# Patient Record
Sex: FEMALE | Race: WHITE | Employment: OTHER | URBAN - METROPOLITAN AREA
[De-identification: names, ages, dates, MRNs, and addresses within clinical notes are randomized per-mention and may not be internally consistent; named-entity substitution may affect disease eponyms.]

---

## 2017-02-04 DIAGNOSIS — J30.1 NON-SEASONAL ALLERGIC RHINITIS DUE TO POLLEN: Primary | ICD-10-CM

## 2017-02-04 RX ORDER — MONTELUKAST SODIUM 10 MG/1
10 TABLET ORAL NIGHTLY
Qty: 90 TABLET | Refills: 1 | Status: SHIPPED | OUTPATIENT
Start: 2017-02-04 | End: 2017-03-07 | Stop reason: ALTCHOICE

## 2017-03-07 ENCOUNTER — OFFICE VISIT (OUTPATIENT)
Dept: FAMILY MEDICINE CLINIC | Age: 77
End: 2017-03-07

## 2017-03-07 ENCOUNTER — HOSPITAL ENCOUNTER (OUTPATIENT)
Dept: OTHER | Age: 77
Discharge: HOME | End: 2017-03-07
Attending: NURSE PRACTITIONER | Admitting: NURSE PRACTITIONER

## 2017-03-07 VITALS
TEMPERATURE: 97.9 F | WEIGHT: 109.6 LBS | SYSTOLIC BLOOD PRESSURE: 100 MMHG | BODY MASS INDEX: 20.17 KG/M2 | HEART RATE: 77 BPM | HEIGHT: 62 IN | OXYGEN SATURATION: 97 % | DIASTOLIC BLOOD PRESSURE: 60 MMHG

## 2017-03-07 DIAGNOSIS — M79.89 SWELLING OF LEFT HAND: ICD-10-CM

## 2017-03-07 DIAGNOSIS — M79.89 SWELLING OF LEFT HAND: Primary | ICD-10-CM

## 2017-03-07 LAB
BASOPHILS ABSOLUTE: 0 K/UL (ref 0–0.2)
BASOPHILS RELATIVE PERCENT: 0.2 %
EOSINOPHILS ABSOLUTE: 0 K/UL (ref 0–0.6)
EOSINOPHILS RELATIVE PERCENT: 0.5 %
HCT VFR BLD CALC: 39.4 % (ref 36–48)
HEMOGLOBIN: 12.6 G/DL (ref 12–16)
LYMPHOCYTES ABSOLUTE: 4.4 K/UL (ref 1–5.1)
LYMPHOCYTES RELATIVE PERCENT: 56.2 %
MCH RBC QN AUTO: 29.6 PG (ref 26–34)
MCHC RBC AUTO-ENTMCNC: 32.1 G/DL (ref 31–36)
MCV RBC AUTO: 92.3 FL (ref 80–100)
MONOCYTES ABSOLUTE: 0.7 K/UL (ref 0–1.3)
MONOCYTES RELATIVE PERCENT: 8.9 %
NEUTROPHILS ABSOLUTE: 2.7 K/UL (ref 1.7–7.7)
NEUTROPHILS RELATIVE PERCENT: 34.2 %
PDW BLD-RTO: 17 % (ref 12.4–15.4)
PLATELET # BLD: 121 K/UL (ref 135–450)
PMV BLD AUTO: 9.5 FL (ref 5–10.5)
RBC # BLD: 4.27 M/UL (ref 4–5.2)
WBC # BLD: 7.8 K/UL (ref 4–11)

## 2017-03-07 PROCEDURE — G8484 FLU IMMUNIZE NO ADMIN: HCPCS | Performed by: NURSE PRACTITIONER

## 2017-03-07 PROCEDURE — 1036F TOBACCO NON-USER: CPT | Performed by: NURSE PRACTITIONER

## 2017-03-07 PROCEDURE — 4040F PNEUMOC VAC/ADMIN/RCVD: CPT | Performed by: NURSE PRACTITIONER

## 2017-03-07 PROCEDURE — 3017F COLORECTAL CA SCREEN DOC REV: CPT | Performed by: NURSE PRACTITIONER

## 2017-03-07 PROCEDURE — 99213 OFFICE O/P EST LOW 20 MIN: CPT | Performed by: NURSE PRACTITIONER

## 2017-03-07 PROCEDURE — G8399 PT W/DXA RESULTS DOCUMENT: HCPCS | Performed by: NURSE PRACTITIONER

## 2017-03-07 PROCEDURE — 1123F ACP DISCUSS/DSCN MKR DOCD: CPT | Performed by: NURSE PRACTITIONER

## 2017-03-07 PROCEDURE — G8419 CALC BMI OUT NRM PARAM NOF/U: HCPCS | Performed by: NURSE PRACTITIONER

## 2017-03-07 PROCEDURE — 1090F PRES/ABSN URINE INCON ASSESS: CPT | Performed by: NURSE PRACTITIONER

## 2017-03-07 PROCEDURE — G8427 DOCREV CUR MEDS BY ELIG CLIN: HCPCS | Performed by: NURSE PRACTITIONER

## 2017-03-07 RX ORDER — TRIAMCINOLONE ACETONIDE 55 UG/1
2 SPRAY, METERED NASAL
COMMUNITY
Start: 2017-02-16 | End: 2017-03-15

## 2017-03-07 RX ORDER — OXYMETAZOLINE HYDROCHLORIDE 0.05 G/100ML
2 SPRAY NASAL
COMMUNITY
End: 2017-05-08

## 2017-03-07 ASSESSMENT — ENCOUNTER SYMPTOMS
ALLERGIC/IMMUNOLOGIC NEGATIVE: 1
GASTROINTESTINAL NEGATIVE: 1
RESPIRATORY NEGATIVE: 1
EYES NEGATIVE: 1

## 2017-03-08 LAB — SEDIMENTATION RATE, ERYTHROCYTE: 4 MM/HR (ref 0–30)

## 2017-03-17 DIAGNOSIS — E78.2 MIXED HYPERLIPIDEMIA: Primary | ICD-10-CM

## 2017-03-17 RX ORDER — ATORVASTATIN CALCIUM 20 MG/1
20 TABLET, FILM COATED ORAL DAILY
Qty: 90 TABLET | Refills: 1 | Status: SHIPPED | OUTPATIENT
Start: 2017-03-17 | End: 2018-01-24 | Stop reason: SDUPTHER

## 2017-03-24 ENCOUNTER — TELEPHONE (OUTPATIENT)
Dept: FAMILY MEDICINE CLINIC | Age: 77
End: 2017-03-24

## 2017-04-11 ENCOUNTER — PATIENT MESSAGE (OUTPATIENT)
Dept: FAMILY MEDICINE CLINIC | Age: 77
End: 2017-04-11

## 2017-04-11 RX ORDER — SULFAMETHOXAZOLE AND TRIMETHOPRIM 800; 160 MG/1; MG/1
1 TABLET ORAL 2 TIMES DAILY
Qty: 20 TABLET | Refills: 0 | Status: SHIPPED | OUTPATIENT
Start: 2017-04-11 | End: 2017-04-21

## 2017-05-08 ENCOUNTER — OFFICE VISIT (OUTPATIENT)
Dept: FAMILY MEDICINE CLINIC | Age: 77
End: 2017-05-08

## 2017-05-08 ENCOUNTER — HOSPITAL ENCOUNTER (OUTPATIENT)
Dept: OTHER | Age: 77
Discharge: HOME | End: 2017-05-08
Attending: FAMILY MEDICINE | Admitting: FAMILY MEDICINE

## 2017-05-08 VITALS
WEIGHT: 106.6 LBS | SYSTOLIC BLOOD PRESSURE: 106 MMHG | BODY MASS INDEX: 19.62 KG/M2 | DIASTOLIC BLOOD PRESSURE: 68 MMHG | TEMPERATURE: 97.8 F

## 2017-05-08 DIAGNOSIS — J01.01 ACUTE RECURRENT MAXILLARY SINUSITIS: Primary | ICD-10-CM

## 2017-05-08 DIAGNOSIS — J30.1 SEASONAL ALLERGIC RHINITIS DUE TO POLLEN: ICD-10-CM

## 2017-05-08 DIAGNOSIS — H10.33 ACUTE BACTERIAL CONJUNCTIVITIS OF BOTH EYES: ICD-10-CM

## 2017-05-08 DIAGNOSIS — E78.2 MIXED HYPERLIPIDEMIA: ICD-10-CM

## 2017-05-08 DIAGNOSIS — J01.01 ACUTE RECURRENT MAXILLARY SINUSITIS: ICD-10-CM

## 2017-05-08 PROCEDURE — 3017F COLORECTAL CA SCREEN DOC REV: CPT | Performed by: FAMILY MEDICINE

## 2017-05-08 PROCEDURE — 1090F PRES/ABSN URINE INCON ASSESS: CPT | Performed by: FAMILY MEDICINE

## 2017-05-08 PROCEDURE — 99214 OFFICE O/P EST MOD 30 MIN: CPT | Performed by: FAMILY MEDICINE

## 2017-05-08 PROCEDURE — 1036F TOBACCO NON-USER: CPT | Performed by: FAMILY MEDICINE

## 2017-05-08 PROCEDURE — 1123F ACP DISCUSS/DSCN MKR DOCD: CPT | Performed by: FAMILY MEDICINE

## 2017-05-08 PROCEDURE — G8419 CALC BMI OUT NRM PARAM NOF/U: HCPCS | Performed by: FAMILY MEDICINE

## 2017-05-08 PROCEDURE — G8399 PT W/DXA RESULTS DOCUMENT: HCPCS | Performed by: FAMILY MEDICINE

## 2017-05-08 PROCEDURE — 4040F PNEUMOC VAC/ADMIN/RCVD: CPT | Performed by: FAMILY MEDICINE

## 2017-05-08 PROCEDURE — G8428 CUR MEDS NOT DOCUMENT: HCPCS | Performed by: FAMILY MEDICINE

## 2017-05-08 RX ORDER — LEVOFLOXACIN 500 MG/1
500 TABLET, FILM COATED ORAL DAILY
Qty: 10 TABLET | Refills: 0 | Status: SHIPPED | OUTPATIENT
Start: 2017-05-08 | End: 2017-05-18

## 2017-05-08 RX ORDER — CETIRIZINE HYDROCHLORIDE 10 MG/1
10 TABLET ORAL DAILY
COMMUNITY
End: 2020-01-13

## 2017-05-08 RX ORDER — MONTELUKAST SODIUM 10 MG/1
TABLET ORAL
COMMUNITY
Start: 2017-05-04 | End: 2017-08-02 | Stop reason: SDUPTHER

## 2017-05-08 ASSESSMENT — ENCOUNTER SYMPTOMS
EYES NEGATIVE: 1
GASTROINTESTINAL NEGATIVE: 1
RESPIRATORY NEGATIVE: 1

## 2017-05-16 DIAGNOSIS — E78.2 MIXED HYPERLIPIDEMIA: ICD-10-CM

## 2017-05-16 LAB
A/G RATIO: 2.9 (ref 1.1–2.2)
ALBUMIN SERPL-MCNC: 4.7 G/DL (ref 3.4–5)
ALP BLD-CCNC: 66 U/L (ref 40–129)
ALT SERPL-CCNC: 36 U/L (ref 10–40)
ANION GAP SERPL CALCULATED.3IONS-SCNC: 10 MMOL/L (ref 3–16)
AST SERPL-CCNC: 22 U/L (ref 15–37)
BILIRUB SERPL-MCNC: 0.3 MG/DL (ref 0–1)
BUN BLDV-MCNC: 11 MG/DL (ref 7–20)
CALCIUM SERPL-MCNC: 9.1 MG/DL (ref 8.3–10.6)
CHLORIDE BLD-SCNC: 103 MMOL/L (ref 99–110)
CHOLESTEROL, TOTAL: 180 MG/DL (ref 0–199)
CO2: 30 MMOL/L (ref 21–32)
CREAT SERPL-MCNC: <0.5 MG/DL (ref 0.6–1.2)
GFR AFRICAN AMERICAN: >60
GFR NON-AFRICAN AMERICAN: >60
GLOBULIN: 1.6 G/DL
GLUCOSE BLD-MCNC: 96 MG/DL (ref 70–99)
HDLC SERPL-MCNC: 67 MG/DL (ref 40–60)
LDL CHOLESTEROL CALCULATED: 80 MG/DL
POTASSIUM SERPL-SCNC: 4.3 MMOL/L (ref 3.5–5.1)
SODIUM BLD-SCNC: 143 MMOL/L (ref 136–145)
TOTAL PROTEIN: 6.3 G/DL (ref 6.4–8.2)
TRIGL SERPL-MCNC: 167 MG/DL (ref 0–150)
VLDLC SERPL CALC-MCNC: 33 MG/DL

## 2017-07-11 ENCOUNTER — OFFICE VISIT (OUTPATIENT)
Dept: FAMILY MEDICINE CLINIC | Age: 77
End: 2017-07-11

## 2017-07-11 ENCOUNTER — TELEPHONE (OUTPATIENT)
Dept: FAMILY MEDICINE CLINIC | Age: 77
End: 2017-07-11

## 2017-07-11 VITALS
DIASTOLIC BLOOD PRESSURE: 60 MMHG | TEMPERATURE: 96.7 F | HEART RATE: 76 BPM | HEIGHT: 62 IN | BODY MASS INDEX: 19.54 KG/M2 | OXYGEN SATURATION: 97 % | SYSTOLIC BLOOD PRESSURE: 100 MMHG | WEIGHT: 106.2 LBS

## 2017-07-11 DIAGNOSIS — M75.101 ROTATOR CUFF SYNDROME, RIGHT: Primary | ICD-10-CM

## 2017-07-11 PROCEDURE — G8427 DOCREV CUR MEDS BY ELIG CLIN: HCPCS | Performed by: NURSE PRACTITIONER

## 2017-07-11 PROCEDURE — 1123F ACP DISCUSS/DSCN MKR DOCD: CPT | Performed by: NURSE PRACTITIONER

## 2017-07-11 PROCEDURE — 99213 OFFICE O/P EST LOW 20 MIN: CPT | Performed by: NURSE PRACTITIONER

## 2017-07-11 PROCEDURE — 1090F PRES/ABSN URINE INCON ASSESS: CPT | Performed by: NURSE PRACTITIONER

## 2017-07-11 PROCEDURE — 4040F PNEUMOC VAC/ADMIN/RCVD: CPT | Performed by: NURSE PRACTITIONER

## 2017-07-11 PROCEDURE — 1036F TOBACCO NON-USER: CPT | Performed by: NURSE PRACTITIONER

## 2017-07-11 PROCEDURE — G8399 PT W/DXA RESULTS DOCUMENT: HCPCS | Performed by: NURSE PRACTITIONER

## 2017-07-11 PROCEDURE — G8420 CALC BMI NORM PARAMETERS: HCPCS | Performed by: NURSE PRACTITIONER

## 2017-07-11 RX ORDER — ALPRAZOLAM 1 MG/1
TABLET ORAL
Qty: 1 TABLET | Refills: 0 | Status: SHIPPED | OUTPATIENT
Start: 2017-07-11 | End: 2017-09-20 | Stop reason: ALTCHOICE

## 2017-07-11 ASSESSMENT — ENCOUNTER SYMPTOMS
GASTROINTESTINAL NEGATIVE: 1
ALLERGIC/IMMUNOLOGIC NEGATIVE: 1
EYES NEGATIVE: 1
RESPIRATORY NEGATIVE: 1

## 2017-07-12 ENCOUNTER — HOSPITAL ENCOUNTER (OUTPATIENT)
Dept: MRI IMAGING | Age: 77
Discharge: HOME | End: 2017-07-12
Attending: NURSE PRACTITIONER | Admitting: NURSE PRACTITIONER

## 2017-07-12 DIAGNOSIS — M75.101 ROTATOR CUFF SYNDROME, RIGHT: ICD-10-CM

## 2017-07-12 DIAGNOSIS — M75.101 RIGHT ROTATOR CUFF TEAR: ICD-10-CM

## 2017-08-02 DIAGNOSIS — J30.1 NON-SEASONAL ALLERGIC RHINITIS DUE TO POLLEN: ICD-10-CM

## 2017-08-02 RX ORDER — MONTELUKAST SODIUM 10 MG/1
10 TABLET ORAL NIGHTLY
Qty: 90 TABLET | Refills: 1 | Status: SHIPPED | OUTPATIENT
Start: 2017-08-02 | End: 2018-02-04 | Stop reason: SDUPTHER

## 2017-08-08 ENCOUNTER — PATIENT MESSAGE (OUTPATIENT)
Dept: FAMILY MEDICINE CLINIC | Age: 77
End: 2017-08-08

## 2017-08-08 DIAGNOSIS — M75.121 COMPLETE TEAR OF RIGHT ROTATOR CUFF: Primary | ICD-10-CM

## 2017-08-08 DIAGNOSIS — M19.011 PRIMARY OSTEOARTHRITIS OF RIGHT SHOULDER: ICD-10-CM

## 2017-08-10 PROBLEM — M75.121 COMPLETE TEAR OF RIGHT ROTATOR CUFF: Status: ACTIVE | Noted: 2017-08-10

## 2017-08-10 PROBLEM — M19.011 PRIMARY OSTEOARTHRITIS OF RIGHT SHOULDER: Status: ACTIVE | Noted: 2017-08-10

## 2017-08-15 ENCOUNTER — HOSPITAL ENCOUNTER (OUTPATIENT)
Dept: ONCOLOGY | Age: 77
Discharge: HOME | End: 2017-08-31
Attending: INTERNAL MEDICINE | Admitting: INTERNAL MEDICINE

## 2017-08-15 VITALS
TEMPERATURE: 97.3 F | SYSTOLIC BLOOD PRESSURE: 94 MMHG | HEART RATE: 69 BPM | DIASTOLIC BLOOD PRESSURE: 52 MMHG | RESPIRATION RATE: 16 BRPM

## 2017-08-15 DIAGNOSIS — D59.10 ACQUIRED AUTOIMMUNE HEMOLYTIC ANEMIA (HCC): ICD-10-CM

## 2017-08-15 DIAGNOSIS — C91.12 CLL (CHRONIC LYMPHOID LEUKEMIA) IN RELAPSE (HCC): ICD-10-CM

## 2017-08-15 LAB
ABO/RH: NORMAL
ANTIBODY SCREEN: NORMAL
BLOOD BANK DISPENSE STATUS: NORMAL
BLOOD BANK PRODUCT CODE: NORMAL
BPU ID: NORMAL
DESCRIPTION BLOOD BANK: NORMAL

## 2017-08-15 RX ORDER — SODIUM CHLORIDE 0.9 % (FLUSH) 0.9 %
20 SYRINGE (ML) INJECTION PRN
Status: CANCELLED | OUTPATIENT
Start: 2017-08-15

## 2017-08-15 RX ORDER — ACETAMINOPHEN 325 MG/1
650 TABLET ORAL ONCE
Status: CANCELLED | OUTPATIENT
Start: 2017-08-15 | End: 2017-08-15

## 2017-08-15 RX ORDER — SODIUM CHLORIDE 9 MG/ML
INJECTION, SOLUTION INTRAVENOUS CONTINUOUS
Status: CANCELLED | OUTPATIENT
Start: 2017-08-15

## 2017-08-15 RX ORDER — DIPHENHYDRAMINE HCL 25 MG
25 TABLET ORAL ONCE
Status: CANCELLED | OUTPATIENT
Start: 2017-08-15 | End: 2017-08-15

## 2017-08-15 RX ORDER — DIPHENHYDRAMINE HYDROCHLORIDE 50 MG/ML
50 INJECTION INTRAMUSCULAR; INTRAVENOUS ONCE
Status: CANCELLED | OUTPATIENT
Start: 2017-08-15 | End: 2017-08-15

## 2017-08-15 RX ORDER — DIPHENHYDRAMINE HYDROCHLORIDE 50 MG/ML
25 INJECTION INTRAMUSCULAR; INTRAVENOUS ONCE
Status: CANCELLED | OUTPATIENT
Start: 2017-08-15 | End: 2017-08-15

## 2017-08-15 RX ORDER — METHYLPREDNISOLONE SODIUM SUCCINATE 125 MG/2ML
125 INJECTION, POWDER, LYOPHILIZED, FOR SOLUTION INTRAMUSCULAR; INTRAVENOUS ONCE
Status: CANCELLED | OUTPATIENT
Start: 2017-08-15 | End: 2017-08-15

## 2017-08-15 RX ORDER — 0.9 % SODIUM CHLORIDE 0.9 %
10 VIAL (ML) INJECTION ONCE
Status: CANCELLED | OUTPATIENT
Start: 2017-08-15 | End: 2017-08-15

## 2017-08-17 ENCOUNTER — HOSPITAL ENCOUNTER (OUTPATIENT)
Dept: PHYSICAL THERAPY | Age: 77
Discharge: HOME | End: 2017-08-31
Admitting: FAMILY MEDICINE

## 2017-08-31 ENCOUNTER — HOSPITAL ENCOUNTER (OUTPATIENT)
Dept: ONCOLOGY | Age: 77
Discharge: HOME | End: 2017-08-31
Attending: INTERNAL MEDICINE | Admitting: INTERNAL MEDICINE

## 2017-08-31 DIAGNOSIS — C91.12 CLL (CHRONIC LYMPHOID LEUKEMIA) IN RELAPSE (HCC): ICD-10-CM

## 2017-08-31 DIAGNOSIS — D59.10 ACQUIRED AUTOIMMUNE HEMOLYTIC ANEMIA (HCC): ICD-10-CM

## 2017-08-31 LAB
ABO/RH: NORMAL
ANTIBODY IDENTIFICATION: NORMAL
ANTIBODY SCREEN: NORMAL

## 2017-08-31 RX ORDER — DIPHENHYDRAMINE HYDROCHLORIDE 50 MG/ML
50 INJECTION INTRAMUSCULAR; INTRAVENOUS ONCE
Status: CANCELLED | OUTPATIENT
Start: 2017-08-31 | End: 2017-08-31

## 2017-08-31 RX ORDER — SODIUM CHLORIDE 9 MG/ML
INJECTION, SOLUTION INTRAVENOUS CONTINUOUS
Status: CANCELLED | OUTPATIENT
Start: 2017-08-31

## 2017-08-31 RX ORDER — METHYLPREDNISOLONE SODIUM SUCCINATE 125 MG/2ML
125 INJECTION, POWDER, LYOPHILIZED, FOR SOLUTION INTRAMUSCULAR; INTRAVENOUS ONCE
Status: CANCELLED | OUTPATIENT
Start: 2017-08-31 | End: 2017-08-31

## 2017-08-31 RX ORDER — ACETAMINOPHEN 325 MG/1
650 TABLET ORAL ONCE
Status: CANCELLED | OUTPATIENT
Start: 2017-08-31 | End: 2017-08-31

## 2017-08-31 RX ORDER — DIPHENHYDRAMINE HCL 25 MG
25 TABLET ORAL ONCE
Status: CANCELLED | OUTPATIENT
Start: 2017-08-31 | End: 2017-08-31

## 2017-08-31 RX ORDER — DIPHENHYDRAMINE HYDROCHLORIDE 50 MG/ML
25 INJECTION INTRAMUSCULAR; INTRAVENOUS ONCE
Status: CANCELLED | OUTPATIENT
Start: 2017-08-31 | End: 2017-08-31

## 2017-08-31 RX ORDER — SODIUM CHLORIDE 0.9 % (FLUSH) 0.9 %
20 SYRINGE (ML) INJECTION PRN
Status: CANCELLED | OUTPATIENT
Start: 2017-08-31

## 2017-08-31 RX ORDER — 0.9 % SODIUM CHLORIDE 0.9 %
10 VIAL (ML) INJECTION ONCE
Status: CANCELLED | OUTPATIENT
Start: 2017-08-31 | End: 2017-08-31

## 2017-09-01 ENCOUNTER — HOSPITAL ENCOUNTER (OUTPATIENT)
Dept: ONCOLOGY | Age: 77
Discharge: HOME | End: 2017-09-01
Attending: INTERNAL MEDICINE | Admitting: INTERNAL MEDICINE

## 2017-09-01 VITALS
TEMPERATURE: 98.1 F | HEART RATE: 61 BPM | SYSTOLIC BLOOD PRESSURE: 101 MMHG | DIASTOLIC BLOOD PRESSURE: 62 MMHG | RESPIRATION RATE: 16 BRPM

## 2017-09-01 DIAGNOSIS — D59.10 ACQUIRED AUTOIMMUNE HEMOLYTIC ANEMIA (HCC): ICD-10-CM

## 2017-09-01 DIAGNOSIS — C91.12 CLL (CHRONIC LYMPHOID LEUKEMIA) IN RELAPSE (HCC): ICD-10-CM

## 2017-09-01 LAB
BLOOD BANK DISPENSE STATUS: NORMAL
BLOOD BANK PRODUCT CODE: NORMAL
BPU ID: NORMAL
DESCRIPTION BLOOD BANK: NORMAL

## 2017-09-01 RX ORDER — METHYLPREDNISOLONE SODIUM SUCCINATE 125 MG/2ML
125 INJECTION, POWDER, LYOPHILIZED, FOR SOLUTION INTRAMUSCULAR; INTRAVENOUS ONCE
Status: CANCELLED | OUTPATIENT
Start: 2017-09-01 | End: 2017-09-01

## 2017-09-01 RX ORDER — 0.9 % SODIUM CHLORIDE 0.9 %
10 VIAL (ML) INJECTION ONCE
Status: CANCELLED | OUTPATIENT
Start: 2017-09-01 | End: 2017-09-01

## 2017-09-01 RX ORDER — DIPHENHYDRAMINE HYDROCHLORIDE 50 MG/ML
25 INJECTION INTRAMUSCULAR; INTRAVENOUS ONCE
Status: CANCELLED | OUTPATIENT
Start: 2017-09-01 | End: 2017-09-01

## 2017-09-01 RX ORDER — DIPHENHYDRAMINE HCL 25 MG
25 TABLET ORAL ONCE
Status: CANCELLED | OUTPATIENT
Start: 2017-09-01 | End: 2017-09-01

## 2017-09-01 RX ORDER — DIPHENHYDRAMINE HYDROCHLORIDE 50 MG/ML
50 INJECTION INTRAMUSCULAR; INTRAVENOUS ONCE
Status: CANCELLED | OUTPATIENT
Start: 2017-09-01 | End: 2017-09-01

## 2017-09-01 RX ORDER — SODIUM CHLORIDE 0.9 % (FLUSH) 0.9 %
20 SYRINGE (ML) INJECTION PRN
Status: CANCELLED | OUTPATIENT
Start: 2017-09-01

## 2017-09-01 RX ORDER — ACETAMINOPHEN 325 MG/1
650 TABLET ORAL ONCE
Status: CANCELLED | OUTPATIENT
Start: 2017-09-01 | End: 2017-09-01

## 2017-09-01 RX ORDER — SODIUM CHLORIDE 9 MG/ML
INJECTION, SOLUTION INTRAVENOUS CONTINUOUS
Status: CANCELLED | OUTPATIENT
Start: 2017-09-01

## 2017-09-19 ENCOUNTER — TELEPHONE (OUTPATIENT)
Dept: FAMILY MEDICINE CLINIC | Age: 77
End: 2017-09-19

## 2017-09-19 RX ORDER — DOXYCYCLINE HYCLATE 100 MG
100 TABLET ORAL 2 TIMES DAILY
Qty: 20 TABLET | Refills: 0 | Status: SHIPPED | OUTPATIENT
Start: 2017-09-19 | End: 2017-09-29

## 2017-09-19 RX ORDER — ALBUTEROL SULFATE 90 UG/1
2 AEROSOL, METERED RESPIRATORY (INHALATION) EVERY 6 HOURS PRN
Qty: 1 INHALER | Refills: 1 | Status: SHIPPED | OUTPATIENT
Start: 2017-09-19 | End: 2019-03-05

## 2017-09-20 ENCOUNTER — OFFICE VISIT (OUTPATIENT)
Dept: FAMILY MEDICINE CLINIC | Age: 77
End: 2017-09-20

## 2017-09-20 VITALS
DIASTOLIC BLOOD PRESSURE: 60 MMHG | SYSTOLIC BLOOD PRESSURE: 110 MMHG | WEIGHT: 106 LBS | OXYGEN SATURATION: 97 % | TEMPERATURE: 98.2 F | HEART RATE: 78 BPM | BODY MASS INDEX: 19.51 KG/M2 | HEIGHT: 62 IN

## 2017-09-20 DIAGNOSIS — H57.89 EYE REDNESS: Primary | ICD-10-CM

## 2017-09-20 DIAGNOSIS — R05.9 COUGH: ICD-10-CM

## 2017-09-20 PROCEDURE — 1123F ACP DISCUSS/DSCN MKR DOCD: CPT | Performed by: NURSE PRACTITIONER

## 2017-09-20 PROCEDURE — 99213 OFFICE O/P EST LOW 20 MIN: CPT | Performed by: NURSE PRACTITIONER

## 2017-09-20 PROCEDURE — 1090F PRES/ABSN URINE INCON ASSESS: CPT | Performed by: NURSE PRACTITIONER

## 2017-09-20 PROCEDURE — G8420 CALC BMI NORM PARAMETERS: HCPCS | Performed by: NURSE PRACTITIONER

## 2017-09-20 PROCEDURE — G8399 PT W/DXA RESULTS DOCUMENT: HCPCS | Performed by: NURSE PRACTITIONER

## 2017-09-20 PROCEDURE — G8427 DOCREV CUR MEDS BY ELIG CLIN: HCPCS | Performed by: NURSE PRACTITIONER

## 2017-09-20 PROCEDURE — 4040F PNEUMOC VAC/ADMIN/RCVD: CPT | Performed by: NURSE PRACTITIONER

## 2017-09-20 PROCEDURE — 1036F TOBACCO NON-USER: CPT | Performed by: NURSE PRACTITIONER

## 2017-09-20 RX ORDER — BROMPHENIRAMINE MALEATE, PSEUDOEPHEDRINE HYDROCHLORIDE, AND DEXTROMETHORPHAN HYDROBROMIDE 2; 30; 10 MG/5ML; MG/5ML; MG/5ML
5 SYRUP ORAL 4 TIMES DAILY PRN
Qty: 200 ML | Refills: 0 | Status: SHIPPED | OUTPATIENT
Start: 2017-09-20 | End: 2019-03-05 | Stop reason: ALTCHOICE

## 2017-09-20 RX ORDER — ERYTHROMYCIN 5 MG/G
OINTMENT OPHTHALMIC
Qty: 1 TUBE | Refills: 0 | Status: SHIPPED | OUTPATIENT
Start: 2017-09-20 | End: 2019-03-05

## 2017-09-20 ASSESSMENT — ENCOUNTER SYMPTOMS
EYE DISCHARGE: 1
BLURRED VISION: 0
COUGH: 1
GASTROINTESTINAL NEGATIVE: 1
VOMITING: 0
FOREIGN BODY SENSATION: 1
WHEEZING: 0
ALLERGIC/IMMUNOLOGIC NEGATIVE: 1
NAUSEA: 0
SORE THROAT: 0
HEARTBURN: 0
EYE REDNESS: 1
HEMOPTYSIS: 0
PHOTOPHOBIA: 0
RHINORRHEA: 0
SHORTNESS OF BREATH: 0
DOUBLE VISION: 0

## 2018-01-15 ENCOUNTER — TELEPHONE (OUTPATIENT)
Dept: FAMILY MEDICINE CLINIC | Age: 78
End: 2018-01-15

## 2018-01-24 RX ORDER — ATORVASTATIN CALCIUM 20 MG/1
TABLET, FILM COATED ORAL
Qty: 60 TABLET | Refills: 0 | Status: SHIPPED | OUTPATIENT
Start: 2018-01-24 | End: 2018-06-03 | Stop reason: SDUPTHER

## 2018-02-04 DIAGNOSIS — J30.1 NON-SEASONAL ALLERGIC RHINITIS DUE TO POLLEN: ICD-10-CM

## 2018-02-04 RX ORDER — MONTELUKAST SODIUM 10 MG/1
10 TABLET ORAL NIGHTLY
Qty: 90 TABLET | Refills: 1 | Status: SHIPPED | OUTPATIENT
Start: 2018-02-04 | End: 2018-08-12 | Stop reason: SDUPTHER

## 2018-02-14 ENCOUNTER — OFFICE VISIT (OUTPATIENT)
Dept: FAMILY MEDICINE CLINIC | Age: 78
End: 2018-02-14

## 2018-02-14 VITALS
TEMPERATURE: 98.3 F | WEIGHT: 107.8 LBS | SYSTOLIC BLOOD PRESSURE: 110 MMHG | OXYGEN SATURATION: 96 % | HEART RATE: 66 BPM | HEIGHT: 62 IN | BODY MASS INDEX: 19.84 KG/M2 | DIASTOLIC BLOOD PRESSURE: 60 MMHG

## 2018-02-14 DIAGNOSIS — M25.531 RIGHT WRIST PAIN: ICD-10-CM

## 2018-02-14 DIAGNOSIS — M47.812 OSTEOARTHRITIS OF CERVICAL SPINE, UNSPECIFIED SPINAL OSTEOARTHRITIS COMPLICATION STATUS: ICD-10-CM

## 2018-02-14 DIAGNOSIS — D61.01 ACUTE PURE RED CELL APLASIA (HCC): ICD-10-CM

## 2018-02-14 DIAGNOSIS — M75.121 COMPLETE TEAR OF RIGHT ROTATOR CUFF: Primary | ICD-10-CM

## 2018-02-14 RX ORDER — CELECOXIB 100 MG/1
100 CAPSULE ORAL DAILY
Qty: 90 CAPSULE | Refills: 1 | Status: SHIPPED | OUTPATIENT
Start: 2018-02-14 | End: 2018-11-09 | Stop reason: SDUPTHER

## 2018-02-14 ASSESSMENT — ENCOUNTER SYMPTOMS
GASTROINTESTINAL NEGATIVE: 1
VISUAL CHANGE: 0
PHOTOPHOBIA: 0
TROUBLE SWALLOWING: 0
EYES NEGATIVE: 1
ALLERGIC/IMMUNOLOGIC NEGATIVE: 1
RESPIRATORY NEGATIVE: 1

## 2018-02-14 NOTE — PROGRESS NOTES
Date    Allergic rhinitis due to pollen 1/20/2016    Arthritis, degenerative 5/3/2011    Cancer (HCC)     chronic lymphocytic leukemia    CLL (chronic lymphoblastic leukemia)     Closed compression fracture of thoracic vertebra (HCC) 6/27/2016    Depression     Hyperlipidemia     Melanoma of upper arm ([address] [address]     2002    Mild reactive airways disease 1/20/2016     Past Surgical History:   Procedure Laterality Date    APPENDECTOMY      BELPHAROPTOSIS REPAIR      CATARACT REMOVAL WITH IMPLANT      bilat    NASAL SINUS SURGERY      TONSILLECTOMY       Family History   Problem Relation Age of Onset    Cancer Mother      lymphoma    Heart Disease Father     Heart Disease Sister     Arthritis Sister      Social History     Social History    Marital status:      Spouse name: N/A    Number of children: N/A    Years of education: N/A     Occupational History    Not on file. Social History Main Topics    Smoking status: Never Smoker    Smokeless tobacco: Never Used    Alcohol use 0.0 oz/week      Comment: occasionally     Drug use: Unknown    Sexual activity: Not on file     Other Topics Concern    Not on file     Social History Narrative    No narrative on file         Any recent diagnostic tests, hospital reports, office notes or consultation letters were reviewed prior to and during the visit. Shoulder Pain    The pain is present in the right shoulder. This is a recurrent problem. The current episode started more than 1 month ago. The problem has been unchanged. The quality of the pain is described as aching. The pain is moderate. Associated symptoms include a limited range of motion and stiffness. Pertinent negatives include no fever, inability to bear weight, itching, joint locking, joint swelling, numbness or tingling. She has tried NSAIDS for the symptoms. The treatment provided mild relief. Neck Pain    This is a chronic problem.  The current episode started more than 1 decreased range of motion, tenderness, bony tenderness, pain and decreased strength. She exhibits no swelling, no effusion, no crepitus, no deformity, no laceration, no spasm and normal pulse. Right wrist: She exhibits tenderness, bony tenderness and swelling. She exhibits normal range of motion, no effusion, no crepitus, no deformity and no laceration. Cervical back: She exhibits tenderness, bony tenderness and pain. She exhibits normal range of motion, no swelling, no edema, no deformity, no laceration, no spasm and normal pulse. Neurological: She is alert and oriented to person, place, and time. Skin: Skin is warm and dry. Psychiatric: She has a normal mood and affect. Her behavior is normal. Thought content normal.         1. Complete tear of right rotator cuff  Would like to try PT, order written celecoxib (CELEBREX) 100 MG capsule    External Referral To Physical Therapy   2. Osteoarthritis of cervical spine, unspecified spinal osteoarthritis complication status  Condition is unchanged, will change treatment, call if no better or worsens   celecoxib (CELEBREX) 100 MG capsule    External Referral To Physical Therapy   3.  Right wrist pain Condition is unchanged, will change treatment, call if no better or worsens   celecoxib (CELEBREX) 100 MG capsule    External Referral To Physical Therapy             Chrissie Cullen NP

## 2018-03-01 RX ORDER — MECLIZINE HYDROCHLORIDE 25 MG/1
25 TABLET ORAL 3 TIMES DAILY PRN
Qty: 60 TABLET | Refills: 1 | Status: SHIPPED | OUTPATIENT
Start: 2018-03-01 | End: 2019-03-05

## 2018-03-22 ENCOUNTER — HOSPITAL ENCOUNTER (OUTPATIENT)
Dept: GENERAL RADIOLOGY | Age: 78
Discharge: HOME | End: 2018-03-22
Attending: INTERNAL MEDICINE | Admitting: INTERNAL MEDICINE

## 2018-03-22 DIAGNOSIS — Z13.820 ENCOUNTER FOR IMAGING TO ASSESS OSTEOPOROSIS: ICD-10-CM

## 2018-03-22 DIAGNOSIS — M81.0 SENILE OSTEOPOROSIS: ICD-10-CM

## 2018-03-22 DIAGNOSIS — Q78.2 OSTEOPETROSIS: ICD-10-CM

## 2018-06-03 RX ORDER — ATORVASTATIN CALCIUM 20 MG/1
TABLET, FILM COATED ORAL
Qty: 30 TABLET | Refills: 0 | Status: SHIPPED | OUTPATIENT
Start: 2018-06-03 | End: 2018-08-12 | Stop reason: SDUPTHER

## 2018-06-18 ENCOUNTER — TELEPHONE (OUTPATIENT)
Dept: FAMILY MEDICINE CLINIC | Age: 78
End: 2018-06-18

## 2018-06-18 DIAGNOSIS — E78.2 MIXED HYPERLIPIDEMIA: Primary | ICD-10-CM

## 2018-07-12 DIAGNOSIS — E78.2 MIXED HYPERLIPIDEMIA: ICD-10-CM

## 2018-07-12 LAB
A/G RATIO: 3.3 (ref 1.1–2.2)
ALBUMIN SERPL-MCNC: 4.6 G/DL (ref 3.4–5)
ALP BLD-CCNC: 57 U/L (ref 40–129)
ALT SERPL-CCNC: 27 U/L (ref 10–40)
ANION GAP SERPL CALCULATED.3IONS-SCNC: 9 MMOL/L (ref 3–16)
AST SERPL-CCNC: 21 U/L (ref 15–37)
BILIRUB SERPL-MCNC: 0.4 MG/DL (ref 0–1)
BUN BLDV-MCNC: 14 MG/DL (ref 7–20)
CALCIUM SERPL-MCNC: 9.3 MG/DL (ref 8.3–10.6)
CHLORIDE BLD-SCNC: 103 MMOL/L (ref 99–110)
CHOLESTEROL, TOTAL: 185 MG/DL (ref 0–199)
CO2: 29 MMOL/L (ref 21–32)
CREAT SERPL-MCNC: <0.5 MG/DL (ref 0.6–1.2)
GFR AFRICAN AMERICAN: >60
GFR NON-AFRICAN AMERICAN: >60
GLOBULIN: 1.4 G/DL
GLUCOSE BLD-MCNC: 97 MG/DL (ref 70–99)
HDLC SERPL-MCNC: 74 MG/DL (ref 40–60)
LDL CHOLESTEROL CALCULATED: 90 MG/DL
POTASSIUM SERPL-SCNC: 4.2 MMOL/L (ref 3.5–5.1)
SODIUM BLD-SCNC: 141 MMOL/L (ref 136–145)
TOTAL PROTEIN: 6 G/DL (ref 6.4–8.2)
TRIGL SERPL-MCNC: 106 MG/DL (ref 0–150)
VLDLC SERPL CALC-MCNC: 21 MG/DL

## 2018-08-12 DIAGNOSIS — J30.1 NON-SEASONAL ALLERGIC RHINITIS DUE TO POLLEN: ICD-10-CM

## 2018-08-14 ENCOUNTER — TELEPHONE (OUTPATIENT)
Dept: INTERNAL MEDICINE | Age: 78
End: 2018-08-14

## 2018-08-14 RX ORDER — MONTELUKAST SODIUM 10 MG/1
TABLET ORAL
Qty: 90 TABLET | Refills: 0 | Status: SHIPPED | OUTPATIENT
Start: 2018-08-14 | End: 2018-11-21 | Stop reason: SDUPTHER

## 2018-08-14 RX ORDER — ATORVASTATIN CALCIUM 20 MG/1
TABLET, FILM COATED ORAL
Qty: 90 TABLET | Refills: 0 | Status: SHIPPED | OUTPATIENT
Start: 2018-08-14 | End: 2018-11-05 | Stop reason: SDUPTHER

## 2018-08-14 NOTE — TELEPHONE ENCOUNTER
Patient called in stating she needs medication refill for atorvastatin (LIPITOR) 20 MG tablet   Verified pharmacy with patient on file  Please call patient

## 2018-10-10 ENCOUNTER — HOSPITAL ENCOUNTER (OUTPATIENT)
Dept: CT IMAGING | Age: 78
Discharge: HOME | End: 2018-10-10
Payer: MEDICARE

## 2018-10-10 DIAGNOSIS — D59.10 AUTOIMMUNE HEMOLYTIC ANEMIA (HCC): ICD-10-CM

## 2018-10-10 PROCEDURE — 74177 CT ABD & PELVIS W/CONTRAST: CPT

## 2018-10-10 PROCEDURE — 6360000004 HC RX CONTRAST MEDICATION: Performed by: INTERNAL MEDICINE

## 2018-10-10 RX ADMIN — IOHEXOL 50 ML: 240 INJECTION, SOLUTION INTRATHECAL; INTRAVASCULAR; INTRAVENOUS; ORAL at 11:50

## 2018-10-10 RX ADMIN — IOPAMIDOL 80 ML: 755 INJECTION, SOLUTION INTRAVENOUS at 11:50

## 2018-11-05 DIAGNOSIS — E78.2 MIXED HYPERLIPIDEMIA: Primary | ICD-10-CM

## 2018-11-05 RX ORDER — ATORVASTATIN CALCIUM 20 MG/1
TABLET, FILM COATED ORAL
Qty: 90 TABLET | Refills: 0 | Status: SHIPPED | OUTPATIENT
Start: 2018-11-05 | End: 2019-10-28 | Stop reason: SDUPTHER

## 2018-11-08 ENCOUNTER — OFFICE VISIT (OUTPATIENT)
Dept: INTERNAL MEDICINE CLINIC | Age: 78
End: 2018-11-08
Payer: MEDICARE

## 2018-11-08 VITALS
OXYGEN SATURATION: 97 % | SYSTOLIC BLOOD PRESSURE: 110 MMHG | DIASTOLIC BLOOD PRESSURE: 70 MMHG | BODY MASS INDEX: 19.88 KG/M2 | WEIGHT: 108 LBS | HEIGHT: 62 IN

## 2018-11-08 DIAGNOSIS — C91.10 CHRONIC LARGE GRANULAR LYMPHOCYTIC LEUKEMIA (HCC): ICD-10-CM

## 2018-11-08 DIAGNOSIS — M75.111 INCOMPLETE TEAR OF RIGHT ROTATOR CUFF: ICD-10-CM

## 2018-11-08 DIAGNOSIS — D80.1 HYPOGAMMAGLOBULINEMIA (HCC): ICD-10-CM

## 2018-11-08 DIAGNOSIS — E78.2 MIXED HYPERLIPIDEMIA: ICD-10-CM

## 2018-11-08 DIAGNOSIS — D59.10 ACQUIRED AUTOIMMUNE HEMOLYTIC ANEMIA (HCC): Primary | ICD-10-CM

## 2018-11-08 PROCEDURE — 1090F PRES/ABSN URINE INCON ASSESS: CPT | Performed by: INTERNAL MEDICINE

## 2018-11-08 PROCEDURE — 1101F PT FALLS ASSESS-DOCD LE1/YR: CPT | Performed by: INTERNAL MEDICINE

## 2018-11-08 PROCEDURE — 99213 OFFICE O/P EST LOW 20 MIN: CPT | Performed by: INTERNAL MEDICINE

## 2018-11-08 PROCEDURE — 4040F PNEUMOC VAC/ADMIN/RCVD: CPT | Performed by: INTERNAL MEDICINE

## 2018-11-08 PROCEDURE — G8420 CALC BMI NORM PARAMETERS: HCPCS | Performed by: INTERNAL MEDICINE

## 2018-11-08 PROCEDURE — G8484 FLU IMMUNIZE NO ADMIN: HCPCS | Performed by: INTERNAL MEDICINE

## 2018-11-08 PROCEDURE — 1123F ACP DISCUSS/DSCN MKR DOCD: CPT | Performed by: INTERNAL MEDICINE

## 2018-11-08 PROCEDURE — 1036F TOBACCO NON-USER: CPT | Performed by: INTERNAL MEDICINE

## 2018-11-08 PROCEDURE — G8427 DOCREV CUR MEDS BY ELIG CLIN: HCPCS | Performed by: INTERNAL MEDICINE

## 2018-11-08 PROCEDURE — G8399 PT W/DXA RESULTS DOCUMENT: HCPCS | Performed by: INTERNAL MEDICINE

## 2018-11-08 ASSESSMENT — PATIENT HEALTH QUESTIONNAIRE - PHQ9
SUM OF ALL RESPONSES TO PHQ9 QUESTIONS 1 & 2: 0
2. FEELING DOWN, DEPRESSED OR HOPELESS: 0
1. LITTLE INTEREST OR PLEASURE IN DOING THINGS: 0
SUM OF ALL RESPONSES TO PHQ QUESTIONS 1-9: 0
SUM OF ALL RESPONSES TO PHQ QUESTIONS 1-9: 0

## 2018-11-08 ASSESSMENT — ENCOUNTER SYMPTOMS
SHORTNESS OF BREATH: 0
CHEST TIGHTNESS: 0
NAUSEA: 0
VOMITING: 0
ABDOMINAL PAIN: 0

## 2018-11-08 NOTE — PROGRESS NOTES
 Melanoma of upper arm ([facility] [address]     2002    Mild reactive airways disease 1/20/2016       Social History:   TOBACCO:   reports that she has never smoked. She has never used smokeless tobacco.  ETOH:   reports that she drinks alcohol. Current Medications:    Prior to Admission medications    Medication Sig Start Date End Date Taking? Authorizing Provider   montelukast (SINGULAIR) 10 MG tablet TAKE ONE TABLET BY MOUTH ONCE NIGHTLY 8/14/18  Yes Ghazal Jose MD   erythromycin [facility]) 5 MG/GM ophthalmic ointment Apply to affected eye(s) 3 times a day x 10 days 9/20/17  Yes BRAULIO Love CNP   brompheniramine-pseudoephedrine-DM 30-2-10 MG/5ML syrup Take 5 mLs by mouth 4 times daily as needed for Cough 9/20/17  Yes BRAULIO Love CNP   butalbital-aspirin-caffeine (FIORINAL) -40 MG capsule TAKE ONE CAPSULE BY MOUTH EVERY 4 HOURS AS NEEDED  **DO NOT EXCEED 6 CAPSULES IN 24 HOURS** 7/21/17  Yes Regina Tam MD   predniSONE (DELTASONE) 10 MG tablet Take half tablet every day 6/20/17  Yes Regina Tam MD   cetirizine (ZYRTEC) 10 MG tablet Take 10 mg by mouth daily   Yes Historical Provider, MD   folic acid (FOLVITE) 1 MG tablet Take 1 tablet by mouth daily 10/25/16  Yes Regina Tam MD   Multiple Vitamin (MVI, CELEBRATE, CHEWABLE TABLET) Take 1 tablet by mouth 3/11/09  Yes Historical Provider, MD   calcium citrate-vitamin D (CITRICAL + D) 315-250 MG-UNIT TABS Take  by mouth. Yes Historical Provider, MD   celecoxib (CELEBREX) 100 MG capsule Take 1 capsule by mouth daily 11/9/18   Marilyn Gomez MD   atorvastatin (LIPITOR) 20 MG tablet TAKE ONE TABLET BY MOUTH DAILY 11/5/18   [facility]  Gina Medina.,    meclizine (ANTIVERT) 25 MG tablet Take 1 tablet by mouth 3 times daily as needed for Dizziness or Nausea 3/1/18   Ghazal Jose MD   albuterol sulfate HFA (PROVENTIL HFA) 108 (90 Base) MCG/ACT inhaler Inhale 2 puffs into the lungs every 6 hours as needed for Wheezing MAY SUBSTITUTE PER Musculoskeletal: She exhibits no deformity. R shoulder: limited abduction 120 due to pain  Mild pain with forced ext rotation and IR   likley rotator cuff tendinitis/ tear      Lymphadenopathy:        Head (right side): No submental and no submandibular adenopathy present. Head (left side): No submental and no submandibular adenopathy present. She has no cervical adenopathy. Right cervical: No superficial cervical and no deep cervical adenopathy present. Left cervical: No superficial cervical and no deep cervical adenopathy present. Neurological: She is alert and oriented to person, place, and time. She has normal reflexes. She exhibits normal muscle tone. GCS eye subscore is 4. GCS verbal subscore is 5. GCS motor subscore is 6. Skin: No rash noted. She is not diaphoretic. Psychiatric: She has a normal mood and affect. Her behavior is normal. Thought content normal.          Assessment/Plan:   Celeste Carnes was seen today for established new doctor. Diagnoses and all orders for this visit:    Acquired autoimmune hemolytic anemia (HCC)    Chronic large granular lymphocytic leukemia ([facility] [address]  She is followed by Dr Millie Fernando, no side effects on Prednison    Mixed hyperlipidemia  On Lipitor 20 mg - no side effects- no changes in meds    Incomplete tear of right rotator cuff  -     External Referral To Physical Therapy      Quality & Risk Score Accuracy    Last edited 11/11/18 19:14 EST by Chela Jain MD       - Patient was encouraged to callthe office (and ask to see me) or be seen by other provider for any worsening or lack    of improvement in his symptoms.       - Pt was asked toschedule an appointment in 6 months, and to let me know of any scheduling difficulties.      Chela Jain M.D.   11/11/2018, 7:17 PM

## 2018-11-09 DIAGNOSIS — M25.531 RIGHT WRIST PAIN: ICD-10-CM

## 2018-11-09 DIAGNOSIS — M47.812 OSTEOARTHRITIS OF CERVICAL SPINE, UNSPECIFIED SPINAL OSTEOARTHRITIS COMPLICATION STATUS: ICD-10-CM

## 2018-11-09 DIAGNOSIS — M75.121 COMPLETE TEAR OF RIGHT ROTATOR CUFF: ICD-10-CM

## 2018-11-09 RX ORDER — CELECOXIB 100 MG/1
100 CAPSULE ORAL DAILY
Qty: 90 CAPSULE | Refills: 1 | Status: SHIPPED | OUTPATIENT
Start: 2018-11-09 | End: 2019-05-14 | Stop reason: ALTCHOICE

## 2018-11-21 DIAGNOSIS — J30.1 NON-SEASONAL ALLERGIC RHINITIS DUE TO POLLEN: ICD-10-CM

## 2018-11-21 RX ORDER — MONTELUKAST SODIUM 10 MG/1
10 TABLET ORAL NIGHTLY
Qty: 90 TABLET | Refills: 2 | Status: SHIPPED | OUTPATIENT
Start: 2018-11-21 | End: 2019-08-25 | Stop reason: SDUPTHER

## 2019-03-05 ENCOUNTER — HOSPITAL ENCOUNTER (OUTPATIENT)
Age: 79
Discharge: HOME | End: 2019-03-05
Payer: MEDICARE

## 2019-03-05 ENCOUNTER — TELEPHONE (OUTPATIENT)
Dept: INTERNAL MEDICINE CLINIC | Age: 79
End: 2019-03-05

## 2019-03-05 ENCOUNTER — OFFICE VISIT (OUTPATIENT)
Dept: INTERNAL MEDICINE CLINIC | Age: 79
End: 2019-03-05
Payer: MEDICARE

## 2019-03-05 ENCOUNTER — HOSPITAL ENCOUNTER (OUTPATIENT)
Dept: GENERAL RADIOLOGY | Age: 79
Discharge: HOME | End: 2019-03-05
Payer: MEDICARE

## 2019-03-05 VITALS
WEIGHT: 108 LBS | BODY MASS INDEX: 20.01 KG/M2 | SYSTOLIC BLOOD PRESSURE: 130 MMHG | OXYGEN SATURATION: 96 % | DIASTOLIC BLOOD PRESSURE: 80 MMHG | HEART RATE: 75 BPM

## 2019-03-05 DIAGNOSIS — F41.9 ANXIETY: Primary | ICD-10-CM

## 2019-03-05 DIAGNOSIS — M75.121 COMPLETE TEAR OF RIGHT ROTATOR CUFF: Primary | ICD-10-CM

## 2019-03-05 DIAGNOSIS — M75.121 COMPLETE TEAR OF RIGHT ROTATOR CUFF: ICD-10-CM

## 2019-03-05 DIAGNOSIS — H65.92 LEFT SEROUS OTITIS MEDIA, UNSPECIFIED CHRONICITY: ICD-10-CM

## 2019-03-05 DIAGNOSIS — C91.10 CHRONIC LARGE GRANULAR LYMPHOCYTIC LEUKEMIA (HCC): ICD-10-CM

## 2019-03-05 DIAGNOSIS — D80.1 HYPOGAMMAGLOBULINEMIA (HCC): ICD-10-CM

## 2019-03-05 PROCEDURE — 1123F ACP DISCUSS/DSCN MKR DOCD: CPT | Performed by: INTERNAL MEDICINE

## 2019-03-05 PROCEDURE — G8427 DOCREV CUR MEDS BY ELIG CLIN: HCPCS | Performed by: INTERNAL MEDICINE

## 2019-03-05 PROCEDURE — 1036F TOBACCO NON-USER: CPT | Performed by: INTERNAL MEDICINE

## 2019-03-05 PROCEDURE — 1090F PRES/ABSN URINE INCON ASSESS: CPT | Performed by: INTERNAL MEDICINE

## 2019-03-05 PROCEDURE — 99213 OFFICE O/P EST LOW 20 MIN: CPT | Performed by: INTERNAL MEDICINE

## 2019-03-05 PROCEDURE — G8484 FLU IMMUNIZE NO ADMIN: HCPCS | Performed by: INTERNAL MEDICINE

## 2019-03-05 PROCEDURE — G8420 CALC BMI NORM PARAMETERS: HCPCS | Performed by: INTERNAL MEDICINE

## 2019-03-05 PROCEDURE — G8399 PT W/DXA RESULTS DOCUMENT: HCPCS | Performed by: INTERNAL MEDICINE

## 2019-03-05 PROCEDURE — 4040F PNEUMOC VAC/ADMIN/RCVD: CPT | Performed by: INTERNAL MEDICINE

## 2019-03-05 PROCEDURE — 73030 X-RAY EXAM OF SHOULDER: CPT

## 2019-03-05 RX ORDER — TRAMADOL HYDROCHLORIDE 50 MG/1
50 TABLET ORAL EVERY 12 HOURS PRN
Qty: 14 TABLET | Refills: 1 | Status: SHIPPED | OUTPATIENT
Start: 2019-03-05 | End: 2019-03-12

## 2019-03-05 RX ORDER — AZITHROMYCIN 250 MG/1
250 TABLET, FILM COATED ORAL SEE ADMIN INSTRUCTIONS
Qty: 6 TABLET | Refills: 0 | Status: SHIPPED | OUTPATIENT
Start: 2019-03-05 | End: 2019-03-10

## 2019-03-05 RX ORDER — LORAZEPAM 0.5 MG/1
0.5 TABLET ORAL EVERY 8 HOURS PRN
Qty: 1 TABLET | Refills: 0 | OUTPATIENT
Start: 2019-03-05 | End: 2019-03-06

## 2019-03-05 RX ORDER — IBUPROFEN 200 MG
400 TABLET ORAL 3 TIMES DAILY
COMMUNITY
End: 2021-02-09

## 2019-03-05 ASSESSMENT — PATIENT HEALTH QUESTIONNAIRE - PHQ9
SUM OF ALL RESPONSES TO PHQ QUESTIONS 1-9: 0
2. FEELING DOWN, DEPRESSED OR HOPELESS: 0
SUM OF ALL RESPONSES TO PHQ9 QUESTIONS 1 & 2: 0
1. LITTLE INTEREST OR PLEASURE IN DOING THINGS: 0
SUM OF ALL RESPONSES TO PHQ QUESTIONS 1-9: 0

## 2019-03-05 NOTE — LETTER
MEDICATION AGREEMENT     Vinayak Plants  [birth year]      For certain conditions, multiple classes of medications may be used to help better manage your symptoms, and to improve your ability to function at home, work and in social settings. However, these medications do have risks, which will be discussed with you, including addiction and dependency. The following prescribed medications need frequent monitoring and will require you to partner and assist in your healthcare. Medication  Dose, instructions and quantity as indicated on current prescription bottle Diagnosis/Reason(s) for Taking Category       Tramadol 50mg BID     Shoulder pain                               Benefits and goals of Controlled Substance Medications: There are two potential goals for your treatment: (1) decreased pain and suffering (2) improved daily life functions. There are many possible treatments for your chronic condition(s), and, in addition to controlled substance medications, we will try alternatives such as physical therapy, yoga, massage, home daily exercise, meditation, relaxation techniques, injections, chiropractic manipulations, surgery, cognitive therapy, hypnosis and many medications that are not habit-forming. Use of controlled substance medications may be helpful, but they are unlikely to resolve all of your symptoms or restore all function. Risks of Controlled Substance Medications:    Opioid pain medications: These medications can lead to problems such as addiction/dependence, sedation, lightheadedness/dizziness, memory issues, falls, constipation, nausea, or vomiting. They may also impair the ability to drive or operate machinery. Additionally, these medications may lower testosterone levels, leading to loss of bone strength, stamina and sex drive.   They may cause problems with breathing, sleep apnea and reduced coughing, which are especially dangerous for patients with lung disease. Overdose or dangerous interactions with alcohol and other medications may occur, leading to death. Hyperalgesia may develop, in which patients receiving opioids for the treatment of pain may actually become more sensitive to certain painful stimuli, and in some cases, experience pain from ordinarily non-painful stimuli. Women between the ages of 14-53 who could become pregnant should carefully weigh the risks and benefits of opioids with their physicians, as these medications increase the risk of pregnancy complications, including miscarriage,  delivery and stillbirth. It is also possible for babies to be born addicted to opioids. Opioid dependence withdrawal symptoms may include; feelings of uneasiness, increased pain, irritability, belly pain, diarrhea, sweats and goose-flesh. Benzodiazepines and non-benzodiazepine sleep medications: These medications can lead to problems such as addiction/dependence, sedation, fatigue, lightheadedness, dizziness, incoordination, falls, depression, hallucinations, and impaired judgment, memory and concentration. The ability to drive and operate machinery may also be affected. Abnormal sleep-related behaviors have been reported, including sleep walking, driving, making telephone calls, eating, or having sex while not fully awake. These medications can suppress breathing and worsen sleep apnea, particularly when combined with alcohol or other sedating medications, potentially leading to death. Dependence withdrawal symptoms may include tremors, anxiety, hallucinations and seizures. Stimulants:  Common adverse effects include addiction/dependence, increased blood pressure and heart rate, decreased appetite, nausea, involuntary weight loss, insomnia, irritability, and headaches.   These risks may increase when these medications are combined with other stimulants, such as caffeine pills or energy drinks, certain weight loss supplements and oral decongestants. Dependence withdrawal symptoms may include depressed mood, loss of interest, suicidal thoughts, anxiety, fatigue, appetite changes and agitation. Testosterone replacement therapy:  Potential side effects include increased risk of stroke and heart attack, blood clots, increased blood pressure, increased cholesterol, enlarged prostate, sleep apnea, irritability/aggression and other mood disorders, and decreased fertility. Other:     1. I understand that I have the following responsibilities:  · I will take medications at the dose and frequency prescribed. · I will not increase or change how I take my medications without the approval of the health care provider who signs this Medication Agreement. · I will arrange for refills at the prescribed interval ONLY during regular office hours. I will not ask for refills earlier than agreed, after-hours, on holidays or on weekends. · I will obtain all refills for these medications at  ·  ____________________________________  pharmacy (phone number  ·  ________________________), with full consent for my provider and pharmacist to exchange information in writing or verbally. · I will not request any pain medications or controlled substances from other providers and will inform this provider of all other medications I am taking. · I will inform my other health care providers that I am taking these medications and of the existence of this [address]. In the event of an emergency, I will provide the same information to the emergency department providers. · I will protect my prescriptions and medications. I understand that lost or misplaced prescriptions will not be replaced. · I will keep medications only for my own use and will not share them with others. I will keep all medications away from children. · I agree to participate in any medical, psychological or psychiatric assessments recommended by my provider. · I will actively participate in any program designed to improve function, including social, physical, psychological and daily or work activities. 2. I will not use illegal or street drugs or another person's prescription. If I have an addiction problem with drugs or alcohol and my provider asks me to enter a program to address this issue, I agree to follow through. Such programs may include:  · 12-Step program and securing a sponsor  · Individual counseling   · Inpatient or outpatient treatment  · Other:_____________________________________________________________________________________________________________________________________________    If in treatment, I will request that a copy of the programs initial evaluation and treatment recommendations be sent to this provider and will not expect refills until that is received. I will also request written monthly updates be sent to this provider to verify my continuing treatment. 3. I will consent to drug screening upon my providers request to assure I am only taking the prescribed drugs, described in this MEDICATION AGREEMENT. I understand that a drug screen is a laboratory test in which a sample of my urine, blood or saliva is checked to see what drugs I have been taking. 4. I agree that I will treat the providers and staff at this office with respect at all times. I will keep all of my scheduled appointments, but if I need to cancel my appointment, I will do so a minimum of 24 hours before it is scheduled. 5. I understand that this provider may stop prescribing the medications listed if:  · I do not show any improvement in pain, or my activity has not improved. · I develop rapid tolerance or loss of improvement, as described in my treatment plan. · I develop significant side effects from the medication.   · My behavior is inconsistent with the responsibilities outlined above, which may also result in my being prevented from receiving further care from this office. · Other:____________________________________________________________________    AGREEMENT:    I have read the above and have had all of my questions answered. For chronic disease management, I know that my symptoms can be managed with many types of treatments. A chronic medication trial may be part of my treatment, but I must be an active participant in my care. Medication therapy is only one part of my symptom management plan. In some cases, there may be limited scientific evidence to support the chronic use of certain medications to improve symptoms and daily function. Furthermore, in certain circumstances, there may be scientific information that suggests that use of chronic controlled substances may actually worsen my symptoms and increase my risk of unintentional death directly related to this medication therapy. I know that if my provider feels my risk from controlled medications is greater than my benefit, I will have my controlled substance medication(s) compassionately lowered or removed altogether. I agree to a controlled substance medication trial.      I further agree to allow this office to contact my HIPAA contact on file if there are concerns about my safety and use of controlled medications. I have agreed to use the following medications above as instructed by my physician and as stated in this [address].      Patient Signature:  ______________________  Date:3/5/2019 or _____________    Provider Signature:______________________  Date:3/5/2019 or _____________

## 2019-03-05 NOTE — PROGRESS NOTES
MD Mirza   traMADol (ULTRAM) 50 MG tablet Take 1 tablet by mouth every 12 hours as needed for Pain for up to 7 days. Intended supply: 3 days. Take lowest dose possible to manage pain 3/5/19 3/12/19 Yes Isabelle Duff MD   montelukast (SINGULAIR) 10 MG tablet Take 1 tablet by mouth nightly 11/21/18  Yes Isabelle Duff MD   celecoxib (CELEBREX) 100 MG capsule Take 1 capsule by mouth daily 11/9/18  Yes Isabelle Duff MD   atorvastatin (LIPITOR) 20 MG tablet TAKE ONE TABLET BY MOUTH DAILY 11/5/18  Yes Angel Vargas.,    butalbital-aspirin-caffeine [facility]) -40 MG capsule TAKE ONE CAPSULE BY MOUTH EVERY 4 HOURS AS NEEDED  **DO NOT EXCEED 6 CAPSULES IN 24 HOURS** 7/21/17  Yes Luz Merrill MD   predniSONE (DELTASONE) 10 MG tablet Take half tablet every day 6/20/17  Yes Luz Merrill MD   cetirizine (ZYRTEC) 10 MG tablet Take 10 mg by mouth daily   Yes Historical Provider, MD   fluticasone (FLONASE) 50 MCG/ACT nasal spray 1 spray by Nasal route daily   Yes Historical Provider, MD   Multiple Vitamin (MVI, CELEBRATE, CHEWABLE TABLET) Take 1 tablet by mouth 3/11/09  Yes Historical Provider, MD   calcium citrate-vitamin D (CITRICAL + D) 315-250 MG-UNIT TABS Take  by mouth. Yes Historical Provider, MD       REVIEW OF SYSTEMS:  Review of Systems   Constitutional: Negative for activity change, appetite change, chills, fever and unexpected weight change. HENT: Positive for ear pain (pressure in both ears ). Negative for hearing loss. Eyes: Negative for visual disturbance. Respiratory: Negative for cough, chest tightness and shortness of breath. Cardiovascular: Negative for chest pain. Gastrointestinal: Negative for abdominal pain, nausea and vomiting. Genitourinary: Negative for hematuria. Musculoskeletal: Positive for arthralgias (R shoulder pain ). Skin: Negative for rash. Allergic/Immunologic: Negative for immunocompromised state.    Neurological: Negative for dizziness and headaches. Psychiatric/Behavioral: Negative for dysphoric mood and suicidal ideas. Physical Exam:      Vitals: /80 (Site: Left Upper Arm, Position: Sitting, Cuff Size: Small Adult)   Pulse 75   Wt 108 lb (49 kg)   LMP  (LMP Unknown)   SpO2 96%   BMI 20.01 kg/m²     Body mass index is 20.01 kg/m². Wt Readings from Last 3 Encounters:   03/05/19 108 lb (49 kg)   11/08/18 108 lb (49 kg)   02/14/18 107 lb 12.8 oz (48.9 kg)     Physical Exam   Constitutional: She is oriented to person, place, and time. She appears well-developed and well-nourished. No distress. HENT:   Head: Normocephalic and atraumatic. Mouth/Throat: Oropharynx is clear and moist. No oropharyngeal exudate. Small amount of fluid behind tympanic membrane. Eyes: Conjunctivae and EOM are normal. Right eye exhibits no discharge. Left eye exhibits no discharge. No scleral icterus. Neck: Normal range of motion. Neck supple. Cardiovascular: Normal rate and normal heart sounds. No murmur heard. Pulmonary/Chest: Effort normal and breath sounds normal. No respiratory distress. She has no wheezes. She has no rales. Abdominal: Soft. She exhibits no distension and no mass. There is no tenderness. There is no guarding. Musculoskeletal: She exhibits no deformity. R shoulder- significant  limitation in ROM especially ABD  No redness/ swelling. Lymphadenopathy:        Head (right side): No submental and no submandibular adenopathy present. Head (left side): No submental and no submandibular adenopathy present. She has no cervical adenopathy. Right cervical: No superficial cervical and no deep cervical adenopathy present. Left cervical: No superficial cervical and no deep cervical adenopathy present. Neurological: She is alert and oriented to person, place, and time. She has normal reflexes. She exhibits normal muscle tone. GCS eye subscore is 4. GCS verbal subscore is 5.  GCS motor subscore

## 2019-03-06 ENCOUNTER — HOSPITAL ENCOUNTER (OUTPATIENT)
Dept: MRI IMAGING | Age: 79
Discharge: HOME | End: 2019-03-06
Payer: MEDICARE

## 2019-03-06 DIAGNOSIS — M75.121 COMPLETE TEAR OF RIGHT ROTATOR CUFF: ICD-10-CM

## 2019-03-06 PROCEDURE — 73221 MRI JOINT UPR EXTREM W/O DYE: CPT

## 2019-04-25 ASSESSMENT — ENCOUNTER SYMPTOMS
CHEST TIGHTNESS: 0
NAUSEA: 0
ABDOMINAL PAIN: 0
VOMITING: 0
COUGH: 0
SHORTNESS OF BREATH: 0

## 2019-05-14 ENCOUNTER — OFFICE VISIT (OUTPATIENT)
Dept: INTERNAL MEDICINE CLINIC | Age: 79
End: 2019-05-14
Payer: MEDICARE

## 2019-05-14 VITALS
HEART RATE: 69 BPM | DIASTOLIC BLOOD PRESSURE: 62 MMHG | BODY MASS INDEX: 19.64 KG/M2 | WEIGHT: 106 LBS | OXYGEN SATURATION: 97 % | SYSTOLIC BLOOD PRESSURE: 110 MMHG

## 2019-05-14 DIAGNOSIS — D59.10 ACQUIRED AUTOIMMUNE HEMOLYTIC ANEMIA (HCC): ICD-10-CM

## 2019-05-14 DIAGNOSIS — Z12.12 SCREENING FOR COLORECTAL CANCER: ICD-10-CM

## 2019-05-14 DIAGNOSIS — F51.01 PRIMARY INSOMNIA: Primary | ICD-10-CM

## 2019-05-14 DIAGNOSIS — Z12.11 SCREENING FOR COLORECTAL CANCER: ICD-10-CM

## 2019-05-14 DIAGNOSIS — E78.2 MIXED HYPERLIPIDEMIA: ICD-10-CM

## 2019-05-14 DIAGNOSIS — C91.12 CLL (CHRONIC LYMPHOID LEUKEMIA) IN RELAPSE (HCC): ICD-10-CM

## 2019-05-14 PROCEDURE — G8427 DOCREV CUR MEDS BY ELIG CLIN: HCPCS | Performed by: INTERNAL MEDICINE

## 2019-05-14 PROCEDURE — 1090F PRES/ABSN URINE INCON ASSESS: CPT | Performed by: INTERNAL MEDICINE

## 2019-05-14 PROCEDURE — 1036F TOBACCO NON-USER: CPT | Performed by: INTERNAL MEDICINE

## 2019-05-14 PROCEDURE — 1123F ACP DISCUSS/DSCN MKR DOCD: CPT | Performed by: INTERNAL MEDICINE

## 2019-05-14 PROCEDURE — 99213 OFFICE O/P EST LOW 20 MIN: CPT | Performed by: INTERNAL MEDICINE

## 2019-05-14 PROCEDURE — 4040F PNEUMOC VAC/ADMIN/RCVD: CPT | Performed by: INTERNAL MEDICINE

## 2019-05-14 PROCEDURE — G8399 PT W/DXA RESULTS DOCUMENT: HCPCS | Performed by: INTERNAL MEDICINE

## 2019-05-14 PROCEDURE — G8420 CALC BMI NORM PARAMETERS: HCPCS | Performed by: INTERNAL MEDICINE

## 2019-05-14 RX ORDER — TRAZODONE HYDROCHLORIDE 50 MG/1
50 TABLET ORAL NIGHTLY
Qty: 30 TABLET | Refills: 1 | Status: SHIPPED | OUTPATIENT
Start: 2019-05-14 | End: 2019-06-01

## 2019-05-14 ASSESSMENT — ENCOUNTER SYMPTOMS
CONSTIPATION: 1
ABDOMINAL PAIN: 0
VOMITING: 0
CHEST TIGHTNESS: 0
NAUSEA: 0
SHORTNESS OF BREATH: 0

## 2019-05-14 NOTE — PROGRESS NOTES
Outpatient Note for established Patient - regular Visit    History Obtained From:  patient, electronic medical record  Is the patient new to me ? - No    HISTORY OF PRESENT ILLNESS:   The patient is a 69 y.o. female who is here today for :      Her shoulder pain is much better   She had surgery   BP looks good today. Pt denies CP/SOB/palpitations/abdominal pain/N/V. She see Dr Yonatan Jackman tomorrow for the CLL. No LOW. CARIDAD/ fever/ chills. She is on OTC anti-histamine  insomnia meds  recently difficulties with falling asleep. She still has some shoulder discomfort. Melatonin does not work. She reports good sleep hygiene     Preventive:  1)  colon cancer screening completed? No- she ius ok with FIT   2) Breast cancer screeningcompleted (or not needed) ? - she does not want to do mammogram       Past Medical History:        Diagnosis Date    Allergic rhinitis due to pollen 1/20/2016    Arthritis, degenerative 5/3/2011    Cancer (HCC)     chronic lymphocytic leukemia    CLL (chronic lymphoblastic leukemia)     Closed compression fracture of thoracic vertebra ([facility] [address] 6/27/2016    Depression     Hyperlipidemia     Melanoma of upper arm ([facility] [address]     2002    Mild reactive airways disease 1/20/2016       Social History:   TOBACCO:   reports that she has never smoked. She has never used smokeless tobacco.    ETOH:   reports that she drinks alcohol. Current Medications:    Prior to Admission medications    Medication Sig Start Date End Date Taking?  Authorizing Provider   traZODone (DESYREL) 50 MG tablet Take 1 tablet by mouth nightly 5/14/19  Yes Isabelle Duff MD   B Complex Vitamins (B-COMPLEX/B-12 PO) Take 1 capsule by mouth daily   Yes Historical Provider, MD   ibuprofen (ADVIL;MOTRIN) 200 MG tablet Take 400 mg by mouth 3 times daily   Yes Historical Provider, MD   montelukast (SINGULAIR) 10 MG tablet Take 1 tablet by mouth nightly 11/21/18  Yes Isabelle Duff MD   atorvastatin (LIPITOR) 20 MG tablet TAKE ONE TABLET BY MOUTH DAILY 11/5/18  Yes Deontetaylor Eller. Alonso Andrews, DO   butalbital-aspirin-caffeine [facility]) -40 MG capsule TAKE ONE CAPSULE BY MOUTH EVERY 4 HOURS AS NEEDED  **DO NOT EXCEED 6 CAPSULES IN 24 HOURS** 7/21/17  Yes Ledy Valentin MD   predniSONE (DELTASONE) 10 MG tablet Take half tablet every day 6/20/17  Yes Ledy Valentin MD   cetirizine (ZYRTEC) 10 MG tablet Take 10 mg by mouth daily   Yes Historical Provider, MD   fluticasone (FLONASE) 50 MCG/ACT nasal spray 1 spray by Nasal route daily   Yes Historical Provider, MD   Multiple Vitamin (MVI, CELEBRATE, CHEWABLE TABLET) Take 1 tablet by mouth 3/11/09  Yes Historical Provider, MD   calcium citrate-vitamin D (CITRICAL + D) 315-250 MG-UNIT TABS Take  by mouth. Yes Historical Provider, MD       REVIEW OF SYSTEMS:  Review of Systems   Constitutional: Negative for activity change, appetite change, chills, fever and unexpected weight change. HENT: Negative for hearing loss. Eyes: Negative for visual disturbance. Respiratory: Negative for chest tightness and shortness of breath. Cardiovascular: Negative for chest pain. Gastrointestinal: Positive for constipation (good with stool softener ). Negative for abdominal pain, nausea and vomiting. Genitourinary: Negative for difficulty urinating and hematuria. Skin: Negative for rash. Allergic/Immunologic: Negative for immunocompromised state. Neurological: Negative for dizziness and headaches. Psychiatric/Behavioral: Negative for dysphoric mood and suicidal ideas. The patient is not nervous/anxious. Physical Exam:      Vitals: /62 (Site: Left Upper Arm, Position: Sitting, Cuff Size: Small Adult)   Pulse 69   Wt 106 lb (48.1 kg)   LMP  (LMP Unknown)   SpO2 97%   BMI 19.64 kg/m²     Body mass index is 19.64 kg/m².   Wt Readings from Last 3 Encounters:   05/14/19 106 lb (48.1 kg)   03/05/19 108 lb (49 kg)   11/08/18 108 lb (49 kg)     Physical Exam will, see Dr Karolyn Tomlinson and repeat labs including CBC    Acquired autoimmune hemolytic anemia ([facility]    Screening for colorectal cancer  -     POCT Fecal Immunochemical Test (FIT)      Quality & Risk Score Accuracy    Visit Dx:  D59.1 - Acquired autoimmune hemolytic anemia (HCC)  Assessment and plan:  Stable based upon symptoms and exam. Continue current treatment plan and follow up at least yearly. She will see Dr Karolyn Tomlinson tomorrow   Last edited 05/14/19 09:56 EDT by Jesus Shell MD       - Patient was encouraged to callthe office (and ask to see me) or be seen by other provider for any worsening or lack    of improvement in his symptoms.       - Pt was asked toschedule an appointment in 6 months, and to let me know of any scheduling difficulties. Additional patients instructions (if given): There are no Patient Instructions on file for this visit.     Jesus Shell M.D.   5/14/2019, 10:09 AM

## 2019-05-15 LAB
CONTROL: NORMAL
HEMOCCULT STL QL: NORMAL

## 2019-05-16 DIAGNOSIS — E78.2 MIXED HYPERLIPIDEMIA: ICD-10-CM

## 2019-05-16 LAB
A/G RATIO: 2 (ref 1.1–2.2)
ALBUMIN SERPL-MCNC: 4.3 G/DL (ref 3.4–5)
ALP BLD-CCNC: 66 U/L (ref 40–129)
ALT SERPL-CCNC: 21 U/L (ref 10–40)
ANION GAP SERPL CALCULATED.3IONS-SCNC: 10 MMOL/L (ref 3–16)
AST SERPL-CCNC: 18 U/L (ref 15–37)
BILIRUB SERPL-MCNC: <0.2 MG/DL (ref 0–1)
BUN BLDV-MCNC: 11 MG/DL (ref 7–20)
CALCIUM SERPL-MCNC: 8.9 MG/DL (ref 8.3–10.6)
CHLORIDE BLD-SCNC: 103 MMOL/L (ref 99–110)
CHOLESTEROL, TOTAL: 189 MG/DL (ref 0–199)
CO2: 28 MMOL/L (ref 21–32)
CREAT SERPL-MCNC: 0.5 MG/DL (ref 0.6–1.2)
GFR AFRICAN AMERICAN: >60
GFR NON-AFRICAN AMERICAN: >60
GLOBULIN: 2.1 G/DL
GLUCOSE BLD-MCNC: 90 MG/DL (ref 70–99)
HDLC SERPL-MCNC: 61 MG/DL (ref 40–60)
LDL CHOLESTEROL CALCULATED: 96 MG/DL
POTASSIUM SERPL-SCNC: 3.9 MMOL/L (ref 3.5–5.1)
SODIUM BLD-SCNC: 141 MMOL/L (ref 136–145)
TOTAL PROTEIN: 6.4 G/DL (ref 6.4–8.2)
TRIGL SERPL-MCNC: 161 MG/DL (ref 0–150)
VLDLC SERPL CALC-MCNC: 32 MG/DL

## 2019-05-22 ENCOUNTER — PATIENT MESSAGE (OUTPATIENT)
Dept: INTERNAL MEDICINE CLINIC | Age: 79
End: 2019-05-22

## 2019-05-23 ENCOUNTER — TELEPHONE (OUTPATIENT)
Dept: INTERNAL MEDICINE CLINIC | Age: 79
End: 2019-05-23

## 2019-05-23 NOTE — TELEPHONE ENCOUNTER
Test Results Question     From  Charyl Apgar To  [facility] [address] Practice Support Sent  2019  2:00 PM   ----- Message from [facility] [address], Processor sent at 2019  2:00 PM EDT -----     Dr. Darylene Reid,   Because my lipid test seems good, should I continue to take the Lennie Sis take it 4 times a week, 20mgs.  Thank you!

## 2019-08-25 DIAGNOSIS — J30.1 NON-SEASONAL ALLERGIC RHINITIS DUE TO POLLEN: ICD-10-CM

## 2019-08-28 RX ORDER — MONTELUKAST SODIUM 10 MG/1
TABLET ORAL
Qty: 90 TABLET | Refills: 1 | Status: SHIPPED | OUTPATIENT
Start: 2019-08-28 | End: 2020-03-05

## 2019-10-28 ENCOUNTER — OFFICE VISIT (OUTPATIENT)
Dept: INTERNAL MEDICINE CLINIC | Age: 79
End: 2019-10-28
Payer: MEDICARE

## 2019-10-28 VITALS
WEIGHT: 105.2 LBS | HEART RATE: 74 BPM | BODY MASS INDEX: 19.49 KG/M2 | DIASTOLIC BLOOD PRESSURE: 70 MMHG | OXYGEN SATURATION: 94 % | SYSTOLIC BLOOD PRESSURE: 122 MMHG

## 2019-10-28 DIAGNOSIS — D59.10 ACQUIRED AUTOIMMUNE HEMOLYTIC ANEMIA (HCC): ICD-10-CM

## 2019-10-28 DIAGNOSIS — J01.01 ACUTE RECURRENT MAXILLARY SINUSITIS: ICD-10-CM

## 2019-10-28 DIAGNOSIS — C91.10 CHRONIC LARGE GRANULAR LYMPHOCYTIC LEUKEMIA (HCC): Primary | ICD-10-CM

## 2019-10-28 DIAGNOSIS — E78.2 MIXED HYPERLIPIDEMIA: ICD-10-CM

## 2019-10-28 PROCEDURE — 4040F PNEUMOC VAC/ADMIN/RCVD: CPT | Performed by: INTERNAL MEDICINE

## 2019-10-28 PROCEDURE — 1090F PRES/ABSN URINE INCON ASSESS: CPT | Performed by: INTERNAL MEDICINE

## 2019-10-28 PROCEDURE — 1123F ACP DISCUSS/DSCN MKR DOCD: CPT | Performed by: INTERNAL MEDICINE

## 2019-10-28 PROCEDURE — G8399 PT W/DXA RESULTS DOCUMENT: HCPCS | Performed by: INTERNAL MEDICINE

## 2019-10-28 PROCEDURE — G8427 DOCREV CUR MEDS BY ELIG CLIN: HCPCS | Performed by: INTERNAL MEDICINE

## 2019-10-28 PROCEDURE — 1036F TOBACCO NON-USER: CPT | Performed by: INTERNAL MEDICINE

## 2019-10-28 PROCEDURE — G8484 FLU IMMUNIZE NO ADMIN: HCPCS | Performed by: INTERNAL MEDICINE

## 2019-10-28 PROCEDURE — 99213 OFFICE O/P EST LOW 20 MIN: CPT | Performed by: INTERNAL MEDICINE

## 2019-10-28 PROCEDURE — G8420 CALC BMI NORM PARAMETERS: HCPCS | Performed by: INTERNAL MEDICINE

## 2019-10-28 RX ORDER — ATORVASTATIN CALCIUM 20 MG/1
TABLET, FILM COATED ORAL
Qty: 90 TABLET | Refills: 3 | Status: SHIPPED | OUTPATIENT
Start: 2019-10-28 | End: 2020-12-14 | Stop reason: SDUPTHER

## 2019-10-28 RX ORDER — DOXYCYCLINE HYCLATE 100 MG
100 TABLET ORAL 2 TIMES DAILY
Qty: 14 TABLET | Refills: 0 | Status: SHIPPED | OUTPATIENT
Start: 2019-10-28 | End: 2019-11-04 | Stop reason: SDUPTHER

## 2019-10-28 ASSESSMENT — ENCOUNTER SYMPTOMS
DIARRHEA: 0
BLOOD IN STOOL: 0
NAUSEA: 0
ABDOMINAL PAIN: 0
VOMITING: 0
RHINORRHEA: 1
CHEST TIGHTNESS: 0
SINUS PRESSURE: 1
BACK PAIN: 0
CONSTIPATION: 0
SHORTNESS OF BREATH: 0
SORE THROAT: 0

## 2019-11-04 DIAGNOSIS — J01.01 ACUTE RECURRENT MAXILLARY SINUSITIS: ICD-10-CM

## 2019-11-04 RX ORDER — DOXYCYCLINE HYCLATE 100 MG
TABLET ORAL
Qty: 14 TABLET | Refills: 0 | Status: SHIPPED | OUTPATIENT
Start: 2019-11-04 | End: 2020-01-13

## 2019-11-26 ENCOUNTER — HOSPITAL ENCOUNTER (OUTPATIENT)
Dept: GENERAL RADIOLOGY | Age: 79
Discharge: HOME | End: 2019-11-26
Payer: MEDICARE

## 2019-11-26 DIAGNOSIS — Z78.0 POSTMENOPAUSAL STATUS: ICD-10-CM

## 2020-01-10 ENCOUNTER — TELEPHONE (OUTPATIENT)
Dept: INTERNAL MEDICINE CLINIC | Age: 80
End: 2020-01-10

## 2020-01-13 ENCOUNTER — OFFICE VISIT (OUTPATIENT)
Dept: INTERNAL MEDICINE CLINIC | Age: 80
End: 2020-01-13
Payer: MEDICARE

## 2020-01-13 VITALS
HEIGHT: 61 IN | OXYGEN SATURATION: 97 % | DIASTOLIC BLOOD PRESSURE: 70 MMHG | WEIGHT: 106 LBS | SYSTOLIC BLOOD PRESSURE: 102 MMHG | HEART RATE: 76 BPM | BODY MASS INDEX: 20.01 KG/M2

## 2020-01-13 DIAGNOSIS — R53.83 OTHER FATIGUE: ICD-10-CM

## 2020-01-13 DIAGNOSIS — C91.10 CHRONIC LARGE GRANULAR LYMPHOCYTIC LEUKEMIA (HCC): ICD-10-CM

## 2020-01-13 LAB
A/G RATIO: 2.5 (ref 1.1–2.2)
ALBUMIN SERPL-MCNC: 4.2 G/DL (ref 3.4–5)
ALP BLD-CCNC: 80 U/L (ref 40–129)
ALT SERPL-CCNC: 20 U/L (ref 10–40)
ANION GAP SERPL CALCULATED.3IONS-SCNC: 12 MMOL/L (ref 3–16)
AST SERPL-CCNC: 20 U/L (ref 15–37)
BASOPHILS ABSOLUTE: 0.1 K/UL (ref 0–0.2)
BASOPHILS RELATIVE PERCENT: 1 %
BILIRUB SERPL-MCNC: 0.3 MG/DL (ref 0–1)
BUN BLDV-MCNC: 9 MG/DL (ref 7–20)
CALCIUM SERPL-MCNC: 8.9 MG/DL (ref 8.3–10.6)
CHLORIDE BLD-SCNC: 103 MMOL/L (ref 99–110)
CO2: 28 MMOL/L (ref 21–32)
CREAT SERPL-MCNC: <0.5 MG/DL (ref 0.6–1.2)
EOSINOPHILS ABSOLUTE: 0.1 K/UL (ref 0–0.6)
EOSINOPHILS RELATIVE PERCENT: 1 %
GFR AFRICAN AMERICAN: >60
GFR NON-AFRICAN AMERICAN: >60
GLOBULIN: 1.7 G/DL
GLUCOSE BLD-MCNC: 81 MG/DL (ref 70–99)
HCT VFR BLD CALC: 26.7 % (ref 36–48)
HEMATOLOGY PATH CONSULT: YES
HEMOGLOBIN: 8.9 G/DL (ref 12–16)
LYMPHOCYTES ABSOLUTE: 4.1 K/UL (ref 1–5.1)
LYMPHOCYTES RELATIVE PERCENT: 80 %
MCH RBC QN AUTO: 30.9 PG (ref 26–34)
MCHC RBC AUTO-ENTMCNC: 33.5 G/DL (ref 31–36)
MCV RBC AUTO: 92.3 FL (ref 80–100)
MONOCYTES ABSOLUTE: 0.3 K/UL (ref 0–1.3)
MONOCYTES RELATIVE PERCENT: 5 %
MONONUCLEAR UNIDENTIFIED CELLS: 2 %
NEUTROPHILS ABSOLUTE: 0.6 K/UL (ref 1.7–7.7)
NEUTROPHILS RELATIVE PERCENT: 11 %
PDW BLD-RTO: 16.2 % (ref 12.4–15.4)
PLATELET # BLD: 141 K/UL (ref 135–450)
PMV BLD AUTO: 11 FL (ref 5–10.5)
POTASSIUM SERPL-SCNC: 4.3 MMOL/L (ref 3.5–5.1)
RBC # BLD: 2.89 M/UL (ref 4–5.2)
SODIUM BLD-SCNC: 143 MMOL/L (ref 136–145)
TOTAL PROTEIN: 5.9 G/DL (ref 6.4–8.2)
TSH REFLEX: 3.97 UIU/ML (ref 0.27–4.2)
WBC # BLD: 5.1 K/UL (ref 4–11)

## 2020-01-13 PROCEDURE — 1090F PRES/ABSN URINE INCON ASSESS: CPT | Performed by: INTERNAL MEDICINE

## 2020-01-13 PROCEDURE — G8484 FLU IMMUNIZE NO ADMIN: HCPCS | Performed by: INTERNAL MEDICINE

## 2020-01-13 PROCEDURE — G8399 PT W/DXA RESULTS DOCUMENT: HCPCS | Performed by: INTERNAL MEDICINE

## 2020-01-13 PROCEDURE — 99213 OFFICE O/P EST LOW 20 MIN: CPT | Performed by: INTERNAL MEDICINE

## 2020-01-13 PROCEDURE — G8420 CALC BMI NORM PARAMETERS: HCPCS | Performed by: INTERNAL MEDICINE

## 2020-01-13 PROCEDURE — 1036F TOBACCO NON-USER: CPT | Performed by: INTERNAL MEDICINE

## 2020-01-13 PROCEDURE — 93000 ELECTROCARDIOGRAM COMPLETE: CPT | Performed by: INTERNAL MEDICINE

## 2020-01-13 PROCEDURE — 4040F PNEUMOC VAC/ADMIN/RCVD: CPT | Performed by: INTERNAL MEDICINE

## 2020-01-13 PROCEDURE — G8427 DOCREV CUR MEDS BY ELIG CLIN: HCPCS | Performed by: INTERNAL MEDICINE

## 2020-01-13 PROCEDURE — 1123F ACP DISCUSS/DSCN MKR DOCD: CPT | Performed by: INTERNAL MEDICINE

## 2020-01-13 SDOH — ECONOMIC STABILITY: INCOME INSECURITY: HOW HARD IS IT FOR YOU TO PAY FOR THE VERY BASICS LIKE FOOD, HOUSING, MEDICAL CARE, AND HEATING?: NOT HARD AT ALL

## 2020-01-13 SDOH — ECONOMIC STABILITY: TRANSPORTATION INSECURITY
IN THE PAST 12 MONTHS, HAS THE LACK OF TRANSPORTATION KEPT YOU FROM MEDICAL APPOINTMENTS OR FROM GETTING MEDICATIONS?: NO

## 2020-01-13 SDOH — ECONOMIC STABILITY: FOOD INSECURITY: WITHIN THE PAST 12 MONTHS, YOU WORRIED THAT YOUR FOOD WOULD RUN OUT BEFORE YOU GOT MONEY TO BUY MORE.: NEVER TRUE

## 2020-01-13 SDOH — ECONOMIC STABILITY: FOOD INSECURITY: WITHIN THE PAST 12 MONTHS, THE FOOD YOU BOUGHT JUST DIDN'T LAST AND YOU DIDN'T HAVE MONEY TO GET MORE.: NEVER TRUE

## 2020-01-13 SDOH — ECONOMIC STABILITY: TRANSPORTATION INSECURITY
IN THE PAST 12 MONTHS, HAS LACK OF TRANSPORTATION KEPT YOU FROM MEETINGS, WORK, OR FROM GETTING THINGS NEEDED FOR DAILY LIVING?: NO

## 2020-01-13 ASSESSMENT — PATIENT HEALTH QUESTIONNAIRE - PHQ9
1. LITTLE INTEREST OR PLEASURE IN DOING THINGS: 0
2. FEELING DOWN, DEPRESSED OR HOPELESS: 0
SUM OF ALL RESPONSES TO PHQ9 QUESTIONS 1 & 2: 0
SUM OF ALL RESPONSES TO PHQ QUESTIONS 1-9: 0
SUM OF ALL RESPONSES TO PHQ QUESTIONS 1-9: 0

## 2020-01-13 ASSESSMENT — ENCOUNTER SYMPTOMS
NAUSEA: 0
SORE THROAT: 0
BACK PAIN: 0
SHORTNESS OF BREATH: 0
ABDOMINAL PAIN: 0
EYE REDNESS: 0
COUGH: 0
CHEST TIGHTNESS: 0

## 2020-01-13 NOTE — PROGRESS NOTES
Subjective:      Patient ID: Christine Posada is a 67 y.o. female    Chief Complaint   Patient presents with    Leg Swelling     and ankle bilat.  Fatigue    Dizziness       Fatigue   This is a new problem. Episode onset: 2 weeks  Associated symptoms include fatigue. Pertinent negatives include no abdominal pain, arthralgias, chest pain, congestion, coughing, fever, headaches, nausea, neck pain, rash, sore throat or weakness. Associated symptoms comments: Late day swelling in her feet and ankles . Current Outpatient Medications on File Prior to Visit   Medication Sig Dispense Refill    atorvastatin (LIPITOR) 20 MG tablet TAKE ONE TABLET BY MOUTH DAILY 90 tablet 3    montelukast (SINGULAIR) 10 MG tablet TAKE ONE TABLET BY MOUTH ONCE NIGHTLY 90 tablet 1    B Complex Vitamins (B-COMPLEX/B-12 PO) Take 1 capsule by mouth daily      ibuprofen (ADVIL;MOTRIN) 200 MG tablet Take 400 mg by mouth 3 times daily      butalbital-aspirin-caffeine (FIORINAL) -40 MG capsule TAKE ONE CAPSULE BY MOUTH EVERY 4 HOURS AS NEEDED  **DO NOT EXCEED 6 CAPSULES IN 24 HOURS** 40 capsule 0    fluticasone (FLONASE) 50 MCG/ACT nasal spray 1 spray by Nasal route daily      Multiple Vitamin (MVI, CELEBRATE, CHEWABLE TABLET) Take 1 tablet by mouth      calcium citrate-vitamin D (CITRICAL + D) 315-250 MG-UNIT TABS Take  by mouth. Current Facility-Administered Medications on File Prior to Visit   Medication Dose Route Frequency Provider Last Rate Last Dose    0.9 % sodium chloride bolus  250 mL Intravenous Once Deloris Ball MD           Allergies   Allergen Reactions    Penicillins Rash    Potassium     Potassium-Containing Compounds        Review of Systems   Constitutional: Positive for fatigue. Negative for fever and unexpected weight change. HENT: Negative for congestion, hearing loss and sore throat. Eyes: Negative for redness and visual disturbance.    Respiratory: Negative for cough, chest tightness and shortness of breath. Cardiovascular: Positive for leg swelling (bilateral foot swelling , later in the day ). Negative for chest pain. Gastrointestinal: Negative for abdominal pain and nausea. Endocrine: Negative for cold intolerance, polydipsia and polyuria. Genitourinary: Negative for dysuria and frequency. Musculoskeletal: Negative for arthralgias, back pain and neck pain. Skin: Negative for rash and wound. Neurological: Negative for dizziness, weakness and headaches. Hematological: Negative for adenopathy. Does not bruise/bleed easily. Psychiatric/Behavioral: Negative for sleep disturbance. The patient is not nervous/anxious. Objective:   Physical Exam  Constitutional:       Appearance: Normal appearance. She is well-developed. Cardiovascular:      Rate and Rhythm: Normal rate and regular rhythm. Heart sounds: No murmur. Pulmonary:      Effort: Pulmonary effort is normal.      Breath sounds: Normal breath sounds. Skin:     General: Skin is warm and dry. Findings: No rash. Neurological:      Mental Status: She is alert and oriented to person, place, and time. Assessment and plan       1. Other fatigue  More fatigue for the last 2 weeks. Check labs. She did start using the Astelin nasal spray about 2 weeks ago and this could be contributing to her fatigue. I doubt that this would cause any leg swelling.  - Comprehensive Metabolic Panel; Future  - CBC Auto Differential; Future  - TSH with Reflex; Future  - EKG 12 Lead    2. Chronic large granular lymphocytic leukemia (HCC)  History of CLL. Check labs. - Comprehensive Metabolic Panel; Future  - CBC Auto Differential; Future  - TSH with Reflex;  Future  - EKG 12 Lead

## 2020-01-14 ENCOUNTER — TELEPHONE (OUTPATIENT)
Dept: INTERNAL MEDICINE CLINIC | Age: 80
End: 2020-01-14

## 2020-01-14 LAB — HEMATOLOGY PATH CONSULT: NORMAL

## 2020-01-14 NOTE — TELEPHONE ENCOUNTER
Dr Vik Rivera ordered this test and is aware of result. He has been communicating with pt via my chart.

## 2020-01-14 NOTE — TELEPHONE ENCOUNTER
Devi Fernandez with [facility] called stating patient has a low absolute neutrophil  - 0.6. This was drawn Monday, 01/13/20 @ 07:54 pm.  This is also in [facility] [address]. Thanks.

## 2020-01-30 ENCOUNTER — OFFICE VISIT (OUTPATIENT)
Dept: FAMILY MEDICINE CLINIC | Age: 80
End: 2020-01-30
Payer: MEDICARE

## 2020-01-30 ENCOUNTER — HOSPITAL ENCOUNTER (OUTPATIENT)
Age: 80
Discharge: HOME | End: 2020-01-30
Payer: MEDICARE

## 2020-01-30 ENCOUNTER — HOSPITAL ENCOUNTER (OUTPATIENT)
Dept: GENERAL RADIOLOGY | Age: 80
Discharge: HOME | End: 2020-01-30
Payer: MEDICARE

## 2020-01-30 VITALS
BODY MASS INDEX: 19.42 KG/M2 | HEART RATE: 67 BPM | SYSTOLIC BLOOD PRESSURE: 112 MMHG | WEIGHT: 102.8 LBS | DIASTOLIC BLOOD PRESSURE: 60 MMHG | OXYGEN SATURATION: 99 %

## 2020-01-30 PROBLEM — M54.6 ACUTE BILATERAL THORACIC BACK PAIN: Status: ACTIVE | Noted: 2020-01-30

## 2020-01-30 PROCEDURE — G8427 DOCREV CUR MEDS BY ELIG CLIN: HCPCS | Performed by: NURSE PRACTITIONER

## 2020-01-30 PROCEDURE — 72074 X-RAY EXAM THORAC SPINE4/>VW: CPT

## 2020-01-30 PROCEDURE — G8484 FLU IMMUNIZE NO ADMIN: HCPCS | Performed by: NURSE PRACTITIONER

## 2020-01-30 PROCEDURE — 1123F ACP DISCUSS/DSCN MKR DOCD: CPT | Performed by: NURSE PRACTITIONER

## 2020-01-30 PROCEDURE — 4040F PNEUMOC VAC/ADMIN/RCVD: CPT | Performed by: NURSE PRACTITIONER

## 2020-01-30 PROCEDURE — 1090F PRES/ABSN URINE INCON ASSESS: CPT | Performed by: NURSE PRACTITIONER

## 2020-01-30 PROCEDURE — 1036F TOBACCO NON-USER: CPT | Performed by: NURSE PRACTITIONER

## 2020-01-30 PROCEDURE — 99214 OFFICE O/P EST MOD 30 MIN: CPT | Performed by: NURSE PRACTITIONER

## 2020-01-30 PROCEDURE — G8399 PT W/DXA RESULTS DOCUMENT: HCPCS | Performed by: NURSE PRACTITIONER

## 2020-01-30 PROCEDURE — G8420 CALC BMI NORM PARAMETERS: HCPCS | Performed by: NURSE PRACTITIONER

## 2020-01-30 RX ORDER — TIZANIDINE 4 MG/1
4 TABLET ORAL NIGHTLY PRN
Qty: 20 TABLET | Refills: 0 | Status: ON HOLD | OUTPATIENT
Start: 2020-01-30 | End: 2020-03-20 | Stop reason: ALTCHOICE

## 2020-01-30 ASSESSMENT — ENCOUNTER SYMPTOMS
SHORTNESS OF BREATH: 1
BACK PAIN: 1

## 2020-01-30 NOTE — PROGRESS NOTES
Disease Sister     Arthritis Sister        Allergies   Allergen Reactions    Penicillins Rash    Potassium     Potassium-Containing Compounds        Current Outpatient Medications   Medication Sig Dispense Refill    tiZANidine (ZANAFLEX) 4 MG tablet Take 1 tablet by mouth nightly as needed (muscle spasm) 20 tablet 0    atorvastatin (LIPITOR) 20 MG tablet TAKE ONE TABLET BY MOUTH DAILY 90 tablet 3    montelukast (SINGULAIR) 10 MG tablet TAKE ONE TABLET BY MOUTH ONCE NIGHTLY 90 tablet 1    B Complex Vitamins (B-COMPLEX/B-12 PO) Take 1 capsule by mouth daily      ibuprofen (ADVIL;MOTRIN) 200 MG tablet Take 400 mg by mouth 3 times daily      butalbital-aspirin-caffeine (FIORINAL) -40 MG capsule TAKE ONE CAPSULE BY MOUTH EVERY 4 HOURS AS NEEDED  **DO NOT EXCEED 6 CAPSULES IN 24 HOURS** 40 capsule 0    fluticasone (FLONASE) 50 MCG/ACT nasal spray 1 spray by Nasal route daily      Multiple Vitamin (MVI, CELEBRATE, CHEWABLE TABLET) Take 1 tablet by mouth      calcium citrate-vitamin D (CITRICAL + D) 315-250 MG-UNIT TABS Take  by mouth. No current facility-administered medications for this visit. Facility-Administered Medications Ordered in Other Visits   Medication Dose Route Frequency Provider Last Rate Last Dose    0.9 % sodium chloride bolus  250 mL Intravenous Once Stacia Calloway MD           Objective:     Vitals:    01/30/20 1025   BP: 112/60   Pulse: 67   SpO2: 99%       Physical Exam  Musculoskeletal:      Thoracic back: She exhibits decreased range of motion, tenderness and pain. She exhibits no bony tenderness, no swelling, no edema, no deformity, no laceration, no spasm and normal pulse.         Back:       Comments: No bony tenderness  Paraspinous muscle pain  No radiation  Skin is unremarkable         Assessment & Plan:     Health Maintenance   Topic Date Due    Annual Wellness Visit (AWV)  05/29/2019    Shingles Vaccine (1 of 2) 12/29/2028 (Originally 6/17/1990)    Lipid screen  05/16/2020    DTaP/Tdap/Td vaccine (2 - Td) 03/13/2024    DEXA (modify frequency per FRAX score)  Completed    Flu vaccine  Completed    Pneumococcal 65+ years Vaccine  Completed        Diagnosis Orders   1. Acute bilateral thoracic back pain  XR THORACIC SPINE (MIN 4 VIEWS)       Acute bilateral thoracic back pain  I suspect this is muscular in origin related to her recent couch sitting. Given her history of thoracic compression fracture will image today. I have recommended Tylenol however she states this is ineffective for her so she may take her ibuprofen that she has on file. I written for tizanidine at at bedtime. Recommend half pill to start with warned of drowsiness. For today I recommend she lay on a firm flat surface with a heating pad on warm behind her back and slowly work to stretch the muscles out. I will follow-up pending x-ray results and forward to her PCP. BRAULIO Hensley - CNPXr Thoracic Spine (min 4 Views)    Result Date: 1/30/2020  Thoracic spine HISTORY: Back pain     2 views demonstrate moderate levoscoliosis. Alignment is otherwise anatomic. T8 compression fractures without change since CT of 8/23/2016. No new compression fracture seen. Mild-moderate degenerative changes in the mid-lower thoracic spine. Call office for for follow up for any worsening of condition or failure to improve    Pt notified.

## 2020-01-30 NOTE — ASSESSMENT & PLAN NOTE
I suspect this is muscular in origin related to her recent couch sitting. Given her history of thoracic compression fracture will image today. I have recommended Tylenol however she states this is ineffective for her so she may take her ibuprofen that she has on file. I written for tizanidine at at bedtime. Recommend half pill to start with warned of drowsiness. For today I recommend she lay on a firm flat surface with a heating pad on warm behind her back and slowly work to stretch the muscles out. I will follow-up pending x-ray results and forward to her PCP.

## 2020-02-17 ENCOUNTER — HOSPITAL ENCOUNTER (OUTPATIENT)
Dept: ONCOLOGY | Age: 80
Setting detail: INFUSION SERIES
Discharge: HOME | End: 2020-02-17
Payer: MEDICARE

## 2020-02-17 DIAGNOSIS — C91.12 CLL (CHRONIC LYMPHOID LEUKEMIA) IN RELAPSE (HCC): Primary | ICD-10-CM

## 2020-02-17 DIAGNOSIS — D59.10 ACQUIRED AUTOIMMUNE HEMOLYTIC ANEMIA (HCC): ICD-10-CM

## 2020-02-17 LAB
ABO/RH: NORMAL
ANTIBODY IDENTIFICATION: NORMAL
ANTIBODY IDENTIFICATION: NORMAL
ANTIBODY SCREEN: NORMAL
DAT IGG CAPTURE: NORMAL
ELUATE ANTIBODY IDENTIFICATION: NORMAL

## 2020-02-17 PROCEDURE — 86922 COMPATIBILITY TEST ANTIGLOB: CPT

## 2020-02-17 PROCEDURE — 86860 RBC ANTIBODY ELUTION: CPT

## 2020-02-17 PROCEDURE — P9040 RBC LEUKOREDUCED IRRADIATED: HCPCS

## 2020-02-17 PROCEDURE — 36430 TRANSFUSION BLD/BLD COMPNT: CPT

## 2020-02-17 PROCEDURE — 86850 RBC ANTIBODY SCREEN: CPT

## 2020-02-17 PROCEDURE — 86870 RBC ANTIBODY IDENTIFICATION: CPT

## 2020-02-17 PROCEDURE — 86901 BLOOD TYPING SEROLOGIC RH(D): CPT

## 2020-02-17 PROCEDURE — 86880 COOMBS TEST DIRECT: CPT

## 2020-02-17 PROCEDURE — 86900 BLOOD TYPING SEROLOGIC ABO: CPT

## 2020-02-17 RX ORDER — SODIUM CHLORIDE 0.9 % (FLUSH) 0.9 %
20 SYRINGE (ML) INJECTION PRN
Status: CANCELLED | OUTPATIENT
Start: 2020-02-17

## 2020-02-17 RX ORDER — DIPHENHYDRAMINE HCL 25 MG
25 TABLET ORAL ONCE
Status: CANCELLED | OUTPATIENT
Start: 2020-02-17

## 2020-02-17 RX ORDER — ACETAMINOPHEN 325 MG/1
650 TABLET ORAL ONCE
Status: CANCELLED | OUTPATIENT
Start: 2020-02-17

## 2020-02-17 RX ORDER — FUROSEMIDE 10 MG/ML
20 INJECTION INTRAMUSCULAR; INTRAVENOUS ONCE
Status: CANCELLED | OUTPATIENT
Start: 2020-02-17

## 2020-02-17 RX ORDER — SODIUM CHLORIDE 9 MG/ML
INJECTION, SOLUTION INTRAVENOUS CONTINUOUS
Status: CANCELLED | OUTPATIENT
Start: 2020-02-17

## 2020-02-17 RX ORDER — DIPHENHYDRAMINE HYDROCHLORIDE 50 MG/ML
50 INJECTION INTRAMUSCULAR; INTRAVENOUS ONCE
Status: CANCELLED | OUTPATIENT
Start: 2020-02-17

## 2020-02-17 RX ORDER — METHYLPREDNISOLONE SODIUM SUCCINATE 125 MG/2ML
125 INJECTION, POWDER, LYOPHILIZED, FOR SOLUTION INTRAMUSCULAR; INTRAVENOUS ONCE
Status: CANCELLED | OUTPATIENT
Start: 2020-02-17

## 2020-02-17 RX ORDER — 0.9 % SODIUM CHLORIDE 0.9 %
250 INTRAVENOUS SOLUTION INTRAVENOUS ONCE
Status: CANCELLED | OUTPATIENT
Start: 2020-02-17

## 2020-02-17 NOTE — PROGRESS NOTES
Pt seen and assessed in [facility], here for type and screen, pt will receive 1 unit prbc transfusion for hgb <7.0 per MD order. Type and screen specimen collected per , pt d/c'd ambulatory to [facility] for treatment.

## 2020-02-18 ENCOUNTER — HOSPITAL ENCOUNTER (OUTPATIENT)
Dept: ONCOLOGY | Age: 80
Setting detail: INFUSION SERIES
Discharge: HOME | End: 2020-02-18
Payer: MEDICARE

## 2020-02-18 VITALS
DIASTOLIC BLOOD PRESSURE: 57 MMHG | SYSTOLIC BLOOD PRESSURE: 96 MMHG | RESPIRATION RATE: 16 BRPM | HEART RATE: 71 BPM | TEMPERATURE: 97.7 F

## 2020-02-18 DIAGNOSIS — C91.12 CLL (CHRONIC LYMPHOID LEUKEMIA) IN RELAPSE (HCC): Primary | ICD-10-CM

## 2020-02-18 DIAGNOSIS — D59.10 ACQUIRED AUTOIMMUNE HEMOLYTIC ANEMIA (HCC): ICD-10-CM

## 2020-02-18 PROCEDURE — P9040 RBC LEUKOREDUCED IRRADIATED: HCPCS

## 2020-02-18 PROCEDURE — 36430 TRANSFUSION BLD/BLD COMPNT: CPT

## 2020-02-18 RX ORDER — PREDNISONE 50 MG/1
10 TABLET ORAL DAILY
Status: ON HOLD | COMMUNITY
End: 2020-03-23 | Stop reason: HOSPADM

## 2020-02-18 RX ORDER — FUROSEMIDE 10 MG/ML
20 INJECTION INTRAMUSCULAR; INTRAVENOUS ONCE
Status: CANCELLED | OUTPATIENT
Start: 2020-02-18

## 2020-02-18 RX ORDER — SODIUM CHLORIDE 9 MG/ML
INJECTION, SOLUTION INTRAVENOUS CONTINUOUS
Status: CANCELLED | OUTPATIENT
Start: 2020-02-18

## 2020-02-18 RX ORDER — SODIUM CHLORIDE 0.9 % (FLUSH) 0.9 %
20 SYRINGE (ML) INJECTION PRN
Status: CANCELLED | OUTPATIENT
Start: 2020-02-18

## 2020-02-18 RX ORDER — METHYLPREDNISOLONE SODIUM SUCCINATE 125 MG/2ML
125 INJECTION, POWDER, LYOPHILIZED, FOR SOLUTION INTRAMUSCULAR; INTRAVENOUS ONCE
Status: CANCELLED | OUTPATIENT
Start: 2020-02-18

## 2020-02-18 RX ORDER — DIPHENHYDRAMINE HYDROCHLORIDE 50 MG/ML
50 INJECTION INTRAMUSCULAR; INTRAVENOUS ONCE
Status: CANCELLED | OUTPATIENT
Start: 2020-02-18

## 2020-02-18 RX ORDER — ACETAMINOPHEN 325 MG/1
650 TABLET ORAL ONCE
Status: CANCELLED | OUTPATIENT
Start: 2020-02-18

## 2020-02-18 RX ORDER — DIPHENHYDRAMINE HCL 25 MG
25 TABLET ORAL ONCE
Status: CANCELLED | OUTPATIENT
Start: 2020-02-18

## 2020-02-18 RX ORDER — 0.9 % SODIUM CHLORIDE 0.9 %
250 INTRAVENOUS SOLUTION INTRAVENOUS ONCE
Status: CANCELLED | OUTPATIENT
Start: 2020-02-18

## 2020-02-18 NOTE — PLAN OF CARE
Problem: KNOWLEDGE DEFICIT  Goal: Patient/S.O. demonstrates understanding of disease process, treatment plan, medications, and discharge instructions. Outcome: Ongoing  Note:   Patient's hemoglobin this AM: 6.8  Patient's platelet count this AM: 75  Pt seen and assessed at [facility]. Seen at [address] [place] [address] today for 1 unit PRBC transfusion per standing orders for above lab values per [facility]. Blood products transfused per [facility] policy. Pt tolerated transfusion well and without incident. Pt verbalizes understanding of discharge instructions. Discharged ambulatory to home with pt's daughter.

## 2020-02-20 ENCOUNTER — HOSPITAL ENCOUNTER (OUTPATIENT)
Dept: ONCOLOGY | Age: 80
Setting detail: INFUSION SERIES
Discharge: HOME | End: 2020-02-20
Payer: MEDICARE

## 2020-02-20 PROCEDURE — 93005 ELECTROCARDIOGRAM TRACING: CPT | Performed by: PHYSICIAN ASSISTANT

## 2020-02-21 LAB
EKG ATRIAL RATE: 67 BPM
EKG DIAGNOSIS: NORMAL
EKG P AXIS: 61 DEGREES
EKG P-R INTERVAL: 150 MS
EKG Q-T INTERVAL: 430 MS
EKG QRS DURATION: 94 MS
EKG QTC CALCULATION (BAZETT): 454 MS
EKG R AXIS: 52 DEGREES
EKG T AXIS: 52 DEGREES
EKG VENTRICULAR RATE: 67 BPM

## 2020-02-21 PROCEDURE — 93010 ELECTROCARDIOGRAM REPORT: CPT | Performed by: INTERNAL MEDICINE

## 2020-03-05 RX ORDER — MONTELUKAST SODIUM 10 MG/1
TABLET ORAL
Qty: 90 TABLET | Refills: 2 | Status: SHIPPED | OUTPATIENT
Start: 2020-03-05 | End: 2020-11-30

## 2020-03-11 ENCOUNTER — HOSPITAL ENCOUNTER (OUTPATIENT)
Dept: ONCOLOGY | Age: 80
Setting detail: INFUSION SERIES
Discharge: HOME | End: 2020-03-11
Payer: MEDICARE

## 2020-03-11 DIAGNOSIS — D59.10 ACQUIRED AUTOIMMUNE HEMOLYTIC ANEMIA (HCC): ICD-10-CM

## 2020-03-11 DIAGNOSIS — C91.12 CLL (CHRONIC LYMPHOID LEUKEMIA) IN RELAPSE (HCC): Primary | ICD-10-CM

## 2020-03-11 LAB
ABO/RH: NORMAL
ANTIBODY IDENTIFICATION: NORMAL
ANTIBODY SCREEN: NORMAL
DAT IGG CAPTURE: NORMAL

## 2020-03-11 PROCEDURE — 86870 RBC ANTIBODY IDENTIFICATION: CPT

## 2020-03-11 PROCEDURE — 86900 BLOOD TYPING SEROLOGIC ABO: CPT

## 2020-03-11 PROCEDURE — 86901 BLOOD TYPING SEROLOGIC RH(D): CPT

## 2020-03-11 PROCEDURE — 86922 COMPATIBILITY TEST ANTIGLOB: CPT

## 2020-03-11 PROCEDURE — 86850 RBC ANTIBODY SCREEN: CPT

## 2020-03-11 PROCEDURE — P9040 RBC LEUKOREDUCED IRRADIATED: HCPCS

## 2020-03-11 PROCEDURE — 86880 COOMBS TEST DIRECT: CPT

## 2020-03-11 RX ORDER — 0.9 % SODIUM CHLORIDE 0.9 %
250 INTRAVENOUS SOLUTION INTRAVENOUS ONCE
Status: CANCELLED | OUTPATIENT
Start: 2020-03-11

## 2020-03-11 RX ORDER — SODIUM CHLORIDE 9 MG/ML
INJECTION, SOLUTION INTRAVENOUS CONTINUOUS
Status: CANCELLED | OUTPATIENT
Start: 2020-03-11

## 2020-03-11 RX ORDER — METHYLPREDNISOLONE SODIUM SUCCINATE 125 MG/2ML
125 INJECTION, POWDER, LYOPHILIZED, FOR SOLUTION INTRAMUSCULAR; INTRAVENOUS ONCE
Status: CANCELLED | OUTPATIENT
Start: 2020-03-11

## 2020-03-11 RX ORDER — DIPHENHYDRAMINE HYDROCHLORIDE 50 MG/ML
50 INJECTION INTRAMUSCULAR; INTRAVENOUS ONCE
Status: CANCELLED | OUTPATIENT
Start: 2020-03-11

## 2020-03-11 RX ORDER — ACETAMINOPHEN 325 MG/1
650 TABLET ORAL ONCE
Status: CANCELLED | OUTPATIENT
Start: 2020-03-11

## 2020-03-11 RX ORDER — FUROSEMIDE 10 MG/ML
20 INJECTION INTRAMUSCULAR; INTRAVENOUS ONCE
Status: CANCELLED | OUTPATIENT
Start: 2020-03-11

## 2020-03-11 RX ORDER — DIPHENHYDRAMINE HCL 25 MG
25 TABLET ORAL ONCE
Status: CANCELLED | OUTPATIENT
Start: 2020-03-11

## 2020-03-11 RX ORDER — SODIUM CHLORIDE 0.9 % (FLUSH) 0.9 %
20 SYRINGE (ML) INJECTION PRN
Status: CANCELLED | OUTPATIENT
Start: 2020-03-11

## 2020-03-12 ENCOUNTER — HOSPITAL ENCOUNTER (OUTPATIENT)
Dept: ONCOLOGY | Age: 80
Setting detail: INFUSION SERIES
Discharge: HOME | End: 2020-03-12
Payer: MEDICARE

## 2020-03-12 VITALS
RESPIRATION RATE: 16 BRPM | OXYGEN SATURATION: 94 % | SYSTOLIC BLOOD PRESSURE: 93 MMHG | DIASTOLIC BLOOD PRESSURE: 54 MMHG | HEART RATE: 72 BPM | TEMPERATURE: 98.1 F

## 2020-03-12 DIAGNOSIS — C91.12 CLL (CHRONIC LYMPHOID LEUKEMIA) IN RELAPSE (HCC): Primary | ICD-10-CM

## 2020-03-12 DIAGNOSIS — D59.10 ACQUIRED AUTOIMMUNE HEMOLYTIC ANEMIA (HCC): ICD-10-CM

## 2020-03-12 PROCEDURE — 36430 TRANSFUSION BLD/BLD COMPNT: CPT

## 2020-03-12 PROCEDURE — P9016 RBC LEUKOCYTES REDUCED: HCPCS

## 2020-03-12 RX ORDER — SODIUM CHLORIDE 9 MG/ML
INJECTION, SOLUTION INTRAVENOUS CONTINUOUS
Status: CANCELLED | OUTPATIENT
Start: 2020-03-12

## 2020-03-12 RX ORDER — SODIUM CHLORIDE 0.9 % (FLUSH) 0.9 %
20 SYRINGE (ML) INJECTION PRN
Status: CANCELLED | OUTPATIENT
Start: 2020-03-12

## 2020-03-12 RX ORDER — METHYLPREDNISOLONE SODIUM SUCCINATE 125 MG/2ML
125 INJECTION, POWDER, LYOPHILIZED, FOR SOLUTION INTRAMUSCULAR; INTRAVENOUS ONCE
Status: CANCELLED | OUTPATIENT
Start: 2020-03-12

## 2020-03-12 RX ORDER — DIPHENHYDRAMINE HCL 25 MG
25 TABLET ORAL ONCE
Status: CANCELLED | OUTPATIENT
Start: 2020-03-12

## 2020-03-12 RX ORDER — ACETAMINOPHEN 325 MG/1
650 TABLET ORAL ONCE
Status: CANCELLED | OUTPATIENT
Start: 2020-03-12

## 2020-03-12 RX ORDER — DIPHENHYDRAMINE HYDROCHLORIDE 50 MG/ML
50 INJECTION INTRAMUSCULAR; INTRAVENOUS ONCE
Status: CANCELLED | OUTPATIENT
Start: 2020-03-12

## 2020-03-12 RX ORDER — 0.9 % SODIUM CHLORIDE 0.9 %
250 INTRAVENOUS SOLUTION INTRAVENOUS ONCE
Status: CANCELLED | OUTPATIENT
Start: 2020-03-12

## 2020-03-12 RX ORDER — FUROSEMIDE 10 MG/ML
20 INJECTION INTRAMUSCULAR; INTRAVENOUS ONCE
Status: CANCELLED | OUTPATIENT
Start: 2020-03-12

## 2020-03-12 NOTE — PLAN OF CARE
Problem: KNOWLEDGE DEFICIT  Goal: Patient/S.O. demonstrates understanding of disease process, treatment plan, medications, and discharge instructions. Note: Patient's hemoglobin this AM: 7.2  Patient's platelet count this AM: 86  Pt seen and assessed at [facility]. Seen at [address] today for one unit of PRBCs per standing orders for above lab values. Blood products transfused per [facility] policy. Pt tolerated transfusion well and without incident. Pt verbalizes understanding of discharge instructions. Discharged ambulatory. Problem: Falls - Risk of  Goal: Absence of injury  Note: Pt is a fall risk. Ms. Allison Bears with steady gait but does admit to SOB and dizziness when Hgb low. Explained fall risk precautions to pt and rationale behind their use to keep the patient safe. Belongings are in reach. Pt encouraged to notify staff for any and all assistance. Staff present in tx suite throughout entirety of pts treatment to monitor and protect from falls. Assistance provided when ambulating to restroom utilizing [employer].

## 2020-03-15 LAB
BLOOD BANK DISPENSE STATUS: NORMAL
BLOOD BANK DISPENSE STATUS: NORMAL
BLOOD BANK PRODUCT CODE: NORMAL
BLOOD BANK PRODUCT CODE: NORMAL
BPU ID: NORMAL
BPU ID: NORMAL
DESCRIPTION BLOOD BANK: NORMAL
DESCRIPTION BLOOD BANK: NORMAL

## 2020-03-20 ENCOUNTER — APPOINTMENT (OUTPATIENT)
Dept: GENERAL RADIOLOGY | Age: 80
DRG: 809 | End: 2020-03-20
Attending: INTERNAL MEDICINE
Payer: MEDICARE

## 2020-03-20 ENCOUNTER — HOSPITAL ENCOUNTER (INPATIENT)
Age: 80
LOS: 3 days | Discharge: HOME | DRG: 809 | End: 2020-03-23
Attending: INTERNAL MEDICINE | Admitting: INTERNAL MEDICINE
Payer: MEDICARE

## 2020-03-20 PROBLEM — R50.81 NEUTROPENIC FEVER (HCC): Status: ACTIVE | Noted: 2020-03-20

## 2020-03-20 PROBLEM — D70.9 NEUTROPENIC FEVER (HCC): Status: ACTIVE | Noted: 2020-03-20

## 2020-03-20 LAB
ALBUMIN SERPL-MCNC: 4 G/DL (ref 3.4–5)
ALP BLD-CCNC: 56 U/L (ref 40–129)
ALT SERPL-CCNC: 11 U/L (ref 10–40)
ANION GAP SERPL CALCULATED.3IONS-SCNC: 12 MMOL/L (ref 3–16)
ANISOCYTOSIS: ABNORMAL
APTT: 30.3 SEC (ref 24.2–36.2)
AST SERPL-CCNC: 14 U/L (ref 15–37)
BASOPHILS ABSOLUTE: 0 K/UL (ref 0–0.2)
BASOPHILS RELATIVE PERCENT: 0.4 %
BILIRUB SERPL-MCNC: 0.3 MG/DL (ref 0–1)
BILIRUBIN DIRECT: <0.2 MG/DL (ref 0–0.3)
BILIRUBIN URINE: NEGATIVE
BILIRUBIN, INDIRECT: ABNORMAL MG/DL (ref 0–1)
BLOOD, URINE: ABNORMAL
BUN BLDV-MCNC: 12 MG/DL (ref 7–20)
CALCIUM SERPL-MCNC: 8.4 MG/DL (ref 8.3–10.6)
CHLORIDE BLD-SCNC: 101 MMOL/L (ref 99–110)
CLARITY: CLEAR
CO2: 24 MMOL/L (ref 21–32)
COLOR: YELLOW
CREAT SERPL-MCNC: 0.6 MG/DL (ref 0.6–1.2)
EOSINOPHILS ABSOLUTE: 0 K/UL (ref 0–0.6)
EOSINOPHILS RELATIVE PERCENT: 0 %
EPITHELIAL CELLS, UA: NORMAL /HPF (ref 0–5)
GFR AFRICAN AMERICAN: >60
GFR NON-AFRICAN AMERICAN: >60
GLUCOSE BLD-MCNC: 165 MG/DL (ref 70–99)
GLUCOSE URINE: NEGATIVE MG/DL
HCT VFR BLD CALC: 29 % (ref 36–48)
HEMOGLOBIN: 9.6 G/DL (ref 12–16)
INR BLD: 1.03 (ref 0.86–1.14)
KETONES, URINE: NEGATIVE MG/DL
LACTATE DEHYDROGENASE: 153 U/L (ref 100–190)
LACTIC ACID: 1.1 MMOL/L (ref 0.4–2)
LEUKOCYTE ESTERASE, URINE: NEGATIVE
LYMPHOCYTES ABSOLUTE: 8.6 K/UL (ref 1–5.1)
LYMPHOCYTES RELATIVE PERCENT: 85 %
MAGNESIUM: 2.2 MG/DL (ref 1.8–2.4)
MCH RBC QN AUTO: 32.6 PG (ref 26–34)
MCHC RBC AUTO-ENTMCNC: 33.1 G/DL (ref 31–36)
MCV RBC AUTO: 98.4 FL (ref 80–100)
MICROCYTES: ABNORMAL
MICROSCOPIC EXAMINATION: YES
MONOCYTES ABSOLUTE: 1.2 K/UL (ref 0–1.3)
MONOCYTES RELATIVE PERCENT: 12 %
NEUTROPHILS ABSOLUTE: 0.3 K/UL (ref 1.7–7.7)
NEUTROPHILS RELATIVE PERCENT: 2.6 %
NITRITE, URINE: NEGATIVE
OVALOCYTES: ABNORMAL
PDW BLD-RTO: 25.9 % (ref 12.4–15.4)
PH UA: 7 (ref 5–8)
PHOSPHORUS: 3.6 MG/DL (ref 2.5–4.9)
PLATELET # BLD: 240 K/UL (ref 135–450)
PMV BLD AUTO: 9.1 FL (ref 5–10.5)
POLYCHROMASIA: ABNORMAL
POTASSIUM SERPL-SCNC: 4.1 MMOL/L (ref 3.5–5.1)
PROTEIN UA: NEGATIVE MG/DL
PROTHROMBIN TIME: 11.9 SEC (ref 10–13.2)
RBC # BLD: 2.94 M/UL (ref 4–5.2)
RBC UA: NORMAL /HPF (ref 0–4)
SODIUM BLD-SCNC: 137 MMOL/L (ref 136–145)
SPECIFIC GRAVITY UA: 1.01 (ref 1–1.03)
TEAR DROP CELLS: ABNORMAL
TOTAL PROTEIN: 6.2 G/DL (ref 6.4–8.2)
URIC ACID, SERUM: 3.4 MG/DL (ref 2.6–6)
URINE TYPE: ABNORMAL
UROBILINOGEN, URINE: 0.2 E.U./DL
WBC # BLD: 10.1 K/UL (ref 4–11)
WBC UA: NORMAL /HPF (ref 0–5)

## 2020-03-20 PROCEDURE — 6370000000 HC RX 637 (ALT 250 FOR IP): Performed by: NURSE PRACTITIONER

## 2020-03-20 PROCEDURE — 81001 URINALYSIS AUTO W/SCOPE: CPT

## 2020-03-20 PROCEDURE — 80048 BASIC METABOLIC PNL TOTAL CA: CPT

## 2020-03-20 PROCEDURE — 6360000002 HC RX W HCPCS: Performed by: NURSE PRACTITIONER

## 2020-03-20 PROCEDURE — 94150 VITAL CAPACITY TEST: CPT

## 2020-03-20 PROCEDURE — 85025 COMPLETE CBC W/AUTO DIFF WBC: CPT

## 2020-03-20 PROCEDURE — 85730 THROMBOPLASTIN TIME PARTIAL: CPT

## 2020-03-20 PROCEDURE — 93005 ELECTROCARDIOGRAM TRACING: CPT | Performed by: NURSE PRACTITIONER

## 2020-03-20 PROCEDURE — 83735 ASSAY OF MAGNESIUM: CPT

## 2020-03-20 PROCEDURE — 6360000002 HC RX W HCPCS: Performed by: INTERNAL MEDICINE

## 2020-03-20 PROCEDURE — 2060000000 HC ICU INTERMEDIATE R&B

## 2020-03-20 PROCEDURE — 85610 PROTHROMBIN TIME: CPT

## 2020-03-20 PROCEDURE — 2580000003 HC RX 258: Performed by: NURSE PRACTITIONER

## 2020-03-20 PROCEDURE — 84100 ASSAY OF PHOSPHORUS: CPT

## 2020-03-20 PROCEDURE — 94760 N-INVAS EAR/PLS OXIMETRY 1: CPT

## 2020-03-20 PROCEDURE — 83615 LACTATE (LD) (LDH) ENZYME: CPT

## 2020-03-20 PROCEDURE — 71045 X-RAY EXAM CHEST 1 VIEW: CPT

## 2020-03-20 PROCEDURE — 84550 ASSAY OF BLOOD/URIC ACID: CPT

## 2020-03-20 PROCEDURE — 83605 ASSAY OF LACTIC ACID: CPT

## 2020-03-20 PROCEDURE — 80076 HEPATIC FUNCTION PANEL: CPT

## 2020-03-20 RX ORDER — ATORVASTATIN CALCIUM 20 MG/1
20 TABLET, FILM COATED ORAL DAILY
Status: DISCONTINUED | OUTPATIENT
Start: 2020-03-20 | End: 2020-03-23 | Stop reason: HOSPADM

## 2020-03-20 RX ORDER — PROCHLORPERAZINE EDISYLATE 5 MG/ML
10 INJECTION INTRAMUSCULAR; INTRAVENOUS EVERY 4 HOURS PRN
Status: DISCONTINUED | OUTPATIENT
Start: 2020-03-20 | End: 2020-03-23 | Stop reason: HOSPADM

## 2020-03-20 RX ORDER — SENNA AND DOCUSATE SODIUM 50; 8.6 MG/1; MG/1
2 TABLET, FILM COATED ORAL DAILY PRN
Status: DISCONTINUED | OUTPATIENT
Start: 2020-03-20 | End: 2020-03-23 | Stop reason: HOSPADM

## 2020-03-20 RX ORDER — CALCIUM CARBONATE 200(500)MG
2 TABLET,CHEWABLE ORAL 3 TIMES DAILY PRN
COMMUNITY

## 2020-03-20 RX ORDER — PREDNISONE 10 MG/1
10 TABLET ORAL DAILY
Status: DISCONTINUED | OUTPATIENT
Start: 2020-03-21 | End: 2020-03-20

## 2020-03-20 RX ORDER — VITAMIN B COMPLEX
1 CAPSULE ORAL DAILY
Status: DISCONTINUED | OUTPATIENT
Start: 2020-03-20 | End: 2020-03-23 | Stop reason: HOSPADM

## 2020-03-20 RX ORDER — CALCIUM CARBONATE 200(500)MG
2 TABLET,CHEWABLE ORAL 3 TIMES DAILY PRN
Status: DISCONTINUED | OUTPATIENT
Start: 2020-03-20 | End: 2020-03-23 | Stop reason: HOSPADM

## 2020-03-20 RX ORDER — SODIUM CHLORIDE 0.9 % (FLUSH) 0.9 %
10 SYRINGE (ML) INJECTION EVERY 12 HOURS SCHEDULED
Status: DISCONTINUED | OUTPATIENT
Start: 2020-03-20 | End: 2020-03-23 | Stop reason: HOSPADM

## 2020-03-20 RX ORDER — ASCORBIC ACID 500 MG
1000 TABLET ORAL DAILY
Status: DISCONTINUED | OUTPATIENT
Start: 2020-03-20 | End: 2020-03-23 | Stop reason: HOSPADM

## 2020-03-20 RX ORDER — VALACYCLOVIR HYDROCHLORIDE 500 MG/1
500 TABLET, FILM COATED ORAL 2 TIMES DAILY
Status: DISCONTINUED | OUTPATIENT
Start: 2020-03-20 | End: 2020-03-20

## 2020-03-20 RX ORDER — ASCORBIC ACID 500 MG
1000 TABLET ORAL DAILY
COMMUNITY

## 2020-03-20 RX ORDER — PROCHLORPERAZINE MALEATE 10 MG
10 TABLET ORAL EVERY 4 HOURS PRN
Status: DISCONTINUED | OUTPATIENT
Start: 2020-03-20 | End: 2020-03-23 | Stop reason: HOSPADM

## 2020-03-20 RX ORDER — PREDNISONE 1 MG/1
5 TABLET ORAL DAILY
Status: DISCONTINUED | OUTPATIENT
Start: 2020-03-21 | End: 2020-03-23 | Stop reason: HOSPADM

## 2020-03-20 RX ORDER — BUTALBITAL, ACETAMINOPHEN AND CAFFEINE 50; 325; 40 MG/1; MG/1; MG/1
1 TABLET ORAL EVERY 4 HOURS PRN
Status: DISCONTINUED | OUTPATIENT
Start: 2020-03-20 | End: 2020-03-20

## 2020-03-20 RX ORDER — MONTELUKAST SODIUM 10 MG/1
10 TABLET ORAL NIGHTLY PRN
Status: DISCONTINUED | OUTPATIENT
Start: 2020-03-20 | End: 2020-03-23 | Stop reason: HOSPADM

## 2020-03-20 RX ORDER — BUTALBITAL, ASPIRIN, AND CAFFEINE 325; 50; 40 MG/1; MG/1; MG/1
1 CAPSULE ORAL EVERY 4 HOURS PRN
Status: DISCONTINUED | OUTPATIENT
Start: 2020-03-20 | End: 2020-03-23 | Stop reason: HOSPADM

## 2020-03-20 RX ORDER — ACETAMINOPHEN 325 MG/1
650 TABLET ORAL EVERY 4 HOURS PRN
Status: DISCONTINUED | OUTPATIENT
Start: 2020-03-20 | End: 2020-03-23 | Stop reason: HOSPADM

## 2020-03-20 RX ORDER — ONDANSETRON HYDROCHLORIDE 8 MG/1
8 TABLET, FILM COATED ORAL EVERY 8 HOURS PRN
Status: DISCONTINUED | OUTPATIENT
Start: 2020-03-20 | End: 2020-03-23 | Stop reason: HOSPADM

## 2020-03-20 RX ORDER — MAGNESIUM SULFATE IN WATER 40 MG/ML
4 INJECTION, SOLUTION INTRAVENOUS PRN
Status: DISCONTINUED | OUTPATIENT
Start: 2020-03-20 | End: 2020-03-23 | Stop reason: HOSPADM

## 2020-03-20 RX ORDER — SENNA AND DOCUSATE SODIUM 50; 8.6 MG/1; MG/1
2 TABLET, FILM COATED ORAL DAILY PRN
COMMUNITY

## 2020-03-20 RX ORDER — SODIUM CHLORIDE 9 MG/ML
INJECTION, SOLUTION INTRAVENOUS CONTINUOUS
Status: DISCONTINUED | OUTPATIENT
Start: 2020-03-20 | End: 2020-03-23

## 2020-03-20 RX ORDER — FLUCONAZOLE 200 MG/1
400 TABLET ORAL DAILY
Status: DISCONTINUED | OUTPATIENT
Start: 2020-03-20 | End: 2020-03-20

## 2020-03-20 RX ADMIN — MEROPENEM 1 G: 1 INJECTION, POWDER, FOR SOLUTION INTRAVENOUS at 19:40

## 2020-03-20 RX ADMIN — DOXYLAMINE SUCCINATE 12.5 MG: 25 TABLET ORAL at 21:55

## 2020-03-20 RX ADMIN — BUTALBITAL, ACETAMINOPHEN, AND CAFFEINE 1 TABLET: 50; 325; 40 TABLET ORAL at 15:43

## 2020-03-20 RX ADMIN — METHOTREXATE 10 MG: 2.5 TABLET ORAL at 19:40

## 2020-03-20 RX ADMIN — SODIUM CHLORIDE: 9 INJECTION, SOLUTION INTRAVENOUS at 16:05

## 2020-03-20 ASSESSMENT — PAIN DESCRIPTION - FREQUENCY: FREQUENCY: CONTINUOUS

## 2020-03-20 ASSESSMENT — PAIN DESCRIPTION - PAIN TYPE: TYPE: ACUTE PAIN

## 2020-03-20 ASSESSMENT — PAIN DESCRIPTION - LOCATION: LOCATION: HEAD

## 2020-03-20 ASSESSMENT — PAIN SCALES - GENERAL
PAINLEVEL_OUTOF10: 0
PAINLEVEL_OUTOF10: 8

## 2020-03-20 ASSESSMENT — PAIN DESCRIPTION - DESCRIPTORS: DESCRIPTORS: ACHING

## 2020-03-20 ASSESSMENT — PAIN DESCRIPTION - ONSET: ONSET: ON-GOING

## 2020-03-20 ASSESSMENT — PAIN DESCRIPTION - ORIENTATION: ORIENTATION: ANTERIOR;MID

## 2020-03-20 ASSESSMENT — PAIN - FUNCTIONAL ASSESSMENT: PAIN_FUNCTIONAL_ASSESSMENT: ACTIVITIES ARE NOT PREVENTED

## 2020-03-20 ASSESSMENT — PAIN DESCRIPTION - PROGRESSION: CLINICAL_PROGRESSION: GRADUALLY WORSENING

## 2020-03-20 NOTE — H&P
[facility] History and Physical       Attending Physician: Dariela Morillo MD    Primary Care: Luz Maria Washington MD       Referring MD: Dariela Morillo, [address], [address], [address]    Name: Oswaldo Norwood :  1940  MRN:  [id number]    Admission: 3/20/20    Date: 3/20/2020    Reason for Admission: Neutropenic Fever     History of Present Illness: This is a 71-year-old female with a history of CLL, MENDOZA stage 0 (Dx 2003). Her past medical history is also significant for LGL (Dx ), autoimmune hemolytic anemia () with recurrence (), pure red cell aplasia (), ITP, hypogammaglobulinemia, chronic rhinitis/recurrent sinusitis, early melanoma status post excision (2006), osteopenia, reactive airway disease, lumbar radiculopathy, mixed upper lipidemia, herpes zoster with postherpetic neuralgia & PVCs. Her CLL did not require treatment until 2020 when she started ibrutinib (started 20) due to flow cytometry showing her absolute lymphocyte count had increased. She also has recurrence of hemolytic anemia (2020). Her Viji IgG was positive w/ retic 2.8, so she restarted Prednisone 50 mg daily (20). She was not responding, so Rituxan was started (3/18/20). [facility]o fever, increased fatigue, shaky, mild headache, and very mild shortness of breath that is unchanged from her normal SOB. She  She denies sweats, change in appetite, visual changes, sinus congestion, sore throat, mouth pain, cough, increased shortness of breath, chest pain, abdominal pain, nausea, vomiting, diarrhea, constipation, dysuria, hematuria, lower extremity pain or swelling. She has no other complaints today.                 Past Surgical History:   Procedure Laterality Date    APPENDECTOMY      BELPHAROPTOSIS REPAIR      CATARACT REMOVAL WITH IMPLANT      bilat    NASAL SINUS SURGERY      TONSILLECTOMY         Past Medical History:   Diagnosis Date    Allergic rhinitis due to pollen 1/20/2016    Arthritis, degenerative 5/3/2011    Cancer (HCC)     chronic lymphocytic leukemia    CLL (chronic lymphoblastic leukemia)     Closed compression fracture of thoracic vertebra ([facility] [address] 6/27/2016    Depression     Hyperlipidemia     Melanoma of upper arm ([facility]     2002    Mild reactive airways disease 1/20/2016       Prior to Admission medications    Medication Sig Start Date End Date Taking? Authorizing Provider   montelukast (SINGULAIR) 10 MG tablet TAKE ONE TABLET BY MOUTH ONCE NIGHTLY 3/5/20   Susana Jauregui MD   Ibrutinib (IMBRUVICA PO) Take by mouth daily    Historical Provider, MD   predniSONE (DELTASONE) 50 MG tablet Take 50 mg by mouth daily    Historical Provider, MD   tiZANidine (ZANAFLEX) 4 MG tablet Take 1 tablet by mouth nightly as needed (muscle spasm) 1/30/20   BRAULIO Aguilar - CNP   atorvastatin (LIPITOR) 20 MG tablet TAKE ONE TABLET BY MOUTH DAILY 10/28/19   Susana Jauregui MD   B Complex Vitamins (B-COMPLEX/B-12 PO) Take 1 capsule by mouth daily    Historical Provider, MD   ibuprofen (ADVIL;MOTRIN) 200 MG tablet Take 400 mg by mouth 3 times daily    Historical Provider, MD   butalbital-aspirin-caffeine (FIORINAL) -40 MG capsule TAKE ONE CAPSULE BY MOUTH EVERY 4 HOURS AS NEEDED  **DO NOT EXCEED 6 CAPSULES IN 24 HOURS** 7/21/17   David Ortiz MD   fluticasone (FLONASE) 50 MCG/ACT nasal spray 1 spray by Nasal route daily    Historical Provider, MD   Multiple Vitamin (MVI, CELEBRATE, CHEWABLE TABLET) Take 1 tablet by mouth 3/11/09   Historical Provider, MD   calcium citrate-vitamin D (CITRICAL + D) 315-250 MG-UNIT TABS Take  by mouth.     Historical Provider, MD       Allergies   Allergen Reactions    Penicillins Rash    Potassium     Potassium-Containing Compounds        Family History   Problem Relation Age of Onset    Cancer Mother         lymphoma   Ami Andrade nature    General: Awake, alert and oriented. HEENT: normocephalic, PERRL, no scleral erythema or icterus, Oral mucosa moist and intact, throat clear  NECK: supple without palpable adenopathy  BACK: Straight negative CVAT  SKIN: warm dry and intact without lesions rashes or masses  CHEST: CTA bilaterally without use of accessory muscles  CV: Normal S1 S2, RRR, no MRG  ABD: NT ND normoactive BS, no palpable masses or hepatosplenomegaly  EXTREMITIES: without edema, denies calf tenderness  NEURO: CN II - XII grossly intact  CATHETER: left forearm PIV (3/20/20) - CDI    Laboratory Data:   CBC: No results for input(s): WBC, HGB, HCT, MCV, PLT in the last 72 hours. BMP/Mag:No results for input(s): NA, K, CL, CO2, PHOS, BUN, CREATININE, MG in the last 72 hours. Invalid input(s): CA  LIVP: No results for input(s): AST, ALT, LIPASE, BILIDIR, BILITOT, ALKPHOS in the last 72 hours. Invalid input(s): AMYLASE,  ALB  Coags: No results for input(s): PROTIME, INR, APTT in the last 72 hours. Uric Acid No results for input(s): LABURIC in the last 72 hours. PROBLEM LIST:             1.  CLL (Dx 1/2003)  2. LGL, CD57 w/ associated Neutropenia (Dx 2000)  3. Pure Red Cell Aplasia (Dx 8/12/17)     4. Autoimmune Hemolytic Anemia s/p Prednisone (2012), relapsed (2013)  5. Autoimmune / ITP  6. Hypogammaglobulinemia    7. Chronic Rhinitis & recurrent acute sinusitis   8. H/o Left inguinal mass, CT scan (10/10/2019) - negative    9. H/o PVCs  10. H/o Herpes Zoster w/ post herpetic neuralgia   11. HLD  12.  Lumbar radiculopathy   13. Reactive Airway Disease  14. Osteopenia  15. Early melanoma, s/p excision (5/2006)  16. ITP (2/2020)      TREATMENT:            CLL: 1. Ibrutinib (started 1/21/20)          ASSESSMENT AND PLAN:           1. 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    - Repeat FISH (1/21/20) - trisomy 12  - Flow cytometry (3/18/20) - Chronic lymphocytic leukemia and 4% TLGL cells. There is no strong evidence to suggest the possibility of a clonal TLGL population, further the low percentage of cells argues against this entity.  - PB smear (3/18/20) - Occasional spherocytes and slight increase in polychromasia indicating likely ongoing hemolysis. Leukocytes remarkable for monomorphic small lymphoid cells indicating the presence of chronic lymphocytic leukemia. There are also occasional forms showing granules. Platelets unremarkable    PLAN:  Cont Ibrutinib (started 1/21/20) - Hold while admitted for fever     2. Heme:  Anemia, hemolytic, autoimmune/ITP w/ neutropenia from chemotherapy   - H/o hemolytic anemia and ITP  - Transfuse for hgb <7 and plt <10  - No transfusion today     ITP / hemolytic anemia:    - Recurrent hemolysis (1/21/20) - Viji IgG + with retic 2.8    Current Tx:  - Cont Prednisone 50 mg daily (started 2/17/20), 40mg daily (2/24/20), 30 mg daily (3/4/20), 20mg daily (3/12/20), 10mg daily (3/19/20)   - Rituxan weekly x 4 - #1 (3/18/20) - next dose #2 - 3/25/20    Previous Tx:  - S/p 4 cycles Rituxan (completed 9/14/16) & IVIG 20 gm daily for 5 days (completed 10/10/16)  - S/p Rituxan weekly x 4 (last dose 9/11/17) w/ normalization of her hemoglobin. - S/p Prednisone (stopped 10/2019)      3.  ID:  Admitted w/ neutropenic fever, etiology unclear   - H/o acute sinusitis, currenlty on ppx Doxycycline (Dr. Gee Sunshine)   - Rapid flu (3/20/20) - Pending  - CXR (3/20/20) - Pending   - Start Valtrex & Diflucan  - Merrem Day + 1    Hypogammaglobulinemia:   IgG (3/11/20) - 562  -  Cont monthly immunoglobulin (started 1/4/19), last dose 3/11/20, next due 0/7/56.     4.  Metabolic:  Stable renal fxn and e-lytes  - Start IVF:  NS @ 75 mL/hr    5.  GI / Nutrition:  - H/o indigestion & constipation   Nutrition:  - Start low microbial diet  - Dietary to follow   Indigestion:  - Cont TUMS as needed  Constipation:  - Cont Senokot -s as needed    6. M/S:  - H/o Osteopenia & generalized weakness  Osteopenia:    - Cont Reclast annually 3/15/2017, last dose 4/4/18 and 4/2018. - Dexa scan (3/22/18)   - Repeat bone density study (?)  Generalized Weakness:  Ongoing d/t CLL and treatment w/ chemotherapy  - TSH (8/9/19) - WNL  - Consult PT/OT    7. ENT:    - H/o rhinitis & followed by ENT  Rhinitis:     - Cont Flonase & Zyrtec D    8. Cardiac:    - H/o PVCs   - If heart fluttering sensation returns, will order holter monitor     9. Insomnia:    - Cont doxylamine 12.5mg nightly     10. Neuro:  Mild HA  - H/o headaches  - Cont Fiorinol as needed      - DVT Prophylaxis: Platelets >57,075 cells/dL, - daily lovenox prophylaxis ordered  Contraindications to pharmacologic prophylaxis: None  Contraindications to mechanical prophylaxis: None      - Disposition: Once afebrile on outpatient Abx    The patient was seen and examined by Dr. Edis Linares. This admission history and physical has been discussed and agreed upon by Dr. Edis Linares.     BRAULIO Glass - CNP

## 2020-03-20 NOTE — H&P
TONSILLECTOMY         Past Medical History:   Diagnosis Date    Allergic rhinitis due to pollen 1/20/2016    Arthritis, degenerative 5/3/2011    Cancer (HCC)     chronic lymphocytic leukemia    CLL (chronic lymphoblastic leukemia)     Closed compression fracture of thoracic vertebra ([facility] [address] 6/27/2016    Depression     Hyperlipidemia     Melanoma of upper arm ([facility]     2002    Mild reactive airways disease 1/20/2016       Prior to Admission medications    Medication Sig Start Date End Date Taking?  Authorizing Provider   calcium carbonate (TUMS) 500 MG chewable tablet Take 2 tablets by mouth 3 times daily as needed for Heartburn   Yes Historical Provider, MD   vitamin C (ASCORBIC ACID) 500 MG tablet Take 1,000 mg by mouth daily   Yes Historical Provider, MD   sennosides-docusate sodium (SENOKOT-S) 8.6-50 MG tablet Take 2 tablets by mouth daily as needed for Constipation   Yes Historical Provider, MD   Ibrutinib (IMBRUVICA PO) Take by mouth daily   Yes Historical Provider, MD   predniSONE (DELTASONE) 50 MG tablet Take 10 mg by mouth daily    Yes Historical Provider, MD   atorvastatin (LIPITOR) 20 MG tablet TAKE ONE TABLET BY MOUTH DAILY 10/28/19  Yes Sushma Lyman MD   B Complex Vitamins (B-COMPLEX/B-12 PO) Take 1 capsule by mouth daily   Yes Historical Provider, MD   ibuprofen (ADVIL;MOTRIN) 200 MG tablet Take 400 mg by mouth 3 times daily   Yes Historical Provider, MD   butalbital-aspirin-caffeine (FIORINAL) -40 MG capsule TAKE ONE CAPSULE BY MOUTH EVERY 4 HOURS AS NEEDED  **DO NOT EXCEED 6 CAPSULES IN 24 HOURS** 7/21/17  Yes Franchesca Laguna MD   montelukast (SINGULAIR) 10 MG tablet TAKE ONE TABLET BY MOUTH ONCE NIGHTLY  Patient taking differently: Take 10 mg by mouth nightly as needed (rhinitis)  3/5/20   Sushma Lyman MD   Multiple Vitamin (MVI, CELEBRATE, CHEWABLE TABLET) Take 1 tablet by mouth 3/11/09   Historical Provider, MD       Allergies   Allergen Reactions    Penicillins Rash kg)       KPS: 90% Able to carry on normal activity; minor signs or symptoms of disease    ECOG PS:  (1) Restricted in physically strenuous activity, ambulatory and able to do work of light nature    General: Awake, alert and oriented. HEENT: normocephalic, PERRL, no scleral erythema or icterus, Oral mucosa moist and intact, throat clear  NECK: supple without palpable adenopathy  BACK: Straight negative CVAT  SKIN: warm dry and intact without lesions rashes or masses  CHEST: CTA bilaterally without use of accessory muscles  CV: Normal S1 S2, RRR, no MRG  ABD: NT ND normoactive BS, no palpable masses or hepatosplenomegaly  EXTREMITIES: without edema, denies calf tenderness  NEURO: CN II - XII grossly intact  CATHETER: left forearm PIV (3/20/20) - CDI    Laboratory Data:   CBC: No results for input(s): WBC, HGB, HCT, MCV, PLT in the last 72 hours. BMP/Mag:No results for input(s): NA, K, CL, CO2, PHOS, BUN, CREATININE, MG in the last 72 hours. Invalid input(s): CA  LIVP: No results for input(s): AST, ALT, LIPASE, BILIDIR, BILITOT, ALKPHOS in the last 72 hours. Invalid input(s): AMYLASE,  ALB  Coags: No results for input(s): PROTIME, INR, APTT in the last 72 hours. Uric Acid No results for input(s): LABURIC in the last 72 hours. PROBLEM LIST:             1.  CLL (Dx 1/2003)  2. LGL, CD57 w/ associated Neutropenia (Dx 2000)  3. Pure Red Cell Aplasia (Dx 8/12/17)     4. Autoimmune Hemolytic Anemia s/p Prednisone (2012), relapsed (2013)  5. Autoimmune / ITP  6. Hypogammaglobulinemia    7. Chronic Rhinitis & recurrent acute sinusitis   8. H/o Left inguinal mass, CT scan (10/10/2019) - negative    9. H/o PVCs  10. H/o Herpes Zoster w/ post herpetic neuralgia   11. HLD  12.  Lumbar radiculopathy   13. Reactive Airway Disease  14. Osteopenia  15. Early melanoma, s/p excision (5/2006)  16. ITP (2/2020)      TREATMENT:            CLL: 1.   Ibrutinib (started 1/21/20)          ASSESSMENT AND PLAN: / Nutrition:  - H/o indigestion & constipation   Nutrition:  - Start low microbial diet  - Dietary to follow   Indigestion:  - Cont TUMS as needed  Constipation:  - Cont Senokot -s as needed    6. M/S:  - H/o Osteopenia & generalized weakness  Osteopenia:    - Cont Reclast annually 3/15/2017, last dose 4/4/18 and 4/2018. - Dexa scan (3/22/18)   - Repeat bone density study (?)  Generalized Weakness:  Ongoing d/t CLL and treatment w/ chemotherapy  - TSH (8/9/19) - WNL  - Consult PT/OT    7. ENT:    - H/o rhinitis & followed by ENT  Rhinitis:     - Cont Flonase & Zyrtec D    8. Cardiac:    - H/o PVCs   - If heart fluttering sensation returns, will order holter monitor     9. Insomnia:    - Cont doxylamine 12.5mg nightly     10. Neuro:  Mild HA  - H/o headaches  - Cont Fiorinol as needed    11. LGL - review of the peripheral blood smear show LGL and confirmed with flow cytometry. Will begin MTX with profound neutropenia.       - DVT Prophylaxis: Platelets >83,362 cells/dL, - daily lovenox prophylaxis ordered  Contraindications to pharmacologic prophylaxis: None  Contraindications to mechanical prophylaxis: None      - Disposition: Once afebrile on outpatient Abx        Jeannette Esquivel MD

## 2020-03-20 NOTE — PROGRESS NOTES
Patient admitted to [facility] via direct admit from [facility] for diagnosis of neutropenic fever. Patient oriented to patient room including call light and bed controls. Admission assessment completed - see admission flowsheet documentation. Patient is a medium fall risk. Safety measures instituted per policy. Patient oriented to unit policies and procedures including: pain management practices, unit safety precautions, family rapid response, q4h vital signs and assessments, daily 4am lab draws, weekly chest x-rays, weekly VRE rectal swabs for surveillance, daily chlorhexidine bathing, standing transfusion orders, and routine central line care. Also discussed use of call light and how to get in touch with nursing staff. Stressed the importance of calling out immediately for any changes in condition including but not limited to: pain, chills, fever, nausea, vomiting, diarrhea, chest pain, sob/quijano, assistance with toileting, bleeding, or any other symptoms that are out of the ordinary for the patient. Patient verbalizes understanding of all instructions and will call for assistance as needed.

## 2020-03-21 LAB
ANION GAP SERPL CALCULATED.3IONS-SCNC: 10 MMOL/L (ref 3–16)
BASOPHILS ABSOLUTE: 0 K/UL (ref 0–0.2)
BASOPHILS RELATIVE PERCENT: 0 %
BUN BLDV-MCNC: 10 MG/DL (ref 7–20)
CALCIUM SERPL-MCNC: 8.3 MG/DL (ref 8.3–10.6)
CHLORIDE BLD-SCNC: 109 MMOL/L (ref 99–110)
CO2: 26 MMOL/L (ref 21–32)
CREAT SERPL-MCNC: <0.5 MG/DL (ref 0.6–1.2)
EOSINOPHILS ABSOLUTE: 0 K/UL (ref 0–0.6)
EOSINOPHILS RELATIVE PERCENT: 0 %
GFR AFRICAN AMERICAN: >60
GFR NON-AFRICAN AMERICAN: >60
GLUCOSE BLD-MCNC: 86 MG/DL (ref 70–99)
HCT VFR BLD CALC: 27.4 % (ref 36–48)
HEMOGLOBIN: 8.9 G/DL (ref 12–16)
LYMPHOCYTES ABSOLUTE: 10.4 K/UL (ref 1–5.1)
LYMPHOCYTES RELATIVE PERCENT: 86 %
MCH RBC QN AUTO: 31.8 PG (ref 26–34)
MCHC RBC AUTO-ENTMCNC: 32.6 G/DL (ref 31–36)
MCV RBC AUTO: 97.5 FL (ref 80–100)
MONOCYTES ABSOLUTE: 1.3 K/UL (ref 0–1.3)
MONOCYTES RELATIVE PERCENT: 11 %
MYELOCYTE PERCENT: 1 %
NEUTROPHILS ABSOLUTE: 0.4 K/UL (ref 1.7–7.7)
NEUTROPHILS RELATIVE PERCENT: 2 %
PDW BLD-RTO: 26.7 % (ref 12.4–15.4)
PLATELET # BLD: 217 K/UL (ref 135–450)
PMV BLD AUTO: 8.9 FL (ref 5–10.5)
POTASSIUM SERPL-SCNC: 3.9 MMOL/L (ref 3.5–5.1)
RBC # BLD: 2.81 M/UL (ref 4–5.2)
SODIUM BLD-SCNC: 145 MMOL/L (ref 136–145)
URIC ACID, SERUM: 3.1 MG/DL (ref 2.6–6)
WBC # BLD: 12.1 K/UL (ref 4–11)

## 2020-03-21 PROCEDURE — 6370000000 HC RX 637 (ALT 250 FOR IP): Performed by: INTERNAL MEDICINE

## 2020-03-21 PROCEDURE — 85025 COMPLETE CBC W/AUTO DIFF WBC: CPT

## 2020-03-21 PROCEDURE — 2060000000 HC ICU INTERMEDIATE R&B

## 2020-03-21 PROCEDURE — 6360000002 HC RX W HCPCS: Performed by: INTERNAL MEDICINE

## 2020-03-21 PROCEDURE — 84550 ASSAY OF BLOOD/URIC ACID: CPT

## 2020-03-21 PROCEDURE — 6360000002 HC RX W HCPCS: Performed by: NURSE PRACTITIONER

## 2020-03-21 PROCEDURE — 2580000003 HC RX 258: Performed by: INTERNAL MEDICINE

## 2020-03-21 PROCEDURE — 87081 CULTURE SCREEN ONLY: CPT

## 2020-03-21 PROCEDURE — 6370000000 HC RX 637 (ALT 250 FOR IP): Performed by: NURSE PRACTITIONER

## 2020-03-21 PROCEDURE — 80048 BASIC METABOLIC PNL TOTAL CA: CPT

## 2020-03-21 PROCEDURE — 2580000003 HC RX 258: Performed by: NURSE PRACTITIONER

## 2020-03-21 RX ORDER — 0.9 % SODIUM CHLORIDE 0.9 %
500 INTRAVENOUS SOLUTION INTRAVENOUS ONCE
Status: COMPLETED | OUTPATIENT
Start: 2020-03-21 | End: 2020-03-21

## 2020-03-21 RX ADMIN — DOXYLAMINE SUCCINATE 12.5 MG: 25 TABLET ORAL at 20:57

## 2020-03-21 RX ADMIN — ATORVASTATIN CALCIUM 20 MG: 20 TABLET, FILM COATED ORAL at 08:39

## 2020-03-21 RX ADMIN — PREDNISONE 5 MG: 5 TABLET ORAL at 08:39

## 2020-03-21 RX ADMIN — Medication 1 CAPSULE: at 08:39

## 2020-03-21 RX ADMIN — MEROPENEM 1 G: 1 INJECTION, POWDER, FOR SOLUTION INTRAVENOUS at 18:48

## 2020-03-21 RX ADMIN — SODIUM CHLORIDE 15 ML: 900 IRRIGANT IRRIGATION at 18:07

## 2020-03-21 RX ADMIN — SODIUM CHLORIDE: 9 INJECTION, SOLUTION INTRAVENOUS at 14:24

## 2020-03-21 RX ADMIN — SODIUM CHLORIDE 15 ML: 900 IRRIGANT IRRIGATION at 12:06

## 2020-03-21 RX ADMIN — Medication 10 ML: at 20:18

## 2020-03-21 RX ADMIN — TBO-FILGRASTIM 300 MCG: 300 INJECTION, SOLUTION SUBCUTANEOUS at 18:13

## 2020-03-21 RX ADMIN — SODIUM CHLORIDE 15 ML: 900 IRRIGANT IRRIGATION at 20:18

## 2020-03-21 RX ADMIN — MEROPENEM 1 G: 1 INJECTION, POWDER, FOR SOLUTION INTRAVENOUS at 04:22

## 2020-03-21 RX ADMIN — OXYCODONE HYDROCHLORIDE AND ACETAMINOPHEN 1000 MG: 500 TABLET ORAL at 08:39

## 2020-03-21 RX ADMIN — BUTALBITAL, ASPIRIN, AND CAFFEINE 1 CAPSULE: 325; 50; 40 CAPSULE ORAL at 12:05

## 2020-03-21 RX ADMIN — SODIUM CHLORIDE 500 ML: 9 INJECTION, SOLUTION INTRAVENOUS at 18:08

## 2020-03-21 RX ADMIN — MEROPENEM 1 G: 1 INJECTION, POWDER, FOR SOLUTION INTRAVENOUS at 12:05

## 2020-03-21 RX ADMIN — ENOXAPARIN SODIUM 40 MG: 40 INJECTION SUBCUTANEOUS at 18:07

## 2020-03-21 RX ADMIN — Medication 10 ML: at 08:40

## 2020-03-21 ASSESSMENT — PAIN SCALES - GENERAL
PAINLEVEL_OUTOF10: 2
PAINLEVEL_OUTOF10: 0

## 2020-03-21 ASSESSMENT — PAIN DESCRIPTION - DESCRIPTORS: DESCRIPTORS: HEADACHE

## 2020-03-21 ASSESSMENT — PAIN DESCRIPTION - PAIN TYPE: TYPE: ACUTE PAIN

## 2020-03-21 ASSESSMENT — PAIN DESCRIPTION - PROGRESSION: CLINICAL_PROGRESSION: GRADUALLY WORSENING

## 2020-03-21 ASSESSMENT — PAIN DESCRIPTION - FREQUENCY: FREQUENCY: CONTINUOUS

## 2020-03-21 ASSESSMENT — PAIN - FUNCTIONAL ASSESSMENT: PAIN_FUNCTIONAL_ASSESSMENT: ACTIVITIES ARE NOT PREVENTED

## 2020-03-21 ASSESSMENT — PAIN DESCRIPTION - LOCATION: LOCATION: HEAD

## 2020-03-21 ASSESSMENT — PAIN DESCRIPTION - ORIENTATION: ORIENTATION: ANTERIOR

## 2020-03-21 ASSESSMENT — PAIN DESCRIPTION - ONSET: ONSET: ON-GOING

## 2020-03-21 NOTE — PROGRESS NOTES
Patient gave RN home supply of Fiorinal. RN verified order with NP and pharmacist. Medication brought to pharmacy for controlled usage. Will notify pm RN.

## 2020-03-21 NOTE — PROGRESS NOTES
[facility] Progress Note    3/21/2020     Micki Waggoner    MRN: [id number]    : 1940    Referring MD: Kirstie Pump, [address], [address], [address]      SUBJECTIVE:  Patient is feeling better. ECOG PS:  (1) Restricted in physically strenuous activity, ambulatory and able to do work of light nature    KPS: 80% Normal activity with effort; some signs or symptoms of disease    Isolation: None    Medications    Scheduled Meds:   tbo-filgrastim  300 mcg Subcutaneous QPM    enoxaparin  40 mg Subcutaneous Daily    Saline Mouthwash  15 mL Swish & Spit 4x Daily AC & HS    sodium chloride flush  10 mL Intravenous 2 times per day    atorvastatin  20 mg Oral Daily    b complex vitamins  1 capsule Oral Daily    vitamin C  1,000 mg Oral Daily    doxyLAMINE succinate  12.5 mg Oral Nightly    meropenem  1 g Intravenous Q8H    predniSONE  5 mg Oral Daily    methotrexate  10 mg Oral Weekly     Continuous Infusions:   sodium chloride 50 mL/hr at 20 1605     PRN Meds:.alteplase, magnesium hydroxide, magnesium sulfate, Saline Mouthwash, acetaminophen, ondansetron, prochlorperazine **OR** prochlorperazine, calcium carbonate, montelukast, sennosides-docusate sodium, butalbital-aspirin-caffeine    ROS:  As noted above, otherwise remainder of 10-point ROS negative    Physical Exam:     I&O:      Intake/Output Summary (Last 24 hours) at 3/21/2020 1214  Last data filed at 3/21/2020 1208  Gross per 24 hour   Intake 1860 ml   Output 3150 ml   Net -1290 ml       Vital Signs:  BP (!) 90/46   Pulse 69   Temp 98.2 °F (36.8 °C) (Oral)   Resp 18   Ht 5' 2\" (1.575 m)   Wt 103 lb (46.7 kg)   LMP  (LMP Unknown)   SpO2 97%   BMI 18.84 kg/m²     Weight:    Wt Readings from Last 3 Encounters:   20 103 lb (46.7 kg)   20 102 lb 12.8 oz (46.6 kg)   20 106 lb (48.1 kg)         General: Awake, alert and oriented.   HEENT: normocephalic, PERRL, no scleral erythema or needed  Constipation:  - Cont Senokot -s as needed    6. M/S:  - H/o Osteopenia & generalized weakness  Osteopenia:    - Cont Reclast annually 3/15/2017, last dose 4/4/18 and 4/2018. - Dexa scan (3/22/18)   - Repeat bone density study (?)  Generalized Weakness:  Ongoing d/t CLL and treatment w/ chemotherapy  - TSH (8/9/19) - WNL  - Consult PT/OT    7. ENT:    - H/o rhinitis & followed by ENT  Rhinitis:     - Cont Flonase & Zyrtec D    8. Cardiac:    - H/o PVCs   - If heart fluttering sensation returns, will order holter monitor      9. Insomnia:    - Cont doxylamine 12.5mg nightly      10. Neuro:  Mild HA  - H/o headaches  - Cont Fiorinol as needed     11. LGL - review of the peripheral blood smear show LGL and confirmed with flow cytometry.   Will begin MTX  10 mg weekly (3/21/20)with profound neutropenia.        - DVT Prophylaxis: Platelets >20,048 cells/dL, - daily lovenox prophylaxis ordered  Contraindications to pharmacologic prophylaxis: None  Contraindications to mechanical prophylaxis: None        - Disposition: Once afebrile on outpatient Abx        Evan Aleman MD

## 2020-03-21 NOTE — PLAN OF CARE
Problem: Pain:  Goal: Control of acute pain  Description: Control of acute pain  Outcome: Met This Shift     Problem: Falls - Risk of:  Goal: Will remain free from falls  Description: Will remain free from falls  Outcome: Met This Shift  Goal: Absence of physical injury  Description: Absence of physical injury  Outcome: Met This Shift     Problem: Bleeding:  Goal: Will show no signs and symptoms of excessive bleeding  Description: Will show no signs and symptoms of excessive bleeding  Outcome: Met This Shift     Problem: PROTECTIVE PRECAUTIONS  Goal: Patient will remain free of nosocomial Infections  Outcome: Met This Shift

## 2020-03-22 LAB
ANION GAP SERPL CALCULATED.3IONS-SCNC: 15 MMOL/L (ref 3–16)
BASOPHILS ABSOLUTE: 0.1 K/UL (ref 0–0.2)
BASOPHILS RELATIVE PERCENT: 0.6 %
BILIRUBIN URINE: NEGATIVE
BLOOD, URINE: NEGATIVE
BUN BLDV-MCNC: 9 MG/DL (ref 7–20)
CALCIUM SERPL-MCNC: 7.9 MG/DL (ref 8.3–10.6)
CHLORIDE BLD-SCNC: 105 MMOL/L (ref 99–110)
CLARITY: CLEAR
CO2: 22 MMOL/L (ref 21–32)
COLOR: YELLOW
CREAT SERPL-MCNC: <0.5 MG/DL (ref 0.6–1.2)
EOSINOPHILS ABSOLUTE: 0 K/UL (ref 0–0.6)
EOSINOPHILS RELATIVE PERCENT: 0.1 %
GFR AFRICAN AMERICAN: >60
GFR NON-AFRICAN AMERICAN: >60
GLUCOSE BLD-MCNC: 79 MG/DL (ref 70–99)
GLUCOSE URINE: NEGATIVE MG/DL
HCT VFR BLD CALC: 28 % (ref 36–48)
HEMOGLOBIN: 9 G/DL (ref 12–16)
KETONES, URINE: NEGATIVE MG/DL
LEUKOCYTE ESTERASE, URINE: NEGATIVE
LYMPHOCYTES ABSOLUTE: 11.7 K/UL (ref 1–5.1)
LYMPHOCYTES RELATIVE PERCENT: 59.2 %
MCH RBC QN AUTO: 32.1 PG (ref 26–34)
MCHC RBC AUTO-ENTMCNC: 32 G/DL (ref 31–36)
MCV RBC AUTO: 100.3 FL (ref 80–100)
MICROSCOPIC EXAMINATION: NORMAL
MONOCYTES ABSOLUTE: 2 K/UL (ref 0–1.3)
MONOCYTES RELATIVE PERCENT: 10.2 %
NEUTROPHILS ABSOLUTE: 5.9 K/UL (ref 1.7–7.7)
NEUTROPHILS RELATIVE PERCENT: 29.9 %
NITRITE, URINE: NEGATIVE
PDW BLD-RTO: 26.3 % (ref 12.4–15.4)
PH UA: 6 (ref 5–8)
PLATELET # BLD: 236 K/UL (ref 135–450)
PMV BLD AUTO: 9.2 FL (ref 5–10.5)
POTASSIUM SERPL-SCNC: 3.6 MMOL/L (ref 3.5–5.1)
PROTEIN UA: NEGATIVE MG/DL
RBC # BLD: 2.79 M/UL (ref 4–5.2)
SODIUM BLD-SCNC: 142 MMOL/L (ref 136–145)
SPECIFIC GRAVITY UA: 1.02 (ref 1–1.03)
URIC ACID, SERUM: 2.8 MG/DL (ref 2.6–6)
URINE TYPE: NORMAL
UROBILINOGEN, URINE: 0.2 E.U./DL
WBC # BLD: 19.9 K/UL (ref 4–11)

## 2020-03-22 PROCEDURE — 81003 URINALYSIS AUTO W/O SCOPE: CPT

## 2020-03-22 PROCEDURE — 84550 ASSAY OF BLOOD/URIC ACID: CPT

## 2020-03-22 PROCEDURE — 6370000000 HC RX 637 (ALT 250 FOR IP): Performed by: NURSE PRACTITIONER

## 2020-03-22 PROCEDURE — 85025 COMPLETE CBC W/AUTO DIFF WBC: CPT

## 2020-03-22 PROCEDURE — 6360000002 HC RX W HCPCS: Performed by: NURSE PRACTITIONER

## 2020-03-22 PROCEDURE — 2060000000 HC ICU INTERMEDIATE R&B

## 2020-03-22 PROCEDURE — 6370000000 HC RX 637 (ALT 250 FOR IP): Performed by: INTERNAL MEDICINE

## 2020-03-22 PROCEDURE — 2580000003 HC RX 258: Performed by: NURSE PRACTITIONER

## 2020-03-22 PROCEDURE — 80048 BASIC METABOLIC PNL TOTAL CA: CPT

## 2020-03-22 RX ADMIN — MEROPENEM 1 G: 1 INJECTION, POWDER, FOR SOLUTION INTRAVENOUS at 11:33

## 2020-03-22 RX ADMIN — Medication 10 ML: at 20:55

## 2020-03-22 RX ADMIN — OXYCODONE HYDROCHLORIDE AND ACETAMINOPHEN 1000 MG: 500 TABLET ORAL at 09:46

## 2020-03-22 RX ADMIN — SODIUM CHLORIDE: 9 INJECTION, SOLUTION INTRAVENOUS at 20:50

## 2020-03-22 RX ADMIN — SODIUM CHLORIDE: 9 INJECTION, SOLUTION INTRAVENOUS at 03:23

## 2020-03-22 RX ADMIN — Medication 10 ML: at 09:47

## 2020-03-22 RX ADMIN — SODIUM CHLORIDE 15 ML: 900 IRRIGANT IRRIGATION at 18:23

## 2020-03-22 RX ADMIN — DOXYLAMINE SUCCINATE 12.5 MG: 25 TABLET ORAL at 20:50

## 2020-03-22 RX ADMIN — PREDNISONE 5 MG: 5 TABLET ORAL at 09:46

## 2020-03-22 RX ADMIN — Medication 1 CAPSULE: at 09:46

## 2020-03-22 RX ADMIN — SODIUM CHLORIDE 15 ML: 900 IRRIGANT IRRIGATION at 20:55

## 2020-03-22 RX ADMIN — BUTALBITAL, ASPIRIN, AND CAFFEINE 1 CAPSULE: 325; 50; 40 CAPSULE ORAL at 07:42

## 2020-03-22 RX ADMIN — BUTALBITAL, ASPIRIN, AND CAFFEINE 1 CAPSULE: 325; 50; 40 CAPSULE ORAL at 12:37

## 2020-03-22 RX ADMIN — ATORVASTATIN CALCIUM 20 MG: 20 TABLET, FILM COATED ORAL at 09:46

## 2020-03-22 RX ADMIN — ENOXAPARIN SODIUM 40 MG: 40 INJECTION SUBCUTANEOUS at 18:23

## 2020-03-22 RX ADMIN — MEROPENEM 1 G: 1 INJECTION, POWDER, FOR SOLUTION INTRAVENOUS at 03:23

## 2020-03-22 RX ADMIN — SODIUM CHLORIDE 15 ML: 900 IRRIGANT IRRIGATION at 11:33

## 2020-03-22 ASSESSMENT — PAIN DESCRIPTION - PAIN TYPE
TYPE: ACUTE PAIN
TYPE: ACUTE PAIN

## 2020-03-22 ASSESSMENT — PAIN DESCRIPTION - PROGRESSION
CLINICAL_PROGRESSION: NOT CHANGED
CLINICAL_PROGRESSION: NOT CHANGED

## 2020-03-22 ASSESSMENT — PAIN SCALES - GENERAL
PAINLEVEL_OUTOF10: 7
PAINLEVEL_OUTOF10: 7
PAINLEVEL_OUTOF10: 0
PAINLEVEL_OUTOF10: 7
PAINLEVEL_OUTOF10: 0
PAINLEVEL_OUTOF10: 8
PAINLEVEL_OUTOF10: 0

## 2020-03-22 ASSESSMENT — PAIN - FUNCTIONAL ASSESSMENT
PAIN_FUNCTIONAL_ASSESSMENT: PREVENTS OR INTERFERES SOME ACTIVE ACTIVITIES AND ADLS
PAIN_FUNCTIONAL_ASSESSMENT: ACTIVITIES ARE NOT PREVENTED

## 2020-03-22 ASSESSMENT — PAIN DESCRIPTION - LOCATION
LOCATION: HEAD
LOCATION: HEAD

## 2020-03-22 ASSESSMENT — PAIN DESCRIPTION - DESCRIPTORS
DESCRIPTORS: HEADACHE
DESCRIPTORS: HEADACHE

## 2020-03-22 ASSESSMENT — PAIN DESCRIPTION - ORIENTATION: ORIENTATION: OTHER (COMMENT)

## 2020-03-22 ASSESSMENT — PAIN DESCRIPTION - FREQUENCY
FREQUENCY: CONTINUOUS
FREQUENCY: INTERMITTENT

## 2020-03-22 ASSESSMENT — PAIN DESCRIPTION - ONSET
ONSET: ON-GOING
ONSET: ON-GOING

## 2020-03-22 NOTE — PLAN OF CARE
Problem: Pain:  Goal: Pain level will decrease  Description: Pain level will decrease  Outcome: Ongoing  Pt reports pain of 7 out of 10. Pt states pain is located in Head Pt currently takes Fionnal (pt supply in pharmacy). Educated pt on use of meds for pain control. R: Pt verbalized understanding of education. .      Problem: Falls - Risk of:  Goal: Will remain free from falls  Description: Will remain free from falls  Outcome: Ongoing  Note: Orthostatic vital signs obtained at start of shift - see flowsheet for details. Pt meets criteria for orthostasis. Pt is a Med fall risk. See Eric Mahendra Fall Score and ABCDS Injury Risk assessments. - Screening for Orthostasis AND not a [place] Risk per ZAPATA/ABCDS: Pt bed is in low position, side rails up, call light and belongings are in reach. Fall risk light is on outside pts room. Pt encouraged to call for assistance as needed. Will continue with hourly rounds for PO intake, pain needs, toileting and repositioning as needed. Problem: Falls - Risk of:  Goal: Absence of physical injury  Description: Absence of physical injury  Outcome: Ongoing     Problem: Bleeding:  Goal: Will show no signs and symptoms of excessive bleeding  Description: Will show no signs and symptoms of excessive bleeding  Outcome: Ongoing  Note: Patient's hemoglobin this AM:   Recent Labs     03/22/20  0410   HGB 9.0*     Patient's platelet count this AM:   Recent Labs     03/22/20  0410       Thrombocytopenia not present at this time. Patient showing no signs or symptoms of active bleeding. Transfusion not indicated at this time. Patient verbalizes understanding of all instructions. Will continue to assess and implement POC. Call light within reach and hourly rounding in place. Problem: PROTECTIVE PRECAUTIONS  Goal: Patient will remain free of nosocomial Infections  Outcome: Ongoing  Note: Pt remains in protective precautions.   Pt educated on wearing mask when in hallways. Pt, staff, and visitors adhering to handwashing guidelines. Pt educated to shower or bathe daily with chlorhexidine and linens changed daily per protocol. Pt verbalizes understanding of low microbial diet. Will continue to monitor.        Problem: Venous Thromboembolism:  Goal: Will show no signs or symptoms of venous thromboembolism  Description: Will show no signs or symptoms of venous thromboembolism  Outcome: Ongoing  Note: - DVT Prophylaxis: Platelets >49,909 cells/dL, - daily lovenox prophylaxis ordered  Contraindications to pharmacologic prophylaxis: Patient already on therapeutic anticoagulation  Contraindications to mechanical prophylaxis: Patient already on therapeutic anticoagulation

## 2020-03-22 NOTE — PROGRESS NOTES
[facility] Progress Note    3/22/2020     Micki Waggoner    MRN: [id number]    : 1940    Referring MD: Kirstie Tejeda MD  [address], [address], [address]      SUBJECTIVE:  Patient is feeling back to normal.    ECOG PS:  (1) Restricted in physically strenuous activity, ambulatory and able to do work of light nature    KPS: 80% Normal activity with effort; some signs or symptoms of disease    Isolation: None    Medications    Scheduled Meds:   enoxaparin  40 mg Subcutaneous Daily    Saline Mouthwash  15 mL Swish & Spit 4x Daily AC & HS    sodium chloride flush  10 mL Intravenous 2 times per day    atorvastatin  20 mg Oral Daily    b complex vitamins  1 capsule Oral Daily    vitamin C  1,000 mg Oral Daily    doxyLAMINE succinate  12.5 mg Oral Nightly    predniSONE  5 mg Oral Daily    methotrexate  10 mg Oral Weekly     Continuous Infusions:   sodium chloride 50 mL/hr at 20 0323     PRN Meds:.alteplase, magnesium hydroxide, magnesium sulfate, Saline Mouthwash, acetaminophen, ondansetron, prochlorperazine **OR** prochlorperazine, calcium carbonate, montelukast, sennosides-docusate sodium, butalbital-aspirin-caffeine    ROS:  As noted above, otherwise remainder of 10-point ROS negative    Physical Exam:     I&O:      Intake/Output Summary (Last 24 hours) at 3/22/2020 1211  Last data filed at 3/22/2020 0940  Gross per 24 hour   Intake 3373 ml   Output 3150 ml   Net 223 ml       Vital Signs:  BP (!) 98/45   Pulse 72   Temp 97.6 °F (36.4 °C) (Oral)   Resp 18   Ht 5' 2\" (1.575 m)   Wt 107 lb 4 oz (48.6 kg)   LMP  (LMP Unknown)   SpO2 96%   BMI 19.62 kg/m²     Weight:    Wt Readings from Last 3 Encounters:   20 107 lb 4 oz (48.6 kg)   20 102 lb 12.8 oz (46.6 kg)   20 106 lb (48.1 kg)         General: Awake, alert and oriented.   HEENT: normocephalic, PERRL, no scleral erythema or icterus, Oral mucosa moist and intact, throat clear  NECK: supple without palpable adenopathy  BACK: Straight negative CVAT  SKIN: warm dry and intact without lesions rashes or masses  CHEST: CTA bilaterally without use of accessory muscles  CV: Normal S1 S2, RRR, no MRG  ABD: NT ND normoactive BS, no palpable masses or hepatosplenomegaly  EXTREMITIES: without edema, denies calf tenderness  NEURO: CN II - XII grossly intact  CATHETER: PIV    Data    CBC:   Recent Labs     03/20/20 1730 03/21/20 0420 03/22/20  0410   WBC 10.1 12.1* 19.9*   HGB 9.6* 8.9* 9.0*   HCT 29.0* 27.4* 28.0*   MCV 98.4 97.5 100.3*    217 236     BMP/Mag:  Recent Labs     03/20/20 1730 03/21/20 0420 03/22/20  0410    145 142   K 4.1 3.9 3.6    109 105   CO2 24 26 22   PHOS 3.6  --   --    BUN 12 10 9   CREATININE 0.6 <0.5* <0.5*   MG 2.20  --   --      LIVP:   Recent Labs     03/20/20 1730   AST 14*   ALT 11   BILIDIR <0.2   BILITOT 0.3   ALKPHOS 56     Coags:   Recent Labs     03/20/20 1730   PROTIME 11.9   INR 1.03   APTT 30.3     Uric Acid   Recent Labs     03/20/20 1730 03/21/20 0420 03/22/20  0410   LABURIC 3.4 3.1 2.8     PROBLEM LIST:            1. CLL (Dx 1/2003)  2. LGL, CD57 w/ associated Neutropenia (Dx 2000)  3. Pure Red Cell Aplasia (Dx 8/12/17)     4. Autoimmune Hemolytic Anemia s/p Prednisone (2012), relapsed (2013)  5. Autoimmune / ITP  6. Hypogammaglobulinemia    7. Chronic Rhinitis & recurrent acute sinusitis   8. H/o Left inguinal mass, CT scan (10/10/2019) - negative    9. H/o PVCs  10. H/o Herpes Zoster w/ post herpetic neuralgia   11. HLD  12.  Lumbar radiculopathy   13. Reactive Airway Disease  14. Osteopenia  15. Early melanoma, s/p excision (5/2006)  16. ITP (2/2020)     TREATMENT:            CLL:    1. Ibrutinib (started 1/21/20)        ASSESSMENT AND PLAN:           1. 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    - Repeat FISH low microbial diet  - Dietary to follow   Indigestion:  - Cont TUMS as needed  Constipation:  - Cont Senokot -s as needed    6. M/S:  - H/o Osteopenia & generalized weakness  Osteopenia:    - Cont Reclast annually 3/15/2017, last dose 4/4/18 and 4/2018. - Dexa scan (3/22/18)   - Repeat bone density study (?)  Generalized Weakness:  Ongoing d/t CLL and treatment w/ chemotherapy  - TSH (8/9/19) - WNL  - Consult PT/OT    7. ENT:    - H/o rhinitis & followed by ENT  Rhinitis:     - Cont Flonase & Zyrtec D    8. Cardiac:    - H/o PVCs   - If heart fluttering sensation returns, will order holter monitor      9. Insomnia:    - Cont doxylamine 12.5mg nightly      10. Neuro:  Mild HA  - H/o headaches  - Cont Fiorinol as needed     11. LGL - review of the peripheral blood smear show LGL and confirmed with flow cytometry. Started MTX  10 mg weekly (3/21/20) with profound neutropenia.     12. CV - hypotension  Likely artifact, needs smaller cuff        - DVT Prophylaxis: Platelets >71,425 cells/dL, - daily lovenox prophylaxis ordered  Contraindications to pharmacologic prophylaxis: None  Contraindications to mechanical prophylaxis: None        - Disposition: Once afebrile on outpatient Abx        Stewart Brannon MD

## 2020-03-23 ENCOUNTER — APPOINTMENT (OUTPATIENT)
Dept: GENERAL RADIOLOGY | Age: 80
DRG: 809 | End: 2020-03-23
Attending: INTERNAL MEDICINE
Payer: MEDICARE

## 2020-03-23 VITALS
HEART RATE: 69 BPM | DIASTOLIC BLOOD PRESSURE: 53 MMHG | SYSTOLIC BLOOD PRESSURE: 102 MMHG | BODY MASS INDEX: 19.73 KG/M2 | HEIGHT: 62 IN | WEIGHT: 107.2 LBS | OXYGEN SATURATION: 97 % | TEMPERATURE: 97.9 F | RESPIRATION RATE: 18 BRPM

## 2020-03-23 PROBLEM — R50.81 NEUTROPENIC FEVER (HCC): Status: RESOLVED | Noted: 2020-03-20 | Resolved: 2020-03-23

## 2020-03-23 PROBLEM — D70.9 NEUTROPENIC FEVER (HCC): Status: RESOLVED | Noted: 2020-03-20 | Resolved: 2020-03-23

## 2020-03-23 LAB
ALBUMIN SERPL-MCNC: 3.3 G/DL (ref 3.4–5)
ALP BLD-CCNC: 52 U/L (ref 40–129)
ALT SERPL-CCNC: 9 U/L (ref 10–40)
ANION GAP SERPL CALCULATED.3IONS-SCNC: 9 MMOL/L (ref 3–16)
ANISOCYTOSIS: ABNORMAL
APTT: 30.7 SEC (ref 24.2–36.2)
AST SERPL-CCNC: 12 U/L (ref 15–37)
ATYPICAL LYMPHOCYTE RELATIVE PERCENT: 1 % (ref 0–6)
BASOPHILS ABSOLUTE: 0 K/UL (ref 0–0.2)
BASOPHILS RELATIVE PERCENT: 0 %
BILIRUB SERPL-MCNC: <0.2 MG/DL (ref 0–1)
BILIRUBIN DIRECT: <0.2 MG/DL (ref 0–0.3)
BILIRUBIN, INDIRECT: ABNORMAL MG/DL (ref 0–1)
BUN BLDV-MCNC: 6 MG/DL (ref 7–20)
BURR CELLS: ABNORMAL
CALCIUM SERPL-MCNC: 8.3 MG/DL (ref 8.3–10.6)
CHLORIDE BLD-SCNC: 108 MMOL/L (ref 99–110)
CO2: 27 MMOL/L (ref 21–32)
CREAT SERPL-MCNC: <0.5 MG/DL (ref 0.6–1.2)
EKG ATRIAL RATE: 82 BPM
EKG DIAGNOSIS: NORMAL
EKG P AXIS: 58 DEGREES
EKG P-R INTERVAL: 152 MS
EKG Q-T INTERVAL: 398 MS
EKG QRS DURATION: 90 MS
EKG QTC CALCULATION (BAZETT): 464 MS
EKG R AXIS: 18 DEGREES
EKG T AXIS: 58 DEGREES
EKG VENTRICULAR RATE: 82 BPM
EOSINOPHILS ABSOLUTE: 0 K/UL (ref 0–0.6)
EOSINOPHILS RELATIVE PERCENT: 0 %
GFR AFRICAN AMERICAN: >60
GFR NON-AFRICAN AMERICAN: >60
GLUCOSE BLD-MCNC: 81 MG/DL (ref 70–99)
HCT VFR BLD CALC: 28 % (ref 36–48)
HEMOGLOBIN: 8.9 G/DL (ref 12–16)
INR BLD: 0.97 (ref 0.86–1.14)
LACTATE DEHYDROGENASE: 174 U/L (ref 100–190)
LYMPHOCYTES ABSOLUTE: 18.7 K/UL (ref 1–5.1)
LYMPHOCYTES RELATIVE PERCENT: 61 %
MAGNESIUM: 2.2 MG/DL (ref 1.8–2.4)
MCH RBC QN AUTO: 31.6 PG (ref 26–34)
MCHC RBC AUTO-ENTMCNC: 31.8 G/DL (ref 31–36)
MCV RBC AUTO: 99.5 FL (ref 80–100)
METAMYELOCYTES RELATIVE PERCENT: 1 %
MONOCYTES ABSOLUTE: 1.5 K/UL (ref 0–1.3)
MONOCYTES RELATIVE PERCENT: 5 %
NEUTROPHILS ABSOLUTE: 9.9 K/UL (ref 1.7–7.7)
NEUTROPHILS RELATIVE PERCENT: 32 %
OVALOCYTES: ABNORMAL
PDW BLD-RTO: 25.3 % (ref 12.4–15.4)
PHOSPHORUS: 2.9 MG/DL (ref 2.5–4.9)
PLATELET # BLD: 268 K/UL (ref 135–450)
PMV BLD AUTO: 8.8 FL (ref 5–10.5)
POLYCHROMASIA: ABNORMAL
POTASSIUM SERPL-SCNC: 3.8 MMOL/L (ref 3.5–5.1)
PROTHROMBIN TIME: 11.2 SEC (ref 10–13.2)
RBC # BLD: 2.82 M/UL (ref 4–5.2)
SODIUM BLD-SCNC: 144 MMOL/L (ref 136–145)
TOTAL PROTEIN: 5.2 G/DL (ref 6.4–8.2)
URIC ACID, SERUM: 3.6 MG/DL (ref 2.6–6)
VRE CULTURE: NORMAL
WBC # BLD: 30.1 K/UL (ref 4–11)

## 2020-03-23 PROCEDURE — 6370000000 HC RX 637 (ALT 250 FOR IP): Performed by: INTERNAL MEDICINE

## 2020-03-23 PROCEDURE — 85610 PROTHROMBIN TIME: CPT

## 2020-03-23 PROCEDURE — 6370000000 HC RX 637 (ALT 250 FOR IP): Performed by: NURSE PRACTITIONER

## 2020-03-23 PROCEDURE — 97530 THERAPEUTIC ACTIVITIES: CPT

## 2020-03-23 PROCEDURE — 97165 OT EVAL LOW COMPLEX 30 MIN: CPT

## 2020-03-23 PROCEDURE — 80048 BASIC METABOLIC PNL TOTAL CA: CPT

## 2020-03-23 PROCEDURE — 2580000003 HC RX 258: Performed by: NURSE PRACTITIONER

## 2020-03-23 PROCEDURE — 85025 COMPLETE CBC W/AUTO DIFF WBC: CPT

## 2020-03-23 PROCEDURE — 84100 ASSAY OF PHOSPHORUS: CPT

## 2020-03-23 PROCEDURE — 83735 ASSAY OF MAGNESIUM: CPT

## 2020-03-23 PROCEDURE — 80076 HEPATIC FUNCTION PANEL: CPT

## 2020-03-23 PROCEDURE — 84550 ASSAY OF BLOOD/URIC ACID: CPT

## 2020-03-23 PROCEDURE — 85730 THROMBOPLASTIN TIME PARTIAL: CPT

## 2020-03-23 PROCEDURE — 71045 X-RAY EXAM CHEST 1 VIEW: CPT

## 2020-03-23 PROCEDURE — 93010 ELECTROCARDIOGRAM REPORT: CPT | Performed by: INTERNAL MEDICINE

## 2020-03-23 PROCEDURE — 83615 LACTATE (LD) (LDH) ENZYME: CPT

## 2020-03-23 RX ORDER — METHOTREXATE 2.5 MG/1
10 TABLET ORAL WEEKLY
Qty: 16 TABLET | Refills: 0
Start: 2020-03-23 | End: 2020-04-22

## 2020-03-23 RX ORDER — PREDNISONE 1 MG/1
5 TABLET ORAL DAILY
Qty: 7 TABLET | Refills: 0 | Status: SHIPPED | OUTPATIENT
Start: 2020-03-24 | End: 2020-03-31

## 2020-03-23 RX ADMIN — ATORVASTATIN CALCIUM 20 MG: 20 TABLET, FILM COATED ORAL at 09:13

## 2020-03-23 RX ADMIN — Medication 10 ML: at 09:13

## 2020-03-23 RX ADMIN — PREDNISONE 5 MG: 5 TABLET ORAL at 09:13

## 2020-03-23 RX ADMIN — Medication 1 CAPSULE: at 09:13

## 2020-03-23 RX ADMIN — OXYCODONE HYDROCHLORIDE AND ACETAMINOPHEN 1000 MG: 500 TABLET ORAL at 09:13

## 2020-03-23 ASSESSMENT — PAIN SCALES - GENERAL
PAINLEVEL_OUTOF10: 0
PAINLEVEL_OUTOF10: 0

## 2020-03-23 NOTE — PROGRESS NOTES
Reviewed discharge instructions with patient. Reviewed discharge medications including dosing, schedule, indication, and adverse reactions. Reviewed which medications were already taken today and next dosage due for each medication. Reviewed signs and symptoms that prompt a call to the physician and appropriate phone numbers. Reviewed follow up appointments that have been made in [facility] and [facility]patient Oncology. Low microbial diet, activity restrictions, and increased risk of infection were reviewed. Patient verbalized understanding of all instructions and questions were answered to her. satisfaction. Signed discharge instructions were given to the patient and a copy placed in the paper-lite chart. Patient discharged to home per self with family members.       Lore Payne

## 2020-03-23 NOTE — PROGRESS NOTES
Occupational Therapy   Occupational Therapy Initial Assessment/Tx/Discharge Note  Date: 3/23/2020   Patient Name: Brooke Terry  MRN: [id number]     : 1940    Date of Service: 3/23/2020  Assessment: Pt's functional independence is at or very near baseline. She demonstrates safe mobility and no difficulty with ADLs. Pt has no concerns about returning home at d/c. No acute OT needs identified. Discharge Recommendations: Brooke Terry scored a 24/24 on the -[facility] ADL Inpatient form. At this time, no further OT is recommended upon discharge. Recommend patient returns to prior setting with prior services. OT Equipment Recommendations  Equipment Needed: No    Assessment   Assessment: Pt's functional independence is at or very near baseline. She demonstrates safe mobility and no difficulty with ADLs. Pt has no concerns about returning home at d/c. No acute OT needs identified. Decision Making: Low Complexity  No Skilled OT: Safe to return home; At baseline function  REQUIRES OT FOLLOW UP: No  Activity Tolerance  Activity Tolerance: Patient Tolerated treatment well  Safety Devices  Safety Devices in place: Yes  Type of devices: Call light within reach; Left in bed(up ad mravin, returned to bed as found on entry)           Restrictions  Position Activity Restriction  Other position/activity restrictions: up as tolerated    Subjective   General  Chart Reviewed: Yes  Additional Pertinent Hx: 67 y.o. F with CLL admitted 3/20 with fever, fatigue. PMHx includes melanoma, CLL, depression, compression fx  Family / Caregiver Present: No  Referring Practitioner: Rafy  Diagnosis: neutropenic fever  Subjective  Subjective: Pt sitting up in bed on entry. Pleasant and cooperative. Explains she is feeling much better and ready to go home.    Pain Assessment  Pain Assessment: 0-10(no pain)  Social/Functional History  Social/Functional History  Lives With: Alone  Type of Home: House  Home Layout: Two level, Able to Live on

## 2020-03-23 NOTE — DISCHARGE SUMMARY
status post excision (5/2006), osteopenia, reactive airway disease, lumbar radiculopathy, mixed upper lipidemia, herpes zoster with postherpetic neuralgia & PVCs. Most recently her medical issues have required various treatments where they had previously been monitored. CLL did not require treatment until January 2020 when she started ibrutinib (started 1/21/20) due to flow cytometry showing her absolute lymphocyte count had increased. She also had recurrence of hemolytic anemia (2/2020), and so restarted Prednisone 50 mg daily (2/17/20, this has been tapered since). She was not responding, so Rituxan was then started (3/18/20). In the midst of all of this, she developed new onset neutropenia of unclear etiology. She was then seen at [facility] 3/20/20 with new c/o fever, fatigue, & HA. She was then admitted to [address] 3/20/20 for evaluation of neutropenic fever. Rapid flu on admit was found to be negative. She exhibited no other signs/sx concerning for COVID-19 therefore this was not tested. Blood cultures were also obtained on admission and are negative to date. She was started on merrem 3/20/20. She received granix x1 dose on admission and had incredible improvement in neutropenia. Ibrutinib was also put on hold at this time. Given her profound neutropenia and h/o LGL, we also performed flow on peripheral blood 3/21 which was c/w LGL. She then started weekly PO MTX Fri 3/21. At this point she has remained afebrile, ANC is improved, and she is hemodynamically stable. Her symptoms have also improved. She denies n/v/d/constipation. Denies SOB/TELLES/Chest pain. Denies fevers/chills/night sweats. She will therefore be discharged home today. We will see her again at her previously scheduled appt on Wed 3/25 where she will receive dose #2 of rituxan.        Physical Exam:     Vital Signs:  BP (!) 102/53   Pulse 69   Temp 97.9 °F (36.6 °C) (Oral)   Resp 18   Ht 5' 2\" (1.575 m)   Wt 107 lb 3.2 oz (48.6 kg)   LMP No transfusion today   - S/p GCSF x1 with great response in neutrophil recovery     ITP / hemolytic anemia:    - Recurrent hemolysis (1/21/20) - V[place] IgG + with retic 2.8  Current Tx:  - Cont Prednisone Taper: 50 mg daily (started 2/17/20), 40mg daily (2/24/20), 30 mg daily (3/4/20), 20mg daily (3/12/20), 10mg daily (3/19/20), 5mg daily 3/21  - Rituxan weekly x 4 - #1 (3/18/20) - next dose #2 - 3/25/20     Previous Tx:  - S/p 4 cycles Rituxan (completed 9/14/16) & IVIG 20 gm daily for 5 days (completed 10/10/16)  - S/p Rituxan weekly x 4 (last dose 9/11/17) w/ normalization of her hemoglobin. - S/p Prednisone (stopped 40/6117)     4. Metabolic:  Stable renal fxn and e-lytes  - Encourage PO fluid intake  - Check CMP, Mg, Phos at next office visit     5. GI / Nutrition:  - H/o indigestion & constipation   Nutrition: Good appetite & intake  Indigestion: Cont TUMS as needed  Constipation: Cont Senokot -s as needed    6. M/S:  - H/o Osteopenia & generalized weakness  Osteopenia:    - Cont Reclast annually 3/15/2017, last dose 4/4/18 and 4/2019   - Dexa scan (3/22/18)   - Repeat bone density study   Generalized Weakness:  Ongoing d/t CLL and treatment w/ chemotherapy  - TSH (8/9/19) - WNL    7. ENT:    - H/o rhinitis & followed by ENT  Rhinitis: Cont Flonase & Zyrtec D    8. Cardiac:  H/o PVCs   - If heart fluttering sensation returns, will order holter monitor      9. Insomnia:    - Cont doxylamine 12.5mg nightly      10. Neuro:  Mild HA  - H/o headaches  - Cont Fiorinol as needed    Condition on discharge: Stable    Discharge Instructions:  Pt will f/u at [facility] Wednesday for provider assessment & labs (CBC w/diff, CMP, Mg, Phos) and for her 2nd rituximab infusion. The patient was advised on activity and dietary restrictions.     The patient was advised to follow up in the emergency department or contact the physician with any unresolved nausea/vomiting/diarrhea/pain or temperature greater than 100.5 F or any other unusual symptoms. This discharge summary and plan was discussed and agreed upon with Dr. Ritika Whiteside.       Eugene Shah, APRN - CNP

## 2020-03-24 LAB — BLOOD CULTURE, ROUTINE: NORMAL

## 2020-05-28 LAB — REPORT: NORMAL

## 2020-05-30 LAB
BLOOD CULTURE, ROUTINE: ABNORMAL
BLOOD CULTURE, ROUTINE: ABNORMAL
ORGANISM: ABNORMAL
ORGANISM: ABNORMAL

## 2020-05-31 LAB — CULTURE, BLOOD 2: NORMAL

## 2020-06-08 ENCOUNTER — HOSPITAL ENCOUNTER (OUTPATIENT)
Dept: GENERAL RADIOLOGY | Age: 80
Discharge: HOME | End: 2020-06-08
Payer: MEDICARE

## 2020-06-08 PROCEDURE — 71046 X-RAY EXAM CHEST 2 VIEWS: CPT

## 2020-09-28 ENCOUNTER — NURSE ONLY (OUTPATIENT)
Dept: INTERNAL MEDICINE CLINIC | Age: 80
End: 2020-09-28
Payer: MEDICARE

## 2020-09-28 ENCOUNTER — TELEPHONE (OUTPATIENT)
Dept: INTERNAL MEDICINE CLINIC | Age: 80
End: 2020-09-28

## 2020-09-28 PROCEDURE — G0008 ADMIN INFLUENZA VIRUS VAC: HCPCS | Performed by: INTERNAL MEDICINE

## 2020-09-28 PROCEDURE — 90694 VACC AIIV4 NO PRSRV 0.5ML IM: CPT | Performed by: INTERNAL MEDICINE

## 2020-09-28 NOTE — TELEPHONE ENCOUNTER
PATIENT IS REQUESTING X RAY DUE TO SHOULDER PAIN AND WANTS TO KNOW IF SHE CAN RECEIVE SCRIPTS OR DOES SHE NEED TO BE SEEN BY DR. Fe Jaime IN ORDER TO RECEIVE SCRIPTS.  NO INCIDENT BEEN HAVING ONGOING PAIN FOR A FEW MONTHS PATIENT STATES PAIN IS FROM AGING

## 2020-09-30 ENCOUNTER — HOSPITAL ENCOUNTER (OUTPATIENT)
Dept: GENERAL RADIOLOGY | Age: 80
Discharge: HOME | End: 2020-09-30
Payer: MEDICARE

## 2020-09-30 ENCOUNTER — HOSPITAL ENCOUNTER (OUTPATIENT)
Age: 80
Discharge: HOME | End: 2020-09-30
Payer: MEDICARE

## 2020-09-30 PROCEDURE — 73030 X-RAY EXAM OF SHOULDER: CPT

## 2020-09-30 NOTE — TELEPHONE ENCOUNTER
XR of left shoulder ordered and patient called and notified. Patient stated that she would rather see Dr. Luis Hernandez after the XR has been resulted.

## 2020-10-19 ENCOUNTER — TELEPHONE (OUTPATIENT)
Dept: FAMILY MEDICINE CLINIC | Age: 80
End: 2020-10-19

## 2020-10-19 NOTE — TELEPHONE ENCOUNTER
----- Message from Emerald Narayanan sent at 10/19/2020  2:20 PM EDT -----  Subject: Message to Provider    QUESTIONS  Information for Provider? patient called to see if she needs to get   labwork done for cholesterol screening. Please advise patient  ---------------------------------------------------------------------------  --------------  CALL BACK INFO  What is the best way for the office to contact you? OK to leave message on   voicemail  Preferred Call Back Phone Number? [phone]  ---------------------------------------------------------------------------  --------------  SCRIPT ANSWERS  Relationship to Patient?  Self

## 2020-10-20 NOTE — TELEPHONE ENCOUNTER
Called for clarification - the message was sent to Dr. Warren Hemphill for lipid labs. In reviewing the chart it looks like she established with Dr. Dorothea Jones as her PCP. Left a message for her to return our call.

## 2020-10-20 NOTE — TELEPHONE ENCOUNTER
Patient was last seen in May, 2019. She wants to check on her cholesterol. She wonders if she needs to see you or if she can just get lab work done. Saw Dr. Bard Petty but does not want him as her PCP - she will see Dr. Lien Argueta.

## 2020-10-27 DIAGNOSIS — Z00.00 ANNUAL PHYSICAL EXAM: ICD-10-CM

## 2020-10-27 LAB
A/G RATIO: 2.3 (ref 1.1–2.2)
ALBUMIN SERPL-MCNC: 4.4 G/DL (ref 3.4–5)
ALP BLD-CCNC: 74 U/L (ref 40–129)
ALT SERPL-CCNC: 22 U/L (ref 10–40)
ANION GAP SERPL CALCULATED.3IONS-SCNC: 11 MMOL/L (ref 3–16)
AST SERPL-CCNC: 21 U/L (ref 15–37)
BILIRUB SERPL-MCNC: 0.3 MG/DL (ref 0–1)
BUN BLDV-MCNC: 13 MG/DL (ref 7–20)
CALCIUM SERPL-MCNC: 9.2 MG/DL (ref 8.3–10.6)
CHLORIDE BLD-SCNC: 102 MMOL/L (ref 99–110)
CHOLESTEROL, TOTAL: 215 MG/DL (ref 0–199)
CO2: 29 MMOL/L (ref 21–32)
CREAT SERPL-MCNC: <0.5 MG/DL (ref 0.6–1.2)
GFR AFRICAN AMERICAN: >60
GFR NON-AFRICAN AMERICAN: >60
GLOBULIN: 1.9 G/DL
GLUCOSE BLD-MCNC: 87 MG/DL (ref 70–99)
HDLC SERPL-MCNC: 65 MG/DL (ref 40–60)
LDL CHOLESTEROL CALCULATED: 115 MG/DL
POTASSIUM SERPL-SCNC: 3.8 MMOL/L (ref 3.5–5.1)
SODIUM BLD-SCNC: 142 MMOL/L (ref 136–145)
TOTAL PROTEIN: 6.3 G/DL (ref 6.4–8.2)
TRIGL SERPL-MCNC: 177 MG/DL (ref 0–150)
TSH REFLEX: 2.64 UIU/ML (ref 0.27–4.2)
VLDLC SERPL CALC-MCNC: 35 MG/DL

## 2020-11-30 RX ORDER — MONTELUKAST SODIUM 10 MG/1
TABLET ORAL
Qty: 90 TABLET | Refills: 1 | Status: SHIPPED | OUTPATIENT
Start: 2020-11-30 | End: 2021-06-01 | Stop reason: SDUPTHER

## 2020-12-14 RX ORDER — ATORVASTATIN CALCIUM 20 MG/1
TABLET, FILM COATED ORAL
Qty: 90 TABLET | Refills: 3 | Status: SHIPPED | OUTPATIENT
Start: 2020-12-14 | End: 2022-10-17 | Stop reason: SDUPTHER

## 2020-12-31 ENCOUNTER — NURSE TRIAGE (OUTPATIENT)
Dept: OTHER | Facility: CLINIC | Age: 80
End: 2020-12-31

## 2020-12-31 NOTE — TELEPHONE ENCOUNTER
Wants to comes in for good physical. Has a history for low blood pressure. Reason for Disposition   RN needs further essential information from caller in order to complete triage    Protocols used: INFORMATION ONLY CALL - NO TRIAGE-ADULT-    Caller provided care advice and instructed to call back with worsening symptoms. Attention Provider: Thank you for allowing me to participate in the care of your patient. The patient was connected to triage in response to information provided to the ECC. Please do not respond through this encounter as the response is not directed to a shared pool. Warm transfer to [facility] at [facility] ([facility]) to make routine appointment.

## 2021-01-28 ENCOUNTER — IMMUNIZATION (OUTPATIENT)
Dept: FAMILY MEDICINE CLINIC | Age: 81
End: 2021-01-28
Payer: MEDICARE

## 2021-01-28 PROCEDURE — 0001A COVID-19, PFIZER VACCINE 30MCG/0.3ML DOSE: CPT | Performed by: NURSE PRACTITIONER

## 2021-01-28 PROCEDURE — 91300 COVID-19, PFIZER VACCINE 30MCG/0.3ML DOSE: CPT | Performed by: NURSE PRACTITIONER

## 2021-02-09 ENCOUNTER — VIRTUAL VISIT (OUTPATIENT)
Dept: FAMILY MEDICINE CLINIC | Age: 81
End: 2021-02-09
Payer: MEDICARE

## 2021-02-09 DIAGNOSIS — Z00.00 ROUTINE GENERAL MEDICAL EXAMINATION AT A HEALTH CARE FACILITY: ICD-10-CM

## 2021-02-09 DIAGNOSIS — R73.01 IFG (IMPAIRED FASTING GLUCOSE): ICD-10-CM

## 2021-02-09 DIAGNOSIS — C91.10 CHRONIC LARGE GRANULAR LYMPHOCYTIC LEUKEMIA (HCC): ICD-10-CM

## 2021-02-09 DIAGNOSIS — E78.2 MIXED HYPERLIPIDEMIA: ICD-10-CM

## 2021-02-09 DIAGNOSIS — D59.10 ACQUIRED AUTOIMMUNE HEMOLYTIC ANEMIA (HCC): ICD-10-CM

## 2021-02-09 DIAGNOSIS — Z00.00 MEDICARE ANNUAL WELLNESS VISIT, SUBSEQUENT: Primary | ICD-10-CM

## 2021-02-09 PROCEDURE — 1123F ACP DISCUSS/DSCN MKR DOCD: CPT | Performed by: INTERNAL MEDICINE

## 2021-02-09 PROCEDURE — G0438 PPPS, INITIAL VISIT: HCPCS | Performed by: INTERNAL MEDICINE

## 2021-02-09 PROCEDURE — G8484 FLU IMMUNIZE NO ADMIN: HCPCS | Performed by: INTERNAL MEDICINE

## 2021-02-09 PROCEDURE — 4040F PNEUMOC VAC/ADMIN/RCVD: CPT | Performed by: INTERNAL MEDICINE

## 2021-02-09 RX ORDER — PREDNISONE 1 MG/1
5 TABLET ORAL DAILY
COMMUNITY
End: 2022-10-14

## 2021-02-09 ASSESSMENT — LIFESTYLE VARIABLES
HOW OFTEN DURING THE LAST YEAR HAVE YOU NEEDED AN ALCOHOLIC DRINK FIRST THING IN THE MORNING TO GET YOURSELF GOING AFTER A NIGHT OF HEAVY DRINKING: 0
HOW OFTEN DURING THE LAST YEAR HAVE YOU HAD A FEELING OF GUILT OR REMORSE AFTER DRINKING: 0
HOW OFTEN DURING THE LAST YEAR HAVE YOU FOUND THAT YOU WERE NOT ABLE TO STOP DRINKING ONCE YOU HAD STARTED: 0
HOW MANY STANDARD DRINKS CONTAINING ALCOHOL DO YOU HAVE ON A TYPICAL DAY: 0
HOW OFTEN DO YOU HAVE SIX OR MORE DRINKS ON ONE OCCASION: 0
HOW OFTEN DURING THE LAST YEAR HAVE YOU BEEN UNABLE TO REMEMBER WHAT HAPPENED THE NIGHT BEFORE BECAUSE YOU HAD BEEN DRINKING: 0
HOW OFTEN DO YOU HAVE A DRINK CONTAINING ALCOHOL: 1

## 2021-02-09 ASSESSMENT — ENCOUNTER SYMPTOMS
NAUSEA: 0
CHEST TIGHTNESS: 0
SHORTNESS OF BREATH: 0
ABDOMINAL PAIN: 0
VOMITING: 0

## 2021-02-09 ASSESSMENT — PATIENT HEALTH QUESTIONNAIRE - PHQ9
SUM OF ALL RESPONSES TO PHQ9 QUESTIONS 1 & 2: 0
1. LITTLE INTEREST OR PLEASURE IN DOING THINGS: 0
SUM OF ALL RESPONSES TO PHQ QUESTIONS 1-9: 0
2. FEELING DOWN, DEPRESSED OR HOPELESS: 0

## 2021-02-09 NOTE — PROGRESS NOTES
2021    TELEHEALTH EVALUATION -- Visual (During NZDBZ-85 public health emergency)- annual wellness visit     HPI:    Bruce Velasquez (:  1940) has requested an audio/video evaluation for the following concern(s):    CLL- pt see dr Emily Johnson- next appt ~ march   She gets IVIG monthly   She got the Covid vaccine (1st)  Pt denies CP/SOB/palpitations/abdominal pain/N/V. No fever/ chills/ cough   eh si on MTX for CLL as well   No side effects   She does blood work monthly   Shoulder pain - she finished 6 weeks of PT for L shoulder   No bleeding. No LOW/ CARIDAD   No depression/ anxiety   Labs , pt has questions:  Lab Results   Component Value Date    CHOL 215 (H) 10/27/2020    CHOL 189 2019    CHOL 185 2018     Lab Results   Component Value Date    TRIG 177 (H) 10/27/2020    TRIG 161 (H) 2019    TRIG 106 2018     Lab Results   Component Value Date    HDL 65 (H) 10/27/2020    HDL 61 (H) 2019    HDL 74 (H) 2018     Lab Results   Component Value Date    LDLCALC 115 (H) 10/27/2020    LDLCALC 96 2019    LDLCALC 90 2018     Lab Results   Component Value Date    LABVLDL 35 10/27/2020    LABVLDL 32 2019    LABVLDL 21 2018   she is on Lipitor 20 mg she takes every other day   She was worried about side effects. Pt prefers to avoid mammogram   She had colonoscopy years ago- pt does not want to screen     Review of Systems   Constitutional: Negative for activity change, appetite change, chills, fever and unexpected weight change. HENT: Negative for hearing loss. Eyes: Negative for visual disturbance. Respiratory: Negative for chest tightness and shortness of breath. Cardiovascular: Negative for chest pain. Gastrointestinal: Negative for abdominal pain, nausea and vomiting. Genitourinary: Negative for hematuria. Skin: Negative for rash. Allergic/Immunologic: Negative for immunocompromised state. Neurological: Negative for dizziness and headaches. Psychiatric/Behavioral: Negative for dysphoric mood and suicidal ideas. Prior to Visit Medications    Medication Sig Taking? Authorizing Provider   methotrexate (RHEUMATREX) 2.5 MG chemo tablet Take 5 mg by mouth once a week Yes Historical Provider, MD   predniSONE (DELTASONE) 5 MG tablet Take 5 mg by mouth daily Yes Historical Provider, MD   atorvastatin (LIPITOR) 20 MG tablet TAKE ONE TABLET BY MOUTH DAILY Yes Mark George MD   montelukast (SINGULAIR) 10 MG tablet TAKE ONE TABLET BY MOUTH ONCE NIGHTLY Yes Mark George MD   calcium carbonate (TUMS) 500 MG chewable tablet Take 2 tablets by mouth 3 times daily as needed for Heartburn Yes Historical Provider, MD   vitamin C (ASCORBIC ACID) 500 MG tablet Take 1,000 mg by mouth daily Yes Historical Provider, MD   sennosides-docusate sodium (SENOKOT-S) 8.6-50 MG tablet Take 2 tablets by mouth daily as needed for Constipation Yes Historical Provider, MD   B Complex Vitamins (B-COMPLEX/B-12 PO) Take 1 capsule by mouth daily Yes Historical Provider, MD   butalbital-aspirin-caffeine (FIORINAL) -40 MG capsule TAKE ONE CAPSULE BY MOUTH EVERY 4 HOURS AS NEEDED  **DO NOT EXCEED 6 CAPSULES IN 24 HOURS** Yes Ulises Patterson MD   Multiple Vitamin (MVI, CELEBRATE, CHEWABLE TABLET) Take 1 tablet by mouth Yes Historical Provider, MD       Social History     Tobacco Use    Smoking status: Never Smoker    Smokeless tobacco: Never Used   Substance Use Topics    Alcohol use:  Yes     Alcohol/week: 0.0 standard drinks     Comment: occasionally     Drug use: Not on file      Past Medical History:   Diagnosis Date    Allergic rhinitis due to pollen 1/20/2016    Arthritis, degenerative 5/3/2011    Cancer (HCC)     chronic lymphocytic leukemia    CLL (chronic lymphoblastic leukemia)     Closed compression fracture of thoracic vertebra ([facility] [address] [address] 6/27/2016    Depression     Hyperlipidemia  Melanoma of upper arm ([facility] [address]     2002    Mild reactive airways disease 1/20/2016       PHYSICAL EXAMINATION:  [ INSTRUCTIONS:  \"[x]\" Indicates a positive item  \"[]\" Indicates a negative item  -- DELETE ALL ITEMS NOT EXAMINED]  Vital Signs: (As obtained by patient/caregiver or practitioner observation)    Blood pressure-  Heart rate-    Respiratory rate-    Temperature-  Pulse oximetry-     Constitutional: [x] Appears well-developed and well-nourished [x] No apparent distress      [] Abnormal-   Mental status  [x] Alert and awake  [x] Oriented to person/place/time [x]Able to follow commands      Eyes:  EOM    [x]  Normal  [] Abnormal-  Sclera  [x]  Normal  [] Abnormal -         Discharge []  None visible  [] Abnormal -    HENT:   [x] Normocephalic, atraumatic. [] Abnormal   [x] Mouth/Throat: Mucous membranes are moist.     External Ears [x] Normal  [] Abnormal-     Neck: [x] No visualized mass     Pulmonary/Chest: [x] Respiratory effort normal.  [x] No visualized signs of difficulty breathing or respiratory distress        [] Abnormal-      Musculoskeletal:   [x] Normal gait with no signs of ataxia         [x] Normal range of motion of neck        [] Abnormal-       Neurological:        [x] No Facial Asymmetry (Cranial nerve 7 motor function) (limited exam to video visit)          [x] No gaze palsy        [] Abnormal-         Skin:        [x] No significant exanthematous lesions or discoloration noted on facial skin         [] Abnormal-            Psychiatric:       [x] Normal Affect [] No Hallucinations        [] Abnormal-     Other pertinent observable physical exam findings-     ASSESSMENT/PLAN:  1. Medicare annual wellness visit, subsequent  Pt doing well  Discussed more exercise    2. Chronic large granular lymphocytic leukemia ([facility] [address]  Patient is followed by Dr. Benson Narrow  She feels well asymptomatic. General impression for her exam on video is that she is doing well. Repeat blood work according to hematology request    3. Mixed hyperlipidemia  Keep statin patient is trying to take before sleep  Check lipid panel  - LIPID PANEL; Future    4. Acquired autoimmune hemolytic anemia  Keep follow-up with hematology    5. Routine general medical examination at a health care facility    6. IFG (impaired fasting glucose)  - HEMOGLOBIN A1C; Future      Return for Medicare Annual Wellness Visit in 1 year. Wm Nobles is a 79 y.o. female being evaluated by a Virtual Visit (video visit) encounter to address concerns as mentioned above. A caregiver was present when appropriate. Due to this being a TeleHealth encounter (During UIQAB-21 public health emergency), evaluation of the following organ systems was limited: Vitals/Constitutional/EENT/Resp/CV/GI//MS/Neuro/Skin/Heme-Lymph-Imm. Pursuant to the emergency declaration under the [address] [place] [address], [address] [place] [address] authority and the [employer] Act, this Virtual Visit was conducted with patient's (and/or legal guardian's) consent, to reduce the patient's risk of exposure to COVID-19 and provide necessary medical care. The patient (and/or legal guardian) has also been advised to contact this office for worsening conditions or problems, and seek emergency medical treatment and/or call 911 if deemed necessary. Patient identification was verified at the start of the visit: Yes    Total time spent on this encounter: ~ 15 min    Services were provided through a video synchronous discussion virtually to substitute for in-person clinic visit. Patient and provider were located at their individual homes. --Ana Lilia Buckley MD on 2/9/2021 at 3:48 PM    An electronic signature was used to authenticate this note.     Medicare Annual Wellness Visit  Name: Zuly Nettles Date: 2/9/2021   MRN: <[id number]> Sex: Female Age: 79 y.o. Ethnicity: Non-/Non    : 1940 Race: Kimberley Barton is here for Medicare AWV    Screenings for behavioral, psychosocial and functional/safety risks, and cognitive dysfunction are all negative except as indicated below. These results, as well as other patient data from the [address] form, are documented in Flowsheets linked to this Encounter. Allergies   Allergen Reactions    Penicillins Rash    Potassium     Potassium-Containing Compounds          Prior to Visit Medications    Medication Sig Taking?  Authorizing Provider   methotrexate (RHEUMATREX) 2.5 MG chemo tablet Take 5 mg by mouth once a week Yes Historical Provider, MD   predniSONE (DELTASONE) 5 MG tablet Take 5 mg by mouth daily Yes Historical Provider, MD   atorvastatin (LIPITOR) 20 MG tablet TAKE ONE TABLET BY MOUTH DAILY Yes Dayana Hoffman MD   montelukast (SINGULAIR) 10 MG tablet TAKE ONE TABLET BY MOUTH ONCE NIGHTLY Yes Dayana Hoffman MD   calcium carbonate (TUMS) 500 MG chewable tablet Take 2 tablets by mouth 3 times daily as needed for Heartburn Yes Historical Provider, MD   vitamin C (ASCORBIC ACID) 500 MG tablet Take 1,000 mg by mouth daily Yes Historical Provider, MD   sennosides-docusate sodium (SENOKOT-S) 8.6-50 MG tablet Take 2 tablets by mouth daily as needed for Constipation Yes Historical Provider, MD   B Complex Vitamins (B-COMPLEX/B-12 PO) Take 1 capsule by mouth daily Yes Historical Provider, MD   butalbital-aspirin-caffeine (FIORINAL) -40 MG capsule TAKE ONE CAPSULE BY MOUTH EVERY 4 HOURS AS NEEDED  **DO NOT EXCEED 6 CAPSULES IN 24 HOURS** Yes Tiara Swanson MD   Multiple Vitamin (MVI, CELEBRATE, CHEWABLE TABLET) Take 1 tablet by mouth Yes Historical Provider, MD         Past Medical History:   Diagnosis Date    Allergic rhinitis due to pollen 2016    Arthritis, degenerative 5/3/2011    Cancer ([facility] [address]     chronic lymphocytic leukemia  CLL (chronic lymphoblastic leukemia)     Closed compression fracture of thoracic vertebra (HCC) 6/27/2016    Depression     Hyperlipidemia     Melanoma of upper arm ([facility] [address]     2002    Mild reactive airways disease 1/20/2016       Past Surgical History:   Procedure Laterality Date    APPENDECTOMY      BELPHAROPTOSIS REPAIR      CATARACT REMOVAL WITH IMPLANT      bilat    NASAL SINUS SURGERY      TONSILLECTOMY           Family History   Problem Relation Age of Onset    Cancer Mother         lymphoma    Heart Disease Father     Heart Disease Sister     Arthritis Sister        CareTeam (Including outside providers/suppliers regularly involved in providing care):   Patient Care Team:  Karson Carney MD as PCP - General (Internal Medicine)  Gertrude Jhaveri MD as PCP - Hematology/Oncology (Hematology and Oncology)  Karson Carney MD as PCP - [facility]  Antonia Manning MD as Consulting Physician (Hematology and Oncology)  Phoebe Casillas MD as Consulting Physician (Otolaryngology)    Wt Readings from Last 3 Encounters:   03/23/20 107 lb 3.2 oz (48.6 kg)   01/30/20 102 lb 12.8 oz (46.6 kg)   01/13/20 106 lb (48.1 kg)     There were no vitals filed for this visit. There is no height or weight on file to calculate BMI. Based upon direct observation of the patient, evaluation of cognition reveals recent and remote memory intact. {OPTIONAL FOR THIS VISIT TYPE - GENERAL PHYSICAL EXAM - see above       Patient's complete Health Risk Assessment and screening values have been reviewed and are found in Flowsheets. The following problems were reviewed today and where indicated follow up appointments were made and/or referrals ordered.     Positive Risk Factor Screenings with Interventions:           Health Habits/Nutrition:  Health Habits/Nutrition  Do you exercise for at least 20 minutes 2-3 times per week?: (!) No  Have you lost any weight without trying in the past 3 months?: No Do you eat only one meal per day?: No  Have you seen the dentist within the past year?: Yes     Health Habits/Nutrition Interventions:  · pt will try to resume exercise        Personalized Preventive Plan   Current Health Maintenance Status  Immunization History   Administered Date(s) Administered    COVID-19Artem, 30mcg/0.3ml Dose 01/28/2021    Influenza 10/08/2012    Influenza Vaccine, unspecified formulation 10/01/2018    Influenza Virus Vaccine 10/30/2018    Influenza, High Dose (Fluzone 65 yrs and older) 12/08/2015    Influenza, Quadv, adjuvanted, 65 yrs +, IM, PF (Fluad) 09/28/2020    Influenza, Triv, 3 Years and older, IM (Afluria (5 yrs and older) 03/15/2017    Pneumococcal Conjugate 13-valent ([id number]) 11/01/2018    Pneumococcal Conjugate Vaccine 11/01/2018    Pneumococcal Polysaccharide ([id number]) 03/13/2014, 03/15/2017    Tdap (Boostrix, Adacel) 03/13/2014        Health Maintenance   Topic Date Due    Annual Wellness Visit (AWV)  05/29/2019    Pneumococcal 65+ yrs at Risk Vaccine (2 of 2 - PCV13) 11/19/2020    COVID-19 Vaccine (2 of 2 - Pfizer series) 02/18/2021    Lipid screen  10/27/2021    DTaP/Tdap/Td vaccine (2 - Td) 03/13/2024    DEXA (modify frequency per FRAX score)  Completed    Flu vaccine  Completed    Hepatitis C screen  Completed    Hepatitis A vaccine  Aged Out    Hepatitis B vaccine  Aged Out    Hib vaccine  Aged Out    Meningococcal (ACWY) vaccine  Aged Out     Recommendations for Namely Due: see orders and patient instructions/AVS.  . Recommended screening schedule for the next 5-10 years is provided to the patient in written form: see Patient Instructions/AVS.    Ozzy Green was seen today for medicare awv. Diagnoses and all orders for this visit:    Medicare annual wellness visit, subsequent    Chronic large granular lymphocytic leukemia ([facility] [address]    Mixed hyperlipidemia  -     LIPID PANEL;  Future    Acquired autoimmune hemolytic anemia Routine general medical examination at a health care facility    IFG (impaired fasting glucose)  -     HEMOGLOBIN A1C; Future

## 2021-02-18 ENCOUNTER — IMMUNIZATION (OUTPATIENT)
Dept: FAMILY MEDICINE CLINIC | Age: 81
End: 2021-02-18
Payer: MEDICARE

## 2021-02-18 PROCEDURE — 0002A COVID-19, PFIZER VACCINE 30MCG/0.3ML DOSE: CPT | Performed by: FAMILY MEDICINE

## 2021-02-18 PROCEDURE — 91300 COVID-19, PFIZER VACCINE 30MCG/0.3ML DOSE: CPT | Performed by: FAMILY MEDICINE

## 2021-02-25 DIAGNOSIS — E78.2 MIXED HYPERLIPIDEMIA: ICD-10-CM

## 2021-02-25 DIAGNOSIS — R73.01 IFG (IMPAIRED FASTING GLUCOSE): ICD-10-CM

## 2021-02-25 LAB
CHOLESTEROL, TOTAL: 199 MG/DL (ref 0–199)
HDLC SERPL-MCNC: 65 MG/DL (ref 40–60)
LDL CHOLESTEROL CALCULATED: 111 MG/DL
TRIGL SERPL-MCNC: 116 MG/DL (ref 0–150)
VLDLC SERPL CALC-MCNC: 23 MG/DL

## 2021-02-26 LAB
ESTIMATED AVERAGE GLUCOSE: 105.4 MG/DL
HBA1C MFR BLD: 5.3 %

## 2021-05-31 DIAGNOSIS — J30.1 NON-SEASONAL ALLERGIC RHINITIS DUE TO POLLEN: ICD-10-CM

## 2021-06-01 RX ORDER — MONTELUKAST SODIUM 10 MG/1
TABLET ORAL
Qty: 90 TABLET | Refills: 0 | OUTPATIENT
Start: 2021-06-01

## 2021-06-01 RX ORDER — MONTELUKAST SODIUM 10 MG/1
TABLET ORAL
Qty: 90 TABLET | Refills: 3 | Status: SHIPPED | OUTPATIENT
Start: 2021-06-01 | End: 2022-10-14

## 2021-06-21 ENCOUNTER — TELEPHONE (OUTPATIENT)
Dept: FAMILY MEDICINE CLINIC | Age: 81
End: 2021-06-21

## 2021-06-21 NOTE — TELEPHONE ENCOUNTER
----- Message from Gladys Haley sent at 6/21/2021 11:11 AM EDT -----  Subject: Message to Provider    QUESTIONS  Information for Provider? patient has a appointment with Jeff Rosado on   6/23 and wants to know if she has any cancellations for 6/22 because she   has a clear day if she can come in to see her if not she will keep her   appointment for 6/23  ---------------------------------------------------------------------------  --------------  CALL BACK INFO  What is the best way for the office to contact you? OK to leave message on   voicemail  Preferred Call Back Phone Number? [phone]  ---------------------------------------------------------------------------  --------------  SCRIPT ANSWERS  Relationship to Patient?  Self

## 2021-06-23 ENCOUNTER — OFFICE VISIT (OUTPATIENT)
Dept: INTERNAL MEDICINE CLINIC | Age: 81
End: 2021-06-23
Payer: MEDICARE

## 2021-06-23 VITALS
HEART RATE: 88 BPM | OXYGEN SATURATION: 96 % | DIASTOLIC BLOOD PRESSURE: 72 MMHG | WEIGHT: 103 LBS | BODY MASS INDEX: 19.45 KG/M2 | HEIGHT: 61 IN | SYSTOLIC BLOOD PRESSURE: 108 MMHG

## 2021-06-23 DIAGNOSIS — R42 DIZZINESS: Primary | ICD-10-CM

## 2021-06-23 DIAGNOSIS — E78.2 MIXED HYPERLIPIDEMIA: ICD-10-CM

## 2021-06-23 DIAGNOSIS — Z13.1 DIABETES MELLITUS SCREENING: ICD-10-CM

## 2021-06-23 DIAGNOSIS — M81.0 AGE-RELATED OSTEOPOROSIS WITHOUT CURRENT PATHOLOGICAL FRACTURE: ICD-10-CM

## 2021-06-23 DIAGNOSIS — C91.10 CHRONIC LARGE GRANULAR LYMPHOCYTIC LEUKEMIA (HCC): ICD-10-CM

## 2021-06-23 DIAGNOSIS — J32.8 OTHER CHRONIC SINUSITIS: ICD-10-CM

## 2021-06-23 DIAGNOSIS — Z85.820 HISTORY OF MELANOMA: ICD-10-CM

## 2021-06-23 DIAGNOSIS — R42 LIGHTHEADEDNESS: ICD-10-CM

## 2021-06-23 DIAGNOSIS — D64.9 ANEMIA, UNSPECIFIED TYPE: ICD-10-CM

## 2021-06-23 DIAGNOSIS — R19.5 HEME POSITIVE STOOL: ICD-10-CM

## 2021-06-23 PROBLEM — M19.011 PRIMARY OSTEOARTHRITIS OF RIGHT SHOULDER: Status: RESOLVED | Noted: 2017-08-10 | Resolved: 2021-06-23

## 2021-06-23 PROBLEM — M75.121 COMPLETE TEAR OF RIGHT ROTATOR CUFF: Status: RESOLVED | Noted: 2017-08-10 | Resolved: 2021-06-23

## 2021-06-23 PROBLEM — M54.6 ACUTE BILATERAL THORACIC BACK PAIN: Status: RESOLVED | Noted: 2020-01-30 | Resolved: 2021-06-23

## 2021-06-23 PROCEDURE — 1090F PRES/ABSN URINE INCON ASSESS: CPT | Performed by: INTERNAL MEDICINE

## 2021-06-23 PROCEDURE — 4040F PNEUMOC VAC/ADMIN/RCVD: CPT | Performed by: INTERNAL MEDICINE

## 2021-06-23 PROCEDURE — G8399 PT W/DXA RESULTS DOCUMENT: HCPCS | Performed by: INTERNAL MEDICINE

## 2021-06-23 PROCEDURE — 1036F TOBACCO NON-USER: CPT | Performed by: INTERNAL MEDICINE

## 2021-06-23 PROCEDURE — 1123F ACP DISCUSS/DSCN MKR DOCD: CPT | Performed by: INTERNAL MEDICINE

## 2021-06-23 PROCEDURE — G8420 CALC BMI NORM PARAMETERS: HCPCS | Performed by: INTERNAL MEDICINE

## 2021-06-23 PROCEDURE — 99214 OFFICE O/P EST MOD 30 MIN: CPT | Performed by: INTERNAL MEDICINE

## 2021-06-23 PROCEDURE — G8427 DOCREV CUR MEDS BY ELIG CLIN: HCPCS | Performed by: INTERNAL MEDICINE

## 2021-06-23 RX ORDER — DOXYCYCLINE HYCLATE 100 MG
100 TABLET ORAL 2 TIMES DAILY
Qty: 14 TABLET | Refills: 0 | Status: SHIPPED | OUTPATIENT
Start: 2021-06-23 | End: 2021-06-30

## 2021-06-23 SDOH — ECONOMIC STABILITY: FOOD INSECURITY: WITHIN THE PAST 12 MONTHS, THE FOOD YOU BOUGHT JUST DIDN'T LAST AND YOU DIDN'T HAVE MONEY TO GET MORE.: NEVER TRUE

## 2021-06-23 SDOH — ECONOMIC STABILITY: FOOD INSECURITY: WITHIN THE PAST 12 MONTHS, YOU WORRIED THAT YOUR FOOD WOULD RUN OUT BEFORE YOU GOT MONEY TO BUY MORE.: NEVER TRUE

## 2021-06-23 ASSESSMENT — ENCOUNTER SYMPTOMS
SHORTNESS OF BREATH: 0
NAUSEA: 0
CONSTIPATION: 1
ABDOMINAL PAIN: 0
BLOOD IN STOOL: 1
COUGH: 0
EYE REDNESS: 0
COLOR CHANGE: 0
DIARRHEA: 0

## 2021-06-23 ASSESSMENT — PATIENT HEALTH QUESTIONNAIRE - PHQ9
SUM OF ALL RESPONSES TO PHQ QUESTIONS 1-9: 0
SUM OF ALL RESPONSES TO PHQ QUESTIONS 1-9: 0
2. FEELING DOWN, DEPRESSED OR HOPELESS: 0
SUM OF ALL RESPONSES TO PHQ9 QUESTIONS 1 & 2: 0
1. LITTLE INTEREST OR PLEASURE IN DOING THINGS: 0
SUM OF ALL RESPONSES TO PHQ QUESTIONS 1-9: 0

## 2021-06-23 ASSESSMENT — SOCIAL DETERMINANTS OF HEALTH (SDOH): HOW HARD IS IT FOR YOU TO PAY FOR THE VERY BASICS LIKE FOOD, HOUSING, MEDICAL CARE, AND HEATING?: NOT HARD AT ALL

## 2021-06-23 NOTE — ASSESSMENT & PLAN NOTE
-Recent lipid panel reviewed, mildly above goal.  Just recently changed to taking statin at night  -Continue Lipitor 20 mg

## 2021-06-23 NOTE — ASSESSMENT & PLAN NOTE
Today reported blood on toilet paper, denied blood mixed with stool. Suffers from chronic constipation. No recent unexpected weight loss. GASTON today reveals small external hemorrhoid, heme positive stool. Likely hemorrhoid related. We did discuss in length potential other etiologies including colon cancer and potential colonoscopy. At this point Darrius Conception does not wish to proceed with any further testing as even if colon cancer to be discovered she will not want to pursue any further treatments. Given her history and age, despite being highly functioning, I do believe this is reasonable.   Constipation care discussed

## 2021-06-23 NOTE — ASSESSMENT & PLAN NOTE
-Follows with Dr. Krys Vail  -On methotrexate, check D67 and folic  -On daily prednisone 5 mg  -Monthly IVIG injections  -She will check with Dr. Tom Spring if okay to get shingles vaccine (recombinant vaccine, should be okay but will verify with her oncologist)

## 2021-06-23 NOTE — ASSESSMENT & PLAN NOTE
Today reports worsening congestion and purulent drainage. History of recurrent sinusitis, s/p sinus surgery. -Given immunodeficiency, will treat with antibiotics.   Penicillin allergy, will treat with doxycycline for 7 days, if no improvement consider levofloxacin

## 2021-06-23 NOTE — PROGRESS NOTES
[facility] Internal Medicine  Establish care visit   2021    Crow Whipple (:  1940) niecy [de-identified] y.o. female, here to establish care. Chief Complaint   Patient presents with   [facility].     has several concerns         Patient Active Problem List   Diagnosis    Lumbar radiculopathy    Other chronic sinusitis    Chronic large granular lymphocytic leukemia ([facility] [address]    Age-related osteoporosis without current pathological fracture    Closed compression fracture of thoracic vertebra (HCC)    Hypogammaglobulinemia (HCC)    PRCA (pure red cell aplasia) (HCC)    CLL (chronic lymphoid leukemia) in relapse (HCC)    Non-seasonal allergic rhinitis due to pollen    Mixed hyperlipidemia    Acquired autoimmune hemolytic anemia (HCC)    Arthritis, degenerative    Primary insomnia    History of melanoma    Lightheadedness    Heme positive stool       HPI  [de-identified]years old woman with history of CLL and chronic large granular lymphocytic leukemia, osteoporosis, history of autoimmune hemolytic anemia, history of melanoma, establishing care. Prior patient of Dr. Oliverio June. She follows with , [facility] and is on methotrexate, chronic prednisone as well as monthly IVIG infusions. S/p rituximab injections. She has history of chronic sinusitis and is treated with antibiotics during flareups given her history. Today reports episodes of lightheadedness and sweating which mostly happen if she is not eating every 3 hours. Denies relation to position, palpitations, chest pain, dyspnea. Usually eats and it goes away. She checked her blood pressure during an episode and it was 120/70. Also reports blood on toilet paper, not in toilet bowl. Suffers from chronic constipation for which she takes laxatives as needed.   She has history of melanoma and she sees Dr. Alden Ferguson from dermatology every 6 months for skin check  She had shingles infection with significant postherpetic neuralgia and wonders if she can get shingles vaccine. ROS  Review of Systems   Constitutional: Positive for diaphoresis. Negative for chills, fever and unexpected weight change. Eyes: Negative for redness. Respiratory: Negative for cough and shortness of breath. Cardiovascular: Negative for chest pain and leg swelling. Gastrointestinal: Positive for blood in stool and constipation. Negative for abdominal pain, diarrhea and nausea. Genitourinary: Negative for dysuria and hematuria. Musculoskeletal: Negative for gait problem. Skin: Negative for color change. Neurological: Positive for dizziness. Negative for weakness, numbness and headaches. Psychiatric/Behavioral: Negative for agitation and confusion.         HISTORIES  Current Outpatient Medications on File Prior to Visit   Medication Sig Dispense Refill    montelukast (SINGULAIR) 10 MG tablet TAKE ONE TABLET BY MOUTH ONCE NIGHTLY 90 tablet 3    methotrexate (RHEUMATREX) 2.5 MG chemo tablet Take 5 mg by mouth once a week      predniSONE (DELTASONE) 5 MG tablet Take 5 mg by mouth daily      atorvastatin (LIPITOR) 20 MG tablet TAKE ONE TABLET BY MOUTH DAILY 90 tablet 3    calcium carbonate (TUMS) 500 MG chewable tablet Take 2 tablets by mouth 3 times daily as needed for Heartburn      vitamin C (ASCORBIC ACID) 500 MG tablet Take 1,000 mg by mouth daily      sennosides-docusate sodium (SENOKOT-S) 8.6-50 MG tablet Take 2 tablets by mouth daily as needed for Constipation      B Complex Vitamins (B-COMPLEX/B-12 PO) Take 1 capsule by mouth daily      butalbital-aspirin-caffeine (FIORINAL) -40 MG capsule TAKE ONE CAPSULE BY MOUTH EVERY 4 HOURS AS NEEDED  **DO NOT EXCEED 6 CAPSULES IN 24 HOURS** 40 capsule 0    Multiple Vitamin (MVI, CELEBRATE, CHEWABLE TABLET) Take 1 tablet by mouth       Current Facility-Administered Medications on File Prior to Visit   Medication Dose Route Frequency Provider Last Rate Last Admin    0.9 % sodium chloride bolus  250 mL Intravenous Once Galileo Stein MD Mark            Allergies   Allergen Reactions    Penicillins Rash    Potassium     Potassium-Containing Compounds        Past Medical History:   Diagnosis Date    Allergic rhinitis due to pollen 1/20/2016    Arthritis, degenerative 5/3/2011    Cancer (HCC)     chronic lymphocytic leukemia    CLL (chronic lymphoblastic leukemia)     Closed compression fracture of thoracic vertebra ([facility] [address] 6/27/2016    Depression     Hyperlipidemia     Melanoma of upper arm ([facility] [address]     2002    Mild reactive airways disease 1/20/2016       Patient Active Problem List   Diagnosis    Lumbar radiculopathy    Other chronic sinusitis    Chronic large granular lymphocytic leukemia ([facility] [address]    Age-related osteoporosis without current pathological fracture    Closed compression fracture of thoracic vertebra (HCC)    Hypogammaglobulinemia (HCC)    PRCA (pure red cell aplasia) (HCC)    CLL (chronic lymphoid leukemia) in relapse (HCC)    Non-seasonal allergic rhinitis due to pollen    Mixed hyperlipidemia    Acquired autoimmune hemolytic anemia (HCC)    Arthritis, degenerative    Primary insomnia    History of melanoma    Lightheadedness    Heme positive stool       Past Surgical History:   Procedure Laterality Date    APPENDECTOMY      BELPHAROPTOSIS REPAIR      CATARACT REMOVAL WITH IMPLANT      bilat    NASAL SINUS SURGERY      OTHER SURGICAL HISTORY      melanoma removal    TONSILLECTOMY         Social History     Socioeconomic History    Marital status:      Spouse name: Not on file    Number of children: Not on file    Years of education: Not on file    Highest education level: Not on file   Occupational History    Not on file   Tobacco Use    Smoking status: Never Smoker    Smokeless tobacco: Never Used   Substance and Sexual Activity    Alcohol use:  Yes     Alcohol/week: 0.0 standard drinks     Comment: occasionally     Drug use: Not on file    Sexual activity: Not on file   Other Topics Concern    Not on file   Social History Narrative    Not on file     Social Determinants of Health     Financial Resource Strain: Low Risk     Difficulty of Paying Living Expenses: Not hard at all   Food Insecurity: No Food Insecurity    Worried About Running Out of Food in the Last Year: Never true    [address] in the Last Year: Never true   Transportation Needs:     Lack of Transportation (Medical):  Lack of Transportation (Non-Medical):    Physical Activity:     Days of Exercise per Week:     Minutes of Exercise per Session:    Stress:     Feeling of Stress :    Social Connections:     Frequency of Communication with Friends and Family:     Frequency of Social Gatherings with Friends and Family:     Attends Sikh Services:     Active Member of Clubs or Organizations:     Attends Club or Organization Meetings:     Marital Status:    Intimate Partner Violence:     Fear of Current or Ex-Partner:     Emotionally Abused:     Physically Abused:     Sexually Abused:         Family History   Problem Relation Age of Onset    Cancer Mother         lymphoma    Heart Disease Father     Heart Disease Sister     Arthritis Sister        PE  Vitals:    06/23/21 1525   BP: 108/72   Pulse: 88   SpO2: 96%   Weight: 103 lb (46.7 kg)   Height: 5' 1\" (1.549 m)     Estimated body mass index is 19.46 kg/m² as calculated from the following:    Height as of this encounter: 5' 1\" (1.549 m). Weight as of this encounter: 103 lb (46.7 kg). Physical Exam  Vitals reviewed. Constitutional:       General: She is not in acute distress. Appearance: Normal appearance. She is well-developed. She is not ill-appearing, toxic-appearing or diaphoretic. HENT:      Head: Normocephalic and atraumatic. Eyes:      General: No scleral icterus. Conjunctiva/sclera: Conjunctivae normal.      Pupils: Pupils are equal, round, and reactive to light.    Cardiovascular:      Rate and Rhythm: Normal rate and vaccine  Aged Out    Meningococcal (ACWY) vaccine  Aged Out       PSH, PMH, SH and FH reviewed and noted. Recent and past labs, tests and consults also reviewed. Recent or new meds also reviewed. ASSESSMENT/ PLAN:    Lightheadedness  With sweating, which she associates with decreased p.o. intake- ? Hypoglycemia, resolves with eating sweets. No known history of diabetes or diabetes meds. Reportedly BP during episodes is normal 120/70. Recent CBC no significant anemia. Not associated with chest pain/dyspnea/exertion. No LOC. -We will start with fasting sugar, BMP and TSH, U75 and folic acid.  -Follow-up in 3 months, if persistent symptoms come before. Mixed hyperlipidemia  -Recent lipid panel reviewed, mildly above goal.  Just recently changed to taking statin at night  -Continue Lipitor 20 mg    Chronic large granular lymphocytic leukemia ([facility] [address]  -Follows with Dr. Cornel Ledesma  -On methotrexate, check L01 and folic  -On daily prednisone 5 mg  -Monthly IVIG injections  -She will check with Dr. Madi Wagner if okay to get shingles vaccine (recombinant vaccine, should be okay but will verify with her oncologist)    Age-related osteoporosis without current pathological fracture  -Was getting yearly Reclast infusions until 6 years ago    Heme positive stool  Today reported blood on toilet paper, denied blood mixed with stool. Suffers from chronic constipation. No recent unexpected weight loss. GASTON today reveals small external hemorrhoid, heme positive stool. Likely hemorrhoid related. We did discuss in length potential other etiologies including colon cancer and potential colonoscopy. At this point Amarjit Lewis does not wish to proceed with any further testing as even if colon cancer to be discovered she will not want to pursue any further treatments. Given her history and age, despite being highly functioning, I do believe this is reasonable.   Constipation care discussed    Other chronic sinusitis  Today reports worsening congestion and purulent drainage. History of recurrent sinusitis, s/p sinus surgery. -Given immunodeficiency, will treat with antibiotics. Penicillin allergy, will treat with doxycycline for 7 days, if no improvement consider levofloxacin      Orders Placed This Encounter   Procedures    Glucose, Fasting    Basic Metabolic Panel    TSH WITH REFLEX TO FT4    VITAMIN B12 & FOLATE     Orders Placed This Encounter   Medications    doxycycline hyclate (VIBRA-TABS) 100 MG tablet     Sig: Take 1 tablet by mouth 2 times daily for 7 days     Dispense:  14 tablet     Refill:  0      There are no discontinued medications. Return in about 3 months (around 9/23/2021). Samantha Burgos MD    This dictation was generated by voice recognition computer software. Although all attempts are made to edit the dictation for accuracy, there may be errors in the transcription that are not intended.

## 2021-06-23 NOTE — ASSESSMENT & PLAN NOTE
With sweating, which she associates with decreased p.o. intake- ? Hypoglycemia, resolves with eating sweets. No known history of diabetes or diabetes meds. Reportedly BP during episodes is normal 120/70. Recent CBC no significant anemia. Not associated with chest pain/dyspnea/exertion. No LOC. -We will start with fasting sugar, BMP and TSH, N04 and folic acid.  -Follow-up in 3 months, if persistent symptoms come before.

## 2021-06-24 DIAGNOSIS — D64.9 ANEMIA, UNSPECIFIED TYPE: ICD-10-CM

## 2021-06-24 DIAGNOSIS — Z13.1 DIABETES MELLITUS SCREENING: ICD-10-CM

## 2021-06-24 DIAGNOSIS — M81.0 AGE-RELATED OSTEOPOROSIS WITHOUT CURRENT PATHOLOGICAL FRACTURE: ICD-10-CM

## 2021-06-24 DIAGNOSIS — R42 DIZZINESS: ICD-10-CM

## 2021-06-24 LAB
ANION GAP SERPL CALCULATED.3IONS-SCNC: 9 MMOL/L (ref 3–16)
BUN BLDV-MCNC: 9 MG/DL (ref 7–20)
CALCIUM SERPL-MCNC: 9.2 MG/DL (ref 8.3–10.6)
CHLORIDE BLD-SCNC: 100 MMOL/L (ref 99–110)
CO2: 30 MMOL/L (ref 21–32)
CREAT SERPL-MCNC: <0.5 MG/DL (ref 0.6–1.2)
FOLATE: 10.13 NG/ML (ref 4.78–24.2)
GFR AFRICAN AMERICAN: >60
GFR NON-AFRICAN AMERICAN: >60
GLUCOSE BLD-MCNC: 82 MG/DL (ref 70–99)
GLUCOSE FASTING: 82 MG/DL (ref 70–99)
POTASSIUM SERPL-SCNC: 3.6 MMOL/L (ref 3.5–5.1)
SODIUM BLD-SCNC: 139 MMOL/L (ref 136–145)
TSH REFLEX FT4: 3.23 UIU/ML (ref 0.27–4.2)
VITAMIN B-12: 1044 PG/ML (ref 211–911)

## 2021-06-30 ENCOUNTER — TELEPHONE (OUTPATIENT)
Dept: INTERNAL MEDICINE CLINIC | Age: 81
End: 2021-06-30

## 2021-06-30 NOTE — TELEPHONE ENCOUNTER
Patient called wanting the results of her labs because she is having the cold sweats again.      Please call to advise

## 2021-07-01 NOTE — TELEPHONE ENCOUNTER
Pt c/o cold sweats and weakness occasionally and is concerned when this happens. Please advise.     Pt given lab results

## 2021-07-01 NOTE — TELEPHONE ENCOUNTER
Is she checking blood pressure during those episodes? Should check if not. We checked her fasting sugar which was normal.   Is She describing episodes of drenching sweat?   This can also be related to her oncology history, did she discuss this with her oncologist?

## 2021-07-01 NOTE — TELEPHONE ENCOUNTER
Vitamin B12 more than adequate, normal folate.  Normal thyroid function.  Normal kidney function.  Normal fasting sugar

## 2021-12-23 ENCOUNTER — HOSPITAL ENCOUNTER (OUTPATIENT)
Dept: ONCOLOGY | Age: 81
Setting detail: INFUSION SERIES
Discharge: HOME | End: 2021-12-23
Payer: MEDICARE

## 2021-12-23 LAB
REPORT: NORMAL
RESPIRATORY PANEL PCR: NORMAL

## 2021-12-23 PROCEDURE — 0202U NFCT DS 22 TRGT SARS-COV-2: CPT

## 2021-12-23 PROCEDURE — C9803 HOPD COVID-19 SPEC COLLECT: HCPCS

## 2021-12-23 NOTE — PROGRESS NOTES
Respiratory panel done with no complication. Discharged ambulatory to home with self.      Electronically signed by Richar Tavarez RN on 12/23/2021 at 3:07 PM

## 2022-04-29 ENCOUNTER — HOSPITAL ENCOUNTER (OUTPATIENT)
Dept: CT IMAGING | Age: 82
Discharge: HOME | End: 2022-04-29
Payer: MEDICARE

## 2022-04-29 DIAGNOSIS — C91.12 CHRONIC LYMPHOCYTIC LEUKEMIA OF B-CELL TYPE IN RELAPSE (HCC): ICD-10-CM

## 2022-04-29 PROCEDURE — 70486 CT MAXILLOFACIAL W/O DYE: CPT

## 2022-08-25 ENCOUNTER — HOSPITAL ENCOUNTER (OUTPATIENT)
Dept: GENERAL RADIOLOGY | Age: 82
Discharge: HOME | End: 2022-08-25
Payer: MEDICARE

## 2022-08-25 DIAGNOSIS — Z78.0 POSTMENOPAUSAL: ICD-10-CM

## 2022-08-25 DIAGNOSIS — Z13.820 OSTEOPOROSIS SCREENING: ICD-10-CM

## 2022-08-25 PROCEDURE — 77080 DXA BONE DENSITY AXIAL: CPT

## 2022-10-11 NOTE — TELEPHONE ENCOUNTER
My chart message sent Quality 226: Preventive Care And Screening: Tobacco Use: Screening And Cessation Intervention: Patient screened for tobacco use and is an ex/non-smoker Quality 111:Pneumonia Vaccination Status For Older Adults: Pneumococcal vaccine (PPSV23) administered on or after patient’s 60th birthday and before the end of the measurement period Detail Level: Detailed Quality 431: Preventive Care And Screening: Unhealthy Alcohol Use - Screening: Patient not identified as an unhealthy alcohol user when screened for unhealthy alcohol use using a systematic screening method Quality 110: Preventive Care And Screening: Influenza Immunization: Influenza Immunization Administered during Influenza season

## 2022-10-12 ENCOUNTER — HOSPITAL ENCOUNTER (OUTPATIENT)
Dept: MAMMOGRAPHY | Age: 82
Discharge: HOME | End: 2022-10-12
Payer: MEDICARE

## 2022-10-12 VITALS — WEIGHT: 100 LBS | BODY MASS INDEX: 18.88 KG/M2 | HEIGHT: 61 IN

## 2022-10-12 DIAGNOSIS — Z12.31 VISIT FOR SCREENING MAMMOGRAM: ICD-10-CM

## 2022-10-12 PROCEDURE — 77063 BREAST TOMOSYNTHESIS BI: CPT

## 2022-10-13 NOTE — PROGRESS NOTES
Lera Mcardle    Age 81 y.o.    female    1940    MRN [id number]    10/18/2022  Arrival Time_____________  OR Time____________[address]     Procedure(s):  RIGHT AXILLARY EXCISIONAL LYMPH NODE BIOPSY                      General   Surgeon(s):  Crescencio Aragon, MD      DAY ADMIT ___  SDS/OP ___  OUTPT IN BED ___        Phone [phone] (home)     PCP _____________________ Phone_________________ Epic ( ) Epic CE ( ) Appt ________    ADDITIONAL INFO __________________________________ Cardio/Consult _____________    NOTES _____________________________________________________________________    ____________________________________________________________________________    PAT APPT DATE:________ TIME: ________  FAXED QAD: _______  (__) H&P w/ Hospitalist  __________________________________________________________________________  Preop Nurse phone screen complete: _____________  (__) CBC     (__) W/ DIFF ___________     (__) Hgb A1C    ___________  (__) CHEST X RAY   __________  (__) LIPID PROFILE  ___________  (__) EKG   __________  (__) PT/PTT   ___________  (__) PFT's   __________  (__) BMP   ___________  (__) CAROTIDS  __________  (__) CMP   ___________  (__) VEIN MAPPING  __________  (__) U/A   ___________  (__) HISTORY & PHYSICAL __________  (__) URINE C & S  ___________  (__) CARDIAC CLEARANCE __________  (__) U/A W/ FLEX  ___________  (__) PULM.  CLEARANCE __________  (__) SERUM PREGNANCY ___________  (__) Check Epic DOS orders __________  (__) TYPE & SCREEN __________repeat ( ) (__)  __________________ __________  (__) ALBUMIN Chela Merchant ___________  (__)  __________________ __________  (__) TRANSFERRIN  ___________  (__)  __________________ __________  (__) LIVER PROFILE  ___________  (__)  __________________ __________  (__) MRSA NASAL SWAB ___________  (__) URINE PREG DOS __________  (__) SED RATE  ___________  (__) BLOOD SUGAR DOS __________  (__) C-REACTIVE PROTEIN ___________    (__) VITAMIN D HYDROXY ___________  (__) BLOOD THINNERS __________    (__) ACE/ ARBS: _____________________     (__) BETABLOCKERS __________________

## 2022-10-14 ENCOUNTER — OFFICE VISIT (OUTPATIENT)
Dept: INTERNAL MEDICINE CLINIC | Age: 82
End: 2022-10-14
Payer: MEDICARE

## 2022-10-14 VITALS
DIASTOLIC BLOOD PRESSURE: 74 MMHG | HEIGHT: 61 IN | WEIGHT: 101 LBS | TEMPERATURE: 97 F | OXYGEN SATURATION: 97 % | SYSTOLIC BLOOD PRESSURE: 110 MMHG | BODY MASS INDEX: 19.07 KG/M2 | HEART RATE: 66 BPM

## 2022-10-14 DIAGNOSIS — C91.10 CHRONIC LARGE GRANULAR LYMPHOCYTIC LEUKEMIA (HCC): ICD-10-CM

## 2022-10-14 DIAGNOSIS — Z01.818 PREOP EXAM FOR INTERNAL MEDICINE: Primary | ICD-10-CM

## 2022-10-14 DIAGNOSIS — E78.2 MIXED HYPERLIPIDEMIA: ICD-10-CM

## 2022-10-14 DIAGNOSIS — E78.00 PURE HYPERCHOLESTEROLEMIA: ICD-10-CM

## 2022-10-14 DIAGNOSIS — E55.9 VITAMIN D DEFICIENCY: ICD-10-CM

## 2022-10-14 DIAGNOSIS — M80.00XD AGE-RELATED OSTEOPOROSIS WITH CURRENT PATHOLOGICAL FRACTURE WITH ROUTINE HEALING: ICD-10-CM

## 2022-10-14 DIAGNOSIS — R91.1 SOLITARY PULMONARY NODULE ON LUNG CT: ICD-10-CM

## 2022-10-14 PROCEDURE — G8484 FLU IMMUNIZE NO ADMIN: HCPCS | Performed by: INTERNAL MEDICINE

## 2022-10-14 PROCEDURE — 1090F PRES/ABSN URINE INCON ASSESS: CPT | Performed by: INTERNAL MEDICINE

## 2022-10-14 PROCEDURE — G8420 CALC BMI NORM PARAMETERS: HCPCS | Performed by: INTERNAL MEDICINE

## 2022-10-14 PROCEDURE — 99213 OFFICE O/P EST LOW 20 MIN: CPT | Performed by: INTERNAL MEDICINE

## 2022-10-14 PROCEDURE — G8427 DOCREV CUR MEDS BY ELIG CLIN: HCPCS | Performed by: INTERNAL MEDICINE

## 2022-10-14 RX ORDER — PHENOL 1.4 %
1 AEROSOL, SPRAY (ML) MUCOUS MEMBRANE DAILY
COMMUNITY

## 2022-10-14 SDOH — ECONOMIC STABILITY: FOOD INSECURITY: WITHIN THE PAST 12 MONTHS, THE FOOD YOU BOUGHT JUST DIDN'T LAST AND YOU DIDN'T HAVE MONEY TO GET MORE.: NEVER TRUE

## 2022-10-14 SDOH — ECONOMIC STABILITY: FOOD INSECURITY: WITHIN THE PAST 12 MONTHS, YOU WORRIED THAT YOUR FOOD WOULD RUN OUT BEFORE YOU GOT MONEY TO BUY MORE.: NEVER TRUE

## 2022-10-14 ASSESSMENT — ENCOUNTER SYMPTOMS
VOMITING: 0
ABDOMINAL PAIN: 0
SINUS PRESSURE: 0
EYE DISCHARGE: 0
NAUSEA: 0
COUGH: 0
RHINORRHEA: 0
TROUBLE SWALLOWING: 0
EYE PAIN: 0
SINUS PAIN: 0
BLOOD IN STOOL: 0
SORE THROAT: 0
SHORTNESS OF BREATH: 0
WHEEZING: 0
CHEST TIGHTNESS: 0

## 2022-10-14 ASSESSMENT — PATIENT HEALTH QUESTIONNAIRE - PHQ9
SUM OF ALL RESPONSES TO PHQ QUESTIONS 1-9: 0
1. LITTLE INTEREST OR PLEASURE IN DOING THINGS: 0
SUM OF ALL RESPONSES TO PHQ QUESTIONS 1-9: 0
SUM OF ALL RESPONSES TO PHQ9 QUESTIONS 1 & 2: 0
SUM OF ALL RESPONSES TO PHQ QUESTIONS 1-9: 0
SUM OF ALL RESPONSES TO PHQ QUESTIONS 1-9: 0
2. FEELING DOWN, DEPRESSED OR HOPELESS: 0

## 2022-10-14 ASSESSMENT — SOCIAL DETERMINANTS OF HEALTH (SDOH): HOW HARD IS IT FOR YOU TO PAY FOR THE VERY BASICS LIKE FOOD, HOUSING, MEDICAL CARE, AND HEATING?: NOT HARD AT ALL

## 2022-10-14 NOTE — PROGRESS NOTES
Preoperative Consultation      This patient presents to the office today for a preoperative consultation at the request of surgeon, Dr. Dr. Manjeet Phipps, who plans on performing right axillary lymph node removal on October 18 at [facility].  The current problem began 1 month ago, and symptoms have been unchanged with time. Conservative therapy:  failed . After flu shot on September 1 she had thick lymph node and that is what has shrunk a little bit but is not going away. Hyperlipidemia    she has been watching low fat diet. Reminded to keep doing regular exercise 3 to 4 days a week. Must keep trying to lose weight. Used to take atorvastatin 20 and she has stopped it for couple of years. The available labs reviewed and analyzed and independent interpretation of the results explained at length. Lab Results       Component                Value               Date                       CHOL                     199                 02/25/2021                 TRIG                     116                 02/25/2021                 HDL                      65 (H)              02/25/2021                 ALT                      22                  10/27/2020                 AST                      21                  10/27/2020              Methotrexate and folic acid taken for large cell lymphocytic leukemia    IMPRESSION       Dependent reticular groundglass opacities, favoring atelectasis. Probably chronic sternal and superior plate fractures at T4, T6 and T8. Correlation with trauma history physical exam recommended. 6 mm right lower lobe nodule, increased from 2013, more than likely benign with only slow interval growth over time. Follow-up CT in one year is suggested. No evidence of pulmonary embolus.   Exam End: 07/30/22 02:43   Specimen Collected: 07/30/22 03:14 Last Resulted: 07/30/22 03:25  Received From: [employer]  Result Received: 08/25/22 08:34    And she had DEXA which showed osteoporosis and she is taking calcium with vitamin D and she was recommended more medication but she has wisdom tooth problem so she needs to take care of that before getting stronger medication for osteoporosis. Medical History     Past Medical History:   Diagnosis Date    Allergic rhinitis due to pollen 01/20/2016    Arthritis, degenerative 05/03/2011    Cancer (HCC)     chronic lymphocytic leukemia    CLL (chronic lymphoblastic leukemia)     Closed compression fracture of thoracic vertebra ([facility] [address] 06/27/2016    Depression     History of blood transfusion     Hyperlipidemia     10/14/22 -  no medication currently    Melanoma of upper arm ([facility] [address]     2002    Mild reactive airways disease 01/20/2016    Sternum fx 08/2022    vaccuming       Allergies    Allergies   Allergen Reactions    Penicillins Rash    Potassium     Potassium-Containing Compounds          Surgical History    Past Surgical History:   Procedure Laterality Date    APPENDECTOMY      BELPHAROPTOSIS REPAIR      CATARACT REMOVAL WITH IMPLANT      bilat    NASAL SINUS SURGERY      OTHER SURGICAL HISTORY      melanoma removal    SHOULDER SURGERY Right 2018    Rotator cuff    TONSILLECTOMY           Family History    Family History   Problem Relation Age of Onset    Cancer Mother         lymphoma    Heart Disease Father     Heart Disease Sister     Arthritis Sister          Social History    Social History     Tobacco Use    Smoking status: Never    Smokeless tobacco: Never   Substance Use Topics    Alcohol use: Yes     Alcohol/week: 0.0 standard drinks     Comment: occasionally          Planned anesthesia: General   Known anesthesia problems: None   Bleeding risk: No recent or remote history of abnormal bleeding  Personal or FH of DVT/PE: No    Patient objection to receiving blood products: No    Review of Systems   Constitutional:  Negative for appetite change, chills, fever and unexpected weight change.    HENT:  Negative for congestion, ear discharge, ear pain, nosebleeds, rhinorrhea, sinus pressure, sinus pain, sore throat and trouble swallowing. Eyes:  Negative for pain and discharge. Respiratory:  Negative for cough, chest tightness, shortness of breath and wheezing. Cardiovascular:  Negative for chest pain, palpitations and leg swelling. Gastrointestinal:  Negative for abdominal pain, blood in stool, nausea and vomiting. Endocrine: Negative for polydipsia and polyphagia. Genitourinary:  Negative for difficulty urinating, enuresis, flank pain and hematuria. Musculoskeletal:  Negative for myalgias. Skin:  Negative for rash. Neurological:  Negative for facial asymmetry, weakness, light-headedness, numbness and headaches. Psychiatric/Behavioral:  Negative for confusion. Vitals:    10/14/22 1310   BP: 110/74   Pulse: 66   Temp: 97 °F (36.1 °C)   SpO2: 97%        Physical Exam  Vitals and nursing note reviewed. Constitutional:       General: She is not in acute distress. Appearance: She is well-developed. HENT:      Head: Normocephalic and atraumatic. Right Ear: External ear normal.      Left Ear: External ear normal.   Eyes:      General: Lids are normal. No scleral icterus. Right eye: No discharge. Left eye: No discharge. Conjunctiva/sclera: Conjunctivae normal.      Pupils: Pupils are equal, round, and reactive to light. Neck:      Thyroid: No thyroid mass or thyromegaly. Trachea: No tracheal deviation. Cardiovascular:      Rate and Rhythm: Normal rate and regular rhythm. Heart sounds: Normal heart sounds. No gallop. Pulmonary:      Effort: Pulmonary effort is normal.      Breath sounds: Normal breath sounds. No wheezing or rales. Abdominal:      General: Bowel sounds are normal.      Palpations: Abdomen is soft. There is no mass. Tenderness: There is no abdominal tenderness. Musculoskeletal:         General: No tenderness.       Cervical back: Neck supple. Comments: No leg edema or calf tenderness   Lymphadenopathy:      Cervical: No cervical adenopathy. Skin:     General: Skin is warm and dry. Findings: No rash. Neurological:      General: No focal deficit present. Mental Status: She is alert and oriented to person, place, and time. Cranial Nerves: No cranial nerve deficit. Sensory: No sensory deficit. Coordination: Coordination normal.   Psychiatric:         Speech: Speech normal.         Behavior: Behavior normal.         Thought Content: Thought content normal.        EKG Interpretation: Done in the emergency room on July 30, 2022 normal EKG, normal sinus rhythm. Lab Review   No visits with results within 2 Month(s) from this visit. Latest known visit with results is:   Hospital Outpatient Visit on 12/23/2021   Component Date Value    Respiratory Panel PCR 12/23/2021                      Value:Respiratory Pathogens Panel PCR Result: Not Detected  See additional report for complete Respiratory Pathogens Panel      Report 12/23/2021 SEE IMAGE            Assessment:       81 y.o. patient with planned surgery as above. Diagnosis Orders   1. Preop exam for internal medicine  Comprehensive Metabolic Panel      2. Age-related osteoporosis with current pathological fracture with routine healing  Vitamin D 25 Hydroxy    Comprehensive Metabolic Panel    Lipid Panel    T4, Free    TSH with Reflex      3. Chronic large granular lymphocytic leukemia (HCC)  Comprehensive Metabolic Panel      4. Vitamin D deficiency  Vitamin D 25 Hydroxy      5. Pure hypercholesterolemia  Comprehensive Metabolic Panel    Lipid Panel    T4, Free    TSH with Reflex      6. Solitary pulmonary nodule on lung CT            Known risk factors for perioperative complications: Osteoporosis and lymphocytic leukemia  Current medications which may produce withdrawal symptoms if withheld perioperatively: none      Plan:     1.  Preoperative workup as follows: Labs tomorrow  2. Change in medication regimen before surgery: None  3. Deep vein thrombosis prophylaxis: regimen to be chosen by surgical team  4. Addendum labs T4 normal 1.0 and TSH normal.  Vitamin D 25.2, cholesterol 209  atorvastatin re started and comprehensive metabolic panel with potassium 4.5 glucose 89 creatinine 0.5 total protein 5.8 albumin 4.3  5. no contraindications to planned surgery. Medically cleared for surgery. Patient Instructions   Fasting blood work tomorrow morning. Dental work done as soon as possible after this and get on medication for osteoporosis after that. CT scan chest for the pulmonary nodule in next July.

## 2022-10-14 NOTE — PATIENT INSTRUCTIONS
Fasting blood work tomorrow morning. Dental work done as soon as possible after this and get on medication for osteoporosis after that. CT scan chest for the pulmonary nodule in next July.

## 2022-10-14 NOTE — PROGRESS NOTES
1. Do not eat or drink anything after 12 midnight prior to surgery. This includes no water, chewing gum mints, or ice chips. You may brush your teeth and gargle the day of surgery but DO NOT SWALLOW THE WATER. 2. Please see your family doctor/pediatrician for a history and physical and/or concerning medications. Bring any test results/reports from your physician's office. If you are under the care of a heart doctor or specialist please be aware that you may be asked to see him or her for clearance. 3. You may be asked to stop blood thinners such as Coumadin, Plavix, Fragmin, and Lovenox or Anti-inflammatories such as Aspirin, Ibuprofen, Advil, and Naproxen prior to your surgery. Please check with your doctor before stopping these or any other medications. 4. Do not smoke, and do not drink any alcoholic beverages 24 hours prior to surgery. 5. You MUST make arrangements for a responsible adult to take you home after your surgery. For your safety, you will not be allowed to leave alone or drive yourself home. Your surgery will be cancelled if you do not have a ride home. Also for your safety, it is strongly suggested someone stay with you the first 24 hrs after your surgery. 6. A parent/legal guardian must accompany a child scheduled for surgery and plan to stay at the hospital until the child is discharged. Please do not bring other children with you. 7. For your comfort,please wear simple, loose fitting clothing to the hospital.  Please do not bring valuables (money, credit cards, checkbooks, etc.) Do not wear any makeup (including no eye makeup) or nail polish on your fingers or toes. 8. For your safety, please DO NOT wear any jewelry or piercings on day of surgery. All body piercing jewelry must be removed. 9. If you have dentures, they will be removed before going to the OR; for your convenience we will provide you with a container.   If you wear contact lenses or glasses, they will be removed, they will be removed, please bring a case for them. 10. If appicable,Please see your family doctor/pediatrician for a history & physical and/or concerning medications. Bring any test results/reports from your physician's office. 11. Remember to bring Blood Bank bracelet to the hospital on the day of surgery. 12. If you have a Living Will and Durable Power of  for Healthcare, please bring in a copy. 15. Notify your Surgeon if you develop any illness between now and surgery  time, cough, cold, fever, sore throat, nausea, vomiting, etc.  Please notify your surgeon if you experience dizziness, shortness of breath or blurred vision between now & the time of your surgery   14. DO NOT shave your operative site 96 hours prior to surgery. For face & neck surgery, men may use an electric razor 48 hours prior to surgery. 15. Shower the night before surgery with _X__Antibacterial soap ___Hibiclens   16. To provide excellent care visitors will be limited to one in the room at any given time. 17.  Please bring picture ID and insurance card. 18.  Visit our web site for additional information:  [facility]. [email or web address].

## 2022-10-17 RX ORDER — ATORVASTATIN CALCIUM 20 MG/1
TABLET, FILM COATED ORAL
Qty: 90 TABLET | Refills: 0 | Status: SHIPPED | OUTPATIENT
Start: 2022-10-17

## 2022-10-18 ENCOUNTER — ANESTHESIA EVENT (OUTPATIENT)
Dept: OPERATING ROOM | Age: 82
End: 2022-10-18
Payer: MEDICARE

## 2022-10-18 ENCOUNTER — ANESTHESIA (OUTPATIENT)
Dept: OPERATING ROOM | Age: 82
End: 2022-10-18
Payer: MEDICARE

## 2022-10-18 ENCOUNTER — HOSPITAL ENCOUNTER (OUTPATIENT)
Age: 82
Setting detail: OUTPATIENT SURGERY
Discharge: HOME | End: 2022-10-18
Attending: SURGERY | Admitting: SURGERY
Payer: MEDICARE

## 2022-10-18 VITALS
HEART RATE: 60 BPM | SYSTOLIC BLOOD PRESSURE: 104 MMHG | WEIGHT: 100 LBS | DIASTOLIC BLOOD PRESSURE: 51 MMHG | BODY MASS INDEX: 18.88 KG/M2 | TEMPERATURE: 97.2 F | OXYGEN SATURATION: 99 % | HEIGHT: 61 IN | RESPIRATION RATE: 14 BRPM

## 2022-10-18 DIAGNOSIS — R59.9 LYMPH NODE ENLARGEMENT: ICD-10-CM

## 2022-10-18 LAB
EKG ATRIAL RATE: 63 BPM
EKG DIAGNOSIS: NORMAL
EKG P AXIS: 29 DEGREES
EKG P-R INTERVAL: 158 MS
EKG Q-T INTERVAL: 440 MS
EKG QRS DURATION: 92 MS
EKG QTC CALCULATION (BAZETT): 450 MS
EKG R AXIS: -9 DEGREES
EKG T AXIS: 23 DEGREES
EKG VENTRICULAR RATE: 63 BPM

## 2022-10-18 PROCEDURE — 88307 TISSUE EXAM BY PATHOLOGIST: CPT

## 2022-10-18 PROCEDURE — 6360000002 HC RX W HCPCS: Performed by: REGISTERED NURSE

## 2022-10-18 PROCEDURE — 3600000004 HC SURGERY LEVEL 4 BASE: Performed by: SURGERY

## 2022-10-18 PROCEDURE — 2500000003 HC RX 250 WO HCPCS: Performed by: SURGERY

## 2022-10-18 PROCEDURE — 88185 FLOWCYTOMETRY/TC ADD-ON: CPT

## 2022-10-18 PROCEDURE — 3700000000 HC ANESTHESIA ATTENDED CARE: Performed by: SURGERY

## 2022-10-18 PROCEDURE — 88342 IMHCHEM/IMCYTCHM 1ST ANTB: CPT

## 2022-10-18 PROCEDURE — 3700000001 HC ADD 15 MINUTES (ANESTHESIA): Performed by: SURGERY

## 2022-10-18 PROCEDURE — 6360000002 HC RX W HCPCS: Performed by: SURGERY

## 2022-10-18 PROCEDURE — 88360 TUMOR IMMUNOHISTOCHEM/MANUAL: CPT

## 2022-10-18 PROCEDURE — 7100000010 HC PHASE II RECOVERY - FIRST 15 MIN: Performed by: SURGERY

## 2022-10-18 PROCEDURE — 93010 ELECTROCARDIOGRAM REPORT: CPT | Performed by: INTERNAL MEDICINE

## 2022-10-18 PROCEDURE — A4217 STERILE WATER/SALINE, 500 ML: HCPCS | Performed by: SURGERY

## 2022-10-18 PROCEDURE — 88184 FLOWCYTOMETRY/ TC 1 MARKER: CPT

## 2022-10-18 PROCEDURE — 2580000003 HC RX 258: Performed by: SURGERY

## 2022-10-18 PROCEDURE — 3600000014 HC SURGERY LEVEL 4 ADDTL 15MIN: Performed by: SURGERY

## 2022-10-18 PROCEDURE — 2500000003 HC RX 250 WO HCPCS: Performed by: REGISTERED NURSE

## 2022-10-18 PROCEDURE — 7100000000 HC PACU RECOVERY - FIRST 15 MIN: Performed by: SURGERY

## 2022-10-18 PROCEDURE — 2709999900 HC NON-CHARGEABLE SUPPLY: Performed by: SURGERY

## 2022-10-18 PROCEDURE — 2580000003 HC RX 258: Performed by: REGISTERED NURSE

## 2022-10-18 PROCEDURE — 7100000001 HC PACU RECOVERY - ADDTL 15 MIN: Performed by: SURGERY

## 2022-10-18 PROCEDURE — 7100000011 HC PHASE II RECOVERY - ADDTL 15 MIN: Performed by: SURGERY

## 2022-10-18 PROCEDURE — 88341 IMHCHEM/IMCYTCHM EA ADD ANTB: CPT

## 2022-10-18 PROCEDURE — 93005 ELECTROCARDIOGRAM TRACING: CPT | Performed by: ANESTHESIOLOGY

## 2022-10-18 RX ORDER — DIPHENHYDRAMINE HYDROCHLORIDE 50 MG/ML
12.5 INJECTION INTRAMUSCULAR; INTRAVENOUS
Status: CANCELLED | OUTPATIENT
Start: 2022-10-18 | End: 2022-10-19

## 2022-10-18 RX ORDER — OXYCODONE HYDROCHLORIDE 5 MG/1
5 TABLET ORAL PRN
Status: CANCELLED | OUTPATIENT
Start: 2022-10-18 | End: 2022-10-18

## 2022-10-18 RX ORDER — LIDOCAINE HYDROCHLORIDE 20 MG/ML
INJECTION, SOLUTION INFILTRATION; PERINEURAL PRN
Status: DISCONTINUED | OUTPATIENT
Start: 2022-10-18 | End: 2022-10-18 | Stop reason: SDUPTHER

## 2022-10-18 RX ORDER — PROPOFOL 10 MG/ML
INJECTION, EMULSION INTRAVENOUS PRN
Status: DISCONTINUED | OUTPATIENT
Start: 2022-10-18 | End: 2022-10-18 | Stop reason: SDUPTHER

## 2022-10-18 RX ORDER — MEPERIDINE HYDROCHLORIDE 50 MG/ML
12.5 INJECTION INTRAMUSCULAR; INTRAVENOUS; SUBCUTANEOUS EVERY 5 MIN PRN
Status: CANCELLED | OUTPATIENT
Start: 2022-10-18

## 2022-10-18 RX ORDER — SODIUM CHLORIDE 9 MG/ML
INJECTION, SOLUTION INTRAVENOUS PRN
Status: CANCELLED | OUTPATIENT
Start: 2022-10-18

## 2022-10-18 RX ORDER — PHENYLEPHRINE HCL IN 0.9% NACL 1 MG/10 ML
SYRINGE (ML) INTRAVENOUS PRN
Status: DISCONTINUED | OUTPATIENT
Start: 2022-10-18 | End: 2022-10-18 | Stop reason: SDUPTHER

## 2022-10-18 RX ORDER — LIDOCAINE HYDROCHLORIDE 10 MG/ML
2 INJECTION, SOLUTION INFILTRATION; PERINEURAL
Status: DISCONTINUED | OUTPATIENT
Start: 2022-10-18 | End: 2022-10-18 | Stop reason: HOSPADM

## 2022-10-18 RX ORDER — SODIUM CHLORIDE, SODIUM LACTATE, POTASSIUM CHLORIDE, CALCIUM CHLORIDE 600; 310; 30; 20 MG/100ML; MG/100ML; MG/100ML; MG/100ML
INJECTION, SOLUTION INTRAVENOUS CONTINUOUS
Status: DISCONTINUED | OUTPATIENT
Start: 2022-10-18 | End: 2022-10-18 | Stop reason: HOSPADM

## 2022-10-18 RX ORDER — MAGNESIUM HYDROXIDE 1200 MG/15ML
LIQUID ORAL CONTINUOUS PRN
Status: COMPLETED | OUTPATIENT
Start: 2022-10-18 | End: 2022-10-18

## 2022-10-18 RX ORDER — SODIUM CHLORIDE, SODIUM LACTATE, POTASSIUM CHLORIDE, CALCIUM CHLORIDE 600; 310; 30; 20 MG/100ML; MG/100ML; MG/100ML; MG/100ML
INJECTION, SOLUTION INTRAVENOUS CONTINUOUS PRN
Status: DISCONTINUED | OUTPATIENT
Start: 2022-10-18 | End: 2022-10-18 | Stop reason: SDUPTHER

## 2022-10-18 RX ORDER — BUPIVACAINE HYDROCHLORIDE 5 MG/ML
INJECTION, SOLUTION EPIDURAL; INTRACAUDAL PRN
Status: DISCONTINUED | OUTPATIENT
Start: 2022-10-18 | End: 2022-10-18 | Stop reason: ALTCHOICE

## 2022-10-18 RX ORDER — LABETALOL HYDROCHLORIDE 5 MG/ML
5 INJECTION, SOLUTION INTRAVENOUS EVERY 10 MIN PRN
Status: CANCELLED | OUTPATIENT
Start: 2022-10-18

## 2022-10-18 RX ORDER — OXYCODONE HYDROCHLORIDE 5 MG/1
10 TABLET ORAL PRN
Status: CANCELLED | OUTPATIENT
Start: 2022-10-18 | End: 2022-10-18

## 2022-10-18 RX ORDER — DEXAMETHASONE SODIUM PHOSPHATE 4 MG/ML
INJECTION, SOLUTION INTRA-ARTICULAR; INTRALESIONAL; INTRAMUSCULAR; INTRAVENOUS; SOFT TISSUE PRN
Status: DISCONTINUED | OUTPATIENT
Start: 2022-10-18 | End: 2022-10-18 | Stop reason: SDUPTHER

## 2022-10-18 RX ORDER — SODIUM CHLORIDE 0.9 % (FLUSH) 0.9 %
5-40 SYRINGE (ML) INJECTION EVERY 12 HOURS SCHEDULED
Status: CANCELLED | OUTPATIENT
Start: 2022-10-18

## 2022-10-18 RX ORDER — SODIUM CHLORIDE 0.9 % (FLUSH) 0.9 %
5-40 SYRINGE (ML) INJECTION PRN
Status: CANCELLED | OUTPATIENT
Start: 2022-10-18

## 2022-10-18 RX ORDER — ONDANSETRON 2 MG/ML
4 INJECTION INTRAMUSCULAR; INTRAVENOUS
Status: CANCELLED | OUTPATIENT
Start: 2022-10-18

## 2022-10-18 RX ORDER — OXYCODONE HYDROCHLORIDE 5 MG/1
5 TABLET ORAL EVERY 6 HOURS PRN
Qty: 12 TABLET | Refills: 0 | Status: SHIPPED | OUTPATIENT
Start: 2022-10-18 | End: 2022-10-21

## 2022-10-18 RX ORDER — FENTANYL CITRATE 50 UG/ML
INJECTION, SOLUTION INTRAMUSCULAR; INTRAVENOUS PRN
Status: DISCONTINUED | OUTPATIENT
Start: 2022-10-18 | End: 2022-10-18 | Stop reason: SDUPTHER

## 2022-10-18 RX ORDER — ONDANSETRON 2 MG/ML
INJECTION INTRAMUSCULAR; INTRAVENOUS PRN
Status: DISCONTINUED | OUTPATIENT
Start: 2022-10-18 | End: 2022-10-18 | Stop reason: SDUPTHER

## 2022-10-18 RX ADMIN — Medication 100 MCG: at 08:27

## 2022-10-18 RX ADMIN — Medication 100 MCG: at 08:21

## 2022-10-18 RX ADMIN — DEXAMETHASONE SODIUM PHOSPHATE 4 MG: 4 INJECTION, SOLUTION INTRAMUSCULAR; INTRAVENOUS at 08:04

## 2022-10-18 RX ADMIN — PROPOFOL 100 MG: 10 INJECTION, EMULSION INTRAVENOUS at 07:55

## 2022-10-18 RX ADMIN — CEFAZOLIN 2000 MG: 10 INJECTION, POWDER, FOR SOLUTION INTRAVENOUS at 08:05

## 2022-10-18 RX ADMIN — SODIUM CHLORIDE, SODIUM LACTATE, POTASSIUM CHLORIDE, AND CALCIUM CHLORIDE: .6; .31; .03; .02 INJECTION, SOLUTION INTRAVENOUS at 07:50

## 2022-10-18 RX ADMIN — FENTANYL CITRATE 50 MCG: 50 INJECTION INTRAMUSCULAR; INTRAVENOUS at 08:05

## 2022-10-18 RX ADMIN — LIDOCAINE HYDROCHLORIDE 50 MG: 20 INJECTION, SOLUTION INFILTRATION; PERINEURAL at 07:55

## 2022-10-18 RX ADMIN — ONDANSETRON 4 MG: 2 INJECTION INTRAMUSCULAR; INTRAVENOUS at 08:04

## 2022-10-18 ASSESSMENT — PAIN SCALES - GENERAL
PAINLEVEL_OUTOF10: 0
PAINLEVEL_OUTOF10: 0

## 2022-10-18 NOTE — PROGRESS NOTES
Discharge instructions reviewed with Gem Abernathy, daughter, by Tonio Miller. Verbalizes understanding. IV removed and pt discharged to private vehicle via wheelchair.

## 2022-10-18 NOTE — DISCHARGE INSTRUCTIONS
Postoperative Instructions for Breast Surgery  These are general guidelines to help assist in recovery after breast surgery. Please keep in mind all patients recover differently, so please call the office with any questions, [phone]). General guidelines   Please rest when you feel tired, although it is important to be up and about intermittently. Blood thinning medications, such as warfarin or aspirin, can typically be resumed 48 hours after surgery. This should be discussed with your surgeon and prescribing physician. Final pathology will take 3-5 business days to result. The breast surgery office will call you with the results when they are known. Pain management  You may feel mild to moderate discomfort when the local anesthesia wears off. You may apply ice to your surgical site for 10 minutes at a time every hour while you are awake. Do not fall asleep with the ice in place  You will be given a prescription for a narcotic pain medication  Some patients experience very little discomfort and they prefer not to use the prescription  You may take Tylenol or extra strength Tylenol instead of the narcotic, but be aware that many pain medications also contain Tylenol so do not take both  AVOID aspirin, fish oil, Excedrin, Advil, Motrin, ibuprofen, and other NSAIDS immediately after surgery for at least 24 hours. Narcotic pain medication may cause constipation. Make sure to keep well hydrated, ambulate, and eat high-fiber foods to help avoid constipation. You may use over-the-counter medications for constipation, such as colace or senna  You should not consume alcohol while taking narcotics  You should not drive while taking narcotics  Pain should improve, not worsen as days pass. If pain worsens, please call the office immediately. Wound care  Your incision has dissolvable stitches under the skin and surgical strength skin glue.    Do not place ointment or lotions on your incision  You may shower in 24-48 hours. The water may run over your wounds but do not scrub the surgical glue. Pat the incisions dry after showering with a clean towel. Do not take a tub bath where your incision will be submerged into water until it is fully healed in 4-8 weeks   Do not swim with a fresh incision  Swelling and bruising in the surgical site is considered normal. It is not normal to have excessive bleeding or drainage from the wound. If you notice this, please call the office immediately. Please call the office if you notice a fever as this may be a sign of infection. Activity  You may resume most daily activities the day after surgery  Avoid strenuous activity, heavy lifting, and vigorous exercise for the next 48 hours  Walking is a normal activity that can be restarted right away  Avoid any direct trauma to the surgical area  You may resume driving when you are no longer taking narcotics, are pain free, and you feel safe turning the wheel and stopping quickly    Diet  You may encounter nausea following surgery. Drink clear liquids or popsicles until you are feeling better. You have no diet restrictions and may resume a regular healthy diet immediately. You may add fiber or any over the counter stool softener/laxative to your diet to help with constipation caused by narcotic pain medications. You can resume all of your regular medications immediately. You should discuss with her physician when to resume blood thinners. Follow-up  Call the office at [phone] ([phone]) to schedule a follow up appointment in about 10 days, if not already scheduled. When to contact us  Please call the surgical office immediately if you notice any of the following: Excessive pain, persistent fever, excessive bleeding or swelling, redness surrounding the wound, drainage from the wound, reactions to medications, or any general concerns or worsening of overall condition.   The breast surgery office can be reached at [phone] ([phone]).

## 2022-10-18 NOTE — OP NOTE
Operative Note    Erin Covington  YOB: 1940  MRN: [id number]    PREOPERATIVE DIAGNOSIS  right axillary lymphadenopathy     POSTOPERATIVE DIAGNOSIS  right axillary lymphadenopathy    PROCEDURE  1. excisional biopsy of right axillary lymph node     ANESTHESIA  General anesthesia. SURGEON  Fany Agarwal MD     ASSISTANT  Liang Bejarano    ESTIMATED BLOOD LOSS  less than 50  ml    COMPLICATIONS  None apparent by completion of procedure    SPECIMENS REMOVED  1. right axillary lymph nodes     FINDINGS  The enlarged right axillary lymph node was identified and excised. POST-OP CONDITION  Stable    DISPOSITION  To recovery room    INDICATION FOR PROCEDURE  Erin Covington is a 81 y.o. woman who has a history of CLL and was found to have multiple enlarged lymph nodes on imaging. She presents today for excision for diagnostic purposes. The indications for the planned procedure, along with the potential benefits and risks which include but are not limited to: anesthetic risk, bleeding, infection, wound complications, sensation changes, lymphedema, arm numbness, nerve injury, and the need to return to the operating room for a second procedure based on final pathology were reviewed. All questions were answered, and she agrees to proceed. DESCRIPTION OF PROCEDURE   Lila Dickens underwent an image guided localization procedure preoperatively in the radiology department. She was brought to the operating room and placed supine on the operating room table with her arms extended on boards. She was appropriately positioned and padded. Bilateral sequential compression devices were applied to both lower extremities and a single dose of antibiotics was administered intravenously within 60 minutes prior to the incision time. After induction of anesthesia, a timeout procedure was performed. The patient was prepped and draped in the standard surgical fashion.   We directed our attention to the right axilla. A curvilinear incision was made at the lower edge of the axillary hairline and carried down through the dermis with electrocautery. The subcutaneous tissues were opened and the clavipectoral fascia was incised. Upon entering the axilla, an enlarged lymph node was encounter and excised and then passed off the field. Attention was then turned to the wound. Aggressive hemostasis was obtained with electrocautery. The wound was irrigated with copious amounts of sterile saline. 0.5% bupivacaine was infiltrated into the soft tissues surrounding the cavity and hemostasis was again confirmed. The deep dermal layer was then reapproximated with interrupted 3-0 Vicryl stitches for both incisions. The skin was reapproximated with a running subcuticular using 4-0 Monocryl. Surgical glue dressing was applied. The patient tolerated this procedure well, was awakened and transferred to the recovery room. The instrument, sponge, and needle counts were correct. Her family was notified of intraoperative findings.     Electronically signed by Ludwin Mills MD on 10/18/22 at 8:24 AM EDT

## 2022-10-18 NOTE — ANESTHESIA PRE PROCEDURE
Department of Anesthesiology  Preprocedure Note       Name:  Garcia Mccloud   Age:  81 y.o.  :  1940                                          MRN:  [id number]         Date:  10/18/2022      Surgeon: Yazmin Ames):  Jose Braun MD    Procedure: Procedure(s):  RIGHT AXILLARY EXCISIONAL LYMPH NODE BIOPSY    Medications prior to admission:   Prior to Admission medications    Medication Sig Start Date End Date Taking?  Authorizing Provider   calcium carbonate 600 MG TABS tablet Take 1 tablet by mouth daily   Yes Historical Provider, MD   diphenhydrAMINE HCl (BENADRYL ALLERGY PO) Take 1 tablet by mouth daily   Yes Historical Provider, MD   atorvastatin (LIPITOR) 20 MG tablet TAKE ONE TABLET BY MOUTH DAILY 10/17/22   Melecio Burton MD   vitamin D-3 (CHOLECALCIFEROL) 125 MCG (5000 UT) TABS Take 1 tablet by mouth daily 10/17/22   Benjamin Moyer MD   methotrexate (RHEUMATREX) 2.5 MG chemo tablet Take 12.5 mg by mouth once a week    Historical Provider, MD   calcium carbonate (TUMS) 500 MG chewable tablet Take 2 tablets by mouth 3 times daily as needed for Heartburn  Patient not taking: Reported on 10/18/2022    Historical Provider, MD   vitamin C (ASCORBIC ACID) 500 MG tablet Take 1,000 mg by mouth daily    Historical Provider, MD   sennosides-docusate sodium (SENOKOT-S) 8.6-50 MG tablet Take 2 tablets by mouth daily as needed for Constipation    Historical Provider, MD   B Complex Vitamins (B-COMPLEX/B-12 PO) Take 1 capsule by mouth daily    Historical Provider, MD   butalbital-aspirin-caffeine (FIORINAL) -40 MG capsule TAKE ONE CAPSULE BY MOUTH EVERY 4 HOURS AS NEEDED  **DO NOT EXCEED 6 CAPSULES IN 24 HOURS** 17   Gabe Ruffin MD   Multiple Vitamin (MVI, CELEBRATE, CHEWABLE TABLET) Take 1 tablet by mouth 3/11/09   Historical Provider, MD       Current medications:    Current Facility-Administered Medications   Medication Dose Route Frequency Provider Last Rate Last Admin    ceFAZolin (ANCEF) 2000 mg in dextrose 5 % 100 mL IVPB  2,000 mg IntraVENous On Call to 1709 Denys Alberto MD        lidocaine 1 % injection 2 mL  2 mL IntraDERmal Once PRN Lyla Shaver MD        lactated ringers infusion   IntraVENous Continuous Lyla Shaver MD         Facility-Administered Medications Ordered in Other Encounters   Medication Dose Route Frequency Provider Last Rate Last Admin    0.9 % sodium chloride bolus  250 mL IntraVENous Once Johan Noyola MD           Allergies:     Allergies   Allergen Reactions    Penicillins Rash    Potassium     Potassium-Containing Compounds        Problem List:    Patient Active Problem List   Diagnosis Code    Lumbar radiculopathy M54.16    Other chronic sinusitis J32.8    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    Age-related osteoporosis with current pathological fracture with routine healing M80.00XD    Closed compression fracture of thoracic vertebra ([facility]) S22.000A    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    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    CLL (chronic lymphoid leukemia) in relapse ([facility]) C91.12    Non-seasonal allergic rhinitis due to pollen J30.1    Mixed hyperlipidemia E78.2    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    Arthritis, degenerative M19.90    Primary insomnia F51.01    History of melanoma Z85.820    Lightheadedness R42    Heme positive stool R19.5    Preop exam for internal medicine Z01.818    Solitary pulmonary nodule on lung CT R91.1       Past Medical History:        Diagnosis Date    Allergic rhinitis due to pollen 01/20/2016    Arthritis, degenerative 05/03/2011    Cancer (HCC)     chronic lymphocytic leukemia    CLL (chronic lymphoblastic leukemia)     Closed compression fracture of thoracic vertebra ([facility] [address] 06/27/2016    Depression     History of blood transfusion     Hyperlipidemia     10/14/22 -  no medication currently    Melanoma of upper arm ([facility] [address]     2002    Mild reactive airways disease 01/20/2016    Sternum fx 08/2022    vaccuming       Past Surgical History:        Procedure Laterality Date    APPENDECTOMY      BELPHAROPTOSIS REPAIR      CATARACT REMOVAL WITH IMPLANT      bilat    NASAL SINUS SURGERY      OTHER SURGICAL HISTORY      melanoma removal    SHOULDER SURGERY Right 2018    Rotator cuff    TONSILLECTOMY         Social History:    Social History     Tobacco Use    Smoking status: Never    Smokeless tobacco: Never   Substance Use Topics    Alcohol use: Yes     Alcohol/week: 0.0 standard drinks     Comment: occasionally                                 Counseling given: Not Answered      Vital Signs (Current):   Vitals:    10/14/22 1048 10/18/22 0625   BP:  110/61   Pulse:  61   Resp:  16   Temp:  97.3 °F (36.3 °C)   TempSrc:  Temporal   SpO2:  97%   Weight: 100 lb (45.4 kg)    Height: 5' 1\" (1.549 m)                                               BP Readings from Last 3 Encounters:   10/18/22 110/61   10/14/22 110/74   06/23/21 108/72       NPO Status: Time of last liquid consumption: 2000                        Time of last solid consumption: 2000                        Date of last liquid consumption: 10/17/22                        Date of last solid food consumption: 10/17/22    BMI:   Wt Readings from Last 3 Encounters:   10/14/22 100 lb (45.4 kg)   10/14/22 101 lb (45.8 kg)   10/12/22 100 lb (45.4 kg)     Body mass index is 18.89 kg/m².     CBC:   Lab Results   Component Value Date/Time    WBC 30.1 03/23/2020 04:23 AM    RBC 2.82 03/23/2020 04:23 AM    RBC 3.33 08/18/2017 01:18 PM    HGB 8.9 03/23/2020 04:23 AM    HCT 28.0 03/23/2020 04:23 AM    MCV 99.5 03/23/2020 04:23 AM    RDW 25.3 03/23/2020 04:23 AM     03/23/2020 04:23 AM       CMP:   Lab Results   Component Value Date/Time     10/15/2022 09:00 AM    K 4.5 10/15/2022 09:00 AM     10/15/2022 09:00 AM    CO2 27 10/15/2022 09:00 AM    BUN 11 10/15/2022 09:00 AM    CREATININE <0.5 10/15/2022 09:00 AM    GFRAA >60 10/15/2022 09:00 AM    GFRAA >60 10/08/2012 10:13 AM    AGRATIO 2.9 10/15/2022 09:00 AM    LABGLOM >60 10/15/2022 09:00 AM    GLUCOSE 89 10/15/2022 09:00 AM    GLUCOSE 100 06/14/2017 01:52 PM    PROT 5.8 10/15/2022 09:00 AM    PROT 6.4 06/14/2017 01:52 PM    CALCIUM 9.1 10/15/2022 09:00 AM    BILITOT 0.5 10/15/2022 09:00 AM    ALKPHOS 107 10/15/2022 09:00 AM    AST 23 10/15/2022 09:00 AM    ALT 15 10/15/2022 09:00 AM       POC Tests: No results for input(s): POCGLU, POCNA, POCK, POCCL, POCBUN, POCHEMO, POCHCT in the last 72 hours. Coags:   Lab Results   Component Value Date/Time    PROTIME 11.2 03/23/2020 04:23 AM    INR 0.97 03/23/2020 04:23 AM    APTT 30.7 03/23/2020 04:23 AM       HCG (If Applicable): No results found for: PREGTESTUR, PREGSERUM, HCG, HCGQUANT     ABGs: No results found for: PHART, PO2ART, OOK2HMT, WGH7WTD, BEART, Z7QMZLPT     Type & Screen (If Applicable):  No results found for: LABABO, LABRH    Drug/Infectious Status (If Applicable):  Lab Results   Component Value Date/Time    HEPCAB Negative 08/23/2016 12:04 PM       COVID-19 Screening (If Applicable): No results found for: COVID19        Anesthesia Evaluation  Patient summary reviewed no history of anesthetic complications:   Airway: Mallampati: II  TM distance: >3 FB   Neck ROM: full  Mouth opening: > = 3 FB   Dental:          Pulmonary:normal exam  breath sounds clear to auscultation      (-) COPD, asthma and sleep apnea                           Cardiovascular:  Exercise tolerance: good (>4 METS),       (-) hypertension, CAD,  angina and  TELLES      Rhythm: regular  Rate: normal                    Neuro/Psych:   (+) neuromuscular disease:, psychiatric history:   (-) seizures and TIA           GI/Hepatic/Renal:        (-) GERD, liver disease and no renal disease       Endo/Other:    (+) malignancy/cancer. (-) diabetes mellitus               Abdominal:             Vascular: negative vascular ROS.          Other Findings:           Anesthesia Plan      general     ASA 3     (I discussed with the patient the risks and benefits of PIV, anesthesia, IV Narcotics, PACU. All questions were answered the patient agrees with the plan and wishes to proceed)  Induction: intravenous.                             Teo Baxter MD   10/18/2022

## 2022-10-18 NOTE — H&P
H&P Update    The patient's most recent H&P, office notes, breast imaging, and pathology were reviewed. Patient examined and laterality marked. There has been no changes. We will proceed with right axillary lymph node excisional biopsy.     Jose Vazquez MD

## 2022-10-18 NOTE — ANESTHESIA POSTPROCEDURE EVALUATION
Department of Anesthesiology  Postprocedure Note    Patient: Hiwot Salas  MRN: [id number]  YOB: 1940  Date of evaluation: 10/18/2022      Procedure Summary     Date: 10/18/22 [facility] / [facility]    Anesthesia Start: 1938 Anesthesia Stop: 8122    Procedure: RIGHT AXILLARY EXCISIONAL LYMPH NODE BIOPSY (Right: Axilla) Diagnosis:       Lymph node enlargement      (ENLARGED AXILLARY LYMPH NODES, RIGHT)    Surgeons: Anabelle Montalvo MD Responsible Provider: Amari Tabor MD    Anesthesia Type: General ASA Status: 3          Anesthesia Type: General    Juanito Phase I: Juanito Score: 10    Juanito Phase II: Juanito Score: 10      Anesthesia Post Evaluation    Comments: Postoperative Anesthesia Note    Name:    Hiwot Salas  MRN:      [id number]    Patient Vitals in the past 12 hrs:  10/18/22 0925, Pulse:60, Resp:14, SpO2:99 %  10/18/22 0924, Pulse:63, Resp:13, SpO2:95 %  10/18/22 0923, Pulse:60, Resp:13, SpO2:96 %  10/18/22 0922, Pulse:57, Resp:11, SpO2:99 %  10/18/22 0921, Pulse:61, Resp:11, SpO2:96 %  10/18/22 0920, Pulse:61, Resp:15, SpO2:97 %  10/18/22 0919, Pulse:62, Resp:11, SpO2:95 %  10/18/22 0918, BP:(!) 104/51, Pulse:58, Resp:10, SpO2:97 %  10/18/22 0917, Pulse:60, Resp:13, SpO2:96 %  10/18/22 0916, Pulse:59, Resp:10, SpO2:96 %  10/18/22 0915, Pulse:59, Resp:(!) 9, SpO2:96 %  10/18/22 0914, BP:(!) 108/50, Pulse:59, Resp:(!) 9, SpO2:97 %  10/18/22 0913, Pulse:58, Resp:11, SpO2:95 %  10/18/22 0912, Pulse:59, Resp:(!) 9, SpO2:96 %  10/18/22 0911, Pulse:60, Resp:12, SpO2:97 %  10/18/22 0910, Pulse:61, Resp:(!) 9, SpO2:99 %  10/18/22 0909, Pulse:62, Resp:11, SpO2:96 %  10/18/22 0908, BP:(!) 110/54, Pulse:59, Resp:11, SpO2:96 %  10/18/22 0907, Pulse:60, Resp:11, SpO2:97 %  10/18/22 0906, Pulse:61, Resp:11, SpO2:98 %  10/18/22 0905, Pulse:61, Resp:(!) 9, SpO2:97 %  10/18/22 0904, Pulse:60, Resp:12, SpO2:95 %  10/18/22 0903, BP:(!) 109/52, Pulse:60, Resp:(!) 9, SpO2:97 APTT                     30.7                03/23/2020 04:23 AM     Intake & Output:  @[id number]@    Nausea & Vomiting:  No    Level of Consciousness:  Awake    Pain Assessment:  Adequate analgesia    Anesthesia Complications:  No apparent anesthetic complications    SUMMARY      Vital signs stable  OK to discharge from Stage I post anesthesia care.   Care transferred from Anesthesiology department on discharge from perioperative area

## 2022-11-13 PROBLEM — Z01.818 PREOP EXAM FOR INTERNAL MEDICINE: Status: RESOLVED | Noted: 2022-10-14 | Resolved: 2022-11-13

## 2022-12-01 ENCOUNTER — APPOINTMENT (OUTPATIENT)
Dept: GENERAL RADIOLOGY | Age: 82
DRG: 536 | End: 2022-12-01
Payer: MEDICARE

## 2022-12-01 ENCOUNTER — APPOINTMENT (OUTPATIENT)
Dept: CT IMAGING | Age: 82
DRG: 536 | End: 2022-12-01
Payer: MEDICARE

## 2022-12-01 ENCOUNTER — HOSPITAL ENCOUNTER (INPATIENT)
Age: 82
LOS: 3 days | Discharge: NURSING, REHAB OR LONG-TERM CARE | DRG: 536 | End: 2022-12-04
Attending: EMERGENCY MEDICINE | Admitting: INTERNAL MEDICINE
Payer: MEDICARE

## 2022-12-01 DIAGNOSIS — S32.592A PUBIC RAMUS FRACTURE, LEFT, CLOSED, INITIAL ENCOUNTER (HCC): Primary | ICD-10-CM

## 2022-12-01 DIAGNOSIS — S32.810K PELVIC RING FRACTURE WITH NONUNION: ICD-10-CM

## 2022-12-01 LAB
ANION GAP SERPL CALCULATED.3IONS-SCNC: 10 MMOL/L (ref 3–16)
ANISOCYTOSIS: ABNORMAL
APTT: 29.6 SEC (ref 23–34.3)
ATYPICAL LYMPHOCYTE RELATIVE PERCENT: 1 % (ref 0–6)
ATYPICAL LYMPHOCYTE RELATIVE PERCENT: 7 % (ref 0–6)
BASOPHILS ABSOLUTE: 0 K/UL (ref 0–0.2)
BASOPHILS ABSOLUTE: 0 K/UL (ref 0–0.2)
BASOPHILS RELATIVE PERCENT: 0 %
BASOPHILS RELATIVE PERCENT: 0 %
BILIRUBIN URINE: NEGATIVE
BLOOD, URINE: NEGATIVE
BUN BLDV-MCNC: 14 MG/DL (ref 7–20)
CALCIUM SERPL-MCNC: 9.4 MG/DL (ref 8.3–10.6)
CHLORIDE BLD-SCNC: 103 MMOL/L (ref 99–110)
CLARITY: CLEAR
CO2: 27 MMOL/L (ref 21–32)
COLOR: YELLOW
CREAT SERPL-MCNC: <0.5 MG/DL (ref 0.6–1.2)
EOSINOPHILS ABSOLUTE: 0 K/UL (ref 0–0.6)
EOSINOPHILS ABSOLUTE: 0 K/UL (ref 0–0.6)
EOSINOPHILS RELATIVE PERCENT: 0 %
EOSINOPHILS RELATIVE PERCENT: 0 %
GFR SERPL CREATININE-BSD FRML MDRD: >60 ML/MIN/{1.73_M2}
GLUCOSE BLD-MCNC: 92 MG/DL (ref 70–99)
GLUCOSE URINE: NEGATIVE MG/DL
HCT VFR BLD CALC: 30.3 % (ref 36–48)
HCT VFR BLD CALC: 33.6 % (ref 36–48)
HCT VFR BLD CALC: 34.4 % (ref 36–48)
HEMOGLOBIN: 10.2 G/DL (ref 12–16)
HEMOGLOBIN: 11.2 G/DL (ref 12–16)
HEMOGLOBIN: 11.7 G/DL (ref 12–16)
INR BLD: 0.99 (ref 0.87–1.14)
INR BLD: 1.04 (ref 0.87–1.14)
KETONES, URINE: NEGATIVE MG/DL
LACTIC ACID: 1.5 MMOL/L (ref 0.4–2)
LEUKOCYTE ESTERASE, URINE: NEGATIVE
LYMPHOCYTES ABSOLUTE: 13.1 K/UL (ref 1–5.1)
LYMPHOCYTES ABSOLUTE: 13.8 K/UL (ref 1–5.1)
LYMPHOCYTES RELATIVE PERCENT: 77 %
LYMPHOCYTES RELATIVE PERCENT: 83 %
MACROCYTES: ABNORMAL
MCH RBC QN AUTO: 35.2 PG (ref 26–34)
MCH RBC QN AUTO: 35.3 PG (ref 26–34)
MCH RBC QN AUTO: 35.6 PG (ref 26–34)
MCHC RBC AUTO-ENTMCNC: 33.5 G/DL (ref 31–36)
MCHC RBC AUTO-ENTMCNC: 33.5 G/DL (ref 31–36)
MCHC RBC AUTO-ENTMCNC: 34 G/DL (ref 31–36)
MCV RBC AUTO: 104.8 FL (ref 80–100)
MCV RBC AUTO: 105.2 FL (ref 80–100)
MCV RBC AUTO: 105.5 FL (ref 80–100)
MICROSCOPIC EXAMINATION: NORMAL
MONOCYTES ABSOLUTE: 0.5 K/UL (ref 0–1.3)
MONOCYTES ABSOLUTE: 1 K/UL (ref 0–1.3)
MONOCYTES RELATIVE PERCENT: 3 %
MONOCYTES RELATIVE PERCENT: 6 %
NEUTROPHILS ABSOLUTE: 1.6 K/UL (ref 1.7–7.7)
NEUTROPHILS ABSOLUTE: 2 K/UL (ref 1.7–7.7)
NEUTROPHILS RELATIVE PERCENT: 10 %
NEUTROPHILS RELATIVE PERCENT: 13 %
NITRITE, URINE: NEGATIVE
PDW BLD-RTO: 15.8 % (ref 12.4–15.4)
PDW BLD-RTO: 15.9 % (ref 12.4–15.4)
PDW BLD-RTO: 16 % (ref 12.4–15.4)
PH UA: 7.5 (ref 5–8)
PLATELET # BLD: 101 K/UL (ref 135–450)
PLATELET # BLD: 97 K/UL (ref 135–450)
PLATELET # BLD: ABNORMAL K/UL (ref 135–450)
PLATELET SLIDE REVIEW: ABNORMAL
PLATELET SLIDE REVIEW: ABNORMAL
PMV BLD AUTO: 10.1 FL (ref 5–10.5)
PMV BLD AUTO: 9.9 FL (ref 5–10.5)
PMV BLD AUTO: ABNORMAL FL (ref 5–10.5)
POTASSIUM REFLEX MAGNESIUM: 3.7 MMOL/L (ref 3.5–5.1)
PROTEIN UA: NEGATIVE MG/DL
PROTHROMBIN TIME: 13 SEC (ref 11.7–14.5)
PROTHROMBIN TIME: 13.5 SEC (ref 11.7–14.5)
RBC # BLD: 2.87 M/UL (ref 4–5.2)
RBC # BLD: 3.19 M/UL (ref 4–5.2)
RBC # BLD: 3.28 M/UL (ref 4–5.2)
SARS-COV-2, NAAT: NOT DETECTED
SODIUM BLD-SCNC: 140 MMOL/L (ref 136–145)
SPECIFIC GRAVITY UA: 1.01 (ref 1–1.03)
URINE TYPE: NORMAL
UROBILINOGEN, URINE: 0.2 E.U./DL
WBC # BLD: 15.6 K/UL (ref 4–11)
WBC # BLD: 16.4 K/UL (ref 4–11)
WBC # BLD: 21.9 K/UL (ref 4–11)

## 2022-12-01 PROCEDURE — 83605 ASSAY OF LACTIC ACID: CPT

## 2022-12-01 PROCEDURE — 85027 COMPLETE CBC AUTOMATED: CPT

## 2022-12-01 PROCEDURE — 2580000003 HC RX 258: Performed by: NURSE PRACTITIONER

## 2022-12-01 PROCEDURE — 99285 EMERGENCY DEPT VISIT HI MDM: CPT

## 2022-12-01 PROCEDURE — 6370000000 HC RX 637 (ALT 250 FOR IP): Performed by: NURSE PRACTITIONER

## 2022-12-01 PROCEDURE — 85025 COMPLETE CBC W/AUTO DIFF WBC: CPT

## 2022-12-01 PROCEDURE — 87040 BLOOD CULTURE FOR BACTERIA: CPT

## 2022-12-01 PROCEDURE — 87103 BLOOD FUNGUS CULTURE: CPT

## 2022-12-01 PROCEDURE — 6370000000 HC RX 637 (ALT 250 FOR IP): Performed by: EMERGENCY MEDICINE

## 2022-12-01 PROCEDURE — 6360000002 HC RX W HCPCS: Performed by: INTERNAL MEDICINE

## 2022-12-01 PROCEDURE — 36415 COLL VENOUS BLD VENIPUNCTURE: CPT

## 2022-12-01 PROCEDURE — 85610 PROTHROMBIN TIME: CPT

## 2022-12-01 PROCEDURE — 72131 CT LUMBAR SPINE W/O DYE: CPT

## 2022-12-01 PROCEDURE — 81003 URINALYSIS AUTO W/O SCOPE: CPT

## 2022-12-01 PROCEDURE — 74176 CT ABD & PELVIS W/O CONTRAST: CPT

## 2022-12-01 PROCEDURE — 87635 SARS-COV-2 COVID-19 AMP PRB: CPT

## 2022-12-01 PROCEDURE — 85730 THROMBOPLASTIN TIME PARTIAL: CPT

## 2022-12-01 PROCEDURE — 6370000000 HC RX 637 (ALT 250 FOR IP): Performed by: STUDENT IN AN ORGANIZED HEALTH CARE EDUCATION/TRAINING PROGRAM

## 2022-12-01 PROCEDURE — 72170 X-RAY EXAM OF PELVIS: CPT

## 2022-12-01 PROCEDURE — 2060000000 HC ICU INTERMEDIATE R&B

## 2022-12-01 PROCEDURE — 80048 BASIC METABOLIC PNL TOTAL CA: CPT

## 2022-12-01 RX ORDER — SODIUM CHLORIDE 9 MG/ML
INJECTION, SOLUTION INTRAVENOUS CONTINUOUS PRN
Status: DISCONTINUED | OUTPATIENT
Start: 2022-12-01 | End: 2022-12-04 | Stop reason: HOSPADM

## 2022-12-01 RX ORDER — 0.9 % SODIUM CHLORIDE 0.9 %
500 INTRAVENOUS SOLUTION INTRAVENOUS ONCE
Status: COMPLETED | OUTPATIENT
Start: 2022-12-01 | End: 2022-12-01

## 2022-12-01 RX ORDER — MORPHINE SULFATE 4 MG/ML
4 INJECTION, SOLUTION INTRAMUSCULAR; INTRAVENOUS
Status: DISCONTINUED | OUTPATIENT
Start: 2022-12-01 | End: 2022-12-02

## 2022-12-01 RX ORDER — OXYCODONE HYDROCHLORIDE 5 MG/1
5 TABLET ORAL
Status: DISCONTINUED | OUTPATIENT
Start: 2022-12-01 | End: 2022-12-02

## 2022-12-01 RX ORDER — OXYCODONE HYDROCHLORIDE 5 MG/1
2.5 TABLET ORAL
Status: DISCONTINUED | OUTPATIENT
Start: 2022-12-01 | End: 2022-12-02

## 2022-12-01 RX ORDER — ALLOPURINOL 300 MG/1
300 TABLET ORAL DAILY
COMMUNITY

## 2022-12-01 RX ORDER — SODIUM CHLORIDE 0.9 % (FLUSH) 0.9 %
5-40 SYRINGE (ML) INJECTION PRN
Status: DISCONTINUED | OUTPATIENT
Start: 2022-12-01 | End: 2022-12-04 | Stop reason: HOSPADM

## 2022-12-01 RX ORDER — MIDODRINE HYDROCHLORIDE 5 MG/1
5 TABLET ORAL
Status: DISCONTINUED | OUTPATIENT
Start: 2022-12-01 | End: 2022-12-04

## 2022-12-01 RX ORDER — HYDROCODONE BITARTRATE AND ACETAMINOPHEN 5; 325 MG/1; MG/1
1 TABLET ORAL ONCE
Status: COMPLETED | OUTPATIENT
Start: 2022-12-01 | End: 2022-12-01

## 2022-12-01 RX ORDER — SODIUM CHLORIDE 9 MG/ML
INJECTION, SOLUTION INTRAVENOUS CONTINUOUS
Status: DISCONTINUED | OUTPATIENT
Start: 2022-12-01 | End: 2022-12-03

## 2022-12-01 RX ORDER — ENOXAPARIN SODIUM 100 MG/ML
30 INJECTION SUBCUTANEOUS DAILY
Status: DISCONTINUED | OUTPATIENT
Start: 2022-12-01 | End: 2022-12-02

## 2022-12-01 RX ORDER — MORPHINE SULFATE 2 MG/ML
2 INJECTION, SOLUTION INTRAMUSCULAR; INTRAVENOUS
Status: DISCONTINUED | OUTPATIENT
Start: 2022-12-01 | End: 2022-12-02

## 2022-12-01 RX ORDER — SODIUM CHLORIDE 0.9 % (FLUSH) 0.9 %
5-40 SYRINGE (ML) INJECTION EVERY 12 HOURS SCHEDULED
Status: DISCONTINUED | OUTPATIENT
Start: 2022-12-01 | End: 2022-12-04 | Stop reason: HOSPADM

## 2022-12-01 RX ORDER — ONDANSETRON 4 MG/1
4 TABLET, FILM COATED ORAL EVERY 8 HOURS PRN
Status: DISCONTINUED | OUTPATIENT
Start: 2022-12-01 | End: 2022-12-04 | Stop reason: HOSPADM

## 2022-12-01 RX ORDER — MAGNESIUM SULFATE IN WATER 40 MG/ML
4000 INJECTION, SOLUTION INTRAVENOUS PRN
Status: DISCONTINUED | OUTPATIENT
Start: 2022-12-01 | End: 2022-12-04 | Stop reason: HOSPADM

## 2022-12-01 RX ORDER — DIPHENHYDRAMINE HCL 25 MG
25 TABLET ORAL NIGHTLY PRN
Status: DISCONTINUED | OUTPATIENT
Start: 2022-12-01 | End: 2022-12-04 | Stop reason: HOSPADM

## 2022-12-01 RX ORDER — SODIUM CHLORIDE 9 MG/ML
25 INJECTION, SOLUTION INTRAVENOUS PRN
Status: DISCONTINUED | OUTPATIENT
Start: 2022-12-01 | End: 2022-12-04 | Stop reason: HOSPADM

## 2022-12-01 RX ADMIN — SODIUM CHLORIDE, PRESERVATIVE FREE 10 ML: 5 INJECTION INTRAVENOUS at 21:37

## 2022-12-01 RX ADMIN — SODIUM CHLORIDE: 9 INJECTION, SOLUTION INTRAVENOUS at 16:31

## 2022-12-01 RX ADMIN — MORPHINE SULFATE 4 MG: 4 INJECTION INTRAVENOUS at 19:01

## 2022-12-01 RX ADMIN — HYDROCODONE BITARTRATE AND ACETAMINOPHEN 1 TABLET: 5; 325 TABLET ORAL at 06:27

## 2022-12-01 RX ADMIN — SODIUM CHLORIDE 500 ML: 9 INJECTION, SOLUTION INTRAVENOUS at 13:57

## 2022-12-01 RX ADMIN — MIDODRINE HYDROCHLORIDE 5 MG: 5 TABLET ORAL at 17:11

## 2022-12-01 RX ADMIN — HYDROCODONE BITARTRATE AND ACETAMINOPHEN 1 TABLET: 5; 325 TABLET ORAL at 10:19

## 2022-12-01 RX ADMIN — OXYCODONE 5 MG: 5 TABLET ORAL at 21:26

## 2022-12-01 RX ADMIN — MIDODRINE HYDROCHLORIDE 5 MG: 5 TABLET ORAL at 13:52

## 2022-12-01 RX ADMIN — OXYCODONE 5 MG: 5 TABLET ORAL at 16:28

## 2022-12-01 ASSESSMENT — PAIN - FUNCTIONAL ASSESSMENT
PAIN_FUNCTIONAL_ASSESSMENT: PREVENTS OR INTERFERES WITH ALL ACTIVE AND SOME PASSIVE ACTIVITIES
PAIN_FUNCTIONAL_ASSESSMENT: PREVENTS OR INTERFERES WITH ALL ACTIVE AND SOME PASSIVE ACTIVITIES
PAIN_FUNCTIONAL_ASSESSMENT: 0-10
PAIN_FUNCTIONAL_ASSESSMENT: PREVENTS OR INTERFERES WITH ALL ACTIVE AND SOME PASSIVE ACTIVITIES
PAIN_FUNCTIONAL_ASSESSMENT: PREVENTS OR INTERFERES SOME ACTIVE ACTIVITIES AND ADLS

## 2022-12-01 ASSESSMENT — PAIN DESCRIPTION - DESCRIPTORS
DESCRIPTORS: ACHING
DESCRIPTORS: DISCOMFORT
DESCRIPTORS: ACHING
DESCRIPTORS: DISCOMFORT;STABBING
DESCRIPTORS: ACHING
DESCRIPTORS: ACHING;THROBBING
DESCRIPTORS: DISCOMFORT

## 2022-12-01 ASSESSMENT — PAIN DESCRIPTION - ONSET
ONSET: ON-GOING
ONSET: ON-GOING

## 2022-12-01 ASSESSMENT — PAIN DESCRIPTION - LOCATION
LOCATION: PELVIS;SACRUM
LOCATION: PELVIS;SACRUM
LOCATION: BUTTOCKS;BACK
LOCATION: HIP
LOCATION: BUTTOCKS;BACK;LEG
LOCATION: HIP

## 2022-12-01 ASSESSMENT — PAIN DESCRIPTION - PAIN TYPE
TYPE: ACUTE PAIN

## 2022-12-01 ASSESSMENT — PAIN SCALES - GENERAL
PAINLEVEL_OUTOF10: 10
PAINLEVEL_OUTOF10: 1
PAINLEVEL_OUTOF10: 4
PAINLEVEL_OUTOF10: 6
PAINLEVEL_OUTOF10: 9
PAINLEVEL_OUTOF10: 7
PAINLEVEL_OUTOF10: 8
PAINLEVEL_OUTOF10: 10
PAINLEVEL_OUTOF10: 6

## 2022-12-01 ASSESSMENT — PAIN DESCRIPTION - ORIENTATION
ORIENTATION: ANTERIOR;POSTERIOR
ORIENTATION: LEFT;LOWER;OUTER
ORIENTATION: LEFT
ORIENTATION: ANTERIOR;POSTERIOR;LEFT;RIGHT
ORIENTATION: LEFT

## 2022-12-01 ASSESSMENT — PAIN DESCRIPTION - FREQUENCY
FREQUENCY: CONTINUOUS

## 2022-12-01 ASSESSMENT — PAIN DESCRIPTION - DIRECTION
RADIATING_TOWARDS: BILATERAL LEGS
RADIATING_TOWARDS: BILATERAL LEGS

## 2022-12-01 NOTE — H&P
[facility] History and Physical       Attending Physician: Mara Henson DO    Primary Care: Aisha Hamilton MD       Referring MD: No referring provider defined for this encounter. Name: Radha Judge :  1940  MRN:  [id number]    Admission: 2022      Date: 2022    Reason for Admission: Pelvic and sacral fracture r/t fall    History of Present Illness:   82 yo F with h/o CLL currently being treated with Acalabrutinib. Patient PMH includes osteopenia, hypogammaglobunemia, and ITP/hemolytic anemia. Patient has been feeling well but unfortunately had a fall today which caused severe pain. Patient was seen in ER and CT A/P and lumbar spine were performed which showed mildly displaced pelvic fracture involving the left pubic body extending into the superior and inferior pubic rami with surrounding stranding likely representing a component of hemorrhage in the extraperitoneal space of Retzius. Minimally displaced left sacral alar fracture. Orthopedic surgery was consulted and noted this was a stable injury that does not require any surgical intervention. Patient is on toe touch weightbearing to the left lower extremity. She will follow up with them in 2 weeks as an outpatient if stable. We will admit patient today for further evaluation as she is at high risk for bleeding and complications. We will consult PT/OT.       Past Surgical History:   Procedure Laterality Date    APPENDECTOMY      AXILLARY SURGERY Right 10/18/2022    RIGHT AXILLARY EXCISIONAL LYMPH NODE BIOPSY performed by Daniel Aparicio MD at [address] [place] [address]    NASAL SINUS SURGERY      OTHER SURGICAL HISTORY      melanoma removal    SHOULDER SURGERY Right 2018    Rotator cuff    TONSILLECTOMY         Past Medical History:   Diagnosis Date    Allergic rhinitis due to pollen 2016    Arthritis, degenerative 2011    Cancer (HCC)     chronic lymphocytic leukemia    CLL (chronic lymphoblastic leukemia)     Closed compression fracture of thoracic vertebra ([facility] 06/27/2016    Depression     History of blood transfusion     Hyperlipidemia     10/14/22 -  no medication currently    Melanoma of upper arm ([facility] [address]     2002    Mild reactive airways disease 01/20/2016    Sternum fx 08/2022    vaccuming       Prior to Admission medications    Medication Sig Start Date End Date Taking?  Authorizing Provider   atorvastatin (LIPITOR) 20 MG tablet TAKE ONE TABLET BY MOUTH DAILY 10/17/22   Nayan Ovalles MD   vitamin D-3 (CHOLECALCIFEROL) 125 MCG (5000 UT) TABS Take 1 tablet by mouth daily 10/17/22   Benjamin Rebollar MD   calcium carbonate 600 MG TABS tablet Take 1 tablet by mouth daily    Historical Provider, MD   diphenhydrAMINE HCl (BENADRYL ALLERGY PO) Take 1 tablet by mouth daily    Historical Provider, MD   methotrexate (RHEUMATREX) 2.5 MG chemo tablet Take 12.5 mg by mouth once a week    Historical Provider, MD   calcium carbonate (TUMS) 500 MG chewable tablet Take 2 tablets by mouth 3 times daily as needed for Heartburn  Patient not taking: Reported on 10/18/2022    Historical Provider, MD   vitamin C (ASCORBIC ACID) 500 MG tablet Take 1,000 mg by mouth daily    Historical Provider, MD   sennosides-docusate sodium (SENOKOT-S) 8.6-50 MG tablet Take 2 tablets by mouth daily as needed for Constipation    Historical Provider, MD   B Complex Vitamins (B-COMPLEX/B-12 PO) Take 1 capsule by mouth daily    Historical Provider, MD   butalbital-aspirin-caffeine (FIORINAL) -40 MG capsule TAKE ONE CAPSULE BY MOUTH EVERY 4 HOURS AS NEEDED  **DO NOT EXCEED 6 CAPSULES IN 24 HOURS** 7/21/17   Matt Hutson MD   Multiple Vitamin (MVI, CELEBRATE, CHEWABLE TABLET) Take 1 tablet by mouth 3/11/09   Historical Provider, MD       Allergies   Allergen Reactions    Penicillins Rash    Potassium     Potassium-Containing Compounds        Family History   Problem Relation Age of Onset    Cancer Mother lymphoma    Heart Disease Father     Heart Disease Sister     Arthritis Sister         Social History     Socioeconomic History    Marital status:      Spouse name: Not on file    Number of children: Not on file    Years of education: Not on file    Highest education level: Not on file   Occupational History    Not on file   Tobacco Use    Smoking status: Never    Smokeless tobacco: Never   Vaping Use    Vaping Use: Never used   Substance and Sexual Activity    Alcohol use: Yes     Alcohol/week: 0.0 standard drinks     Comment: occasionally     Drug use: Never    Sexual activity: Not on file   Other Topics Concern    Not on file   Social History Narrative    Not on file     Social Determinants of Health     Financial Resource Strain: Low Risk     Difficulty of Paying Living Expenses: Not hard at all   Food Insecurity: No Food Insecurity    Worried About Running Out of Food in the Last Year: Never true    Ran Out of Food in the Last Year: Never true   Transportation Needs: Not on file   Physical Activity: Not on file   Stress: Not on file   Social Connections: Not on file   Intimate Partner Violence: Not on file   Housing Stability: Not on file        ROS:  As noted above, otherwise remainder of 10-point ROS negative    Physical Exam:     Vital Signs:  BP (!) 101/54   Pulse 75   Temp 97.5 °F (36.4 °C) (Oral)   Resp 18   Wt 101 lb (45.8 kg)   LMP  (LMP Unknown)   SpO2 99%   BMI 19.08 kg/m²     Weight:    Wt Readings from Last 3 Encounters:   12/01/22 101 lb (45.8 kg)   10/14/22 100 lb (45.4 kg)   10/14/22 101 lb (45.8 kg)       KPS: 40% Disabled; requires special care and assistance    ECOG PS:  (4) Completely disabled, unable to carry out self-care and confined to bed or chair    PE performed by Dr. Spencer Brow: Awake, alert and oriented.   HEENT: normocephalic, PERRL, no scleral erythema or icterus, Oral mucosa moist and intact, throat clear  NECK: supple without palpable adenopathy  BACK: Straight   SKIN: warm dry and intact without lesions rashes or masses  CHEST: CTA bilaterally without use of accessory muscles  CV: Normal S1 S2, RRR, no MRG  ABD: NT ND normoactive BS, no palpable masses or hepatosplenomegaly  EXTREMITIES: without edema, denies calf tenderness  NEURO: CN II - XII grossly intact  CATHETER: PIV    Laboratory Data:   CBC:   Recent Labs     12/01/22 0715   WBC 15.6*   HGB 11.7*   HCT 34.4*   .8*   PLT see below     BMP/Mag:  Recent Labs     12/01/22  0715      K 3.7      CO2 27   BUN 14   CREATININE <0.5*     LIVP: No results for input(s): AST, ALT, LIPASE, BILIDIR, BILITOT, ALKPHOS in the last 72 hours. Invalid input(s): AMYLASE,  ALB  Coags:   Recent Labs     12/01/22 0715   PROTIME 13.0   INR 0.99     Uric Acid No results for input(s): LABURIC in the last 72 hours. PROBLEM LIST:              CLL  Osteopenia  ITP/hemolytic anemia  LGL      TREATMENT:            CLL   Ibrutinib  Acalabrutinib (11/1/2022)    LGL  MTX weekly (d/c 10/2022)          ASSESSMENT AND PLAN:             1. 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 - Repeat FISH (1/21/20) - trisomy 12  - Flow cytometry (3/18/20) - Chronic lymphocytic leukemia and 4% TLGL cells. There is no strong evidence to suggest the possibility of a clonal TLGL population, further the low percentage of cells argues against this entity.  - PB smear (3/18/20) - Occasional spherocytes and slight increase in polychromasia indicating likely ongoing hemolysis. Leukocytes remarkable for monomorphic small lymphoid cells indicating the presence of chronic lymphocytic leukemia. There are also occasional forms showing granules. Platelets unremarkable  - PET 10/3/22:  Thoracic and A/P  lymphadenopathy.   Mild splenic enlargement and uptake suspicious for lymphoma.  - Lymph node biopsy 10/18/22:  Consistent with CLL/SLL; FISH negative  - CT A/P 12/01/22:  No abnormally enlarged lymph nodes in A/P    Biopsy if ax node C/W CLL - started acalabrutininb. Tolerating well with documented improvement. C1D1 11/1/22 Acalabrutinib. - she is responding     1B. 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 - s/p MTX 10 mg weekly (started 3/21/20), increased to 12.5mg weekly (5/29/10) with neutropenia. Cont weekly on Fridays. Will hold ( after dose 10/21/22) with onset of acalabrutininb. 2. ID: No s/s of infection  - H/o recurrent acute sinusitis (follows up with ENT) - starting an antibiotic and steroids per ENT 5/10/22   - S/p COVID vaccines (2nd dose 2/18/21)   - COVID Ab (2/16/22): negative; Evusheld 4/1/22. She had a reaction and refuses repeat. Hypogammaglobulinemia:   - Cont monthly immunoglobulin (started 1/4/19). Dose 1/19/22, 2/16/22, 4/13/22, 5/10/22, 6/10/22, 7/8/22  - Last IgG 10/21/22:  484    Schedule COVID  and flu immunizations     3. Heme: H/o hemolytic anemia and ITP  - Transfuse for hgb <7 and plt <10  - No transfusion today      ITP / hemolytic anemia:    - Recurrent hemolysis (1/21/20) - [place] IgG + with retic 2.8    Current Tx:  NONE     Previous Tx:  - S/p 4 cycles Rituxan (completed 9/14/16) & IVIG 20 gm daily for 5 days (completed 10/10/16)  - S/p Rituxan weekly x 4 (last dose 9/11/17) w/ normalization of her hemoglobin. Last dose 4/8/20  - S/p Prednisone taper (stopped 10/2019)  - s/p Prednisone: 2/17/20 -> Stop 9/12/22  - Rituxan weekly x 4 - #1 (3/18/20), #2(3/25/20), #3 (4/1/20), #4 (4/8/20)       4. Metabolic:  Stable renal fxn and e-lytes  - Encourage PO fluid intake      5. M/S: Traumatic fall (12/01/22)- left pelvic ring fracture and sacral fracture with minimal displacement. H/o Osteoporosis - worse than 2018.   Prolia 12/19/22  Pelvis/Sacral fracture  - Orthopedic surgery consulted, no surgical intervention   - Toe touch weight bearing to LLL   - Cont DVT prophylaxis approximately 1 month s/p injury (January 2023)   - F/U 2 weeks as outpatient for repeat evaluation and imaging of pelvis (week on 12/14/22)  - Monitor hemoglobin closely for signs of bleeding  - Oxycodone 2.5-5 mg Q 3 hour prn pain  -Consult PT/OT  - Cont DVT ppx with Lovenox and SCDs  Osteopenia/Osteoporosis:    - DEXA scan 08/2022:  Lumbar Spine- osteopenia. No significant change since 2018. Left femoral neck : Osteoporosis. Left hip : Osteoporosis. This corresponds to 5% worsening since 2018. 6. ENT: H/o rhinitis & followed by ENT  - Cont Flonase     7. Cardiac:  H/o PVCs. Symptomatic hypotension  - If heart fluttering sensation returns, will order holter monitor   - Telemetry x 48 hours (12/01/22)  Hypotension  - Cortisol level WNL 11/21/22  - Start midodrine 5 mg TID  - Place eccil hose      8. Pulm nodule - CT 7/22. Repeat CT 1/23 after 3 months of acalabrutininb.         - DVT Prophylaxis: Platelets >75,273 cells/dL, - daily lovenox prophylaxis ordered  Contraindications to pharmacologic prophylaxis: None  Contraindications to mechanical prophylaxis: None      - Disposition: Once stable and ambulatory    The patient was seen and examined by Dr. Toni Gamez. This admission history and physical has been discussed and agreed upon by Dr. Toni Gamez.     BRAULIO Thapa - CNP      Elan Amaya MD  [facility]  Please contact me through [facility]

## 2022-12-01 NOTE — ED PROVIDER NOTES
[address] [facility] [address] ENCOUNTER          ATTENDING PHYSICIAN NOTE       Date of evaluation: 12/1/2022    Chief Complaint     Fall (This evening // complains of L hip pain at this time - no deformorites seen by EMS)      History of Present Illness     Gardenia Rico is a 81 y.o. female who presents With a complaint of fall. The patient has a history of leukemia, is currently on chemotherapy and states he has been feeling increasingly fatigued and lightheaded on standing. She had an episode where she stood up from bed and felt lightheaded tonight and then fell, striking her left buttock. She had acute onset of severe 10 out of 10 pain in left buttock but states it does not radiate to her hip. It does not radiate down her leg. She denies any pain in her cervical spine and did not hit her head. She remembers feeling lightheaded before the fall and had no reported seizure-like activity with the fall. Review of Systems     Review of Systems   Constitutional:  Positive for fatigue. Cardiovascular:  Negative for palpitations. Musculoskeletal:  Positive for back pain. Negative for neck pain and neck stiffness. Neurological:  Positive for weakness and light-headedness. All other systems reviewed and are negative. Past Medical, Surgical, Family, and Social History     She has a past medical history of Allergic rhinitis due to pollen, Arthritis, degenerative, Cancer ([address] CLL (chronic lymphoblastic leukemia), Closed compression fracture of thoracic vertebra ([address] Depression, History of blood transfusion, Hyperlipidemia, Melanoma of upper arm ([address] Mild reactive airways disease, and Sternum fx. She has a past surgical history that includes Tonsillectomy; Appendectomy; Cataract removal with implant; Blepharoptosis Repair; Nasal sinus surgery; other surgical history; shoulder surgery (Right, 2018); and Axillary Surgery (Right, 10/18/2022).   Her family history includes Arthritis in her sister; Cancer in her mother; Heart Disease in her father and sister. She reports that she has never smoked. She has never used smokeless tobacco. She reports current alcohol use. She reports that she does not use drugs. Medications     Previous Medications    ATORVASTATIN (LIPITOR) 20 MG TABLET    TAKE ONE TABLET BY MOUTH DAILY    B COMPLEX VITAMINS (B-COMPLEX/B-12 PO)    Take 1 capsule by mouth daily    BUTALBITAL-ASPIRIN-CAFFEINE (FIORINAL) -40 MG CAPSULE    TAKE ONE CAPSULE BY MOUTH EVERY 4 HOURS AS NEEDED  **DO NOT EXCEED 6 CAPSULES IN 24 HOURS**    CALCIUM CARBONATE (TUMS) 500 MG CHEWABLE TABLET    Take 2 tablets by mouth 3 times daily as needed for Heartburn    CALCIUM CARBONATE 600 MG TABS TABLET    Take 1 tablet by mouth daily    DIPHENHYDRAMINE HCL (BENADRYL ALLERGY PO)    Take 1 tablet by mouth daily    METHOTREXATE (RHEUMATREX) 2.5 MG CHEMO TABLET    Take 12.5 mg by mouth once a week    MULTIPLE VITAMIN (MVI, CELEBRATE, CHEWABLE TABLET)    Take 1 tablet by mouth    SENNOSIDES-DOCUSATE SODIUM (SENOKOT-S) 8.6-50 MG TABLET    Take 2 tablets by mouth daily as needed for Constipation    VITAMIN C (ASCORBIC ACID) 500 MG TABLET    Take 1,000 mg by mouth daily    VITAMIN D-3 (CHOLECALCIFEROL) 125 MCG (5000 UT) TABS    Take 1 tablet by mouth daily       Allergies     She is allergic to penicillins, potassium, and potassium-containing compounds. Physical Exam     INITIAL VITALS: BP: (!) 170/147, Temp: 97.5 °F (36.4 °C), Heart Rate: 78, Resp: 17, SpO2: 94 %   Physical Exam  Constitutional: Well nourished Elderly frail-appearing  female who appears in no acute distress. HEENT: NC/AT. PERRL 4-2 bilaterally. EOMI. CV:Heart is regular rate and rhythm without murmurs, rubs or gallops. Resp: Respirations unlabored. Lungs clear to auscultation w/o wheezing. Abd: Soft, nontender, nondistended. No rebound tenderness. Pelvis is stable to rock.     MSK: Full ROM, no edema or tenderness to palpation. She has no pain with logroll of the left hip but does have pain with palpation of the pelvis on the left, and along the left posterior aspect of her pelvis. She has no thoracic or lumbar spine tenderness palpation. She has diffuse tenderness palpation over the lower lumbar spine but no step-offs or deformities. Skin: Extremities are warm and well perfused. 2+ radial pulses . Cap Refill <3 seconds. Neuro: A&Ox3. Cranial nerves grossly intact   Strength and sensation intact x4 extremities. Psych: Thought content, behavior & judgement normal.    Diagnostic Results         RADIOLOGY:  CT ABDOMEN PELVIS WO CONTRAST Additional Contrast? None    (Results Pending)   CT LUMBAR SPINE WO CONTRAST    (Results Pending)       LABS:   No results found for this visit on 12/01/22. ED BEDSIDE ULTRASOUND:  No results found. RECENT VITALS:  BP: (!) 123/90,Temp: 97.5 °F (36.4 °C), Heart Rate: 78, Resp: 17, SpO2: 99 %     Procedures         ED Course     Nursing Notes, Past Medical Hx, Past Surgical Hx, Social Hx,Allergies, and Family Hx were reviewed. patient was given the following medications:  Orders Placed This Encounter   Medications    HYDROcodone-acetaminophen (NORCO) 5-325 MG per tablet 1 tablet       CONSULTS:  None    MEDICAL DECISIONMAKING / ASSESSMENT / Maryleeann Back is a 81 y.o. female Presenting with a complaint of fall after being lightheaded. She is hemodynamically stable here. She had no arrhythmia noted that would be consistent with syncope or seizure. She was given Norco, and basic labs are obtained the patient was sent for CT imaging of her pelvis and lumbar spine. Results of these are pending at time of signout to the oncoming provider. .      Clinical Impression   Pending at signout    Disposition     PATIENT REFERRED TO:  No follow-up provider specified.     DISCHARGE MEDICATIONS:  New Prescriptions    No medications on file       DISPOSITION    Pending CT reymundo Hopper MD  12/02/22 Jaye Piper

## 2022-12-01 NOTE — PROGRESS NOTES
Patient admitted to [facility] via stretcher from ED for diagnosis of pelivc ring fracture. Patient and daughter oriented to patient room including call light and bed controls. Admission assessment completed - see admission flowsheet documentation. Patient is a high fall risk. Safety measures instituted per policy.

## 2022-12-01 NOTE — ED TRIAGE NOTES
Pt comes to the ED after a fall while at home. Pt states she got up to use the restroom, was washing her hands and fell to the ground.

## 2022-12-01 NOTE — Clinical Note
Discharge Plan[de-identified] Other/[facility])   Telemetry/Cardiac Monitoring Required?: No

## 2022-12-01 NOTE — ED PROVIDER NOTES
[address] [facility] [address] ENCOUNTER          ATTENDING PHYSICIAN NOTE       Date of evaluation: 12/1/2022    ADDENDUM:      Care of this patient was assumed from Dr Luna Mcdonnell. The patient was seen for Fall (This evening // complains of L hip pain at this time - no deformorites seen by EMS)  . The patient's initial evaluation and plan have been discussed with the prior provider who initially evaluated the patient. Nursing Notes, Past Medical Hx, Past Surgical Hx, Social Hx, Allergies, and Family Hx were all reviewed. Diagnostic Results     EKG   See prior provider's note    RADIOLOGY:  XR PELVIS INLET OUTLET   Final Result      Left obturator ring fracture. Consider CT. CT LUMBAR SPINE WO CONTRAST   Final Result      1. No acute abnormality within the lumbar spine. CT ABDOMEN PELVIS WO CONTRAST Additional Contrast? None   Final Result      1. Mildly displaced pelvic fracture involving the left pubic body extending into the superior and inferior pubic rami with surrounding stranding likely representing a component of hemorrhage in the extraperitoneal space of Retzius. 2.  Minimally displaced left sacral alar fracture.                 LABS:   Results for orders placed or performed during the hospital encounter of 12/01/22   CBC with Auto Differential   Result Value Ref Range    WBC 15.6 (H) 4.0 - 11.0 K/uL    RBC 3.28 (L) 4.00 - 5.20 M/uL    Hemoglobin 11.7 (L) 12.0 - 16.0 g/dL    Hematocrit 34.4 (L) 36.0 - 48.0 %    .8 (H) 80.0 - 100.0 fL    MCH 35.6 (H) 26.0 - 34.0 pg    MCHC 34.0 31.0 - 36.0 g/dL    RDW 15.8 (H) 12.4 - 15.4 %    Platelets see below 460 - 450 K/uL    MPV see below 5.0 - 10.5 fL    PLATELET SLIDE REVIEW Clumped     Neutrophils % 13.0 %    Lymphocytes % 83.0 %    Monocytes % 3.0 %    Eosinophils % 0.0 %    Basophils % 0.0 %    Neutrophils Absolute 2.0 1.7 - 7.7 K/uL    Lymphocytes Absolute 13.1 (H) 1.0 - 5.1 K/uL    Monocytes Absolute 0.5 0.0 - 1.3 K/uL Eosinophils Absolute 0.0 0.0 - 0.6 K/uL    Basophils Absolute 0.0 0.0 - 0.2 K/uL    Atypical Lymphocytes Relative 1 0 - 6 %    Anisocytosis 1+ (A)     Macrocytes 1+ (A)    Basic Metabolic Panel w/ Reflex to MG   Result Value Ref Range    Sodium 140 136 - 145 mmol/L    Potassium reflex Magnesium 3.7 3.5 - 5.1 mmol/L    Chloride 103 99 - 110 mmol/L    CO2 27 21 - 32 mmol/L    Anion Gap 10 3 - 16    Glucose 92 70 - 99 mg/dL    BUN 14 7 - 20 mg/dL    Creatinine <0.5 (L) 0.6 - 1.2 mg/dL    Est, Glom Filt Rate >60 >60    Calcium 9.4 8.3 - 10.6 mg/dL   Protime-INR   Result Value Ref Range    Protime 13.0 11.7 - 14.5 sec    INR 0.99 0.87 - 1.14   Protime-INR   Result Value Ref Range    Protime 13.5 11.7 - 14.5 sec    INR 1.04 0.87 - 1.14   APTT   Result Value Ref Range    aPTT 29.6 23.0 - 34.3 sec   Urinalysis   Result Value Ref Range    Color, UA Yellow Straw/Yellow    Clarity, UA Clear Clear    Glucose, Ur Negative Negative mg/dL    Bilirubin Urine Negative Negative    Ketones, Urine Negative Negative mg/dL    Specific Gravity, UA 1.010 1.005 - 1.030    Blood, Urine Negative Negative    pH, UA 7.5 5.0 - 8.0    Protein, UA Negative Negative mg/dL    Urobilinogen, Urine 0.2 <2.0 E.U./dL    Nitrite, Urine Negative Negative    Leukocyte Esterase, Urine Negative Negative    Microscopic Examination Not Indicated     Urine Type NotGiven    Lactic Acid   Result Value Ref Range    Lactic Acid 1.5 0.4 - 2.0 mmol/L   CBC with Auto Differential   Result Value Ref Range    WBC 16.4 (H) 4.0 - 11.0 K/uL    RBC 2.87 (L) 4.00 - 5.20 M/uL    Hemoglobin 10.2 (L) 12.0 - 16.0 g/dL    Hematocrit 30.3 (L) 36.0 - 48.0 %    .5 (H) 80.0 - 100.0 fL    MCH 35.3 (H) 26.0 - 34.0 pg    MCHC 33.5 31.0 - 36.0 g/dL    RDW 16.0 (H) 12.4 - 15.4 %    Platelets 97 (L) 558 - 450 K/uL    MPV 10.1 5.0 - 10.5 fL    PLATELET SLIDE REVIEW Decreased     Neutrophils % 10.0 %    Lymphocytes % 77.0 %    Monocytes % 6.0 %    Eosinophils % 0.0 %    Basophils % 0.0 %    Neutrophils Absolute 1.6 (L) 1.7 - 7.7 K/uL    Lymphocytes Absolute 13.8 (H) 1.0 - 5.1 K/uL    Monocytes Absolute 1.0 0.0 - 1.3 K/uL    Eosinophils Absolute 0.0 0.0 - 0.6 K/uL    Basophils Absolute 0.0 0.0 - 0.2 K/uL    Atypical Lymphocytes Relative 7 (H) 0 - 6 %       RECENT VITALS:  BP: (!) 92/48, Temp: 97.5 °F (36.4 °C), Heart Rate: 74, Resp: 14, SpO2: 97 %     Procedures     none    ED Course     The patient was given the following medications:  Orders Placed This Encounter   Medications    HYDROcodone-acetaminophen (NORCO) 5-325 MG per tablet 1 tablet    HYDROcodone-acetaminophen (NORCO) 5-325 MG per tablet 1 tablet    diphenhydrAMINE (BENADRYL) tablet 25 mg    0.9 % sodium chloride infusion    sodium chloride flush 0.9 % injection 5-40 mL    sodium chloride flush 0.9 % injection 5-40 mL    0.9 % sodium chloride infusion    magnesium sulfate 4000 mg in 100 mL IVPB premix    magnesium hydroxide (MILK OF MAGNESIA) 400 MG/5ML suspension 10 mL    Saline Mouthwash 15 mL    Saline Mouthwash 15 mL    alteplase (CATHFLO) 2 mg in sterile water 2 mL injection    OR Linked Order Group     oxyCODONE (ROXICODONE) immediate release tablet 2.5 mg     oxyCODONE (ROXICODONE) immediate release tablet 5 mg    midodrine (PROAMATINE) tablet 5 mg    enoxaparin Sodium (LOVENOX) injection 30 mg     Order Specific Question:   Indication of Use     Answer:   Prophylaxis-DVT/PE    Acalabrutinib CAPS 1 tablet (Patient Supplied)     Patient own medication.     Acalabrutinib CAPS 100 mg (Patient Supplied)     Patient own medication    0.9 % sodium chloride bolus    ondansetron (ZOFRAN) tablet 4 mg    0.9 % sodium chloride infusion       CONSULTS:  [place] / CHRISTIE / Tutu Julio is a 81 y.o. female with pelvic pain after a mechanical fall wthout syncope    CT returned with evidence of a pubic ramus fracture (left) and a sacral alar fracture. Orthopedics was consulted. Dr Evelin Joshi recommended an admission for pain control, monitoring, PT/OT but did not feel operative intervention was immediately warranted. Dr Toni Gamez was consulted given patient's comorbidities, and graciously agrees to admit the patient for management and PT/OT. On my reassessment, patient reports pain was well-controlled by Amanda Young. She is awake, alert, conversant, and without tachypnea or increased work of breathing. She denies numbness or weakness. Plan for admission as above. Clinical Impression     1. Pubic ramus fracture, left, closed, initial encounter ([facility] [address] [address]        Disposition     PATIENT REFERRED TO:  No follow-up provider specified.     DISCHARGE MEDICATIONS:  New Prescriptions    No medications on file       DISPOSITION Admitted 12/01/2022 12:23:45 Tod Hopper MD  12/01/22 [id number]

## 2022-12-01 NOTE — CONSULTS
Attending : Ran Walker MD   Consult Note        Reason for Consult:  fall, pelvic pain   Requesting Physician: Yeni Castro DO  Date of Service: 12/1/2022 12:36 PM    CHIEF COMPLAINT:  As Above    History Obtained From:  patient, family member - daughter    HISTORY OF PRESENT ILLNESS:                The patient is a 81 y.o. female who presents with above chief complaint. The patient sustained a fall earlier today after attempting to get up to use the restroom she feels that she may become a bit dizzy or lost her balance but does not remember specific details.   She subsequently had inability to ambulate after the fall but was able to get to the telephone and phone her daughter  She was transported to the emergency department and images including CT abdomen pelvis were obtained demonstrating pelvic ring fracture left side including sacral and pubic rami fractures  The patient reports pain in her pelvis and into the left side of her hip but she denies any pain throughout the remainder of her bilateral upper or lower extremities  No antecedent left hip pain she typically is a community ambulator without any assistive devices    She is currently being treated with chemotherapy by the hematology/oncology team here for CLL    The patient denies any current numbness or tingling in bilateral lower extremities and denies any specific new incontinence      Past Medical History:        Diagnosis Date    Allergic rhinitis due to pollen 01/20/2016    Arthritis, degenerative 05/03/2011    Cancer ([address]     chronic lymphocytic leukemia    CLL (chronic lymphoblastic leukemia)     Closed compression fracture of thoracic vertebra ([address] 06/27/2016    Depression     History of blood transfusion     Hyperlipidemia     10/14/22 -  no medication currently    Melanoma of upper arm ([address]     2002    Mild reactive airways disease 01/20/2016    Sternum fx 08/2022    vaccuming     Past Surgical History:        Procedure Laterality Date APPENDECTOMY      AXILLARY SURGERY Right 10/18/2022    RIGHT AXILLARY EXCISIONAL LYMPH NODE BIOPSY performed by Keturah Zamora MD at [address]    NASAL SINUS SURGERY      OTHER SURGICAL HISTORY      melanoma removal    SHOULDER SURGERY Right 2018    Rotator cuff    TONSILLECTOMY           Medications Prior to Admission:   Prior to Admission medications    Medication Sig Start Date End Date Taking?  Authorizing Provider   atorvastatin (LIPITOR) 20 MG tablet TAKE ONE TABLET BY MOUTH DAILY 10/17/22   Adriana Fenton MD   vitamin D-3 (CHOLECALCIFEROL) 125 MCG (5000 UT) TABS Take 1 tablet by mouth daily 10/17/22   Benjamin Shanks MD   calcium carbonate 600 MG TABS tablet Take 1 tablet by mouth daily    Historical Provider, MD   diphenhydrAMINE HCl (BENADRYL ALLERGY PO) Take 1 tablet by mouth daily    Historical Provider, MD   methotrexate (RHEUMATREX) 2.5 MG chemo tablet Take 12.5 mg by mouth once a week    Historical Provider, MD   calcium carbonate (TUMS) 500 MG chewable tablet Take 2 tablets by mouth 3 times daily as needed for Heartburn  Patient not taking: Reported on 10/18/2022    Historical Provider, MD   vitamin C (ASCORBIC ACID) 500 MG tablet Take 1,000 mg by mouth daily    Historical Provider, MD   sennosides-docusate sodium (SENOKOT-S) 8.6-50 MG tablet Take 2 tablets by mouth daily as needed for Constipation    Historical Provider, MD   B Complex Vitamins (B-COMPLEX/B-12 PO) Take 1 capsule by mouth daily    Historical Provider, MD   butalbital-aspirin-caffeine (FIORINAL) -40 MG capsule TAKE ONE CAPSULE BY MOUTH EVERY 4 HOURS AS NEEDED  **DO NOT EXCEED 6 CAPSULES IN 24 HOURS** 7/21/17   Mingo Cook MD   Multiple Vitamin (MVI, CELEBRATE, CHEWABLE TABLET) Take 1 tablet by mouth 3/11/09   Historical Provider, MD       Allergies:  Penicillins, Potassium, and Potassium-containing compounds    Social History:    Tobacco:  reports that she has never smoked. She has never used smokeless tobacco.   Alcohol:  reports current alcohol use. Illicit Drug: No  Family History:       Problem Relation Age of Onset    Cancer Mother         lymphoma    Heart Disease Father     Heart Disease Sister     Arthritis Sister        REVIEW OF SYSTEMS:   All additional Review of Systems were reviewed and noted to be negative or noncontributory today.    CONSTITUTIONAL:  negative  MUSCULOSKELETAL:  positive for  pain in the left hip and pelvis    PHYSICAL EXAM:    awake, alert, cooperative, no apparent distress, and appears stated age  MUSCULOSKELETAL:  there is no redness, warmth, or swelling of the joints  full range of motion noted  motor strength is 5 out of 5 all extremities bilaterally  tone is normal  with exception of  Pelvis/left lower extremity  There are some mild swelling near the left hip and pelvis but abdomen is soft and nontender  Obvious ecchymosis or additional skin changes  No evidence of leg length discrepancy or malrotation with hips held in comfortable position  Right lower extremity there is no pain with gentle hip internal and external rotation as well as knee flexion and extension  There is mild discomfort with left hip flexion and extension with limited testing but negative logroll left lower extremity  No tenderness to palpation throughout the remainder of the thigh knees legs and ankles  5 out of 5 strength EHL FHL tibialis anterior and gastrocsoleus bilaterally  Co. sensation intact deep and superficial peroneal tibial sural and saphenous nerves bilaterally  Palpable DP pulses with brisk capillary refill all toes  Thigh compartments soft and compressible  Is mild discomfort with AP and lateral compression of the pelvis with limited exam secondary to known injury    DATA:    CBC:   Recent Labs     12/01/22  0715   WBC 15.6*   HGB 11.7*   PLT see below     BMP:    Recent Labs     12/01/22  0715      K 3.7      CO2 27   BUN 14 CREATININE <0.5*   GLUCOSE 92     INR:   Recent Labs     12/01/22  0715   INR 0.99       Radiology:     Pelvis x-rays as well as CT abdomen pelvis personally reviewed by me demonstrate left pelvic ring fracture including superior and inferior pubic rami fractures as well as minimally displaced sacral fracture  Agree with findings as discussed below    XR PELVIS INLET OUTLET   Final Result      Left obturator ring fracture. Consider CT. CT LUMBAR SPINE WO CONTRAST   Final Result      1. No acute abnormality within the lumbar spine. CT ABDOMEN PELVIS WO CONTRAST Additional Contrast? None   Final Result      1. Mildly displaced pelvic fracture involving the left pubic body extending into the superior and inferior pubic rami with surrounding stranding likely representing a component of hemorrhage in the extraperitoneal space of Retzius. 2.  Minimally displaced left sacral alar fracture. IMPRESSION/RECOMMENDATIONS:    Assessment: 80 y/o F setting for 3 of fall and was pain with inability to ambulate after fall  1. Left pelvic ring fractures including superior and inferior pubic rami fractures and sacral fracture with minimal displacement    Plan:  1) I had a detailed discussion with the patient and her daughter today regarding her injury.   We discussed fracture pattern and pelvic ring injuries that typically involve both the sacrum as well as the pelvic ring  I think that this is very likely a stable injury and will not require any surgical intervention  I reviewed AP inlet and outlet images as well as the CT scan today  Overall the patient's pain is controlled at rest  We discussed priorities for now regarding treatment including the likelihood of continued discomfort particularly when attempting to ambulate  Recommend toe-touch weightbearing left lower extremity and patient will need additional assistance likely with use of walker  PT OT evaluation    Also in the setting of her cancer treatment we will discuss appropriate DVT prophylaxis but we discussed the increased risk of blood clot after pelvic fractures and would recommend chemoprophylaxis in addition to SCDs bilaterally while inpatient and likely outpatient chemoprophylaxis for approximately 1 month status post injury    Regarding further orthopedic care, mainly supportive for now and will recommend monitoring healing and follow-up in approximately 2 weeks orthopedic as outpatient for repeat evaluation and imaging of pelvis    Will continue to monitor while inpatient      Addendum: patient is currently hemodynamically stable, continue to monitor, expected some blood loss anemia s/p pelvic fractures      Thank you for the opportunity to consult on this patient.     MD Paula Shaw MD

## 2022-12-01 NOTE — ED NOTES
Complete bed change done. New chucks pad placed and depends on. Pure wick in place.       Kendra 9101, [facility]  12/01/22 [id number]

## 2022-12-01 NOTE — PROGRESS NOTES
4 Eyes Admission Assessment     I agree as the admission nurse that 2 RN's have performed a thorough Head to Toe Skin Assessment on the patient. ALL assessment sites listed below have been assessed on admission. Areas assessed by both nurses: Honey Comas &Rebeca  [x]   Head, Face, and Ears   [x]   Shoulders, Back, and Chest  [x]   Arms, Elbows, and Hands   [x]   Coccyx, Sacrum, and Ischium  [x]   Legs, Feet, and Heels        Does the Patient have Skin Breakdown?   No         Edson Prevention initiated:  Yes   Wound Care Orders initiated:  No      [facility] nurse consulted for Pressure Injury (Stage 3,4, Unstageable, DTI, NWPT, and Complex wounds) or Edson score 18 or lower:  NA      Nurse 1 eSignature: Electronically signed by Mary Bush on 12/1/22 at 6:53 PM EST    **SHARE this note so that the co-signing nurse is able to place an eSignature**    Nurse 2 eSignature: Electronically signed by Amy Wilson RN on 12/1/22 at 7:00 PM EST

## 2022-12-02 LAB
ANION GAP SERPL CALCULATED.3IONS-SCNC: 6 MMOL/L (ref 3–16)
ANISOCYTOSIS: ABNORMAL
ATYPICAL LYMPHOCYTE RELATIVE PERCENT: 1 % (ref 0–6)
BASOPHILS ABSOLUTE: 0 K/UL (ref 0–0.2)
BASOPHILS RELATIVE PERCENT: 0 %
BUN BLDV-MCNC: 12 MG/DL (ref 7–20)
CALCIUM SERPL-MCNC: 8 MG/DL (ref 8.3–10.6)
CHLORIDE BLD-SCNC: 109 MMOL/L (ref 99–110)
CO2: 26 MMOL/L (ref 21–32)
CORTISOL TOTAL: 10.7 UG/DL
CREAT SERPL-MCNC: <0.5 MG/DL (ref 0.6–1.2)
EOSINOPHILS ABSOLUTE: 0 K/UL (ref 0–0.6)
EOSINOPHILS RELATIVE PERCENT: 0 %
GFR SERPL CREATININE-BSD FRML MDRD: >60 ML/MIN/{1.73_M2}
GLUCOSE BLD-MCNC: 96 MG/DL (ref 70–99)
HCT VFR BLD CALC: 29.5 % (ref 36–48)
HEMOGLOBIN: 10 G/DL (ref 12–16)
LYMPHOCYTES ABSOLUTE: 13.4 K/UL (ref 1–5.1)
LYMPHOCYTES RELATIVE PERCENT: 85 %
MACROCYTES: ABNORMAL
MCH RBC QN AUTO: 35.7 PG (ref 26–34)
MCHC RBC AUTO-ENTMCNC: 33.8 G/DL (ref 31–36)
MCV RBC AUTO: 105.4 FL (ref 80–100)
MONOCYTES ABSOLUTE: 1.1 K/UL (ref 0–1.3)
MONOCYTES RELATIVE PERCENT: 7 %
NEUTROPHILS ABSOLUTE: 1.1 K/UL (ref 1.7–7.7)
NEUTROPHILS RELATIVE PERCENT: 7 %
PDW BLD-RTO: 16.2 % (ref 12.4–15.4)
PLATELET # BLD: 84 K/UL (ref 135–450)
PMV BLD AUTO: 9.8 FL (ref 5–10.5)
POTASSIUM SERPL-SCNC: 3.8 MMOL/L (ref 3.5–5.1)
RBC # BLD: 2.8 M/UL (ref 4–5.2)
SODIUM BLD-SCNC: 141 MMOL/L (ref 136–145)
WBC # BLD: 15.6 K/UL (ref 4–11)

## 2022-12-02 PROCEDURE — 2580000003 HC RX 258: Performed by: INTERNAL MEDICINE

## 2022-12-02 PROCEDURE — 85025 COMPLETE CBC W/AUTO DIFF WBC: CPT

## 2022-12-02 PROCEDURE — 97530 THERAPEUTIC ACTIVITIES: CPT

## 2022-12-02 PROCEDURE — 84439 ASSAY OF FREE THYROXINE: CPT

## 2022-12-02 PROCEDURE — 6370000000 HC RX 637 (ALT 250 FOR IP): Performed by: NURSE PRACTITIONER

## 2022-12-02 PROCEDURE — 94761 N-INVAS EAR/PLS OXIMETRY MLT: CPT

## 2022-12-02 PROCEDURE — 82533 TOTAL CORTISOL: CPT

## 2022-12-02 PROCEDURE — 36415 COLL VENOUS BLD VENIPUNCTURE: CPT

## 2022-12-02 PROCEDURE — 93005 ELECTROCARDIOGRAM TRACING: CPT | Performed by: NURSE PRACTITIONER

## 2022-12-02 PROCEDURE — 2060000000 HC ICU INTERMEDIATE R&B

## 2022-12-02 PROCEDURE — 94664 DEMO&/EVAL PT USE INHALER: CPT

## 2022-12-02 PROCEDURE — 97166 OT EVAL MOD COMPLEX 45 MIN: CPT

## 2022-12-02 PROCEDURE — 80048 BASIC METABOLIC PNL TOTAL CA: CPT

## 2022-12-02 PROCEDURE — 97162 PT EVAL MOD COMPLEX 30 MIN: CPT

## 2022-12-02 PROCEDURE — 6360000002 HC RX W HCPCS: Performed by: INTERNAL MEDICINE

## 2022-12-02 PROCEDURE — 84443 ASSAY THYROID STIM HORMONE: CPT

## 2022-12-02 PROCEDURE — 97116 GAIT TRAINING THERAPY: CPT

## 2022-12-02 PROCEDURE — 2580000003 HC RX 258: Performed by: NURSE PRACTITIONER

## 2022-12-02 RX ORDER — SENNA AND DOCUSATE SODIUM 50; 8.6 MG/1; MG/1
2 TABLET, FILM COATED ORAL DAILY PRN
Status: DISCONTINUED | OUTPATIENT
Start: 2022-12-02 | End: 2022-12-03

## 2022-12-02 RX ORDER — MULTIVITAMIN WITH IRON
1 TABLET ORAL DAILY
Status: DISCONTINUED | OUTPATIENT
Start: 2022-12-02 | End: 2022-12-04 | Stop reason: HOSPADM

## 2022-12-02 RX ORDER — CALCIUM CARBONATE 500(1250)
500 TABLET ORAL DAILY
Status: DISCONTINUED | OUTPATIENT
Start: 2022-12-02 | End: 2022-12-04 | Stop reason: HOSPADM

## 2022-12-02 RX ORDER — HYDROCODONE BITARTRATE AND ACETAMINOPHEN 5; 325 MG/1; MG/1
1 TABLET ORAL EVERY 4 HOURS PRN
Status: DISCONTINUED | OUTPATIENT
Start: 2022-12-02 | End: 2022-12-04 | Stop reason: HOSPADM

## 2022-12-02 RX ORDER — VITAMIN B COMPLEX
5000 TABLET ORAL DAILY
Status: DISCONTINUED | OUTPATIENT
Start: 2022-12-02 | End: 2022-12-04 | Stop reason: HOSPADM

## 2022-12-02 RX ORDER — B-COMPLEX WITH VITAMIN C
1 TABLET ORAL DAILY
Status: DISCONTINUED | OUTPATIENT
Start: 2022-12-02 | End: 2022-12-02

## 2022-12-02 RX ORDER — MORPHINE SULFATE 15 MG/1
15 TABLET ORAL EVERY 4 HOURS PRN
Status: DISCONTINUED | OUTPATIENT
Start: 2022-12-02 | End: 2022-12-04 | Stop reason: HOSPADM

## 2022-12-02 RX ORDER — ALLOPURINOL 300 MG/1
300 TABLET ORAL DAILY
Status: DISCONTINUED | OUTPATIENT
Start: 2022-12-02 | End: 2022-12-04 | Stop reason: HOSPADM

## 2022-12-02 RX ORDER — PHENOL 1.4 %
1 AEROSOL, SPRAY (ML) MUCOUS MEMBRANE DAILY
Status: DISCONTINUED | OUTPATIENT
Start: 2022-12-02 | End: 2022-12-02

## 2022-12-02 RX ORDER — ASCORBIC ACID 500 MG
1000 TABLET ORAL DAILY
Status: DISCONTINUED | OUTPATIENT
Start: 2022-12-02 | End: 2022-12-04 | Stop reason: HOSPADM

## 2022-12-02 RX ORDER — VITAMIN B COMPLEX
1 TABLET ORAL DAILY
Status: DISCONTINUED | OUTPATIENT
Start: 2022-12-02 | End: 2022-12-02

## 2022-12-02 RX ORDER — VITAMIN B COMPLEX
1 CAPSULE ORAL DAILY
Status: DISCONTINUED | OUTPATIENT
Start: 2022-12-02 | End: 2022-12-04 | Stop reason: HOSPADM

## 2022-12-02 RX ORDER — OXYCODONE HYDROCHLORIDE 5 MG/1
2.5 TABLET ORAL
Status: DISCONTINUED | OUTPATIENT
Start: 2022-12-02 | End: 2022-12-02

## 2022-12-02 RX ORDER — 0.9 % SODIUM CHLORIDE 0.9 %
500 INTRAVENOUS SOLUTION INTRAVENOUS ONCE
Status: COMPLETED | OUTPATIENT
Start: 2022-12-02 | End: 2022-12-02

## 2022-12-02 RX ADMIN — ONDANSETRON HYDROCHLORIDE 4 MG: 4 TABLET, FILM COATED ORAL at 00:56

## 2022-12-02 RX ADMIN — THERA TABS 1 TABLET: TAB at 11:07

## 2022-12-02 RX ADMIN — SODIUM CHLORIDE 500 ML: 9 INJECTION, SOLUTION INTRAVENOUS at 01:00

## 2022-12-02 RX ADMIN — MORPHINE SULFATE 2 MG: 2 INJECTION, SOLUTION INTRAMUSCULAR; INTRAVENOUS at 09:54

## 2022-12-02 RX ADMIN — APIXABAN 2.5 MG: 5 TABLET, FILM COATED ORAL at 21:09

## 2022-12-02 RX ADMIN — MORPHINE SULFATE 15 MG: 15 TABLET ORAL at 21:09

## 2022-12-02 RX ADMIN — MORPHINE SULFATE 4 MG: 4 INJECTION INTRAVENOUS at 00:56

## 2022-12-02 RX ADMIN — ALLOPURINOL 300 MG: 300 TABLET ORAL at 11:07

## 2022-12-02 RX ADMIN — Medication 5000 UNITS: at 11:08

## 2022-12-02 RX ADMIN — MIDODRINE HYDROCHLORIDE 5 MG: 5 TABLET ORAL at 16:27

## 2022-12-02 RX ADMIN — OXYCODONE HYDROCHLORIDE AND ACETAMINOPHEN 1000 MG: 500 TABLET ORAL at 11:08

## 2022-12-02 RX ADMIN — HYDROCODONE BITARTRATE AND ACETAMINOPHEN 1 TABLET: 5; 325 TABLET ORAL at 12:19

## 2022-12-02 RX ADMIN — CALCIUM 500 MG: 500 TABLET ORAL at 11:08

## 2022-12-02 RX ADMIN — MORPHINE SULFATE 4 MG: 4 INJECTION INTRAVENOUS at 04:14

## 2022-12-02 RX ADMIN — HYDROCODONE BITARTRATE AND ACETAMINOPHEN 1 TABLET: 5; 325 TABLET ORAL at 16:27

## 2022-12-02 RX ADMIN — MIDODRINE HYDROCHLORIDE 5 MG: 5 TABLET ORAL at 11:08

## 2022-12-02 RX ADMIN — APIXABAN 2.5 MG: 5 TABLET, FILM COATED ORAL at 11:07

## 2022-12-02 RX ADMIN — MIDODRINE HYDROCHLORIDE 5 MG: 5 TABLET ORAL at 08:06

## 2022-12-02 RX ADMIN — SODIUM CHLORIDE, PRESERVATIVE FREE 10 ML: 5 INJECTION INTRAVENOUS at 08:07

## 2022-12-02 RX ADMIN — SODIUM CHLORIDE: 9 INJECTION, SOLUTION INTRAVENOUS at 18:13

## 2022-12-02 RX ADMIN — SODIUM CHLORIDE, PRESERVATIVE FREE 10 ML: 5 INJECTION INTRAVENOUS at 21:12

## 2022-12-02 ASSESSMENT — PAIN DESCRIPTION - ONSET
ONSET: ON-GOING

## 2022-12-02 ASSESSMENT — PAIN DESCRIPTION - DIRECTION
RADIATING_TOWARDS: LEGS
RADIATING_TOWARDS: LEGS
RADIATING_TOWARDS: BILATERAL LEGS

## 2022-12-02 ASSESSMENT — PAIN SCALES - GENERAL
PAINLEVEL_OUTOF10: 1
PAINLEVEL_OUTOF10: 4
PAINLEVEL_OUTOF10: 1
PAINLEVEL_OUTOF10: 9
PAINLEVEL_OUTOF10: 5
PAINLEVEL_OUTOF10: 6
PAINLEVEL_OUTOF10: 1
PAINLEVEL_OUTOF10: 5
PAINLEVEL_OUTOF10: 2
PAINLEVEL_OUTOF10: 3
PAINLEVEL_OUTOF10: 7
PAINLEVEL_OUTOF10: 4

## 2022-12-02 ASSESSMENT — PAIN - FUNCTIONAL ASSESSMENT
PAIN_FUNCTIONAL_ASSESSMENT: PREVENTS OR INTERFERES SOME ACTIVE ACTIVITIES AND ADLS
PAIN_FUNCTIONAL_ASSESSMENT: PREVENTS OR INTERFERES SOME ACTIVE ACTIVITIES AND ADLS
PAIN_FUNCTIONAL_ASSESSMENT: PREVENTS OR INTERFERES WITH MANY ACTIVE NOT PASSIVE ACTIVITIES
PAIN_FUNCTIONAL_ASSESSMENT: PREVENTS OR INTERFERES WITH ALL ACTIVE AND SOME PASSIVE ACTIVITIES
PAIN_FUNCTIONAL_ASSESSMENT: PREVENTS OR INTERFERES WITH MANY ACTIVE NOT PASSIVE ACTIVITIES
PAIN_FUNCTIONAL_ASSESSMENT: PREVENTS OR INTERFERES WITH MANY ACTIVE NOT PASSIVE ACTIVITIES
PAIN_FUNCTIONAL_ASSESSMENT: PREVENTS OR INTERFERES WITH ALL ACTIVE AND SOME PASSIVE ACTIVITIES

## 2022-12-02 ASSESSMENT — PAIN DESCRIPTION - ORIENTATION
ORIENTATION: ANTERIOR
ORIENTATION: LOWER;ANTERIOR
ORIENTATION: LEFT
ORIENTATION: LOWER;ANTERIOR
ORIENTATION: LEFT

## 2022-12-02 ASSESSMENT — PAIN DESCRIPTION - LOCATION
LOCATION: HEAD
LOCATION: HIP
LOCATION: BACK;PELVIS
LOCATION: HIP
LOCATION: PELVIS;SACRUM

## 2022-12-02 ASSESSMENT — PAIN DESCRIPTION - PAIN TYPE
TYPE: ACUTE PAIN

## 2022-12-02 ASSESSMENT — PAIN DESCRIPTION - DESCRIPTORS
DESCRIPTORS: ACHING
DESCRIPTORS: SHOOTING;ACHING
DESCRIPTORS: ACHING
DESCRIPTORS: ACHING
DESCRIPTORS: SHARP;SHOOTING
DESCRIPTORS: ACHING
DESCRIPTORS: POUNDING;SHOOTING;STABBING

## 2022-12-02 ASSESSMENT — PAIN DESCRIPTION - FREQUENCY
FREQUENCY: CONTINUOUS

## 2022-12-02 ASSESSMENT — ENCOUNTER SYMPTOMS: BACK PAIN: 1

## 2022-12-02 NOTE — CARE COORDINATION
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 Current PCP: Cely Mclain MD  Clinic Patient: No    Chart Reviewed: Yes  Patient/ Family Interviewed: Yes    Initial assessment completed at bedside with: patient and daughter    Hospitalization in the last 30 days: No    Emergency Contacts:  Extended Emergency Contact Information  Primary Emergency Contact: [address] Phone: [phone]  Relation: Child  Secondary Emergency Contact: [address] [facility] Phone: [phone]  Relation: Child    Advance Directives:   Code Status: Full [address]: No      Financial  Payor: Lashaun Jarvis / Plan: MEDICARE PART A AND B / Product Type: *No Product type* /     Pre-cert required for SNF: No    Pharmacy    [facility] [id number] [facility], [address] [place] [address] [phone] [facility] [phone]  [facility] [place] [address]  Phone: [phone] Fax: [phone]      Potential assistance Purchasing Medications: Potential Assistance Purchasing Medications: No  Does Patient want to participate in local refill/ meds to beds program?:      Meds To Beds General Rules:  1. Can ONLY be done Monday- Friday between 8:30am-5pm  2. Prescription(s) must be in pharmacy by 3pm to be filled same day  3. Copy of patient's insurance/ prescription drug card and patient face sheet must be sent along with the prescription(s)  4.  Cost of Rx cannot be added to hospital bill. If financial assistance is needed, please contact unit  or ;  or  CANNOT provide pharmacy voucher for patients co-pays  5. Patients can then  the prescription on their way out of the hospital at discharge, or pharmacy can deliver to the bedside if staff is available. (payment due at time of pick-up or delivery - cash, check, or card accepted)     Able to afford home medications/ co-pay costs: Yes    ADLS  Support Systems: Children, Family Members    PT AM-PAC:   /24  OT AM-PAC:   /24    [place]: one story home  Steps: yes    Plans to RETURN to current housing: Yes  Barriers to RETURNING to current housing: medical clearance    Home Care Information  Currently ACTIVE with [address]: No  Home Care Agency: Not Applicable    Currently ACTIVE with [place] on Aging: No  Passport/ Waiver: No  Passport/ Waiver Services: Not Applicable    Durable Medical Equipment  DME Provider: none  Equipment: none    Home Oxygen and [address] [place] prior to admission: No  [address]: Not Applicable  Other Respiratory Equipment: none    Informed of need to bring portable home O2 tank on day of DISCHARGE for nursing to connect prior to leaving: No  Verbalized agreement/Understanding: No  Person to bring portable tank at discharge: none    Dialysis  Active with HD/PD prior to admission: No  Nephrologist: none    HD Center:  Not Applicable    DISCHARGE PLAN:  Disposition: ARU? Vs SNF? Transportation PLAN for discharge: EMS transportation     Factors facilitating achievement of predicted outcomes: Family support, Motivated, and Cooperative    Barriers to discharge: medical clearance    Additional Case Management Notes: Patient is from home alone, but she informed that she would have 24 hour assist at home with family staying with her. She is open to rehab and would prefer [facility] ARU if she had to go somewhere.  Provided patient with SNF list in the event that she cannot tolerate therapy required for ARU. The Plan for Transition of Care is related to the following treatment goals of Pubic ramus fracture, left, closed, initial encounter 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  Yes    Care Transition patient: Yes    Jennifer Murray [facility]  Case Management Department  Ph: [phone]   Fax: [phone]

## 2022-12-02 NOTE — PROGRESS NOTES
Pts BP was soft throughout shift. Notified Dr Elisa Almendarez and 500 ml bolus ordered. MD said OK to give IV Morphine with low BP. Pt rested well and pain was controlled with IV meds.

## 2022-12-02 NOTE — PROGRESS NOTES
Occupational/ Physical Therapy  Hold   Per conversation with RN pt not appropriate to work with therapy at this time 2/2 significant pain. Will f/u as treatment schedule allows later this PM vs 12/3.      HOLGER Ortega, OTR/L  Loreen Apley, PT

## 2022-12-02 NOTE — PROGRESS NOTES
Occupational Therapy  Facility/Department: [facility]  Occupational Therapy Initial Assessment    Name: Marilyn Tran  : 1940  MRN: [id number]  Date of Service: 2022    Discharge Recommendations: Marilyn Tran scored a 15/24 on the AM-PAC ADL Inpatient form. Current research shows that an AM-PAC score of 18 or greater is typically associated with a discharge to the patient's home setting. Based on the patient's AM-PAC score, and their current ADL deficits, it is recommended that the patient have 2-3 sessions per week of Occupational Therapy at d/c to increase the patient's independence. At this time, this patient demonstrates the endurance and safety to discharge home with (home vs OP services) and a follow up treatment frequency of 2-3x/wk. Please see assessment section for further patient specific details. If patient discharges prior to next session this note will serve as a discharge summary. Please see below for the latest assessment towards goals. OT Equipment Recommendations  Equipment Needed: Yes  Other: at this time pt will need BSC for pivot transfers. See PT note for mobility device recs (WC and RW)       Patient Diagnosis(es): The encounter diagnosis was Pubic ramus fracture, left, closed, initial encounter ([address] Past Medical History:  has a past medical history of Allergic rhinitis due to pollen, Arthritis, degenerative, Cancer ([address] CLL (chronic lymphoblastic leukemia), Closed compression fracture of thoracic vertebra ([address] Depression, History of blood transfusion, Hyperlipidemia, Melanoma of upper arm ([address] Mild reactive airways disease, and Sternum fx. Past Surgical History:  has a past surgical history that includes Tonsillectomy; Appendectomy; Cataract removal with implant; Blepharoptosis Repair; Nasal sinus surgery; other surgical history; shoulder surgery (Right, 2018); and Axillary Surgery (Right, 10/18/2022).     Treatment Diagnosis: impaired mobility, transfers, ADL      Assessment   Performance deficits / Impairments: Decreased functional mobility ; Decreased ADL status; Decreased endurance  Assessment: From home alone, IND pta, admit with fall and pelvic fx, now TTWB to LLE. Pt completing mobility with ModA for sit to stand to RW, Maritza for pivot bed to chair. Able to maintain TTWB with small hops 2/3 steps unable to maintain. Pt needing to pivot on RLE w/o hops for bed to chair and able to maintain TTWB. Pt has 24hr assist at home from her 3 kids and sister at [place]. Reporting children can physically assist her. Pt is highly motivated for therapy. Nervous at times and requiring encouragement. Anticipate she will progress well. Pt would benefit from cont OT while in acute care, 24hr hands on assist at [place]. If pt can not obtain WC for mobility and 24hr assist from family with mobility and transfers would benefit from cont skilled inpt OT at [place]. Treatment Diagnosis: impaired mobility, transfers, ADL  Decision Making: Medium Complexity  REQUIRES OT FOLLOW-UP: Yes  Activity Tolerance  Activity Tolerance: Patient Tolerated treatment well  Activity Tolerance Comments: anxious        Plan   Occupational Therapy Plan  Times Per Week: 5-7     Restrictions  Position Activity Restriction  Other position/activity restrictions: PWB TTWB LLE    Subjective   General  Chart Reviewed: Yes  Patient assessed for rehabilitation services?: Yes  Additional Pertinent Hx: 82 yo F with h/o CLL currently being treated with Acalabrutinib. Patient PMH includes osteopenia, hypogammaglobunemia, and ITP/hemolytic anemia. Patient has been feeling well but unfortunately had a fall today which caused severe pain.   Patient was seen in ER and CT A/P and lumbar spine were performed which showed mildly displaced pelvic fracture involving the left pubic body extending into the superior and inferior pubic rami with surrounding stranding likely representing a component of hemorrhage in the extraperitoneal space of Retzius. Minimally displaced left sacral alar fracture. Orthopedic surgery was consulted and noted this was a stable injury that does not require any surgical intervention. Patient is on toe touch weightbearing to the left lower extremity. Referring Practitioner: BRAULIO Schmitt CNP  Diagnosis: pubic ramus L fx  Subjective  Subjective: In bed agreeable to session reporting \"I am just so nervous, this is scary\"     Social/Functional History  Social/Functional History  Lives With: Alone  Type of Home: House  Home Layout: Two level, Able to Live on Main level with bedroom/bathroom  Home Access: Stairs to enter without rails  Entrance Stairs - Number of Steps: 2 AZUCENA through garage with LHR, or 1 + 1 through front door  Bathroom Shower/Tub: Walk-in shower (pt typically stands)  Bathroom Toilet: Standard  Bathroom Equipment: Built-in shower seat  Bathroom Accessibility: Walker accessible  Home Equipment:  (none)  Has the patient had two or more falls in the past year or any fall with injury in the past year?: No (other than PTA)  ADL Assistance: Independent  Homemaking Assistance: Independent  Ambulation Assistance: Independent  Transfer Assistance: Independent  Active : Yes  Occupation: Retired  Type of Occupation: teaching  Leisure & Hobbies: volunteering at [employer], [employer], going out to lunch  Additional Comments: Pt reports that her sister is able to provide 24 hr supervision, pt has 3 children in town that will piece together 24 hr assist.                  Safety Devices  Type of Devices: All fall risk precautions in place;Call light within reach; Chair alarm in place;Gait belt;Left in chair;Nurse notified  Bed Mobility Training  Bed Mobility Training: Yes  Supine to Sit: Contact-guard assistance  Sit to Supine:  (left in chair end of session)  Balance  Sitting: Intact  Standing: With support  Transfer Training  Transfer Training: Yes  Sit to Stand:  Moderate assistance;Assist X1  Stand to Sit: Moderate assistance;Assist X1  Stand Pivot Transfers: Minimum assistance;Assist X1  Bed to Chair: Minimum assistance;Assist X1 (pivot on RLE - not able to hop)  Gait  Assistive Device: Walker, rolling     AROM: Generally decreased, functional  Strength: Generally decreased, functional  ADL  Grooming: Setup;Stand by assistance  LE Dressing: Maximum assistance  Toileting:  (purewick in place)     Activity Tolerance  Activity Tolerance: Patient tolerated treatment well;Patient limited by pain  Activity Tolerance Comments: BP measured in supine prior to activity 106/59, BP in sitting 108/53, BP in standing 104/48. Pt with no reports of lightheadedness throughout session. Vision  Vision: Impaired  Vision Exceptions: Wears glasses for distance  Hearing  Hearing: Within functional limits  Cognition  Overall Cognitive Status: WFL  Orientation  Overall Orientation Status: Within Functional Limits                  Education Given To: Patient; Family  Education Provided: Role of Therapy;Plan of Care;ADL Adaptive Strategies;Transfer Training;Precautions  Education Method: Demonstration;Verbal  Barriers to Learning: None  Education Outcome: Verbalized understanding;Demonstrated understanding;Continued education needed                     AM-PAC Score        AM-[facility] Inpatient Daily Activity Raw Score: 15 (12/02/22 1520)  AM-PAC Inpatient ADL T-Scale Score : 34.69 (12/02/22 1520)  ADL Inpatient CMS 0-100% Score: 56.46 (12/02/22 1520)  ADL Inpatient CMS G-Code Modifier : CK (12/02/22 1520)        Goals  Short Term Goals  Time Frame for Short Term Goals: dc  Short Term Goal 1: supine to sit with SPVN  Short Term Goal 2: sit<>stand with CGA  Short Term Goal 3: Fx mobility with CGA mainatain TTWB  Short Term Goal 4: UB/LB dressing CGA + AE as needed  Short Term Goal 5: toileting with Maritza       Therapy Time   Individual Concurrent Group Co-treatment   Time In 6686         Time Out 6127 Minutes 53             Timed Code Treatment Minutes:   38    Total Treatment Minutes:  Ingris Hunter 984, OT

## 2022-12-02 NOTE — PROGRESS NOTES
[facility] Progress Note    2022     Saurav Hawk    MRN: [id number]    : 1940    Referring MD: No referring provider defined for this encounter. SUBJECTIVE:  Pt in pain with movement. She is afebrile. She denies any other issues     ECOG PS:  (3) Capable of limited self-care, confined to bed or chair > 50% of waking hours    KPS: 50%  Requires considerable assistance and frequent medical care    Isolation: None    Medications    Scheduled Meds:   sodium chloride flush  5-40 mL IntraVENous 2 times per day    Saline Mouthwash  15 mL Swish & Spit 4x Daily AC & HS    midodrine  5 mg Oral TID WC    enoxaparin  30 mg SubCUTAneous Daily    Acalabrutinib  1 tablet Oral Q12H     Continuous Infusions:   sodium chloride      sodium chloride      sodium chloride 75 mL/hr at 22 1848     PRN Meds:.diphenhydramine, sodium chloride, sodium chloride flush, sodium chloride, magnesium sulfate, magnesium hydroxide, Saline Mouthwash, alteplase (CATHFLO) with sterile water injection, oxyCODONE **OR** oxyCODONE, ondansetron, morphine **OR** morphine    ROS:  As noted above, otherwise remainder of 10-point ROS negative    Physical Exam:     I&O:    Intake/Output Summary (Last 24 hours) at 2022 0837  Last data filed at 2022 4025  Gross per 24 hour   Intake 2332.72 ml   Output 525 ml   Net 1807.72 ml       Vital Signs:  BP (!) 101/51   Pulse 88   Temp 98.7 °F (37.1 °C) (Oral)   Resp 16   Wt 103 lb 9.9 oz (47 kg)   LMP  (LMP Unknown)   SpO2 96%   BMI 19.58 kg/m²     Weight:    Wt Readings from Last 3 Encounters:   22 103 lb 9.9 oz (47 kg)   10/14/22 100 lb (45.4 kg)   10/14/22 101 lb (45.8 kg)       General: Awake, alert and oriented.   HEENT: normocephalic, PERRL, no scleral erythema or icterus, Oral mucosa moist and intact, throat clear  NECK: supple without palpable adenopathy  BACK: Straight   SKIN: warm dry and intact without lesions rashes or masses  CHEST: CTA bilaterally without use of accessory muscles  CV: Normal S1 S2, RRR, no MRG  ABD: NT ND normoactive BS, no palpable masses or hepatosplenomegaly  EXTREMITIES: without edema, denies calf tenderness  NEURO: CN II - XII grossly intact  CATHETER: PIV    Data    CBC:   Recent Labs     12/01/22  1415 12/01/22  1907 12/02/22  0451   WBC 16.4* 21.9* 15.6*   HGB 10.2* 11.2* 10.0*   HCT 30.3* 33.6* 29.5*   .5* 105.2* 105.4*   PLT 97* 101* 84*     BMP/Mag:  Recent Labs     12/01/22  0715 12/02/22  0451    141   K 3.7 3.8    109   CO2 27 26   BUN 14 12   CREATININE <0.5* <0.5*     LIVP: No results for input(s): AST, ALT, LIPASE, BILIDIR, BILITOT, ALKPHOS in the last 72 hours. Invalid input(s): AMYLASE,  ALB  Coags:   Recent Labs     12/01/22  0715 12/01/22  1415   PROTIME 13.0 13.5   INR 0.99 1.04   APTT  --  29.6       PROBLEM LIST:             CLL  Osteopenia  ITP/hemolytic anemia  LGL      TREATMENT:            CLL  1. Ibrutinib  2. Acalabrutinib (11/1/2022)     LGL  MTX weekly (d/c 10/2022)      ASSESSMENT AND PLAN:            1. 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 - Repeat FISH (1/21/20) - trisomy 12  - Flow cytometry (3/18/20) - Chronic lymphocytic leukemia and 4% TLGL cells. There is no strong evidence to suggest the possibility of a clonal TLGL population, further the low percentage of cells argues against this entity.  - PB smear (3/18/20) - Occasional spherocytes and slight increase in polychromasia indicating likely ongoing hemolysis. Leukocytes remarkable for monomorphic small lymphoid cells indicating the presence of chronic lymphocytic leukemia. There are also occasional forms showing granules. Platelets unremarkable  - PET 10/3/22:  Thoracic and A/P  lymphadenopathy.   Mild splenic enlargement and uptake suspicious for lymphoma.  - Lymph node biopsy 10/18/22:  Consistent with CLL/SLL; FISH negative  - CT A/P 12/01/22:  No abnormally enlarged lymph nodes in A/P     Biopsy of ax node C/W CLL - started acalabrutininb. Tolerating well with documented improvement. Cont acalabrutinib - Started 11/1/22   - she is responding on exam     1B. 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 - s/p MTX 10 mg weekly (started 3/21/20), increased to 12.5mg weekly (5/29/10) with neutropenia. Cont weekly on Fridays. Will hold ( after dose 10/21/22) with onset of acalabrutininb. 2. ID: No s/s of infection  - H/o recurrent acute sinusitis (follows up with ENT) - starting an antibiotic and steroids per ENT 5/10/22   - S/p COVID vaccines (2nd dose 2/18/21)   - COVID Ab (2/16/22): negative; Evusheld 4/1/22. She had a reaction and refuses repeat. - Schedule COVID  and flu immunizations  Hypogammaglobulinemia:   - Cont monthly immunoglobulin (started 1/4/19). Dose 1/19/22, 2/16/22, 4/13/22, 5/10/22, 6/10/22, 7/8/22  - Last IgG 10/21/22:  484     3. Heme: H/o hemolytic anemia and ITP. +Leukocytosis, Anemia, Thrombocytopenia 2/2 CLL  - Transfuse for hgb <7 and plt <10  - No transfusion today   ITP / hemolytic anemia:    - Recurrent hemolysis (1/21/20) - [place] IgG + with retic 2.8  - No Current Tx  Previous Tx:  - S/p 4 cycles Rituxan (completed 9/14/16) & IVIG 20 gm daily for 5 days (completed 10/10/16)  - S/p Rituxan weekly x 4 (last dose 9/11/17) w/ normalization of her hemoglobin. Last dose 4/8/20  - S/p Prednisone taper (stopped 10/2019)  - S/p Prednisone: 2/17/20 -> Stop 9/12/22  - Rituxan weekly x 4 - #1 (3/18/20), #2(3/25/20), #3 (4/1/20), #4 (4/8/20)    - started eliquis 2.5 mg BID for ppx given pelvic fracture, will monitor platelets      4. Metabolic:  Stable renal fxn and e-lytes  - Encourage PO fluid intake     5.  MSK: +Osteoporosis and now admitted s/p traumatic fall 12/1/22 resulting in left pelvic ring fracture and sacral fracture with minimal displacement. H/o Osteoporosis   - worse than 2018. Prolia 12/19/22  Pelvis/Sacral fracture  - Orthopedic surgery consulted, no surgical intervention              - Toe touch weight bearing to LLL              - Cont DVT prophylaxis approximately 1 month s/p injury (January 2023)              - F/U 2 weeks as outpatient for repeat evaluation and imaging of pelvis (week on 12/14/22)  - Monitor hemoglobin closely for signs of bleeding  - Oxycodone 2.5-5 mg Q 3 hour prn pain  - Consult PT/OT - pending  - Cont DVT ppx with Lovenox and SCDs  Osteopenia/Osteoporosis:    - DEXA scan 08/2022:  Lumbar Spine - osteopenia. No significant change since 2018. Left femoral neck : Osteoporosis. Left hip : Osteoporosis. This corresponds to 5% worsening since 2018. 6. ENT: H/o rhinitis & followed by ENT  - Cont Flonase      7. Cardiac:  H/o PVCs. Symptomatic hypotension  - If heart fluttering sensation returns, will order holter monitor   - Telemetry x 48 hours (12/01/22)  Hypotension:  - Cortisol level WNL 11/21/22  - Cont midodrine 5 mg TID  - Place cecil hose      8. Pulmonary: H/o pulm nodule on CT 7/22  - CT 7/2022. Repeat CT 1/2023 after 3 months of acalabrutininb.    - DVT Prophylaxis: Platelets >67,762 cells/dL, - daily lovenox prophylaxis ordered  Contraindications to pharmacologic prophylaxis: None  Contraindications to mechanical prophylaxis: None    - Disposition: Uncertain at this time.  Pending PT/OT eval. Pt may require inpt rehab vs SNF placement    BRAULIO Mora - CNP

## 2022-12-02 NOTE — PLAN OF CARE
Problem: Safety - Adult  Goal: Free from fall injury  Outcome: Progressing  Flowsheets (Taken 12/2/2022 0800)  Free From Fall Injury: Instruct family/caregiver on patient safety  Note: Pt is a High fall risk. See [place] Fall Score and ABCDS Injury Risk assessments. Explained fall risk precautions to pt and family and rationale behind their use to keep the patient safe. Pt bed is in low position, side rails up, call light and belongings are in reach. Fall wristband applied and present on pts wrist.  Bed alarm on. Pt encouraged to call for assistance. Will continue with hourly rounds for PO intake, pain needs, toileting and repositioning as needed. Problem: Pain  Goal: Verbalizes/displays adequate comfort level or baseline comfort level  Outcome: Progressing  Flowsheets (Taken 12/2/2022 1738)  Verbalizes/displays adequate comfort level or baseline comfort level:   Encourage patient to monitor pain and request assistance   Assess pain using appropriate pain scale   Administer analgesics based on type and severity of pain and evaluate response   Implement non-pharmacological measures as appropriate and evaluate response  Note: Patient c/o acute pain in pelvis, sacrum, radiating to bilateral legs. PRN pain medications per MAR. Patient reports decreased pain on reassessment, see flowsheets. Problem: Skin/Tissue Integrity  Goal: Absence of new skin breakdown  Description: 1. Monitor for areas of redness and/or skin breakdown  2. Assess vascular access sites hourly  3. Every 4-6 hours minimum:  Change oxygen saturation probe site  4. Every 4-6 hours:  If on nasal continuous positive airway pressure, respiratory therapy assess nares and determine need for appliance change or resting period. Outcome: Progressing  Note: Patient without skin breakdown. Q2h repositioning as tolerated. Patient is on a specialty mattress.  Foot of bed elevated     Problem: Musculoskeletal - Adult  Goal: Return mobility to safest level of function  Outcome: Progressing  Flowsheets (Taken 12/2/2022 9796)  Return Mobility to Safest Level of Function:   Assess patient stability and activity tolerance for standing, transferring and ambulating with or without assistive devices   Assist with transfers and ambulation using safe patient handling equipment as needed   Obtain physical therapy/occupational therapy consults as needed   Apply continuous passive motion per provider or physical therapy orders to increase flexion toward goal   Instruct patient/family in ordered activity level  Note: Patient working with PT/OT. Able to pivot to chair from bed and to [facility] from chair, tolerated fairly well.

## 2022-12-02 NOTE — PROGRESS NOTES
Physician Progress Note      Tomy Vilchis  CSN #:                  [id number]  :                       1940  ADMIT DATE:       2022 5:09 AM  DISCH DATE:  Donnell Arnold  PROVIDER #:        Vanessa Corcoran MD          QUERY TEXT:    Pt admitted  with Left pelvic ring fractures. Pt noted to have   Osteoporosis. If possible, please document in progress notes and discharge   summary if you are evaluating and/or treating any of the following: The medical record reflects the following:  Risk Factors: Osteoporosis, CLL on chemo  Clinical Indicators: Per ED \"stood up from bed and felt lightheaded tonight   and then fell, striking her left buttock\". H&P \"H/o Osteoporosis - worse than   2018. Prolia 22; DEXA scan 2022:  Lumbar Spine- osteopenia, Left   femoral neck : Osteoporosis. Left hip : Osteoporosis. \"  Treatment: Scheduled Oscal, Vit D, Ortho c/s  Options provided:  -- Osteoporotic pelvic ring & sacral fractures following fall which would not   usually break a normal, healthy bone  -- Traumatic [Osteoporotic pelvic ring & sacral fractures  -- Other - I will add my own diagnosis  -- Disagree - Not applicable / Not valid  -- Disagree - Clinically unable to determine / Unknown  -- Refer to Clinical Documentation Reviewer    PROVIDER RESPONSE TEXT:    This patient has a traumatic [Osteoporotic pelvic ring & sacral fractures . Query created by:  Eulis Libman on 2022 12:19 PM      Electronically signed by:  Vanessa Corcoran MD 2022 12:32 PM

## 2022-12-02 NOTE — PROGRESS NOTES
of upper arm ([facility] [address] Mild reactive airways disease, and Sternum fx. Past Surgical History:  has a past surgical history that includes Tonsillectomy; Appendectomy; Cataract removal with implant; Blepharoptosis Repair; Nasal sinus surgery; other surgical history; shoulder surgery (Right, 2018); and Axillary Surgery (Right, 10/18/2022). Assessment   Body Structures, Functions, Activity Limitations Requiring Skilled Therapeutic Intervention: Decreased functional mobility ; Decreased ROM; Decreased strength;Decreased endurance;Decreased balance; Increased pain  Assessment: Pt is an 81 y.o. female s/p pelvic fx presenting with decreased functional mobility. Pt is currently requiring Min-ModA for all OOB mobility, and is unable to ambulate, which is a decline from reported baseline. Pt will currently require a w/c for household mobility and a RW for transfers. Pt will have 24 hr supervision/assist from family. PT currently recommends pt be bumped up stairs into home by family with w/c for safety. Pt will benefit from skilled therapy to maximize safety and independence. Despite lower AMPAC score, anticipate good progress throughout acute hospital stay. If pt able to obtain w/c and family is able to bump her up/down stairs, pt safe to d/c home with 24 hr supervision/assist.  If pt unable to obtain w/c, she may benefit from continued inpatient care. Treatment Diagnosis: decreased functional mobility due to pelvic fx  Therapy Prognosis: Good  Decision Making: Medium Complexity  Requires PT Follow-Up: Yes  Activity Tolerance  Activity Tolerance: Patient tolerated treatment well;Patient limited by pain  Activity Tolerance Comments: BP measured in supine prior to activity 106/59, BP in sitting 108/53, BP in standing 104/48. Pt with no reports of lightheadedness throughout session.      Plan   Physcial Therapy Plan  General Plan: 5-7 times per week  Current Treatment Recommendations: Strengthening, ROM, Balance training, Functional mobility training, Transfer training, Endurance training, Wheelchair mobility training, Gait training, Stair training, Neuromuscular re-education, Home exercise program, Safety education & training, Patient/Caregiver education & training, Equipment evaluation, education, & procurement, Therapeutic activities  Safety Devices  Type of Devices: All fall risk precautions in place, Call light within reach, Chair alarm in place, Gait belt, Left in chair, Nurse notified     Restrictions  Position Activity Restriction  Other position/activity restrictions: PWB TTWB LLE     Subjective   General  Chart Reviewed: Yes  Patient assessed for rehabilitation services?: Yes  Additional Pertinent Hx: Pt is an 81 y.o. female admitted to [facility] on 12/1/22 s/p fall at home after being lightheaded. CT pelvia: mildly displaced pelvic fx L pubic body. CT lumbar: neg. XR pelvis: L obturator ring fx. PMH: arthritis, cancer, CLL, hx of blood transfusion, HLD, melanoma upper arm. Family / Caregiver Present: Yes (daughter and friend)  Referring Practitioner: BRAULIO Yusuf CNP  Diagnosis: pubic ramus fx  Follows Commands: Within Functional Limits  General Comment  Comments: Pt found supine in bed upon PT arrival.  Pt agreeable to therapy session. Subjective  Subjective: \"I was just really scared. But that wasn't bad at all. \"         Social/Functional History  Social/Functional History  Lives With: Alone  Type of Home: House  Home Layout: Two level, Able to Live on Main level with bedroom/bathroom  Home Access: Stairs to enter without rails  Entrance Stairs - Number of Steps: 2 AZUCENA through garage with LHR, or 1 + 1 through front door  Bathroom Shower/Tub: Walk-in shower (pt typically stands)  Bathroom Toilet: Standard  Bathroom Equipment: Built-in shower seat  Bathroom Accessibility: Walker accessible  Home Equipment:  (none)  Has the patient had two or more falls in the past year or any fall with injury in the past year?: No (other than PTA)  ADL Assistance: Independent  Homemaking Assistance: Independent  Ambulation Assistance: Independent  Transfer Assistance: Independent  Active : Yes  Occupation: Retired  Type of Occupation: teaching  Leisure & Hobbies: volunteering at [employer], [employer], going out to lunch  Additional Comments: Pt reports that her sister is able to provide 24 hr supervision, pt has 3 children in town that will piece together 24 hr assist.  Vision/Hearing  Vision  Vision: Impaired  Vision Exceptions: Wears glasses for distance  Hearing  Hearing: Within functional limits    Cognition   Orientation  Overall Orientation Status: Within Functional Limits  Cognition  Overall Cognitive Status: WFL     Objective   Heart Rate: 74  Heart Rate Source: Telemetry  BP: (!) 97/50  BP Location: Left upper arm  BP Method: Automatic  Patient Position: Semi fowlers  MAP (Calculated): 66  Resp: 18  SpO2: 99 %  O2 Device: None (Room air)              AROM RLE (degrees)  RLE AROM: WFL  AROM LLE (degrees)  LLE General AROM: knee and ankle WFL, hip grossly decreased due to pain  Strength RLE  Comment: not formally tested due to pain, pt able to perform seated march, LAQ, and ankle pump  Strength LLE  Comment: not formally tested due to pain, pt with trace hip flexion due to pain, pt able to complete seated LAQ and ankle pump        Bed Mobility Training  Bed Mobility Training: Yes  Supine to Sit: Contact-guard assistance  Sit to Supine:  (left in chair end of session)  Balance  Sitting: Intact  Standing: With support  Transfer Training  Transfer Training: Yes  Sit to Stand:  Moderate assistance;Assist X1  Stand to Sit: Moderate assistance;Assist X1  Stand Pivot Transfers: Minimum assistance;Assist X1  Bed to Chair: Minimum assistance;Assist X1 (pivot on RLE - not able to hop)  Gait  Assistive Device: Walker, rolling  Bed mobility  Supine to Sit: Contact guard assistance (HOB elevated, use of bedrail, cues for technique, increased time)  Scooting: Stand by assistance (to scoot back in recliner in sitting, pt performing via shifting weight to side and sliding hip back)  Transfers  Sit to Stand: Moderate Assistance (from EOB to RW, cues for hand placement, increased time for BUE placement on RW, cues for TTWB LLE)  Stand to Sit: Minimal Assistance (cues for hand placement and eccentric control)  Stand Pivot Transfers: Minimal Assistance (EOB to recliner, with RW, pt able to complete via pivoting RLE vs small hops)  Ambulation  Surface: Level tile  Device: Rolling Walker  Assistance: Minimal assistance  Quality of Gait: attempted to instruct pt in \"hopping\" forward using BUE to gently slide RLE, however pt only able to complete 1 small hop before fatiguing and beginning to break TTWB precaution  Distance: 1 small hop  Comments: Pt only able to complete pivot to chair via pivoting on RLE, she was unable to unweight RLE with BUE despite cues and demonstration. Pt educated that currently she will require a w/c for mobility throughout her home due to inability to ambulate. Stairs/Curb  Stairs?: No  Stairs  Comment: Pt was educated that she currently would require a w/c to have a family member bump her backward up the stairs     Balance  Comments: Sitting balance EOB with SBA with BUE support to prep for transfer.            OutComes Score                                                  AM-PAC Score  AM-PAC Inpatient Mobility Raw Score : 14 (12/02/22 1514)  AM-PAC Inpatient T-Scale Score : 38.1 (12/02/22 1514)  Mobility Inpatient CMS 0-100% Score: 61.29 (12/02/22 1514)  Mobility Inpatient CMS G-Code Modifier : CL (12/02/22 1514)          Tinneti Score       Goals  Short Term Goals  Time Frame for Short Term Goals: Discharge  Short Term Goal 1: Pt will perform all bed mobility with SBA  Short Term Goal 2: Pt will perform sit to/from stand with RW and Maritza  Short Term Goal 3: Pt will perform stand pivot with RW and CGA  Short Term Goal 4: Pt will ambulate 10' with RW and Mraitza  Short Term Goal 5: Pt will ascend/descend 1 + 1 step with RW and MaxA  Patient Goals   Patient Goals : \"To go home. \"       Education  Patient Education  Education Given To: Patient; Family  Education Provided: Role of Therapy;Plan of Care  Education Provided Comments: need for w/c in home and for family to bump her up/down stairs in w/c to enter/exit home  Education Method: Verbal  Barriers to Learning: None  Education Outcome: Verbalized understanding      Therapy Time   Individual Concurrent Group Co-treatment   Time In 1353         Time Out 1453         Minutes 60             Timed Code Treatment Minutes:   45    Total Treatment Minutes:  [address] George Borden, PT   This note to serve as discharge summary if patient discharged before next session.

## 2022-12-03 LAB
ANION GAP SERPL CALCULATED.3IONS-SCNC: 6 MMOL/L (ref 3–16)
BASOPHILS ABSOLUTE: 0 K/UL (ref 0–0.2)
BASOPHILS RELATIVE PERCENT: 0.1 %
BUN BLDV-MCNC: 7 MG/DL (ref 7–20)
CALCIUM SERPL-MCNC: 7.9 MG/DL (ref 8.3–10.6)
CHLORIDE BLD-SCNC: 106 MMOL/L (ref 99–110)
CO2: 24 MMOL/L (ref 21–32)
CREAT SERPL-MCNC: <0.5 MG/DL (ref 0.6–1.2)
EKG ATRIAL RATE: 69 BPM
EKG DIAGNOSIS: NORMAL
EKG P AXIS: 20 DEGREES
EKG P-R INTERVAL: 138 MS
EKG Q-T INTERVAL: 396 MS
EKG QRS DURATION: 90 MS
EKG QTC CALCULATION (BAZETT): 424 MS
EKG R AXIS: -7 DEGREES
EKG T AXIS: -4 DEGREES
EKG VENTRICULAR RATE: 69 BPM
EOSINOPHILS ABSOLUTE: 0 K/UL (ref 0–0.6)
EOSINOPHILS RELATIVE PERCENT: 0.1 %
GFR SERPL CREATININE-BSD FRML MDRD: >60 ML/MIN/{1.73_M2}
GLUCOSE BLD-MCNC: 98 MG/DL (ref 70–99)
HCT VFR BLD CALC: 25.7 % (ref 36–48)
HEMOGLOBIN: 8.7 G/DL (ref 12–16)
LYMPHOCYTES ABSOLUTE: 11.7 K/UL (ref 1–5.1)
LYMPHOCYTES RELATIVE PERCENT: 83.4 %
MCH RBC QN AUTO: 35.3 PG (ref 26–34)
MCHC RBC AUTO-ENTMCNC: 33.9 G/DL (ref 31–36)
MCV RBC AUTO: 104.3 FL (ref 80–100)
MONOCYTES ABSOLUTE: 1.2 K/UL (ref 0–1.3)
MONOCYTES RELATIVE PERCENT: 8.8 %
NEUTROPHILS ABSOLUTE: 1.1 K/UL (ref 1.7–7.7)
NEUTROPHILS RELATIVE PERCENT: 7.6 %
PDW BLD-RTO: 16.1 % (ref 12.4–15.4)
PLATELET # BLD: 68 K/UL (ref 135–450)
PMV BLD AUTO: 9.6 FL (ref 5–10.5)
POTASSIUM SERPL-SCNC: 3.4 MMOL/L (ref 3.5–5.1)
RBC # BLD: 2.47 M/UL (ref 4–5.2)
SODIUM BLD-SCNC: 136 MMOL/L (ref 136–145)
T4 FREE: 1.1 NG/DL (ref 0.9–1.8)
TSH REFLEX: 4.88 UIU/ML (ref 0.27–4.2)
WBC # BLD: 14 K/UL (ref 4–11)

## 2022-12-03 PROCEDURE — 2580000003 HC RX 258: Performed by: NURSE PRACTITIONER

## 2022-12-03 PROCEDURE — 93010 ELECTROCARDIOGRAM REPORT: CPT | Performed by: INTERNAL MEDICINE

## 2022-12-03 PROCEDURE — 99223 1ST HOSP IP/OBS HIGH 75: CPT | Performed by: PHYSICAL MEDICINE & REHABILITATION

## 2022-12-03 PROCEDURE — 6370000000 HC RX 637 (ALT 250 FOR IP): Performed by: NURSE PRACTITIONER

## 2022-12-03 PROCEDURE — 2580000003 HC RX 258: Performed by: STUDENT IN AN ORGANIZED HEALTH CARE EDUCATION/TRAINING PROGRAM

## 2022-12-03 PROCEDURE — 6370000000 HC RX 637 (ALT 250 FOR IP): Performed by: STUDENT IN AN ORGANIZED HEALTH CARE EDUCATION/TRAINING PROGRAM

## 2022-12-03 PROCEDURE — 2060000000 HC ICU INTERMEDIATE R&B

## 2022-12-03 PROCEDURE — 85025 COMPLETE CBC W/AUTO DIFF WBC: CPT

## 2022-12-03 PROCEDURE — 80048 BASIC METABOLIC PNL TOTAL CA: CPT

## 2022-12-03 RX ORDER — SENNA AND DOCUSATE SODIUM 50; 8.6 MG/1; MG/1
2 TABLET, FILM COATED ORAL DAILY
Status: DISCONTINUED | OUTPATIENT
Start: 2022-12-03 | End: 2022-12-04 | Stop reason: HOSPADM

## 2022-12-03 RX ORDER — PROCHLORPERAZINE MALEATE 10 MG
5 TABLET ORAL EVERY 6 HOURS PRN
Status: DISCONTINUED | OUTPATIENT
Start: 2022-12-03 | End: 2022-12-04 | Stop reason: HOSPADM

## 2022-12-03 RX ORDER — 0.9 % SODIUM CHLORIDE 0.9 %
500 INTRAVENOUS SOLUTION INTRAVENOUS ONCE
Status: COMPLETED | OUTPATIENT
Start: 2022-12-03 | End: 2022-12-03

## 2022-12-03 RX ADMIN — MIDODRINE HYDROCHLORIDE 5 MG: 5 TABLET ORAL at 17:14

## 2022-12-03 RX ADMIN — HYDROCODONE BITARTRATE AND ACETAMINOPHEN 1 TABLET: 5; 325 TABLET ORAL at 15:41

## 2022-12-03 RX ADMIN — SODIUM CHLORIDE: 9 INJECTION, SOLUTION INTRAVENOUS at 08:01

## 2022-12-03 RX ADMIN — OXYCODONE HYDROCHLORIDE AND ACETAMINOPHEN 1000 MG: 500 TABLET ORAL at 08:28

## 2022-12-03 RX ADMIN — APIXABAN 2.5 MG: 5 TABLET, FILM COATED ORAL at 08:28

## 2022-12-03 RX ADMIN — MORPHINE SULFATE 15 MG: 15 TABLET ORAL at 01:08

## 2022-12-03 RX ADMIN — MORPHINE SULFATE 15 MG: 15 TABLET ORAL at 05:44

## 2022-12-03 RX ADMIN — MIDODRINE HYDROCHLORIDE 5 MG: 5 TABLET ORAL at 08:29

## 2022-12-03 RX ADMIN — PROCHLORPERAZINE MALEATE 5 MG: 10 TABLET ORAL at 16:01

## 2022-12-03 RX ADMIN — MIDODRINE HYDROCHLORIDE 5 MG: 5 TABLET ORAL at 12:21

## 2022-12-03 RX ADMIN — CALCIUM 500 MG: 500 TABLET ORAL at 08:29

## 2022-12-03 RX ADMIN — MORPHINE SULFATE 15 MG: 15 TABLET ORAL at 23:50

## 2022-12-03 RX ADMIN — DOCUSATE SODIUM 50 MG AND SENNOSIDES 8.6 MG 2 TABLET: 8.6; 5 TABLET, FILM COATED ORAL at 12:21

## 2022-12-03 RX ADMIN — MORPHINE SULFATE 15 MG: 15 TABLET ORAL at 19:49

## 2022-12-03 RX ADMIN — MORPHINE SULFATE 15 MG: 15 TABLET ORAL at 12:21

## 2022-12-03 RX ADMIN — ALLOPURINOL 300 MG: 300 TABLET ORAL at 08:28

## 2022-12-03 RX ADMIN — SODIUM CHLORIDE, PRESERVATIVE FREE 10 ML: 5 INJECTION INTRAVENOUS at 20:38

## 2022-12-03 RX ADMIN — THERA TABS 1 TABLET: TAB at 08:29

## 2022-12-03 RX ADMIN — SODIUM CHLORIDE 500 ML: 9 INJECTION, SOLUTION INTRAVENOUS at 18:41

## 2022-12-03 RX ADMIN — ONDANSETRON HYDROCHLORIDE 4 MG: 4 TABLET, FILM COATED ORAL at 12:18

## 2022-12-03 RX ADMIN — Medication 5000 UNITS: at 08:29

## 2022-12-03 RX ADMIN — SODIUM CHLORIDE, PRESERVATIVE FREE 10 ML: 5 INJECTION INTRAVENOUS at 08:36

## 2022-12-03 RX ADMIN — APIXABAN 2.5 MG: 5 TABLET, FILM COATED ORAL at 20:37

## 2022-12-03 RX ADMIN — ONDANSETRON HYDROCHLORIDE 4 MG: 4 TABLET, FILM COATED ORAL at 19:48

## 2022-12-03 RX ADMIN — Medication 1 CAPSULE: at 08:30

## 2022-12-03 ASSESSMENT — PAIN DESCRIPTION - PAIN TYPE
TYPE: ACUTE PAIN

## 2022-12-03 ASSESSMENT — PAIN - FUNCTIONAL ASSESSMENT
PAIN_FUNCTIONAL_ASSESSMENT: PREVENTS OR INTERFERES WITH ALL ACTIVE AND SOME PASSIVE ACTIVITIES
PAIN_FUNCTIONAL_ASSESSMENT: PREVENTS OR INTERFERES WITH ALL ACTIVE AND SOME PASSIVE ACTIVITIES
PAIN_FUNCTIONAL_ASSESSMENT: PREVENTS OR INTERFERES SOME ACTIVE ACTIVITIES AND ADLS
PAIN_FUNCTIONAL_ASSESSMENT: ACTIVITIES ARE NOT PREVENTED
PAIN_FUNCTIONAL_ASSESSMENT: PREVENTS OR INTERFERES WITH MANY ACTIVE NOT PASSIVE ACTIVITIES
PAIN_FUNCTIONAL_ASSESSMENT: PREVENTS OR INTERFERES WITH MANY ACTIVE NOT PASSIVE ACTIVITIES

## 2022-12-03 ASSESSMENT — PAIN DESCRIPTION - ORIENTATION
ORIENTATION: LEFT

## 2022-12-03 ASSESSMENT — PAIN DESCRIPTION - DESCRIPTORS
DESCRIPTORS: STABBING;THROBBING
DESCRIPTORS: ACHING
DESCRIPTORS: JABBING;SHOOTING
DESCRIPTORS: STABBING;THROBBING
DESCRIPTORS: STABBING;SHARP
DESCRIPTORS: SHARP;STABBING;SHOOTING

## 2022-12-03 ASSESSMENT — PAIN SCALES - GENERAL
PAINLEVEL_OUTOF10: 6
PAINLEVEL_OUTOF10: 6
PAINLEVEL_OUTOF10: 9
PAINLEVEL_OUTOF10: 7
PAINLEVEL_OUTOF10: 8
PAINLEVEL_OUTOF10: 10
PAINLEVEL_OUTOF10: 3

## 2022-12-03 ASSESSMENT — PAIN DESCRIPTION - FREQUENCY
FREQUENCY: CONTINUOUS

## 2022-12-03 ASSESSMENT — PAIN DESCRIPTION - LOCATION
LOCATION: HIP
LOCATION: BACK
LOCATION: HIP

## 2022-12-03 ASSESSMENT — PAIN DESCRIPTION - DIRECTION: RADIATING_TOWARDS: LEGS

## 2022-12-03 ASSESSMENT — PAIN DESCRIPTION - ONSET
ONSET: ON-GOING

## 2022-12-03 NOTE — PLAN OF CARE
Problem: Pain  Goal: Verbalizes/displays adequate comfort level or baseline comfort level  Flowsheets (Taken 12/3/2022 0894)  Verbalizes/displays adequate comfort level or baseline comfort level: Encourage patient to monitor pain and request assistance     Problem: Safety - Adult  Goal: Free from fall injury  Flowsheets (Taken 12/3/2022 1338)  Free From Fall Injury: Instruct family/caregiver on patient safety  Note: Orthostatic vital signs obtained at start of shift - see flowsheet for details. Pt meets criteria for orthostasis. Pt is a High fall risk. See Dolphus Ormond Fall Score and ABCDS Injury Risk assessments.   + Screening for Orthostasis and/or + High Fall Risk per ZAPATA/ABCDS: Explained fall risk precautions to pt and family and rationale behind their use to keep the patient safe. Pt bed is in low position, side rails up, call light and belongings are in reach. Fall wristband applied and present on pts wrist.  Bed alarm on. Pt encouraged to call for assistance. Will continue with hourly rounds for PO intake, pain needs, toileting and repositioning as needed.

## 2022-12-03 NOTE — PROGRESS NOTES
Pt resting comfortably this shift. Pain has been well managed with PRN meds overnight. No new complaints. Pt educated to fall and infection prevention.

## 2022-12-03 NOTE — CONSULTS
Consult Note  Physical Medicine and Rehabilitation    Patient: Shayy Thorpe  [id number]  Date: 12/3/2022      Chief Complaint: pelvic fracture     History of Present Illness/Hospital Course: This is a 82yo F who was admitted on 12/1 with a pelvic ring fracture due to osteoporosis. She has a medical history of CLL, ITP, and cardiac PVC with hypotension. She is currently being treated in the [facility]. She has seen orthopedics who will continue to follow. She is having trouble with ADLs and walking around which is why she feels like she needs therapy before being discharged home. She is interested in coming to the ARU for further treatment.       She also has sternal pain from a recent fracture and right shoulder pain from rotator cuff surgery in the past     Prior Level of Function:  Independent for mobility, ADLs, and IADLs    Current Level of Function:  Mod assist     Pertinent Social History:  Support: lives alone  Home set-up: no steps     Past Medical History:   Diagnosis Date    Allergic rhinitis due to pollen 01/20/2016    Arthritis, degenerative 05/03/2011    Cancer (HCC)     chronic lymphocytic leukemia    CLL (chronic lymphoblastic leukemia)     Closed compression fracture of thoracic vertebra ([facility] [address] 06/27/2016    Depression     History of blood transfusion     Hyperlipidemia     10/14/22 -  no medication currently    Melanoma of upper arm ([address]     2002    Mild reactive airways disease 01/20/2016    Sternum fx 08/2022    vaccuming       Past Surgical History:   Procedure Laterality Date    APPENDECTOMY      AXILLARY SURGERY Right 10/18/2022    RIGHT AXILLARY EXCISIONAL LYMPH NODE BIOPSY performed by Jeet Mathew MD at [address]    NASAL SINUS SURGERY      OTHER SURGICAL HISTORY      melanoma removal    SHOULDER SURGERY Right 2018    Rotator cuff    TONSILLECTOMY         Family History   Problem Relation Age of Onset    Cancer Mother lymphoma    Heart Disease Father     Heart Disease Sister     Arthritis Sister        Social History     Socioeconomic History    Marital status:      Spouse name: None    Number of children: None    Years of education: None    Highest education level: None   Tobacco Use    Smoking status: Never    Smokeless tobacco: Never   Vaping Use    Vaping Use: Never used   Substance and Sexual Activity    Alcohol use:  Yes     Alcohol/week: 0.0 standard drinks     Comment: occasionally     Drug use: Never     Social Determinants of Health     Financial Resource Strain: Low Risk     Difficulty of Paying Living Expenses: Not hard at all   Food Insecurity: No Food Insecurity    Worried About Running Out of Food in the Last Year: Never true    Ran Out of Food in the Last Year: Never true           REVIEW OF SYSTEMS:   CONSTITUTIONAL: negative for fevers, chills, diaphoresis, appetite change, night sweats, unexpected weight change, fatigue  EYES: negative for blurred vision, eye discharge, visual disturbance and icterus  HEENT: negative for hearing loss, tinnitus, ear drainage, sinus pressure, nasal congestion, epistaxis and snoring  RESPIRATORY: Negative for hemoptysis, cough, sputum production  CARDIOVASCULAR: negative for chest pain, palpitations, exertional chest pressure/discomfort, syncope, edema  GASTROINTESTINAL: negative for nausea, vomiting, diarrhea, blood in stool, abdominal pain, constipation  GENITOURINARY: negative for frequency, dysuria, urinary incontinence, decreased urine volume, and hematuria  HEMATOLOGIC/LYMPHATIC: negative for easy bruising, bleeding and lymphadenopathy  ALLERGIC/IMMUNOLOGIC: negative for recurrent infections, angioedema, anaphylaxis and drug reactions  ENDOCRINE: negative for weight changes and diabetic symptoms including polyuria, polydipsia and polyphagia  MUSCULOSKELETAL: positive for pain, joint swelling, decreased range of motion  NEUROLOGICAL: negative for headaches, slurred speech, unilateral weakness  PSYCHIATRIC/BEHAVIORAL: negative for hallucinations, behavioral problems, confusion and agitation. Physical Examination:  Vitals: Patient Vitals for the past 24 hrs:   BP Temp Temp src Pulse Resp SpO2   12/03/22 0823 (!) 91/44 99.1 °F (37.3 °C) Oral 80 14 98 %   12/03/22 0417 (!) 93/51 99.8 °F (37.7 °C) Oral 76 16 96 %   12/02/22 2300 (!) 101/50 98 °F (36.7 °C) Oral 79 18 96 %   12/02/22 2106 (!) 109/49 98.6 °F (37 °C) Oral 79 21 93 %   12/02/22 1806 (!) 96/55 98.5 °F (36.9 °C) -- 72 -- --   12/02/22 1703 -- 99.3 °F (37.4 °C) Oral -- -- --   12/02/22 1657 -- -- -- -- 16 --   12/02/22 1627 -- -- -- -- 14 --   12/02/22 1530 (!) 93/50 99.1 °F (37.3 °C) Oral 68 18 96 %   12/02/22 1249 -- -- -- -- 18 --   12/02/22 1219 -- -- -- -- 21 --   12/02/22 1215 (!) 97/50 -- -- -- -- --     Const: Alert. WDWN. No distress  Eyes: Conjunctiva noninjected, no icterus noted; pupils equal, round, and reactive to light. HENT: Atraumatic, normocephalic; Oral mucosa moist  Neck: Trachea midline, neck supple. No thyromegaly noted. CV: Regular rate and rhythm, no murmur rub or gallop noted  Resp: Lungs clear to auscultation bilaterally, no rales wheezes or ronchi, no retractions. Respirations unlabored. GI: Soft, nontender, nondistended. Normal bowel sounds. No palpable masses. Skin: Normal temperature and turgor. No rashes or breakdown noted. Ext: No significant edema appreciated. No varicosities. MSK: No joint tenderness, erythema, warmth noted. AROM intact. Neuro: Alert, oriented, appropriate. No cranial nerve deficits appreciated. Sensation intact to light touch. Motor examination reveals normal strength in bilateral UE and 4/5 strength in bilateral LE with pain.    Psych: Stable mood, normal judgement, normal affect     Lab Results   Component Value Date    WBC 14.0 (H) 12/03/2022    HGB 8.7 (L) 12/03/2022    HCT 25.7 (L) 12/03/2022    .3 (H) 12/03/2022    PLT 68 (L) 12/03/2022 Lab Results   Component Value Date    INR 1.04 12/01/2022    INR 0.99 12/01/2022    INR 0.97 03/23/2020    PROTIME 13.5 12/01/2022    PROTIME 13.0 12/01/2022    PROTIME 11.2 03/23/2020     Lab Results   Component Value Date    CREATININE <0.5 (L) 12/03/2022    BUN 7 12/03/2022     12/03/2022    K 3.4 (L) 12/03/2022     12/03/2022    CO2 24 12/03/2022     Lab Results   Component Value Date    ALT 15 10/15/2022    AST 23 10/15/2022    ALKPHOS 107 10/15/2022    BILITOT 0.5 10/15/2022       XR PELVIS INLET OUTLET   Final Result      Left obturator ring fracture. Consider CT. CT LUMBAR SPINE WO CONTRAST   Final Result      1. No acute abnormality within the lumbar spine. CT ABDOMEN PELVIS WO CONTRAST Additional Contrast? None   Final Result      1. Mildly displaced pelvic fracture involving the left pubic body extending into the superior and inferior pubic rami with surrounding stranding likely representing a component of hemorrhage in the extraperitoneal space of Retzius. 2.  Minimally displaced left sacral alar fracture. Assessment:  1. Pelvic fracture- Oscal, Vit D, seen by ortho  - requires PT/OT  2. CLL- followed by [facility]  3. Hypotension- monitor   4. Debility- unable to ambulate long distances. Requires PT/ OT    Impairments- Decreased functional mobility, Decreased ADLs    Recommendations:  ARU when medically ready    Patient with new functional deficits and ongoing medical complexity. Demonstrates ability to tolerate 3 hours therapy/day. She is a good candidate for acute inpatient rehab when medically appropriate. Thank you for this consult. Please contact me with any questions or concerns.      Melly Shelley D.O. M.P.H  PM&R  12/3/2022  11:54 AM

## 2022-12-03 NOTE — PROGRESS NOTES
[address] Progress Note    12/3/2022     Naila Rank    MRN: [id number]    : 1940    Referring MD: No referring provider defined for this encounter. SUBJECTIVE:  she is in pain with movement but otherwise ok. She wants to go to acute rehab     ECOG PS:  (3) Capable of limited self-care, confined to bed or chair > 50% of waking hours    KPS: 40% Disabled; requires special care and assistance    Isolation: None    Medications    Scheduled Meds:   apixaban  2.5 mg Oral BID    allopurinol  300 mg Oral Daily    vitamin C  1,000 mg Oral Daily    calcium elemental  500 mg Oral Daily    multivitamin  1 tablet Oral Daily    b complex vitamins  1 capsule Oral Daily    Vitamin D  5,000 Units Oral Daily    sodium chloride flush  5-40 mL IntraVENous 2 times per day    Saline Mouthwash  15 mL Swish & Spit 4x Daily AC & HS    midodrine  5 mg Oral TID WC    Acalabrutinib  1 tablet Oral Q12H     Continuous Infusions:   sodium chloride      sodium chloride      sodium chloride 75 mL/hr at 22 0801     PRN Meds:. HYDROcodone 5 mg - acetaminophen, morphine, sennosides-docusate sodium, diphenhydramine, sodium chloride, sodium chloride flush, sodium chloride, magnesium sulfate, magnesium hydroxide, Saline Mouthwash, alteplase (CATHFLO) with sterile water injection, ondansetron    ROS:  As noted above, otherwise remainder of 10-point ROS negative    Physical Exam:     I&O:    Intake/Output Summary (Last 24 hours) at 12/3/2022 1018  Last data filed at 12/3/2022 0644  Gross per 24 hour   Intake 1189 ml   Output 600 ml   Net 589 ml         Vital Signs:  BP (!) 91/44   Pulse 80   Temp 99.1 °F (37.3 °C) (Oral)   Resp 14   Wt 108 lb 0.4 oz (49 kg)   LMP  (LMP Unknown)   SpO2 98%   BMI 20.41 kg/m²     Weight:    Wt Readings from Last 3 Encounters:   22 108 lb 0.4 oz (49 kg)   10/14/22 100 lb (45.4 kg)   10/14/22 101 lb (45.8 kg)       General: Awake, alert and oriented.   HEENT: normocephalic, PERRL, no scleral erythema or icterus, Oral mucosa moist and intact, throat clear  NECK: supple without palpable adenopathy  BACK: Straight   SKIN: warm dry and intact without lesions rashes or masses  CHEST: CTA bilaterally without use of accessory muscles  CV: Normal S1 S2, RRR, no MRG  ABD: NT ND normoactive BS, no palpable masses or hepatosplenomegaly  EXTREMITIES: without edema, denies calf tenderness  NEURO: CN II - XII grossly intact  CATHETER: PIV    Data    CBC:   Recent Labs     12/01/22  1907 12/02/22  0451 12/03/22  0439   WBC 21.9* 15.6* 14.0*   HGB 11.2* 10.0* 8.7*   HCT 33.6* 29.5* 25.7*   .2* 105.4* 104.3*   * 84* 68*       BMP/Mag:  Recent Labs     12/01/22  0715 12/02/22  0451 12/03/22  0439    141 136   K 3.7 3.8 3.4*    109 106   CO2 27 26 24   BUN 14 12 7   CREATININE <0.5* <0.5* <0.5*       LIVP: No results for input(s): AST, ALT, LIPASE, BILIDIR, BILITOT, ALKPHOS in the last 72 hours. Invalid input(s): AMYLASE,  ALB  Coags:   Recent Labs     12/01/22  0715 12/01/22  1415   PROTIME 13.0 13.5   INR 0.99 1.04   APTT  --  29.6         PROBLEM LIST:             CLL  Osteopenia  ITP/hemolytic anemia  LGL      TREATMENT:            CLL  1. Ibrutinib  2. Acalabrutinib (11/1/2022)     LGL  MTX weekly (d/c 10/2022)      ASSESSMENT AND PLAN:            1. 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 - Repeat FISH (1/21/20) - trisomy 12  - Flow cytometry (3/18/20) - Chronic lymphocytic leukemia and 4% TLGL cells. There is no strong evidence to suggest the possibility of a clonal TLGL population, further the low percentage of cells argues against this entity.  - PB smear (3/18/20) - Occasional spherocytes and slight increase in polychromasia indicating likely ongoing hemolysis.  Leukocytes remarkable for monomorphic small lymphoid cells indicating the presence of chronic lymphocytic leukemia. There are also occasional forms showing granules. Platelets unremarkable  - PET 10/3/22:  Thoracic and A/P  lymphadenopathy. Mild splenic enlargement and uptake suspicious for lymphoma.  - Lymph node biopsy 10/18/22:  Consistent with CLL/SLL; FISH negative  - CT A/P 12/01/22:  No abnormally enlarged lymph nodes in A/P     Biopsy of ax node C/W CLL - started acalabrutininb. Tolerating well with documented improvement. Cont acalabrutinib - Started 11/1/22   - she is responding on exam     1B. 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 - s/p MTX 10 mg weekly (started 3/21/20), increased to 12.5mg weekly (5/29/10) with neutropenia. Cont weekly on Fridays. Will hold ( after dose 10/21/22) with onset of acalabrutininb. 2. ID: No s/s of infection  - H/o recurrent acute sinusitis (follows up with ENT) - starting an antibiotic and steroids per ENT 5/10/22   - S/p COVID vaccines (2nd dose 2/18/21)   - COVID Ab (2/16/22): negative; Evusheld 4/1/22. She had a reaction and refuses repeat. - Schedule COVID  and flu immunizations  Hypogammaglobulinemia:   - Cont monthly immunoglobulin (started 1/4/19). Dose 1/19/22, 2/16/22, 4/13/22, 5/10/22, 6/10/22, 7/8/22  - Last IgG 10/21/22:  484     3. Heme: H/o hemolytic anemia and ITP. +Leukocytosis, Anemia, Thrombocytopenia 2/2 CLL  - Transfuse for hgb <7 and plt <10  - No transfusion today   ITP / hemolytic anemia:    - Recurrent hemolysis (1/21/20) - [place] IgG + with retic 2.8  - No Current Tx  Previous Tx:  - S/p 4 cycles Rituxan (completed 9/14/16) & IVIG 20 gm daily for 5 days (completed 10/10/16)  - S/p Rituxan weekly x 4 (last dose 9/11/17) w/ normalization of her hemoglobin.   Last dose 4/8/20  - S/p Prednisone taper (stopped 10/2019)  - S/p Prednisone: 2/17/20 -> Stop 9/12/22  - Rituxan weekly x 4 - #1 (3/18/20), #2(3/25/20), #3 (4/1/20), #4 (4/8/20)    - started eliquis 2.5 mg BID for ppx given pelvic fracture, will monitor platelets      4. Metabolic:  Stable renal fxn and e-lytes  - Encourage PO fluid intake  - Stop IVF 12/3/22     5. MSK: +Osteoporosis and now admitted s/p traumatic fall 12/1/22 resulting in left pelvic ring fracture and sacral fracture with minimal displacement. H/o Osteoporosis   - worse than 2018. Prolia 12/19/22  Pelvis/Sacral fracture  - Orthopedic surgery consulted, no surgical intervention              - Toe touch weight bearing to LLL              - Cont DVT prophylaxis approximately 1 month s/p injury (January 2023)              - F/U 2 weeks as outpatient for repeat evaluation and imaging of pelvis (week on 12/14/22)  - Monitor hemoglobin closely for signs of bleeding  - MSIR 15 mg  PO prn  - Consult PT/OT - [facility] 14- will need w/c in home at this time with 24 hour  supervision/assist  - Cont DVT ppx with Lovenox and SCDs  Osteopenia/Osteoporosis:    - DEXA scan 08/2022:  Lumbar Spine - osteopenia. No significant change since 2018. Left femoral neck : Osteoporosis. Left hip : Osteoporosis. This corresponds to 5% worsening since 2018. 6. ENT: H/o rhinitis & followed by ENT  - Cont Flonase      7. Cardiac:  H/o PVCs. Symptomatic hypotension  - If heart fluttering sensation returns, will order holter monitor   - Telemetry x 48 hours (12/01/22)  Hypotension:  - Cortisol level WNL 11/21/22  - Cont midodrine 5 mg TID  - Place cecil hose      8. Pulmonary: H/o pulm nodule on CT 7/22  - CT 7/2022. Repeat CT 1/2023 after 3 months of acalabrutininb.    - DVT Prophylaxis: Platelets >16,426 cells/dL, - daily lovenox prophylaxis ordered  Contraindications to pharmacologic prophylaxis: None  Contraindications to mechanical prophylaxis: None    - Disposition: Uncertain at this time.  Pending PT/OT eval. Pt may require inpt rehab vs SNF placement    BARULIO Loyola - CNP

## 2022-12-04 ENCOUNTER — HOSPITAL ENCOUNTER (INPATIENT)
Age: 82
DRG: 560 | End: 2022-12-04
Attending: PHYSICAL MEDICINE & REHABILITATION | Admitting: PHYSICAL MEDICINE & REHABILITATION
Payer: MEDICARE

## 2022-12-04 VITALS
SYSTOLIC BLOOD PRESSURE: 97 MMHG | RESPIRATION RATE: 16 BRPM | WEIGHT: 108.03 LBS | HEART RATE: 76 BPM | DIASTOLIC BLOOD PRESSURE: 47 MMHG | OXYGEN SATURATION: 90 % | TEMPERATURE: 98 F | BODY MASS INDEX: 20.41 KG/M2

## 2022-12-04 DIAGNOSIS — Z87.81 HISTORY OF PELVIC FRACTURE: ICD-10-CM

## 2022-12-04 DIAGNOSIS — S32.810K PELVIC RING FRACTURE WITH NONUNION: Primary | ICD-10-CM

## 2022-12-04 LAB
ANION GAP SERPL CALCULATED.3IONS-SCNC: 6 MMOL/L (ref 3–16)
ATYPICAL LYMPHOCYTE RELATIVE PERCENT: 5 % (ref 0–6)
BASOPHILS ABSOLUTE: 0 K/UL (ref 0–0.2)
BASOPHILS RELATIVE PERCENT: 0 %
BUN BLDV-MCNC: 7 MG/DL (ref 7–20)
CALCIUM SERPL-MCNC: 8.5 MG/DL (ref 8.3–10.6)
CHLORIDE BLD-SCNC: 105 MMOL/L (ref 99–110)
CO2: 28 MMOL/L (ref 21–32)
CREAT SERPL-MCNC: <0.5 MG/DL (ref 0.6–1.2)
EOSINOPHILS ABSOLUTE: 0 K/UL (ref 0–0.6)
EOSINOPHILS RELATIVE PERCENT: 0 %
GFR SERPL CREATININE-BSD FRML MDRD: >60 ML/MIN/{1.73_M2}
GLUCOSE BLD-MCNC: 103 MG/DL (ref 70–99)
GLUCOSE BLD-MCNC: 99 MG/DL (ref 70–99)
HCT VFR BLD CALC: 27 % (ref 36–48)
HEMOGLOBIN: 9.4 G/DL (ref 12–16)
INR BLD: 1.04 (ref 0.87–1.14)
LYMPHOCYTES ABSOLUTE: 15.1 K/UL (ref 1–5.1)
LYMPHOCYTES RELATIVE PERCENT: 82 %
MCH RBC QN AUTO: 36.2 PG (ref 26–34)
MCHC RBC AUTO-ENTMCNC: 34.8 G/DL (ref 31–36)
MCV RBC AUTO: 104 FL (ref 80–100)
MONOCYTES ABSOLUTE: 0.7 K/UL (ref 0–1.3)
MONOCYTES RELATIVE PERCENT: 4 %
NEUTROPHILS ABSOLUTE: 1.6 K/UL (ref 1.7–7.7)
NEUTROPHILS RELATIVE PERCENT: 9 %
PDW BLD-RTO: 16 % (ref 12.4–15.4)
PERFORMED ON: ABNORMAL
PLATELET # BLD: 74 K/UL (ref 135–450)
PMV BLD AUTO: 10.1 FL (ref 5–10.5)
POTASSIUM SERPL-SCNC: 3.7 MMOL/L (ref 3.5–5.1)
PROTHROMBIN TIME: 13.5 SEC (ref 11.7–14.5)
RBC # BLD: 2.6 M/UL (ref 4–5.2)
SARS-COV-2, NAAT: NOT DETECTED
SODIUM BLD-SCNC: 139 MMOL/L (ref 136–145)
WBC # BLD: 17.4 K/UL (ref 4–11)

## 2022-12-04 PROCEDURE — 87635 SARS-COV-2 COVID-19 AMP PRB: CPT

## 2022-12-04 PROCEDURE — 85610 PROTHROMBIN TIME: CPT

## 2022-12-04 PROCEDURE — 2580000003 HC RX 258: Performed by: NURSE PRACTITIONER

## 2022-12-04 PROCEDURE — 85025 COMPLETE CBC W/AUTO DIFF WBC: CPT

## 2022-12-04 PROCEDURE — 2580000003 HC RX 258: Performed by: PHYSICAL MEDICINE & REHABILITATION

## 2022-12-04 PROCEDURE — 36415 COLL VENOUS BLD VENIPUNCTURE: CPT

## 2022-12-04 PROCEDURE — 1280000000 HC REHAB R&B

## 2022-12-04 PROCEDURE — 6370000000 HC RX 637 (ALT 250 FOR IP): Performed by: STUDENT IN AN ORGANIZED HEALTH CARE EDUCATION/TRAINING PROGRAM

## 2022-12-04 PROCEDURE — 80048 BASIC METABOLIC PNL TOTAL CA: CPT

## 2022-12-04 PROCEDURE — 6370000000 HC RX 637 (ALT 250 FOR IP): Performed by: NURSE PRACTITIONER

## 2022-12-04 PROCEDURE — 94664 DEMO&/EVAL PT USE INHALER: CPT

## 2022-12-04 PROCEDURE — 94150 VITAL CAPACITY TEST: CPT

## 2022-12-04 PROCEDURE — 99232 SBSQ HOSP IP/OBS MODERATE 35: CPT | Performed by: PHYSICAL MEDICINE & REHABILITATION

## 2022-12-04 PROCEDURE — A4217 STERILE WATER/SALINE, 500 ML: HCPCS | Performed by: PHYSICAL MEDICINE & REHABILITATION

## 2022-12-04 PROCEDURE — 6370000000 HC RX 637 (ALT 250 FOR IP): Performed by: PHYSICAL MEDICINE & REHABILITATION

## 2022-12-04 PROCEDURE — 94761 N-INVAS EAR/PLS OXIMETRY MLT: CPT

## 2022-12-04 RX ORDER — VITAMIN B COMPLEX
5000 TABLET ORAL DAILY
Status: CANCELLED | OUTPATIENT
Start: 2022-12-05

## 2022-12-04 RX ORDER — MIDODRINE HYDROCHLORIDE 5 MG/1
10 TABLET ORAL
Status: CANCELLED | OUTPATIENT
Start: 2022-12-04

## 2022-12-04 RX ORDER — DIPHENHYDRAMINE HCL 25 MG
25 TABLET ORAL NIGHTLY PRN
Qty: 30 TABLET | Refills: 0 | Status: ON HOLD | OUTPATIENT
Start: 2022-12-04 | End: 2022-12-04

## 2022-12-04 RX ORDER — POLYETHYLENE GLYCOL 3350 17 G/17G
17 POWDER, FOR SOLUTION ORAL DAILY PRN
Status: DISCONTINUED | OUTPATIENT
Start: 2022-12-04 | End: 2022-12-17 | Stop reason: HOSPADM

## 2022-12-04 RX ORDER — MORPHINE SULFATE 15 MG/1
15 TABLET ORAL EVERY 4 HOURS PRN
Status: CANCELLED | OUTPATIENT
Start: 2022-12-04

## 2022-12-04 RX ORDER — SENNA AND DOCUSATE SODIUM 50; 8.6 MG/1; MG/1
2 TABLET, FILM COATED ORAL DAILY
Status: CANCELLED | OUTPATIENT
Start: 2022-12-05

## 2022-12-04 RX ORDER — VITAMIN B COMPLEX
1 CAPSULE ORAL DAILY
Status: DISCONTINUED | OUTPATIENT
Start: 2022-12-05 | End: 2022-12-05

## 2022-12-04 RX ORDER — SODIUM CHLORIDE 0.9 % (FLUSH) 0.9 %
5-40 SYRINGE (ML) INJECTION PRN
Status: DISCONTINUED | OUTPATIENT
Start: 2022-12-04 | End: 2022-12-17 | Stop reason: HOSPADM

## 2022-12-04 RX ORDER — HYDROCODONE BITARTRATE AND ACETAMINOPHEN 5; 325 MG/1; MG/1
1 TABLET ORAL EVERY 4 HOURS PRN
Status: DISCONTINUED | OUTPATIENT
Start: 2022-12-04 | End: 2022-12-09

## 2022-12-04 RX ORDER — CALCIUM CARBONATE 500(1250)
500 TABLET ORAL DAILY
Status: CANCELLED | OUTPATIENT
Start: 2022-12-05

## 2022-12-04 RX ORDER — HYDROCODONE BITARTRATE AND ACETAMINOPHEN 5; 325 MG/1; MG/1
1 TABLET ORAL EVERY 4 HOURS PRN
Status: CANCELLED | OUTPATIENT
Start: 2022-12-04

## 2022-12-04 RX ORDER — PROCHLORPERAZINE MALEATE 5 MG/1
5 TABLET ORAL EVERY 6 HOURS PRN
Qty: 120 TABLET | Refills: 0 | Status: ON HOLD | OUTPATIENT
Start: 2022-12-04 | End: 2022-12-04

## 2022-12-04 RX ORDER — ASCORBIC ACID 500 MG
1000 TABLET ORAL DAILY
Status: DISCONTINUED | OUTPATIENT
Start: 2022-12-05 | End: 2022-12-05

## 2022-12-04 RX ORDER — DIPHENHYDRAMINE HCL 25 MG
25 TABLET ORAL NIGHTLY PRN
Status: CANCELLED | OUTPATIENT
Start: 2022-12-04

## 2022-12-04 RX ORDER — MORPHINE SULFATE 15 MG/1
15 TABLET ORAL EVERY 4 HOURS PRN
Qty: 30 TABLET | Refills: 0 | Status: ON HOLD
Start: 2022-12-04 | End: 2022-12-04

## 2022-12-04 RX ORDER — MULTIVITAMIN WITH IRON
1 TABLET ORAL DAILY
Status: CANCELLED | OUTPATIENT
Start: 2022-12-05

## 2022-12-04 RX ORDER — ONDANSETRON 4 MG/1
4 TABLET, FILM COATED ORAL EVERY 8 HOURS PRN
Status: CANCELLED | OUTPATIENT
Start: 2022-12-04

## 2022-12-04 RX ORDER — ACETAMINOPHEN 325 MG/1
650 TABLET ORAL EVERY 4 HOURS PRN
Status: DISCONTINUED | OUTPATIENT
Start: 2022-12-04 | End: 2022-12-09

## 2022-12-04 RX ORDER — POLYETHYLENE GLYCOL 3350 17 G/17G
17 POWDER, FOR SOLUTION ORAL DAILY PRN
Status: CANCELLED | OUTPATIENT
Start: 2022-12-04

## 2022-12-04 RX ORDER — MIDODRINE HYDROCHLORIDE 5 MG/1
10 TABLET ORAL
Status: DISCONTINUED | OUTPATIENT
Start: 2022-12-04 | End: 2022-12-04 | Stop reason: HOSPADM

## 2022-12-04 RX ORDER — MULTIVITAMIN WITH IRON
1 TABLET ORAL DAILY
Status: DISCONTINUED | OUTPATIENT
Start: 2022-12-05 | End: 2022-12-17 | Stop reason: HOSPADM

## 2022-12-04 RX ORDER — SODIUM CHLORIDE 0.9 % (FLUSH) 0.9 %
5-40 SYRINGE (ML) INJECTION EVERY 12 HOURS SCHEDULED
Status: DISCONTINUED | OUTPATIENT
Start: 2022-12-04 | End: 2022-12-16

## 2022-12-04 RX ORDER — PROCHLORPERAZINE MALEATE 5 MG/1
5 TABLET ORAL EVERY 6 HOURS PRN
Status: DISCONTINUED | OUTPATIENT
Start: 2022-12-04 | End: 2022-12-17 | Stop reason: HOSPADM

## 2022-12-04 RX ORDER — ACETAMINOPHEN 325 MG/1
650 TABLET ORAL EVERY 4 HOURS PRN
Status: CANCELLED | OUTPATIENT
Start: 2022-12-04

## 2022-12-04 RX ORDER — DIPHENHYDRAMINE HCL 25 MG
25 TABLET ORAL NIGHTLY PRN
Status: DISCONTINUED | OUTPATIENT
Start: 2022-12-04 | End: 2022-12-17 | Stop reason: HOSPADM

## 2022-12-04 RX ORDER — PROCHLORPERAZINE MALEATE 10 MG
5 TABLET ORAL EVERY 6 HOURS PRN
Status: CANCELLED | OUTPATIENT
Start: 2022-12-04

## 2022-12-04 RX ORDER — VITAMIN B COMPLEX
5000 TABLET ORAL DAILY
Status: DISCONTINUED | OUTPATIENT
Start: 2022-12-05 | End: 2022-12-05

## 2022-12-04 RX ORDER — SENNA AND DOCUSATE SODIUM 50; 8.6 MG/1; MG/1
2 TABLET, FILM COATED ORAL DAILY
Status: DISCONTINUED | OUTPATIENT
Start: 2022-12-05 | End: 2022-12-17 | Stop reason: HOSPADM

## 2022-12-04 RX ORDER — VITAMIN B COMPLEX
1 CAPSULE ORAL DAILY
Status: CANCELLED | OUTPATIENT
Start: 2022-12-05

## 2022-12-04 RX ORDER — ALLOPURINOL 300 MG/1
300 TABLET ORAL DAILY
Status: DISCONTINUED | OUTPATIENT
Start: 2022-12-05 | End: 2022-12-17 | Stop reason: HOSPADM

## 2022-12-04 RX ORDER — CALCIUM CARBONATE 500(1250)
500 TABLET ORAL DAILY
Status: DISCONTINUED | OUTPATIENT
Start: 2022-12-05 | End: 2022-12-05

## 2022-12-04 RX ORDER — HYDROCODONE BITARTRATE AND ACETAMINOPHEN 5; 325 MG/1; MG/1
1 TABLET ORAL EVERY 4 HOURS PRN
Qty: 30 TABLET | Refills: 0 | Status: ON HOLD
Start: 2022-12-04 | End: 2022-12-04

## 2022-12-04 RX ORDER — ASCORBIC ACID 500 MG
1000 TABLET ORAL DAILY
Status: CANCELLED | OUTPATIENT
Start: 2022-12-05

## 2022-12-04 RX ORDER — MORPHINE SULFATE 15 MG/1
15 TABLET ORAL EVERY 4 HOURS PRN
Status: DISCONTINUED | OUTPATIENT
Start: 2022-12-04 | End: 2022-12-09

## 2022-12-04 RX ORDER — SODIUM CHLORIDE 0.9 % (FLUSH) 0.9 %
5-40 SYRINGE (ML) INJECTION EVERY 12 HOURS SCHEDULED
Status: CANCELLED | OUTPATIENT
Start: 2022-12-04

## 2022-12-04 RX ORDER — MIDODRINE HYDROCHLORIDE 10 MG/1
10 TABLET ORAL
Qty: 90 TABLET | Refills: 3 | Status: ON HOLD | OUTPATIENT
Start: 2022-12-04 | End: 2022-12-04

## 2022-12-04 RX ORDER — MIDODRINE HYDROCHLORIDE 5 MG/1
10 TABLET ORAL
Status: DISCONTINUED | OUTPATIENT
Start: 2022-12-05 | End: 2022-12-17 | Stop reason: HOSPADM

## 2022-12-04 RX ORDER — SODIUM CHLORIDE 0.9 % (FLUSH) 0.9 %
5-40 SYRINGE (ML) INJECTION PRN
Status: CANCELLED | OUTPATIENT
Start: 2022-12-04

## 2022-12-04 RX ORDER — ONDANSETRON HYDROCHLORIDE 8 MG/1
4 TABLET, FILM COATED ORAL EVERY 8 HOURS PRN
Status: DISCONTINUED | OUTPATIENT
Start: 2022-12-04 | End: 2022-12-17 | Stop reason: HOSPADM

## 2022-12-04 RX ORDER — ALLOPURINOL 300 MG/1
300 TABLET ORAL DAILY
Status: CANCELLED | OUTPATIENT
Start: 2022-12-05

## 2022-12-04 RX ADMIN — ALLOPURINOL 300 MG: 300 TABLET ORAL at 08:25

## 2022-12-04 RX ADMIN — HYDROCODONE BITARTRATE AND ACETAMINOPHEN 1 TABLET: 5; 325 TABLET ORAL at 09:56

## 2022-12-04 RX ADMIN — MIDODRINE HYDROCHLORIDE 5 MG: 5 TABLET ORAL at 08:23

## 2022-12-04 RX ADMIN — OXYCODONE HYDROCHLORIDE AND ACETAMINOPHEN 1000 MG: 500 TABLET ORAL at 08:26

## 2022-12-04 RX ADMIN — HYDROCODONE BITARTRATE AND ACETAMINOPHEN 1 TABLET: 5; 325 TABLET ORAL at 15:25

## 2022-12-04 RX ADMIN — MORPHINE SULFATE 15 MG: 15 TABLET ORAL at 21:33

## 2022-12-04 RX ADMIN — ONDANSETRON HYDROCHLORIDE 4 MG: 4 TABLET, FILM COATED ORAL at 08:33

## 2022-12-04 RX ADMIN — SODIUM CHLORIDE, PRESERVATIVE FREE 10 ML: 5 INJECTION INTRAVENOUS at 08:34

## 2022-12-04 RX ADMIN — APIXABAN 2.5 MG: 5 TABLET, FILM COATED ORAL at 20:21

## 2022-12-04 RX ADMIN — APIXABAN 2.5 MG: 5 TABLET, FILM COATED ORAL at 08:23

## 2022-12-04 RX ADMIN — THERA TABS 1 TABLET: TAB at 08:25

## 2022-12-04 RX ADMIN — DOCUSATE SODIUM 50 MG AND SENNOSIDES 8.6 MG 2 TABLET: 8.6; 5 TABLET, FILM COATED ORAL at 08:23

## 2022-12-04 RX ADMIN — Medication 5000 UNITS: at 08:26

## 2022-12-04 RX ADMIN — HYDROCODONE BITARTRATE AND ACETAMINOPHEN 1 TABLET: 5; 325 TABLET ORAL at 20:21

## 2022-12-04 RX ADMIN — SODIUM CHLORIDE, PRESERVATIVE FREE 10 ML: 5 INJECTION INTRAVENOUS at 20:23

## 2022-12-04 RX ADMIN — MIDODRINE HYDROCHLORIDE 10 MG: 5 TABLET ORAL at 13:00

## 2022-12-04 RX ADMIN — SODIUM CHLORIDE 15 ML: 900 IRRIGANT IRRIGATION at 20:23

## 2022-12-04 RX ADMIN — PROCHLORPERAZINE MALEATE 5 MG: 10 TABLET ORAL at 15:25

## 2022-12-04 RX ADMIN — CALCIUM 500 MG: 500 TABLET ORAL at 08:25

## 2022-12-04 RX ADMIN — Medication 1 CAPSULE: at 08:26

## 2022-12-04 RX ADMIN — MIDODRINE HYDROCHLORIDE 10 MG: 5 TABLET ORAL at 16:58

## 2022-12-04 RX ADMIN — HYDROCODONE BITARTRATE AND ACETAMINOPHEN 1 TABLET: 5; 325 TABLET ORAL at 05:06

## 2022-12-04 ASSESSMENT — PAIN DESCRIPTION - PAIN TYPE
TYPE: ACUTE PAIN

## 2022-12-04 ASSESSMENT — PAIN SCALES - GENERAL
PAINLEVEL_OUTOF10: 7
PAINLEVEL_OUTOF10: 3
PAINLEVEL_OUTOF10: 5
PAINLEVEL_OUTOF10: 2
PAINLEVEL_OUTOF10: 4
PAINLEVEL_OUTOF10: 7
PAINLEVEL_OUTOF10: 3
PAINLEVEL_OUTOF10: 6
PAINLEVEL_OUTOF10: 4
PAINLEVEL_OUTOF10: 3
PAINLEVEL_OUTOF10: 5
PAINLEVEL_OUTOF10: 5

## 2022-12-04 ASSESSMENT — PAIN - FUNCTIONAL ASSESSMENT
PAIN_FUNCTIONAL_ASSESSMENT: PREVENTS OR INTERFERES SOME ACTIVE ACTIVITIES AND ADLS
PAIN_FUNCTIONAL_ASSESSMENT: ACTIVITIES ARE NOT PREVENTED
PAIN_FUNCTIONAL_ASSESSMENT: ACTIVITIES ARE NOT PREVENTED
PAIN_FUNCTIONAL_ASSESSMENT: PREVENTS OR INTERFERES WITH MANY ACTIVE NOT PASSIVE ACTIVITIES
PAIN_FUNCTIONAL_ASSESSMENT: ACTIVITIES ARE NOT PREVENTED

## 2022-12-04 ASSESSMENT — PAIN DESCRIPTION - DESCRIPTORS
DESCRIPTORS: THROBBING
DESCRIPTORS: ACHING
DESCRIPTORS: THROBBING
DESCRIPTORS: STABBING;THROBBING

## 2022-12-04 ASSESSMENT — PAIN DESCRIPTION - DIRECTION
RADIATING_TOWARDS: LEGS
RADIATING_TOWARDS: LEG

## 2022-12-04 ASSESSMENT — PAIN DESCRIPTION - FREQUENCY
FREQUENCY: CONTINUOUS
FREQUENCY: INTERMITTENT
FREQUENCY: CONTINUOUS

## 2022-12-04 ASSESSMENT — PAIN DESCRIPTION - ORIENTATION
ORIENTATION: MID
ORIENTATION: LEFT
ORIENTATION: MID
ORIENTATION: LEFT
ORIENTATION: INNER

## 2022-12-04 ASSESSMENT — PAIN DESCRIPTION - ONSET
ONSET: ON-GOING

## 2022-12-04 ASSESSMENT — PAIN DESCRIPTION - LOCATION
LOCATION: HEAD
LOCATION: HEAD;STERNUM
LOCATION: HIP
LOCATION: HEAD;STERNUM

## 2022-12-04 NOTE — PROGRESS NOTES
Progress Note  Physical Medicine and Rehabilitation    Patient: Alhaji Lara  [id number]  Date: 12/4/2022      Chief Complaint: pelvic fracture     History of Present Illness/Hospital Course: This is a 80yo F who was admitted on 12/1 with a pelvic ring fracture due to osteoporosis. She has a medical history of CLL, ITP, and cardiac PVC with hypotension. She is currently being treated in the [facility]. She has seen orthopedics who will continue to follow. She is having trouble with ADLs and walking around which is why she feels like she needs therapy before being discharged home. She is interested in coming to the ARU for further treatment. Interval history: Doing well. Admit to ARU today.      Prior Level of Function:  Independent for mobility, ADLs, and IADLs    Current Level of Function:  Mod assist     Pertinent Social History:  Support: lives alone  Home set-up: no steps     Past Medical History:   Diagnosis Date    Allergic rhinitis due to pollen 01/20/2016    Arthritis, degenerative 05/03/2011    Cancer (HCC)     chronic lymphocytic leukemia    CLL (chronic lymphoblastic leukemia)     Closed compression fracture of thoracic vertebra ([facility] [address] 06/27/2016    Depression     History of blood transfusion     Hyperlipidemia     10/14/22 -  no medication currently    Melanoma of upper arm ([address]     2002    Mild reactive airways disease 01/20/2016    Sternum fx 08/2022    vaccuming       Past Surgical History:   Procedure Laterality Date    APPENDECTOMY      AXILLARY SURGERY Right 10/18/2022    RIGHT AXILLARY EXCISIONAL LYMPH NODE BIOPSY performed by Cony Vale MD at [address]    NASAL SINUS SURGERY      OTHER SURGICAL HISTORY      melanoma removal    SHOULDER SURGERY Right 2018    Rotator cuff    TONSILLECTOMY         Family History   Problem Relation Age of Onset    Cancer Mother         lymphoma    Heart Disease Father     Heart Disease Sister Arthritis Sister        Social History     Socioeconomic History    Marital status:      Spouse name: None    Number of children: None    Years of education: None    Highest education level: None   Tobacco Use    Smoking status: Never    Smokeless tobacco: Never   Vaping Use    Vaping Use: Never used   Substance and Sexual Activity    Alcohol use:  Yes     Alcohol/week: 0.0 standard drinks     Comment: occasionally     Drug use: Never     Social Determinants of Health     Financial Resource Strain: Low Risk     Difficulty of Paying Living Expenses: Not hard at all   Food Insecurity: No Food Insecurity    Worried About Running Out of Food in the Last Year: Never true    Ran Out of Food in the Last Year: Never true           REVIEW OF SYSTEMS:   CONSTITUTIONAL: negative for fevers, chills, diaphoresis, appetite change, night sweats, unexpected weight change, fatigue  EYES: negative for blurred vision, eye discharge, visual disturbance and icterus  HEENT: negative for hearing loss, tinnitus, ear drainage, sinus pressure, nasal congestion, epistaxis and snoring  RESPIRATORY: Negative for hemoptysis, cough, sputum production  CARDIOVASCULAR: negative for chest pain, palpitations, exertional chest pressure/discomfort, syncope, edema  GASTROINTESTINAL: negative for nausea, vomiting, diarrhea, blood in stool, abdominal pain, constipation  GENITOURINARY: negative for frequency, dysuria, urinary incontinence, decreased urine volume, and hematuria  HEMATOLOGIC/LYMPHATIC: negative for easy bruising, bleeding and lymphadenopathy  ALLERGIC/IMMUNOLOGIC: negative for recurrent infections, angioedema, anaphylaxis and drug reactions  ENDOCRINE: negative for weight changes and diabetic symptoms including polyuria, polydipsia and polyphagia  MUSCULOSKELETAL: positive for pain, joint swelling, decreased range of motion  NEUROLOGICAL: negative for headaches, slurred speech, unilateral weakness  PSYCHIATRIC/BEHAVIORAL: negative for hallucinations, behavioral problems, confusion and agitation. Physical Examination:  Vitals: Patient Vitals for the past 24 hrs:   BP Temp Temp src Pulse Resp SpO2   12/04/22 1026 -- -- -- -- 14 --   12/04/22 1000 (!) 99/54 -- -- 68 14 --   12/04/22 0817 (!) 94/54 97.3 °F (36.3 °C) Oral 71 12 91 %   12/04/22 0800 (!) 91/51 -- -- 69 11 --   12/04/22 0700 (!) 83/45 -- -- 73 12 --   12/04/22 0536 -- -- -- -- 15 --   12/04/22 0500 (!) 98/50 98.9 °F (37.2 °C) Oral 74 15 98 %   12/04/22 0020 -- -- -- -- 16 --   12/03/22 2340 (!) 92/52 99.2 °F (37.3 °C) Oral 73 15 98 %   12/03/22 2034 (!) 98/53 98.2 °F (36.8 °C) Oral 66 14 98 %   12/03/22 2019 -- -- -- -- 14 --   12/03/22 1949 -- -- -- -- 18 --   12/03/22 1800 84/71 -- -- -- -- --   12/03/22 1700 (!) 84/49 -- -- -- -- --   12/03/22 1600 (!) 93/53 -- -- -- -- --   12/03/22 1555 (!) 88/50 -- -- -- -- --   12/03/22 1539 (!) 88/55 98.7 °F (37.1 °C) Oral 68 23 --   12/03/22 1224 (!) 92/54 97.6 °F (36.4 °C) Oral 66 14 98 %       Const: Alert. WDWN. No distress  Eyes: Conjunctiva noninjected, no icterus noted; pupils equal, round, and reactive to light. HENT: Atraumatic, normocephalic; Oral mucosa moist  Neck: Trachea midline, neck supple. No thyromegaly noted. CV: Regular rate and rhythm, no murmur rub or gallop noted  Resp: Lungs clear to auscultation bilaterally, no rales wheezes or ronchi, no retractions. Respirations unlabored. GI: Soft, nontender, nondistended. Normal bowel sounds. No palpable masses. Skin: Normal temperature and turgor. No rashes or breakdown noted. Ext: No significant edema appreciated. No varicosities. MSK: No joint tenderness, erythema, warmth noted. AROM intact. Neuro: Alert, oriented, appropriate. No cranial nerve deficits appreciated. Sensation intact to light touch. Motor examination reveals normal strength in bilateral UE and 4/5 strength in bilateral LE with pain.    Psych: Stable mood, normal judgement, normal affect     Lab Results   Component Value Date    WBC 17.4 (H) 12/04/2022    HGB 9.4 (L) 12/04/2022    HCT 27.0 (L) 12/04/2022    .0 (H) 12/04/2022    PLT 74 (L) 12/04/2022     Lab Results   Component Value Date    INR 1.04 12/04/2022    INR 1.04 12/01/2022    INR 0.99 12/01/2022    PROTIME 13.5 12/04/2022    PROTIME 13.5 12/01/2022    PROTIME 13.0 12/01/2022     Lab Results   Component Value Date    CREATININE <0.5 (L) 12/04/2022    BUN 7 12/04/2022     12/04/2022    K 3.7 12/04/2022     12/04/2022    CO2 28 12/04/2022     Lab Results   Component Value Date    ALT 15 10/15/2022    AST 23 10/15/2022    ALKPHOS 107 10/15/2022    BILITOT 0.5 10/15/2022       XR PELVIS INLET OUTLET   Final Result      Left obturator ring fracture. Consider CT. CT LUMBAR SPINE WO CONTRAST   Final Result      1. No acute abnormality within the lumbar spine. CT ABDOMEN PELVIS WO CONTRAST Additional Contrast? None   Final Result      1. Mildly displaced pelvic fracture involving the left pubic body extending into the superior and inferior pubic rami with surrounding stranding likely representing a component of hemorrhage in the extraperitoneal space of [place]. 2.  Minimally displaced left sacral alar fracture. Assessment:  1. Pelvic fracture- [place], Vit D, seen by ortho  - requires PT/OT  2. CLL- followed by [facility]  3. Hypotension- monitor   4. Debility- unable to ambulate long distances. Requires PT/ OT    Impairments- Decreased functional mobility, Decreased ADLs    Recommendations:  Admit to ARU today    Patient with new functional deficits and ongoing medical complexity. Demonstrates ability to tolerate 3 hours therapy/day. She is a good candidate for acute inpatient rehab when medically appropriate. Thank you for this consult. Please contact me with any questions or concerns.      Courtney Shipman D.O. M.P.H  PM&R  12/4/2022  12:16 PM

## 2022-12-04 NOTE — PLAN OF CARE
ARU PATIENT TREATMENT PLAN  [facility] [address], [address]    Shayy Thorpe    : 1940  Acct #: [[id number]]  MRN: [id number]  PHYSICIAN:  Ana Giles DO  Primary Problem    Patient Active Problem List   Diagnosis    Lumbar radiculopathy    Other chronic sinusitis    Chronic large granular lymphocytic leukemia ([facility] [address]    Age-related osteoporosis with current pathological fracture with routine healing    Closed compression fracture of thoracic vertebra (HCC)    Hypogammaglobulinemia (HCC)    PRCA (pure red cell aplasia) (HCC)    CLL (chronic lymphoid leukemia) in relapse (HCC)    Non-seasonal allergic rhinitis due to pollen    Mixed hyperlipidemia    Acquired autoimmune hemolytic anemia (HCC)    Arthritis, degenerative    Primary insomnia    History of melanoma    Lightheadedness    Heme positive stool    Solitary pulmonary nodule on lung CT    Lymph node enlargement    Pelvic ring fracture with nonunion    History of pelvic fracture       Rehabilitation Diagnosis:  Orthopedic, 8.3, Pelvic Fracture  ADMIT DATE:2022    Patient Goals: \"To be independent again\"  Admitting Impairments: Decreased functional mobility ; Decreased ADL status; Decreased endurance  Activity Restrictions: PWB TTWB LLE  Participation Limitations: None   CARE PLAN   NURSING:  Shayy Thorpe while on this unit will:  [] Be continent of bowel and bladder     [] Have an adequate number of bowel movements  [x] Urinate with no urinary retention >300ml in bladder  [] Complete bladder protocol with rodríguez removal  [] Maintain O2 SATs at ___%  [x] Have pain managed while on ARU       [] Be pain free by discharge   [x] Have no skin breakdown while on ARU  [] Have improved skin integrity via wound measurements  [] Have no signs/symptoms of infection at the wound site  [x] Be free from injury during hospitalization   [] Complete education with patient/family with understanding demonstrated for:  [] Adjustment   [] Other:     Nursing Interventions will include:  [] bowel/bladder training   [x] education for medical assistive devices   [x] medication education   [] O2 saturation management   [x] energy conservation   [] stress management techniques   [x] fall prevention   [x] alarms protocol   [x] seating and positioning   [x] skin/wound care   [] pressure relief instruction   [] dressing changes     [x] infection protection   [x] DVT prophylaxis  [x] assistance with in room safety with transfers to bed, toilet, wheelchair, shower   [x] bathroom activities and hygiene  [] Other:    Patient/Caregiver Education for:  [x] Disease/sustained injury/management     [x] Medication Use  [x] Surgical intervention  [x] Safety  [x] Body mechanics and or joint protection  [x] Health maintenance     [] Other:     PHYSICAL THERAPY:  Goals:                  Short Term Goals  Time Frame for Short Term Goals: 7 days- ongoing  Short Term Goal 1: Pt will perform supine<>Sit Mod I  Short Term Goal 2: Pt will perform sit<>stand Mod I with RW  Short Term Goal 3: Pt will perform stand pivot with RW Mod I  Short Term Goal 4: Pt will be able to hop 10ft with RW Min A  Short Term Goal 5: .Ascend/descend 2 steps SBA            Long Term Goals  Time Frame for Long Term Goals : 14 days- ongoing  Long Term Goal 1: Ambulate 50 ft with RW while maintaining weight bearing precautions Mod I  Long Term Goal 2: Ascend/descend 5 steps SBA  Long Term Goal 3: Pt will be able to propel a wheelchair 150ft Mod I  These goals were reviewed with this patient at the time of assessment and Romeo Berg is in agreement.      Plan of Care: Pt to be seen 5 out of 7 days per week per ARU protocol (90 minutes with PT)                  Current Treatment Recommendations: Strengthening, ROM, Balance training, Functional mobility training, Transfer training, Endurance training, Wheelchair mobility training, Gait training, Stair training, []  energy conservation        []  dysphagia tx    []  speech/language/communication therapy   []  group therapy    [x]  patient/family education    [] Other:    CASE MANAGEMENT:  Goals: Assist patient/family with discharge planning, patient/family counseling, and coordination with insurance during ARU stay.     Admission Period/Goal QM SCORES  QM Admit/Goal Score   Eating CARE Score: 5 / Discharge Goal: Independent   Oral Hygiene CARE Score: 4 / Discharge Goal: Independent   Shower/Bathing CARE Score: 1 / Discharge Goal: Independent   UB Dressing CARE Score: 3 / Discharge Goal: Independent   LB Dressing CARE Score: 4 / Discharge Goal: Independent   Putting on/off Footwear CARE Score: 4 / Discharge Goal: Independent   Toileting Hygiene CARE Score: 4 / Discharge Goal: Independent   Bladder Continence Bladder Continence: Incontinent less than daily    Bowel Continence Bowel Continence: Occassionally incontinent    Toilet Transfers CARE Score: 4 / Discharge Goal: Independent   Shower/Bathe Self  CARE Score: 1 / Discharge Goal: Independent   Rolling Left and Right CARE Score: 4 / Discharge Goal: Independent   Sit to Lying CARE Score: 4 / Discharge Goal: Independent   Lying to Sitting on Bedside CARE Score: 3 / Discharge Goal: Independent   Sit to Stand CARE Score: 4 / Discharge Goal: Independent   Chair/Bed to Chair Transfer CARE Score: 3 / Discharge Goal: Independent   Car Transfers CARE Score: 1 / Discharge Goal: Independent   Walk 10 Feet CARE Score: 1 / Discharge Goal: Supervision or touching assistance   Walk 50 Feet with Two Turns CARE Score: 88 / Discharge Goal: Supervision or touching assistance   Walk 150 Feet CARE Score: 88 / Discharge Goal: Supervision or touching assistance   Walk 10 Feet on Uneven Surfaces CARE Score: 88 / Discharge Goal: Supervision or touching assistance   1 Step (Curb) CARE Score: 88 / Discharge Goal: Supervision or touching assistance   4 Steps CARE Score: 88 / Discharge Goal: Supervision or touching assistance   12 Steps CARE Score: 88 / Discharge Goal: Partial/moderate assistance   Picking up Object from Floor CARE Score: 88 / Discharge Goal: Supervision or touching assistance   Wheel 50 Feet with 2 Turns CARE Score: 1 / Discharge Goal: Independent   Type         [] Manual        [] Motorized        [] N/A   Wheel 150 Feet CARE Score: 1 / Discharge Goal: Independent   Type         [] Manual        [] Motorized        [] N/A        Kevin Dunn will be seen a minimum of 3 hours of therapy per day, a minimum of 5 out of 7 days per week (please see above for specific treatment plan per PT/OT/SLP). [] In this rare instance due to the nature of this patient's medical involvement, this patient will be seen 15 hours per week (900 minutes within a 7day period). In addition, dietician/nutritionist may monitor calorie count as well as intake and collaboratively work with SLP on dietary upgrades. Neuropsychology/Psychology may evaluate and provide necessary support. Medical issues being managed closely and that require 24hour availability of a physician:  [] Swallowing Precautions  [x] Bowel/Bladder Fx  [] Weight bearing precautions  [] Wound Care    [x] Pain Mgmt   [] Infection Protection  [x] DVT Prophylaxis   [x] Fall Precautions  [x] Fluid/Electrolyte/Nutrition Balance  [] Voice Protection   [] Respiratory  [] Other:    Medical Prognosis: [] Good  [x] Fair    [] Guarded   Total expected IRF days 13  Anticipated discharge destination:   [] Home Independently   [x] Home Modified Independent  [] Home with supervision    []SNF     [] Other                                           Physician anticipated functional outcomes:Pt will progress to a level of MOD I for all functional mobility and ADLs to allow for a safe return home. IPOC brief synthesis: This is a 80yo F who was admitted on 12/1 with a pelvic ring fracture due to osteoporosis.  She has a medical history of CLL, ITP, and cardiac PVC with hypotension. She is currently being treated in the [facility]. She has seen orthopedics who will continue to follow. She is having trouble with ADLs and walking around which is why she feels like she needs therapy before being discharged home. She is interested in coming to the ARU for further treatment. This initial ARU patient treatment plan of care, together with the IPOC & the Education plan, form the foundation for the patient's plan of care. Weekly patient care conferences are held to evaluate progress towards the initial treatment plan & goals.     I have reviewed this initial plan of care and agree with its contents:    Title   Name    Date    Time    Physician: Marissa Morrissey D.O. M.P.H  PM&R  12/7/2022  12:23 PM      Case Mgmt: Sera Turpin [facility] 12/5/22 0833    OT: Madan Jarrett OTR/L, [facility] 12/5/22 @1551    PT: Carissa Waite , PT, DPT #[id number] 12/5/22 @5218      RN: Cira Ames RN  12/4/22 3547    ARU Supervisor: Erna Pina, PT, DPT 12/7/2022 @ 1807

## 2022-12-04 NOTE — PROGRESS NOTES
[email or web address] [email or web address] Progress Note    2022     Sherry Cox    MRN: [id number]    : 1940    Referring MD: No referring provider defined for this encounter. SUBJECTIVE:  Still having pain with movement. He is constipated. No appetite     ECOG PS:  (3) Capable of limited self-care, confined to bed or chair > 50% of waking hours    KPS: 40% Disabled; requires special care and assistance    Isolation: None    Medications    Scheduled Meds:   sennosides-docusate sodium  2 tablet Oral Daily    apixaban  2.5 mg Oral BID    allopurinol  300 mg Oral Daily    vitamin C  1,000 mg Oral Daily    calcium elemental  500 mg Oral Daily    multivitamin  1 tablet Oral Daily    b complex vitamins  1 capsule Oral Daily    Vitamin D  5,000 Units Oral Daily    sodium chloride flush  5-40 mL IntraVENous 2 times per day    Saline Mouthwash  15 mL Swish & Spit 4x Daily AC & HS    midodrine  5 mg Oral TID WC    Acalabrutinib  1 tablet Oral Q12H     Continuous Infusions:   sodium chloride      sodium chloride       PRN Meds:.prochlorperazine, HYDROcodone 5 mg - acetaminophen, morphine, diphenhydramine, sodium chloride, sodium chloride flush, sodium chloride, magnesium sulfate, magnesium hydroxide, Saline Mouthwash, alteplase (CATHFLO) with sterile water injection, ondansetron    ROS:  As noted above, otherwise remainder of 10-point ROS negative    Physical Exam:     I&O:    Intake/Output Summary (Last 24 hours) at 2022 0848  Last data filed at 2022 0536  Gross per 24 hour   Intake 500 ml   Output 500 ml   Net 0 ml         Vital Signs:  BP (!) 94/54   Pulse 71   Temp 97.3 °F (36.3 °C) (Oral)   Resp 12   Wt 108 lb 0.4 oz (49 kg)   LMP  (LMP Unknown)   SpO2 91%   BMI 20.41 kg/m²     Weight:    Wt Readings from Last 3 Encounters:   22 108 lb 0.4 oz (49 kg)   10/14/22 100 lb (45.4 kg)   10/14/22 101 lb (45.8 kg)       General: Awake, alert and oriented.   HEENT: normocephalic, PERRL, no scleral erythema or icterus, Oral mucosa moist and intact, throat clear  NECK: supple without palpable adenopathy  BACK: Straight   SKIN: warm dry and intact without lesions rashes or masses  CHEST: CTA bilaterally without use of accessory muscles  CV: Normal S1 S2, RRR, no MRG  ABD: NT ND normoactive BS, no palpable masses or hepatosplenomegaly  EXTREMITIES: without edema, denies calf tenderness  NEURO: CN II - XII grossly intact  CATHETER: PIV    Data    CBC:   Recent Labs     12/02/22  0451 12/03/22  0439 12/04/22  0506   WBC 15.6* 14.0* 17.4*   HGB 10.0* 8.7* 9.4*   HCT 29.5* 25.7* 27.0*   .4* 104.3* 104.0*   PLT 84* 68* 74*       BMP/Mag:  Recent Labs     12/02/22  0451 12/03/22  0439 12/04/22  0506    136 139   K 3.8 3.4* 3.7    106 105   CO2 26 24 28   BUN 12 7 7   CREATININE <0.5* <0.5* <0.5*       LIVP: No results for input(s): AST, ALT, LIPASE, BILIDIR, BILITOT, ALKPHOS in the last 72 hours. Invalid input(s): AMYLASE,  ALB  Coags:   Recent Labs     12/01/22  1415 12/04/22  0506   PROTIME 13.5 13.5   INR 1.04 1.04   APTT 29.6  --          PROBLEM LIST:             CLL  Osteopenia  ITP/hemolytic anemia  LGL      TREATMENT:            CLL  1. Ibrutinib  2. Acalabrutinib (11/1/2022)     LGL  MTX weekly (d/c 10/2022)      ASSESSMENT AND PLAN:            1. 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 - Repeat FISH (1/21/20) - trisomy 12  - Flow cytometry (3/18/20) - Chronic lymphocytic leukemia and 4% TLGL cells. There is no strong evidence to suggest the possibility of a clonal TLGL population, further the low percentage of cells argues against this entity.  - PB smear (3/18/20) - Occasional spherocytes and slight increase in polychromasia indicating likely ongoing hemolysis. Leukocytes remarkable for monomorphic small lymphoid cells indicating the presence of chronic lymphocytic leukemia.  There are also occasional forms showing granules. Platelets unremarkable  - PET 10/3/22:  Thoracic and A/P  lymphadenopathy. Mild splenic enlargement and uptake suspicious for lymphoma.  - Lymph node biopsy 10/18/22:  Consistent with CLL/SLL; FISH negative  - CT A/P 12/01/22:  No abnormally enlarged lymph nodes in A/P     Biopsy of ax node C/W CLL - started acalabrutininb. Tolerating well with documented improvement. Cont acalabrutinib - Started 11/1/22   - she is responding on exam     1B. 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 - s/p MTX 10 mg weekly (started 3/21/20), increased to 12.5mg weekly (5/29/10) with neutropenia. Cont weekly on Fridays. Will hold ( after dose 10/21/22) with onset of acalabrutininb. 2. ID: No s/s of infection  - H/o recurrent acute sinusitis (follows up with ENT) - starting an antibiotic and steroids per ENT 5/10/22   - S/p COVID vaccines (2nd dose 2/18/21)   - COVID Ab (2/16/22): negative; Evusheld 4/1/22. She had a reaction and refuses repeat. - Schedule COVID  and flu immunizations  Hypogammaglobulinemia:   - Cont monthly immunoglobulin (started 1/4/19). Dose 1/19/22, 2/16/22, 4/13/22, 5/10/22, 6/10/22, 7/8/22  - Last IgG 10/21/22:  484     3. Heme: H/o hemolytic anemia and ITP. +Leukocytosis, Anemia, Thrombocytopenia 2/2 CLL  - Transfuse for hgb <7 and plt <10  - No transfusion today   ITP / hemolytic anemia:    - Recurrent hemolysis (1/21/20) - Viji IgG + with retic 2.8  - No Current Tx  Previous Tx:  - S/p 4 cycles Rituxan (completed 9/14/16) & IVIG 20 gm daily for 5 days (completed 10/10/16)  - S/p Rituxan weekly x 4 (last dose 9/11/17) w/ normalization of her hemoglobin.   Last dose 4/8/20  - S/p Prednisone taper (stopped 10/2019)  - S/p Prednisone: 2/17/20 -> Stop 9/12/22  - Rituxan weekly x 4 - #1 (3/18/20), #2(3/25/20), #3 (4/1/20), #4 (4/8/20)    - started eliquis 2.5 mg BID for ppx given pelvic fracture, will monitor platelets      4. Metabolic:  Stable renal fxn and e-lytes  - Encourage PO fluid intake  - Stop IVF 12/3/22     5. MSK: +Osteoporosis and now admitted s/p traumatic fall 12/1/22 resulting in left pelvic ring fracture and sacral fracture with minimal displacement. H/o Osteoporosis   - worse than 2018. Prolia 12/19/22  Pelvis/Sacral fracture  - Orthopedic surgery consulted, no surgical intervention              - Toe touch weight bearing to LLL              - Cont DVT prophylaxis approximately 1 month s/p injury (January 2023)              - F/U 2 weeks as outpatient for repeat evaluation and imaging of pelvis (week on 12/14/22)  - Monitor hemoglobin closely for signs of bleeding  - MSIR 15 mg  PO prn  - Consult PT/OT - [facility] 14- will need w/c in home at this time with 24 hour  supervision/assist  - Cont DVT ppx with Lovenox and SCD  - Consult ARU  Osteopenia/Osteoporosis:    - DEXA scan 08/2022:  Lumbar Spine - osteopenia. No significant change since 2018. Left femoral neck : Osteoporosis. Left hip : Osteoporosis. This corresponds to 5% worsening since 2018. 6. ENT: H/o rhinitis & followed by ENT  - Cont Flonase      7. Cardiac:  H/o PVCs. Symptomatic hypotension  - If heart fluttering sensation returns, will order holter monitor   - Telemetry x 48 hours (12/01/22)  Hypotension:  - Cortisol level WNL 11/21/22  - Cont midodrine 5 mg TID (started 12/01/22), increase it to 10 mg TID  - Place cecil hose      8. Pulmonary: H/o pulm nodule on CT 7/22  - CT 7/2022. Repeat CT 1/2023 after 3 months of acalabrutininb.    - DVT Prophylaxis: Platelets >21,101 cells/dL, - daily lovenox prophylaxis ordered  Contraindications to pharmacologic prophylaxis: None  Contraindications to mechanical prophylaxis: None    - Disposition: Uncertain at this time.  Transfer to ARU with insurance approval and bed availability    Valentino Deeds, APRN - CNP

## 2022-12-04 NOTE — CARE COORDINATION
Case Management Assessment            Discharge Note                    Date / Time of Note: 12/4/2022 12:32 PM                  Discharge Note Completed by: MANUELA Stanford, ROSENDOW    Patient Name: Lee Ann Frye   YOB: 1940  Diagnosis: Pubic ramus fracture, left, closed, initial encounter [facility]) [S32.592A]  Pelvic ring fracture with nonunion [S32.810K]   Date / Time: 12/1/2022  5:09 AM    Current PCP: Jonny Perez MD  Clinic patient: No    Hospitalization in the last 30 days: No    Advance Directives:  Code Status: Full Code  [address] DNR form completed and on chart: No    Financial:  Payor: Jason Carlton / Plan: MEDICARE PART A AND B / Product Type: *No Product type* /      Pharmacy:    [facility] [id number] [address] [phone]  [address] [place] [address]  Phone: [phone] Fax: [phone]      Assistance purchasing medications?: Potential Assistance Purchasing Medications: No  Assistance provided by Case Management: None at this time    Does patient want to participate in local refill/ meds to beds program?:      Meds To Beds General Rules:  1. Can ONLY be done Monday- Friday between 8:30am-5pm  2. Prescription(s) must be in pharmacy by 3pm to be filled same day  3. Copy of patient's insurance/ prescription drug card and patient face sheet must be sent along with the prescription(s)  4. Cost of Rx cannot be added to hospital bill. If financial assistance is needed, please contact unit  or ;  or  CANNOT provide pharmacy voucher for patients co-pays  5.  Patients can then  the prescription on their way out of the hospital at discharge, or pharmacy can deliver to the bedside if staff is available. (payment due at time of pick-up or delivery - cash, check, or card accepted)     Able to afford home medications/ co-pay costs: Yes    ADLS:  Current PT AM-PAC Score: 14 /24  Current OT AM-PAC Score: 15 /24      DISCHARGE Disposition: Inpatient Rehab: [facility] Phone: [phone] Fax: n/a    LOC at discharge: Not Applicable  SIMON Completed: Not Indicated    Notification completed in HENS/PAS?:  Not Applicable    IMM Completed:   No    Referrals made at [facility] for outpatient continued care:  Not Applicable    Additional CM Notes:     Pt is from home and will DC to [facility] today. Pt and Pt's family are in agreement w/DCP. Pt has no other needs. The Plan for Transition of Care is related to the following treatment goals of Pubic ramus fracture, left, closed, initial encounter [facility]) [Q10.821E]  Pelvic ring fracture with nonunion [W51.675K]    The Patient and/or patient representative Martina Sanchez and her family were provided with a choice of provider and agrees with the discharge plan Yes    Freedom of choice list was provided with basic dialogue that supports the patient's individualized plan of care/goals and shares the quality data associated with the providers.  Yes    Care Transitions patient: Yes    MANUELA Singh, [facility] [employer]  Case Management Department  Ph: [phone]

## 2022-12-04 NOTE — PROGRESS NOTES
ARU Admission Assessment    Ethnicity  \"Are you of , /a, or [place] origin? \"  Check all that apply:  [x] A. No, not of , /a, or [address] Origin  [] B.  Yes, [place], [place] American, Chicano/a  [] C.  Yes, [address] [place] [address]  [] D.  Yes, [place]  [] E.  Yes, another , , or [place] origin  [] X. Patient unable to respond  [] Y. Patient declines to respond    Race  \"What is your race? \"  Check all that apply:  [x] A. White  [] B. Black or   [] C. American [facility] or [place] Native  [] D.  [place] [facility]  [] E. [place]  [] F. Swedish  [] G. [place]  [] Aniceto Torres  [] I. [place]  [] J.  Other   [] K.   [] L. Italian or [place]  [] M. Surinamese  [] N. Other [place]  [] X. Patient unable to respond  [] Y. Patient declines to respond  [] Z. None of the above    Language  A. \"What is your preferred language? \"   English    B. \"Do you need or want an  to communicate with a doctor or health care staff? \"  Check only one:  [x] 0. No  [] 1. Yes  [] 9. Unable to determine    Transportation  \"Has lack of transportation kept you from medical appointments, meetings, work, or from getting things needed for daily living? \"Check all that apply:  [] A.  Yes, it has kept me from medical appointments or from getting my medications  [] B.  Yes, it has kept me from non-medical meetings, appointments, work, or from getting things that I need  [x] C.  No  [] X. Patient unable to respond  [] Y. Patient declines to respond    Hearing  Ability to hear (with hearing aid or hearing appliances if normally used)  [x]  0. Adequate - no difficulty in normal conversation, social interaction, listening to TV  []  1. Minimal difficulty - difficulty in some environments (e.g. when person speaks softly or setting is noisy)  []  2. Moderate difficulty - speaker has to increase volume and speak distinctly   []  3.   Highly impaired - absence of useful hearing    Vision  Ability to see in adequate light (with glasses or other visual appliances)  [x]  0. Adequate - sees fine detail, such as regular print in newspapers/books  []  1. Impaired - sees large print, but not regular print in newspapers/books  []  2. Moderately impaired - limited vision; not able to see newspaper headlines but can identify objects  []  3. Highly impaired - object identification in question, but eyes appear to follow objects  []  4. Severely impaired - no vision or sees only light, colors, or shapes; eyes do not appear to follow objects    Health Literacy  \"How often do you need to have someone help you when you read instructions, pamphlets, or other written material from your doctor or pharmacy? \"  [x]  0. Never  []  1. Rarely  []  2. Sometimes  []  3. Often  []  4. Always  []  8. Patient unable to respond    BIMS - **Must be completed in the flowsheet at admission prior to proceeding with Delirium Assessment**  [x] BIMS completed in flowsheet at admission 15    Signs and Symptoms of Delirium  A. Acute Onset Mental Status Change - Is there evidence of an acute change in mental status from the patient's baseline? [x] 0. No  [] 1. Yes    B. Inattention - Did the patient have difficulty focusing attention, for example being easily distractible or having difficulty keeping track of what was being said? [x]  0. Behavior not present  []  1. Behavior continuously present, does not fluctuate  []  2. Behavior present, fluctuates (comes and goes, changes in severity)    C. Disorganized thinking - Was the patient's thinking disorganized or incoherent (rambling or irrelevant conversation, unclear or illogical flow of ideas, or unpredictable switching from subject to subject)? [x]  0. Behavior not present  []  1. Behavior continuously present, does not fluctuate  []  2. Behavior present, fluctuates (comes and goes, changes in severity)    D.   Altered level of consciousness - Did the patient have altered level of consciousness as indicated by any of the following criteria? Vigilant - startled easily to any sound or touch  Lethargic - repeatedly dozed off while being asked questions, but responded to voice or touch  Stuporous - very difficulty to arouse and keep aroused for the interview  Comatose - could not be aroused  [x]  0. Behavior not present  []  1. Behavior continuously present, does not fluctuate  []  2. Behavior present, fluctuates (comes and goes, changes in severity)    Mood    \"Over the last 2 weeks, have you been bothered by any of the following problems?\" 1. Symptom Presence    0 = No  1 = Yes  9 = No Response 2. Symptom Frequency    0 = Never or 1 day  1 = 2-6 days (several days)  2 = 7-11 days (half or more of the days)  3 = 12-14 days (nearly every day)  **Leave blank if 'No Reponse'**      Enter scores in boxes    Column 1 Column 2   Little interest or pleasure in doing things   0 0   Feeling down, depressed, or hopeless   0 0   **If either A or B in column 2 is coded 2 or 3, CONTINUE asking the questions below. If not, END the interview. **     Trouble falling or staying asleep, or sleeping too much   0 0   Feeling tired or having little energy   0 0   Poor appetite or overeating   0 0   Feeling bad about yourself - or that you are a failure or have let yourself or your family down   0 0   Trouble concentrating on things, such as reading the newspaper or watching television   0 0   Moving or speaking so slowly that other people could have noticed. Or the opposite- being so fidgety or restless that you have been moving around a lot more than usual.   0 0   Thoughts that you would be better off dead, or of hurting yourself in some way. 0 0   Total Severity: Add scores for all frequency responses in column 2 (possible score 0-27, or enter 99 if unable to complete (if symptom frequency (column 2) is blank for 3 or more items).    0     Social Isolation  \"How often do you feel lonely or isolated from those around you? \"  [x] 0. Never  [] 1. Rarely  [] 2. Sometimes  [] 3. Often  [] 4. Always  [] 7. Patient declines to respond  [] 8. Patient unable to respond    Pain Effect on Sleep  \"Over the past 5 days, how much of the time has pain made it hard for you to sleep at night? \"  []  0. Does not apply - I have not had any pain or hurting in the past 5 days  []  1. Rarely or not at all  []  2. Occasionally  [x]  3. Frequently  []  4. Almost constantly  []  8. Unable to answer    **If the patient answers \"0. Does not apply\" to this question, skip the next two \"Pain Effect. Celestia Jim Celestia Jim \" questions**    Pain Interference with Therapy Activities  \"Over the past 5 days, how often have you limited your participation in rehabilitation therapy sessions due to pain? \"  []  0. Does not apply - I have not received rehabilitation therapy in the past 5 days  []  1. Rarely or not at all  []  2. Occasionally  [x]  3. Frequently  []  4. Almost constantly  []  8. Unable to answer    Pain Interference with Day-to-Day Activities: \"Over the past 5 days, how often have you limited your day-to-day activities (excluding rehabilitation therapy session)? \"  []  1. Rarely or not at all  []  2. Occasionally  [x]  3. Frequently  []  4. Almost constantly  []  8. Unable to answer    Nutritional Approaches  Check all of the following nutritional approaches that apply on admission:  []  A. Parenteral/IV feeding (including IV fluids if needed for hydration, but not as part of dialysis/chemo)  []  B. Feeding tube (e.g., nasogastric or abdominal (PEG))  []  C. Mechanically altered diet - requires change in texture of food or liquids (e.g., pureed food, thickened liquids)  []  D. Therapeutic diet (e.g., low salt, diabetic, low cholesterol)  [x]  Z.   None of the above    High Risk Drug Classes:  Use and Indication    Is taking: Check if the pt is taking any medications by pharmacological classification, not how it is used, in the following classes  Indication noted: If column 1 is checked, check if there is an indication noted for all meds in the drug class Is taking  (check all that apply) Indication noted (check all that apply)   Antipsychotic [] []   Anticoagulant [] []   Antibiotic [] []   Opioid [x] []   Antiplatelet [x] []   Hypoglycemic (including insulin) [] []   None of the above []     Special Treatments, Procedures, and Programs    Check all of the following treatments, procedures, and programs that apply on admission. On admission (check all that apply)   Cancer Treatments   A1. Chemotherapy [x]           A2. IV []           A3. Oral [x]           A10. Other []   B1. Radiation [x]   Respiratory Therapies   C1. Oxygen Therapy [x]           C2. Continuous (continuously for at least 14 hours per day) []           C3. Intermittent [x]           C4. High-concentration []   D1. Suctioning (Does not include oral suctioning) []           D2. Scheduled []           D3. As needed []   E1. Tracheostomy Care []   F1. Invasive Mechanical Ventilator (ventilator or respirator) []   G1. Non-invasive Mechanical Ventilator []           G2. BiPAP []           G3. CPAP []   Other   H1. IV Medications (Do not include sub Q pumps, flushes, Dextrose 50% or lactated ringers) []           H2. Vasoactive medications []           H3. Antibiotics []           H4. Anticoagulation []           H10. Other []   I1. Transfusions []   J1. Dialysis []           J2. Hemodialysis []           J3. Peritoneal dialysis []   O1. IV access (including a catheter in a vein) []           O2. Peripheral [x]           O3. Midline []           O4. Central (PICC, tunneled, port) []      None of the above (select if no Cancer, Respiratory, or Other boxes are checked) []     The above items have been reviewed and updated as necessary, and are accurate for the admission assessment period.     Reviewing RN:   Es Slaughter Bianca JAMESON & Raudel Alvarez RN

## 2022-12-04 NOTE — PROGRESS NOTES
The [facility]   After review, this patient is felt to be:       [x]  Appropriate for Acute Inpatient Rehab pending medically stable and open beds    []  Appropriate for Acute Inpatient Rehab Pending Insurance Authorization    []  Not appropriate for Acute Inpatient Rehab    []  Referral received and ARU reviewing patient      Pt in agreement per . Will notify CM/SW with further updates. Thank you for the referral.     Anne Rudolph RN, BSN.   ARU Clinical Liaison  [facility]  Phone: [phone]  Fax: [phone]

## 2022-12-04 NOTE — PLAN OF CARE
Problem: Safety - Adult  Goal: Free from fall injury  Outcome: Progressing     Problem: Pain  Goal: Verbalizes/displays adequate comfort level or baseline comfort level  Outcome: Progressing     Problem: Skin/Tissue Integrity  Goal: Absence of new skin breakdown  Description: 1. Monitor for areas of redness and/or skin breakdown  2. Assess vascular access sites hourly  3. Every 4-6 hours minimum:  Change oxygen saturation probe site  4. Every 4-6 hours:  If on nasal continuous positive airway pressure, respiratory therapy assess nares and determine need for appliance change or resting period.   Outcome: Progressing     Problem: ABCDS Injury Assessment  Goal: Absence of physical injury  Outcome: Progressing     Problem: Musculoskeletal - Adult  Goal: Return mobility to safest level of function  Outcome: Progressing     Problem: Musculoskeletal - Adult  Goal: Return ADL status to a safe level of function  Outcome: Progressing

## 2022-12-04 NOTE — PROGRESS NOTES
Patient was oriented to the Call Light, Phone, TV, Thermostat, Bed Controls, Bathroom and Emergency Cord. Patient verbalized and demonstrated understanding of all. Patient was also given an over view of Unit Routines for Acute Rehab. Meal times were explained; including how to order food. The white board, (which is posted on the wall by the door is used for communication) has the Therapy Scheduled that is posted each day along with the name of your doctor, nurse, and therapist for your convenience. We recommend any family that will be care givers or any care givers the patient has, take part in therapy. We have no set visiting hours, we suggest non-caregiver friends and family visitors come after therapy (at 4 PM or later) to allow patient to rest in between sessions. Nehal Hu RN [facility] gave this RN chemo med and B complex. This RN placed in cart meds.

## 2022-12-04 NOTE — PROGRESS NOTES
Discharge teaching and instructions for diagnosis of pelvic ring fracture completed with patient using teachback method. AVS reviewed. Medications, dosages, schedule, and potential side effects reviewed with patient. Patient voiced understanding regarding prescriptions, follow up appointments, and care of self at home. Discharged in a wheelchair to  ARU per  RN    Verified appropriate follow up appointments scheduled & pt instructed on how to schedule appts. Diet & activity discussed. Patient verbalized understanding and questions were answered to her satisfaction. Signed discharge instructions were given to the patient and a copy placed in the paper-lite chart.       Gee Sal RN

## 2022-12-04 NOTE — PROGRESS NOTES
NURSING ASSESSMENT: ARU ADMISSION  The [address] Dx/Hx: History of pelvic fracture [Z87.81]   :1940  SUGAR:[id number]  Date of Admit: 2022  Room #: 3108/3108-01    Subjective:   Patient admitted to [id number] from 35[facility] via [facility]. Alert and oriented x4. Oriented to room and call light system. Oriented to rehab routine and therapy schedules. Informed about care conferences and ordering of meals. Drug / Medication Review:   Medications were reviewed by RN at time of admission  [x]  No potential or actual clinically significant medication issues were noted.      []   Yes, a clinically significant medication issue was identified                 []  Adverse Drug Event:                  []  Allergy:                  []  Side Effect:                  []  Ineffective Therapy:                  []  Drug Interaction:                 []  Duplicated Therapy:                 []  Untreated Indication:                  []  Non-adherence:                 []  Other:                  Nursing/Pharmacy contacted the physician:     Date:              Time:                  Actions recommended by physician were completed:   Date :            Time:    4 Eyes Skin Assessment   The patient is being assessed for: Admission     I agree that 2 RN's have performed a thorough Head to Toe Skin Assessment on the patient. ALL assessment sites listed below have been assessed. Areas assessed by both nurses:   [x]   Head, Face, and Ears   [x]   Shoulders, Back, and Chest, Abdomen  [x]   Arms, Elbows, and Hands   [x]   Coccyx, Sacrum, and Ischium  [x]   Legs, Feet, and Heel     Does the Patient have Skin Breakdown?   Yes pink area on labia, pink area chest under breast area         Edson Prevention initiated:   No  Wound Care Orders initiated: Not Applicable      [facility] nurse consulted for Pressure Injury (Stage 3,4, Unstageable, DTI, NWPT, Complex wounds)and New or Established Ostomies Not Applicable    Primary Nurse eSignature: Angela Ewing RN  Co-signer eSignature: VEE Garcia RN

## 2022-12-04 NOTE — PLAN OF CARE
Problem: Safety - Adult  Goal: Free from fall injury  12/4/2022 1812 by Ken Davis RN  Outcome: Adequate for Discharge     Problem: Pain  Goal: Verbalizes/displays adequate comfort level or baseline comfort level  12/4/2022 1812 by Ken Davis RN  Outcome: Adequate for Discharge     Problem: Skin/Tissue Integrity  Goal: Absence of new skin breakdown  Description: 1. Monitor for areas of redness and/or skin breakdown  2. Assess vascular access sites hourly  3. Every 4-6 hours minimum:  Change oxygen saturation probe site  4. Every 4-6 hours:  If on nasal continuous positive airway pressure, respiratory therapy assess nares and determine need for appliance change or resting period.   12/4/2022 1812 by Ken Davis RN  Outcome: Adequate for Discharge     Problem: ABCDS Injury Assessment  Goal: Absence of physical injury  12/4/2022 1812 by Ken Davis RN  Outcome: Adequate for Discharge     Problem: Musculoskeletal - Adult  Goal: Return mobility to safest level of function  12/4/2022 1812 by Ken Davis RN  Outcome: Adequate for Discharge     Problem: Musculoskeletal - Adult  Goal: Return ADL status to a safe level of function  12/4/2022 1812 by Ken Davis RN  Outcome: Adequate for Discharge

## 2022-12-05 LAB
ANION GAP SERPL CALCULATED.3IONS-SCNC: 9 MMOL/L (ref 3–16)
ANISOCYTOSIS: ABNORMAL
ANISOCYTOSIS: ABNORMAL
ATYPICAL LYMPHOCYTE RELATIVE PERCENT: 17 % (ref 0–6)
ATYPICAL LYMPHOCYTE RELATIVE PERCENT: 2 % (ref 0–6)
BASOPHILS ABSOLUTE: 0 K/UL (ref 0–0.2)
BASOPHILS ABSOLUTE: 0 K/UL (ref 0–0.2)
BASOPHILS RELATIVE PERCENT: 0 %
BASOPHILS RELATIVE PERCENT: 0 %
BLOOD CULTURE, ROUTINE: NORMAL
BUN BLDV-MCNC: 8 MG/DL (ref 7–20)
CALCIUM SERPL-MCNC: 8.6 MG/DL (ref 8.3–10.6)
CHLORIDE BLD-SCNC: 100 MMOL/L (ref 99–110)
CO2: 26 MMOL/L (ref 21–32)
CREAT SERPL-MCNC: <0.5 MG/DL (ref 0.6–1.2)
EOSINOPHILS ABSOLUTE: 0 K/UL (ref 0–0.6)
EOSINOPHILS ABSOLUTE: 0 K/UL (ref 0–0.6)
EOSINOPHILS RELATIVE PERCENT: 0 %
EOSINOPHILS RELATIVE PERCENT: 0 %
GFR SERPL CREATININE-BSD FRML MDRD: >60 ML/MIN/{1.73_M2}
GLUCOSE BLD-MCNC: 98 MG/DL (ref 70–99)
HCT VFR BLD CALC: 26.4 % (ref 36–48)
HCT VFR BLD CALC: 28.8 % (ref 36–48)
HEMATOLOGY PATH CONSULT: YES
HEMOGLOBIN: 9 G/DL (ref 12–16)
HEMOGLOBIN: 9.4 G/DL (ref 12–16)
LYMPHOCYTES ABSOLUTE: 12.9 K/UL (ref 1–5.1)
LYMPHOCYTES ABSOLUTE: 18.3 K/UL (ref 1–5.1)
LYMPHOCYTES RELATIVE PERCENT: 61 %
LYMPHOCYTES RELATIVE PERCENT: 81 %
MACROCYTES: ABNORMAL
MACROCYTES: ABNORMAL
MCH RBC QN AUTO: 34.5 PG (ref 26–34)
MCH RBC QN AUTO: 35.5 PG (ref 26–34)
MCHC RBC AUTO-ENTMCNC: 32.7 G/DL (ref 31–36)
MCHC RBC AUTO-ENTMCNC: 34 G/DL (ref 31–36)
MCV RBC AUTO: 104.2 FL (ref 80–100)
MCV RBC AUTO: 105.6 FL (ref 80–100)
MONOCYTES ABSOLUTE: 0.3 K/UL (ref 0–1.3)
MONOCYTES ABSOLUTE: 1.4 K/UL (ref 0–1.3)
MONOCYTES RELATIVE PERCENT: 2 %
MONOCYTES RELATIVE PERCENT: 6 %
NEUTROPHILS ABSOLUTE: 2.3 K/UL (ref 1.7–7.7)
NEUTROPHILS ABSOLUTE: 3.7 K/UL (ref 1.7–7.7)
NEUTROPHILS RELATIVE PERCENT: 15 %
NEUTROPHILS RELATIVE PERCENT: 16 %
PDW BLD-RTO: 15.9 % (ref 12.4–15.4)
PDW BLD-RTO: 15.9 % (ref 12.4–15.4)
PLATELET # BLD: 132 K/UL (ref 135–450)
PLATELET # BLD: 95 K/UL (ref 135–450)
PLATELET SLIDE REVIEW: ABNORMAL
PLATELET SLIDE REVIEW: ABNORMAL
PMV BLD AUTO: 10.3 FL (ref 5–10.5)
PMV BLD AUTO: 10.5 FL (ref 5–10.5)
POTASSIUM REFLEX MAGNESIUM: 3.6 MMOL/L (ref 3.5–5.1)
RBC # BLD: 2.54 M/UL (ref 4–5.2)
RBC # BLD: 2.73 M/UL (ref 4–5.2)
SODIUM BLD-SCNC: 135 MMOL/L (ref 136–145)
WBC # BLD: 15.5 K/UL (ref 4–11)
WBC # BLD: 23.4 K/UL (ref 4–11)

## 2022-12-05 PROCEDURE — 97535 SELF CARE MNGMENT TRAINING: CPT

## 2022-12-05 PROCEDURE — 1280000000 HC REHAB R&B

## 2022-12-05 PROCEDURE — 36415 COLL VENOUS BLD VENIPUNCTURE: CPT

## 2022-12-05 PROCEDURE — 2580000003 HC RX 258: Performed by: PHYSICAL MEDICINE & REHABILITATION

## 2022-12-05 PROCEDURE — 99222 1ST HOSP IP/OBS MODERATE 55: CPT | Performed by: PHYSICAL MEDICINE & REHABILITATION

## 2022-12-05 PROCEDURE — 6370000000 HC RX 637 (ALT 250 FOR IP): Performed by: PHYSICAL MEDICINE & REHABILITATION

## 2022-12-05 PROCEDURE — 97166 OT EVAL MOD COMPLEX 45 MIN: CPT

## 2022-12-05 PROCEDURE — 80048 BASIC METABOLIC PNL TOTAL CA: CPT

## 2022-12-05 PROCEDURE — 97530 THERAPEUTIC ACTIVITIES: CPT

## 2022-12-05 PROCEDURE — 85025 COMPLETE CBC W/AUTO DIFF WBC: CPT

## 2022-12-05 RX ORDER — FAMOTIDINE 20 MG/1
20 TABLET, FILM COATED ORAL 2 TIMES DAILY
Status: DISCONTINUED | OUTPATIENT
Start: 2022-12-05 | End: 2022-12-16

## 2022-12-05 RX ORDER — PANTOPRAZOLE SODIUM 40 MG/1
40 TABLET, DELAYED RELEASE ORAL
Status: DISCONTINUED | OUTPATIENT
Start: 2022-12-06 | End: 2022-12-05

## 2022-12-05 RX ADMIN — SODIUM CHLORIDE 15 ML: 900 IRRIGANT IRRIGATION at 08:07

## 2022-12-05 RX ADMIN — HYDROCODONE BITARTRATE AND ACETAMINOPHEN 1 TABLET: 5; 325 TABLET ORAL at 20:34

## 2022-12-05 RX ADMIN — Medication 5000 UNITS: at 08:51

## 2022-12-05 RX ADMIN — SENNOSIDES AND DOCUSATE SODIUM 2 TABLET: 50; 8.6 TABLET ORAL at 08:51

## 2022-12-05 RX ADMIN — SODIUM CHLORIDE 15 ML: 900 IRRIGANT IRRIGATION at 20:35

## 2022-12-05 RX ADMIN — SODIUM CHLORIDE 15 ML: 900 IRRIGANT IRRIGATION at 12:05

## 2022-12-05 RX ADMIN — MIDODRINE HYDROCHLORIDE 10 MG: 5 TABLET ORAL at 17:29

## 2022-12-05 RX ADMIN — SODIUM CHLORIDE, PRESERVATIVE FREE 10 ML: 5 INJECTION INTRAVENOUS at 20:35

## 2022-12-05 RX ADMIN — Medication 500 MG: at 08:52

## 2022-12-05 RX ADMIN — ALLOPURINOL 300 MG: 300 TABLET ORAL at 08:51

## 2022-12-05 RX ADMIN — Medication 1 CAPSULE: at 08:52

## 2022-12-05 RX ADMIN — FAMOTIDINE 20 MG: 20 TABLET ORAL at 13:24

## 2022-12-05 RX ADMIN — SODIUM CHLORIDE 15 ML: 900 IRRIGANT IRRIGATION at 17:29

## 2022-12-05 RX ADMIN — HYDROCODONE BITARTRATE AND ACETAMINOPHEN 1 TABLET: 5; 325 TABLET ORAL at 12:05

## 2022-12-05 RX ADMIN — APIXABAN 2.5 MG: 5 TABLET, FILM COATED ORAL at 08:51

## 2022-12-05 RX ADMIN — APIXABAN 2.5 MG: 5 TABLET, FILM COATED ORAL at 20:34

## 2022-12-05 RX ADMIN — SODIUM CHLORIDE, PRESERVATIVE FREE 10 ML: 5 INJECTION INTRAVENOUS at 08:53

## 2022-12-05 RX ADMIN — MORPHINE SULFATE 15 MG: 15 TABLET ORAL at 15:32

## 2022-12-05 RX ADMIN — OXYCODONE HYDROCHLORIDE AND ACETAMINOPHEN 1000 MG: 500 TABLET ORAL at 08:51

## 2022-12-05 RX ADMIN — THERA TABS 1 TABLET: TAB at 08:51

## 2022-12-05 RX ADMIN — HYDROCODONE BITARTRATE AND ACETAMINOPHEN 1 TABLET: 5; 325 TABLET ORAL at 07:11

## 2022-12-05 RX ADMIN — FAMOTIDINE 20 MG: 20 TABLET ORAL at 20:34

## 2022-12-05 RX ADMIN — MIDODRINE HYDROCHLORIDE 10 MG: 5 TABLET ORAL at 08:03

## 2022-12-05 RX ADMIN — MIDODRINE HYDROCHLORIDE 10 MG: 5 TABLET ORAL at 12:05

## 2022-12-05 ASSESSMENT — PAIN SCALES - GENERAL
PAINLEVEL_OUTOF10: 6
PAINLEVEL_OUTOF10: 10
PAINLEVEL_OUTOF10: 7
PAINLEVEL_OUTOF10: 10
PAINLEVEL_OUTOF10: 8
PAINLEVEL_OUTOF10: 10
PAINLEVEL_OUTOF10: 5
PAINLEVEL_OUTOF10: 5
PAINLEVEL_OUTOF10: 6

## 2022-12-05 ASSESSMENT — PAIN DESCRIPTION - ORIENTATION
ORIENTATION: RIGHT;LEFT
ORIENTATION: LEFT

## 2022-12-05 ASSESSMENT — PAIN - FUNCTIONAL ASSESSMENT
PAIN_FUNCTIONAL_ASSESSMENT: ACTIVITIES ARE NOT PREVENTED
PAIN_FUNCTIONAL_ASSESSMENT: PREVENTS OR INTERFERES SOME ACTIVE ACTIVITIES AND ADLS

## 2022-12-05 ASSESSMENT — PAIN DESCRIPTION - DESCRIPTORS
DESCRIPTORS: ACHING

## 2022-12-05 ASSESSMENT — PAIN DESCRIPTION - PAIN TYPE
TYPE: ACUTE PAIN

## 2022-12-05 ASSESSMENT — PAIN DESCRIPTION - FREQUENCY
FREQUENCY: CONTINUOUS
FREQUENCY: INTERMITTENT
FREQUENCY: CONTINUOUS

## 2022-12-05 ASSESSMENT — PAIN DESCRIPTION - ONSET
ONSET: ON-GOING
ONSET: GRADUAL

## 2022-12-05 ASSESSMENT — PAIN DESCRIPTION - LOCATION
LOCATION: HEAD
LOCATION: HIP

## 2022-12-05 NOTE — PLAN OF CARE
Problem: Discharge Planning  Goal: Discharge to home or other facility with appropriate resources  Outcome: Progressing  Flowsheets  Taken 12/5/2022 1815  Discharge to home or other facility with appropriate resources:   Identify barriers to discharge with patient and caregiver   Arrange for needed discharge resources and transportation as appropriate   Identify discharge learning needs (meds, wound care, etc)  Taken 12/5/2022 0755  Discharge to home or other facility with appropriate resources: Identify barriers to discharge with patient and caregiver     Problem: Safety - Adult  Goal: Free from fall injury  Outcome: Progressing  Flowsheets (Taken 12/5/2022 1815)  Free From Fall Injury: Instruct family/caregiver on patient safety  Note: Pt remains free of falls thus far this shift. All fall precautions in place and functioning properly.       Problem: Pain  Goal: Verbalizes/displays adequate comfort level or baseline comfort level  Outcome: Progressing  Flowsheets (Taken 12/5/2022 1815)  Verbalizes/displays adequate comfort level or baseline comfort level:   Encourage patient to monitor pain and request assistance   Assess pain using appropriate pain scale   Administer analgesics based on type and severity of pain and evaluate response  Note: Patient's pain is controlled with current regime

## 2022-12-05 NOTE — PROGRESS NOTES
Comprehensive Nutrition Assessment    RECOMMENDATIONS:  PO Diet: Regular, Low Microbial  ONS: Ensure Enlive nelson QD  Nutrition Education: No recommendation at this time       NUTRITION ASSESSMENT:   Nutritional summary & status: Consult for ONS. Pt new to ARU. Pt reports good appetite and denies weight loss prior to admission. Receiving a Regular, Low Microbial Diet with varied meal intakes of 1-100%. Stated UBW of 103 lbs. Noted wt of 100 lbs(10/14), indicating no loss. Nutrition status compromised due to varied po intake. Agreeable to trial Chocolate ensure for added nutrition during stay. Will continue to monitor po intake and acceptance of ONS. Admission/PMH: Admit; Pelvic ring fx due to OP  PMHx; CLL, ITP, Cardiac PVC with hypotensioon    MALNUTRITION ASSESSMENT  Context of Malnutrition: Acute Illness   Malnutrition Status: At risk for malnutrition (Comment)  Findings of the 6 clinical characteristics of malnutrition (Minimum of 2 out of 6 clinical characteristics is required to make the diagnosis of moderate or severe Protein Calorie Malnutrition based on AND/ASPEN Guidelines):  Energy Intake:  Mild decrease in energy intake (Comment)  Weight Loss:  No significant weight loss          NUTRITION DIAGNOSIS   Predicted inadequate energy intake related to increase demand for energy/nutrients as evidenced by intake 0-25%, intake 51-75%, other (comment) (extended hospital stay(rehab))    Nutrition Monitoring and Evaluation:   Food/Nutrient Intake Outcomes:  Food and Nutrient Intake, Supplement Intake  Physical Signs/Symptoms Outcomes:  Biochemical Data, Weight     OBJECTIVE DATA: Significant to nutrition assessment  Nutrition Related Findings: No edema. c/o constipation ( senokot qd). Glu 103. Lytes wnl. Wounds: None  Nutrition Goals: PO intake 75% or greater, within 7 days     CURRENT NUTRITION THERAPIES  ADULT DIET;  Regular; Low Microbial  ADULT ORAL NUTRITION SUPPLEMENT; Dinner; Standard High Calorie/High Protein Oral Supplement  PO Intake: %, 51-75%, 1-25%   PO Supplement Intake:None Ordered  Additional Sources of Calories/IVF:n/a     ANTHROPOMETRICS  Current Height: 5' 1\" (154.9 cm)  Current Weight: 112 lb 3.4 oz (50.9 kg)    Admission weight: 110 lb 3.7 oz (50 kg)  Ideal Body Weight (IBW): 105 lbs  (48 kg)    Usual Bodyweight   103 lbs  BMI: 21.2    COMPARATIVE STANDARDS  Energy (kcal):  2119-8245 (25-30 kcal/CBW/50 kg)     Protein (g):  60-70 (1.2-1.4 gm/CBW/50 kg)       Fluid (mL/day):  1250 1500 (1ml/kcal) or per provider    The patient will be monitored per nutrition standards of care. Consult dietitian if additional nutrition interventions are needed prior to RD reassessment.      Wale Baxter, [address]:  [phone]  Office:  [phone]

## 2022-12-05 NOTE — PROGRESS NOTES
Alert. Oriented. Bps remain soft in the upper 80s to upper 90s. Asymptomatic. Encourage fluids. Remainder of VSS on RA. Rating L hip aching pain at 9-10/10 with movement, 5-6/10 at rest and tolerable. Stand pivot transfer x 2. Participated in therapy. Assessment complete. No new issues. Bed alarm in place, call light within reach.

## 2022-12-05 NOTE — PLAN OF CARE
Problem: Discharge Planning  Goal: Discharge to home or other facility with appropriate resources  Recent Flowsheet Documentation  Taken 12/4/2022 2016 by Kaitlyn Slaughter RN  Discharge to home or other facility with appropriate resources: Identify barriers to discharge with patient and caregiver  Taken 12/4/2022 2015 by Kaitlyn Slaughter RN  Discharge to home or other facility with appropriate resources:   Identify barriers to discharge with patient and caregiver   Refer to discharge planning if patient needs post-hospital services based on physician order or complex needs related to functional status, cognitive ability or social support system     Problem: ABCDS Injury Assessment  Goal: Absence of physical injury  Recent Flowsheet Documentation  Taken 12/4/2022 2044 by Kaitlyn Slaughter RN  Absence of Physical Injury: Implement safety measures based on patient assessment  Taken 12/4/2022 2012 by Kaitlyn Slaughter RN  Absence of Physical Injury: Implement safety measures based on patient assessment

## 2022-12-05 NOTE — PROGRESS NOTES
impaired mobility, transfers, ADL  Prognosis: Good  Decision Making: Medium Complexity  REQUIRES OT FOLLOW-UP: Yes  Activity Tolerance  Activity Tolerance: Patient Tolerated treatment well  Activity Tolerance Comments: anxious        Plan   Occupational Therapy Plan  Times Per Week: 5x/week, 90min/day  Current Treatment Recommendations: Strengthening, Balance training, Functional mobility training, Endurance training, Safety education & training, Patient/Caregiver education & training, Pain management, Self-Care / ADL, Home management training, Equipment evaluation, education, & procurement, ROM     Restrictions  Position Activity Restriction  Other position/activity restrictions: PWB TTWB LLE    Subjective   General  Chart Reviewed: Yes  Patient assessed for rehabilitation services?: Yes  Additional Pertinent Hx: 82 yo F with h/o CLL currently being treated with Acalabrutinib. Patient PMH includes osteopenia, hypogammaglobunemia, and ITP/hemolytic anemia. Patient has been feeling well but unfortunately had a fall today which caused severe pain. Patient was seen in ER and CT A/P and lumbar spine were performed which showed mildly displaced pelvic fracture involving the left pubic body extending into the superior and inferior pubic rami with surrounding stranding likely representing a component of hemorrhage in the extraperitoneal space of Retzius. Minimally displaced left sacral alar fracture. Orthopedic surgery was consulted and noted this was a stable injury that does not require any surgical intervention. Patient is on toe touch weightbearing to the left lower extremity. Admitted to ARU on 12/4/22  Family / Caregiver Present: No  Referring Practitioner: Ana Giles DO  Diagnosis: History of pelvic fracture  Subjective  Subjective: In bed upon arrival reporting \"I am just so nervous, this is scary\".  Emotional support provided and pt pleasant and agreeable to OT session     Social/Functional History  Social/Functional History  Lives With: Alone  Type of Home: House  Home Layout: Able to Live on Main level with bedroom/bathroom, One level (one level w/ finished basement (does not have any reson to go downstairs))  Home Access: Stairs to enter with rails  Entrance Stairs - Number of Steps: 2 AZUCENA through garage w/ Right hand rail;  or 1 + 1 through front door  Entrance Stairs - Rails: Right  Bathroom Shower/Tub: Walk-in shower (pt typically stands)  Bathroom Toilet: Standard  Bathroom Equipment: Built-in shower seat  Bathroom Accessibility: Walker accessible  Home Equipment:  (none)  Has the patient had two or more falls in the past year or any fall with injury in the past year?: No (other than pta)  ADL Assistance: [address] [place] [address] Avenue: Independent  Homemaking Responsibilities: Yes  Ambulation Assistance: Independent  Transfer Assistance: Independent  Active : Yes  Mode of Transportation: Car  Occupation: Retired  Type of Occupation: teaching  Leisure & Hobbies: volunteering at "[employer]", [place], going out to lunch  Additional Comments: Pt reports that her sister is able to provide 24 hr supervision, pt has 3 children in town that will piece together 24 hr assist.       Objective   Heart Rate: 83  Heart Rate Source: Monitor  BP: (!) 99/53  BP Location: Left upper arm  BP Method: Automatic  MAP (Calculated): 68  Resp: 16  SpO2: 97 %  O2 Device: None (Room air)             Safety Devices  Type of Devices: All fall risk precautions in place;Call light within reach; Chair alarm in place;Gait belt;Left in chair;Nurse notified     Toilet Transfers  Toilet - Technique: Stand pivot (w/ RW)  Equipment Used: Raised toilet seat with rails  Toilet Transfer: Moderate assistance  Shower Transfers  Shower - Transfer From: Wheelchair  Shower - Transfer Type: To and From  Shower - Transfer To: Transfer tub bench  Shower - Technique: Stand pivot (w/ use of GBs)  Shower Transfers:  Moderate assistance  AROM: Generally decreased, functional (R shoulder limited at baseline d/t previous rotator cuff injury/surgery)  Strength: Generally decreased, functional (R shoulder limited at baseline d/t previous rotator cuff injury/surgery)  Coordination: Within functional limits  Tone: Normal  Sensation: Intact  ADL  Feeding: Setup  Feeding Skilled Clinical Factors: Pt setup w/ breakfast at end of session  Grooming: Setup;Stand by assistance; Increased time to complete  Grooming Skilled Clinical Factors: Sitting on TTB to wash face and hair w/ setup assist and SBA for sitting balance. Pt sitting in wc to complete oral hygiene at sink w/ setup assist. Pt reports completing grooming tasks in stance at baseline, unsafe to attempt this date d/t decreased standing balance and TTWB to LLE  UE Bathing: Setup;Stand by assistance; Increased time to complete  UE Bathing Skilled Clinical Factors: Pt seated on TTB to complete w/ setup assist and SBA for sitting balance. Pt reports completing bathing in stance at baseline, however has access to built-in shower seat at home  LE Bathing: Moderate assistance;Verbal cueing; Increased time to complete;Setup  LE Bathing Skilled Clinical Factors: Pt seated on TTB to wash/dry BLEs and front parminder area w/ setup assist and SBA for sitting balance. Assist required to wash/dry BL feet while seated on TTB. Pt in stance at [facility] w/ CGA w/ total A to wash/dry buttocks. Pt reports completing bathing in stance at baseline, however has access to built-in shower seat at home  UE Dressing: Minimal assistance; Increased time to complete;Setup;Verbal cueing  UE Dressing Skilled Clinical Factors: doffed gown w/ min A to untie in back. Pt doffed/donned t-shirt seated on TTB w/ SBA and setup. LE Dressing: Maximum assistance;Setup;Verbal cueing; Increased time to complete  LE Dressing Skilled Clinical Factors: pt donned/doffed R slip on shoe while seated w/ CGA for dynamic sitting balance.  Assist provided to thread/unthread BLE into/out of briefs and pants while seated. Pt in stance w/ unilateral UE support on GB or RW w/ CGA to complete clothing mgmt at hips. Toileting: Dependent/Total  Toileting Skilled Clinical Factors: Pt incontinent of urine in brief upon arrival, pt simulated toileting w/ CGA in stance for clothing mgmt and assist to complete rear parminder hygiene in stance during bathing     Activity Tolerance  Activity Tolerance: Patient tolerated treatment well  Activity Tolerance Comments: Sitting BP 88/46 in sitting Asympomatic, After activity: 96/56  Bed mobility  Supine to Sit: Minimal assistance (HOB elevated, use of bedrail, cues for technique, increased time)  Scooting: Stand by assistance (to scoot back in recliner in sitting, pt performing via shifting weight to side and sliding hip back)  Transfers  Stand Pivot Transfers: Minimal assistance (using RW)  Sit to stand:  Moderate assistance  Stand to sit: Moderate assistance  Transfer Comments: VC for hand placement and to sequence  Vision  Vision: Impaired  Vision Exceptions:  (\"I only wear glasses when I watch TV\")  Hearing  Hearing: Within functional limits  Cognition  Overall Cognitive Status: WFL  Orientation  Overall Orientation Status: Within Normal Limits  Orientation Level: Oriented X4  Perception  Overall Perceptual Status: WFL                  LUE AROM (degrees)  LUE AROM : WFL  Left Hand AROM (degrees)  Left Hand AROM: WFL  RUE AROM (degrees)  RUE General AROM: shoulder limited at baseline  Right Hand AROM (degrees)  Right Hand AROM: WFL                  Goals  Short Term Goals  Time Frame for Short Term Goals: 7 days  Short Term Goal 1: Pt will complete LE dressing w/ CGA and use of AE prn  Short Term Goal 2: Pt will complete bathing w/ CGA  Short Term Goal 3: Pt will complete toilet transfer w/ CGA  Short Term Goal 4: Pt will complete toileting w/ CGA  Short Term Goal 5: Pt will complete BUE HEP [id number] to increase strength required for functional mobility/transfers  Long Term Goals  Time Frame for Long Term Goals : 14 days  Long Term Goal 1: Pt will complete shower transfer w/ Mod I  Long Term Goal 2: Pt will complete LE dressing w/ Mod I and use of AE prn  Long Term Goal 3: Pt will complete bathing w/ Mod I and use of AE prn  Long Term Goal 4: Pt will complete toileting/ toilet transfer w/ Mod I  Long Term Goal 5: Pt will complete simple meal prep task w/ Mod I  Patient Goals   Patient goals :  \"To be independent again\"       Therapy Time   Individual Concurrent Group Co-treatment   Time In 0730         Time Out 0900         Minutes 90         Timed Code Treatment Minutes: [address], OT

## 2022-12-05 NOTE — PROGRESS NOTES
[address] [employer] Progress Note    2022     River Henning    MRN: [id number]    : 1940    Referring MD: No referring provider defined for this encounter. SUBJECTIVE: working with PT, still has hypotension    ECOG PS:  (3) Capable of limited self-care, confined to bed or chair > 50% of waking hours    KPS: 40% Disabled; requires special care and assistance    Isolation: None    Medications    Scheduled Meds:   Acalabrutinib  1 capsule Oral Q12H    allopurinol  300 mg Oral Daily    apixaban  2.5 mg Oral BID    vitamin C  1,000 mg Oral Daily    b complex vitamins  1 capsule Oral Daily    calcium elemental  500 mg Oral Daily    midodrine  10 mg Oral TID WC    multivitamin  1 tablet Oral Daily    Saline Mouthwash  15 mL Swish & Spit 4x Daily AC & HS    sennosides-docusate sodium  2 tablet Oral Daily    sodium chloride flush  5-40 mL IntraVENous 2 times per day    Vitamin D  5,000 Units Oral Daily     Continuous Infusions:      PRN Meds:.diphenhydramine, HYDROcodone 5 mg - acetaminophen, morphine, ondansetron, prochlorperazine, Saline Mouthwash, sodium chloride flush, acetaminophen, bisacodyl, magnesium hydroxide, polyethylene glycol    ROS:  As noted above, otherwise remainder of 10-point ROS negative    Physical Exam:     I&O:    Intake/Output Summary (Last 24 hours) at 2022 0914  Last data filed at 2022 0853  Gross per 24 hour   Intake 5 ml   Output --   Net 5 ml         Vital Signs:  BP (!) 98/55   Pulse 83   Temp 98.9 °F (37.2 °C) (Oral)   Resp 18   Ht 5' 1\" (1.549 m)   Wt 112 lb 3.4 oz (50.9 kg)   LMP  (LMP Unknown)   SpO2 93%   BMI 21.20 kg/m²     Weight:    Wt Readings from Last 3 Encounters:   22 112 lb 3.4 oz (50.9 kg)   22 108 lb 0.4 oz (49 kg)   10/14/22 100 lb (45.4 kg)       General: Awake, alert and oriented.   HEENT: normocephalic, PERRL, no scleral erythema or icterus, Oral mucosa moist and intact, throat clear  NECK: supple without palpable adenopathy  BACK: Straight SKIN: warm dry and intact without lesions rashes or masses  CHEST: CTA bilaterally without use of accessory muscles  CV: Normal S1 S2, RRR, no MRG  ABD: NT ND normoactive BS, no palpable masses or hepatosplenomegaly  EXTREMITIES: without edema, denies calf tenderness  NEURO: CN II - XII grossly intact  CATHETER: PIV    Data    CBC:   Recent Labs     12/03/22  0439 12/04/22  0506 12/05/22  0740   WBC 14.0* 17.4* 15.5*   HGB 8.7* 9.4* 9.0*   HCT 25.7* 27.0* 26.4*   .3* 104.0* 104.2*   PLT 68* 74* 95*       BMP/Mag:  Recent Labs     12/03/22  0439 12/04/22  0506 12/05/22  0740    139 135*   K 3.4* 3.7 3.6    105 100   CO2 24 28 26   BUN 7 7 8   CREATININE <0.5* <0.5* <0.5*       LIVP: No results for input(s): AST, ALT, LIPASE, BILIDIR, BILITOT, ALKPHOS in the last 72 hours. Invalid input(s): AMYLASE,  ALB  Coags:   Recent Labs     12/04/22  0506   PROTIME 13.5   INR 1.04         PROBLEM LIST:             CLL  Osteopenia  ITP/hemolytic anemia  LGL      TREATMENT:            CLL  1. Ibrutinib  2. Acalabrutinib (11/1/2022)     LGL  MTX weekly (d/c 10/2022)      ASSESSMENT AND PLAN:            1A. 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 - Repeat FISH (1/21/20) - trisomy 12  - Flow cytometry (3/18/20) - Chronic lymphocytic leukemia and 4% TLGL cells. There is no strong evidence to suggest the possibility of a clonal TLGL population, further the low percentage of cells argues against this entity.  - PB smear (3/18/20) - Occasional spherocytes and slight increase in polychromasia indicating likely ongoing hemolysis. Leukocytes remarkable for monomorphic small lymphoid cells indicating the presence of chronic lymphocytic leukemia. There are also occasional forms showing granules. Platelets unremarkable  - PET 10/3/22:  Thoracic and A/P  lymphadenopathy.   Mild splenic enlargement and uptake suspicious for lymphoma.  - Lymph node biopsy 10/18/22:  Consistent with CLL/SLL; FISH negative  - CT A/P 12/01/22:  No abnormally enlarged lymph nodes in A/P     Biopsy of ax node C/W CLL - started acalabrutininb. Tolerating well with documented improvement. Cont acalabrutinib - Started 11/1/22   - she is responding on exam     1B. 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 - S/p MTX 10 mg weekly (started 3/21/20), increased to 12.5mg weekly (5/29/10) with neutropenia. Cont weekly on Fridays. Will hold ( after dose 10/21/22) with onset of acalabrutininb. 2. ID: No s/s of infection  - H/o recurrent acute sinusitis (follows up with ENT) - starting an antibiotic and steroids per ENT 5/10/22   - S/p COVID vaccines (2nd dose 2/18/21)   - COVID Ab (2/16/22): negative; Evusheld 4/1/22. She had a reaction and refuses repeat. - Schedule COVID  and flu immunizations  Hypogammaglobulinemia:   - Cont monthly immunoglobulin (started 1/4/19). Dose 1/19/22, 2/16/22, 4/13/22, 5/10/22, 6/10/22, 7/8/22  - Last IgG 10/21/22:  484     3. Heme: H/o hemolytic anemia and ITP. +Leukocytosis, Anemia, Thrombocytopenia 2/2 CLL  - Transfuse for hgb <7 and plt <10  - No transfusion today   ITP / hemolytic anemia:    - Recurrent hemolysis (1/21/20) - [place] IgG + with retic 2.8  - No Current Tx  Previous Tx:  - S/p 4 cycles Rituxan (completed 9/14/16) & IVIG 20 gm daily for 5 days (completed 10/10/16)  - S/p Rituxan weekly x 4 (last dose 9/11/17) w/ normalization of her hemoglobin. Last dose 4/8/20  - S/p Prednisone taper (stopped 10/2019)  - S/p Prednisone: 2/17/20 -> Stop 9/12/22  - Rituxan weekly x 4 - #1 (3/18/20), #2(3/25/20), #3 (4/1/20), #4 (4/8/20)    - Cont eliquis 2.5 mg BID for ppx given pelvic fracture, will monitor platelets - plts coming back up      4. Metabolic: HypoNa  - Encourage PO fluid intake     5.  MSK: +Osteoporosis and now admitted s/p traumatic fall 12/1/22 resulting in left pelvic ring fracture and sacral fracture with minimal displacement. H/o Osteoporosis   - worse than 2018. Prolia 12/19/22  Pelvis/Sacral fracture  - Orthopedic surgery consulted, no surgical intervention              - Toe touch weight bearing to LLL              - Cont DVT prophylaxis approximately 1 month s/p injury (January 2023)              - F/U 2 weeks as outpatient for repeat evaluation and imaging of pelvis (week on 12/14/22)  - Monitor hemoglobin closely for signs of bleeding  - MSIR 15 mg  PO prn  - Consult PT/OT - [facility] 14- will need w/c in home at this time with 24 hour  supervision/assist  - Cont DVT ppx with Lovenox and SCD  - Cont tx in ARU  Osteopenia/Osteoporosis:    - DEXA scan 08/2022:  Lumbar Spine - osteopenia. No significant change since 2018. Left femoral neck: Osteoporosis  Left hip: Osteoporosis. This corresponds to 5% worsening since 2018. 6. ENT: H/o rhinitis & followed by ENT  - Cont Flonase      7. Cardiac:  H/o PVCs. Symptomatic hypotension  - If heart fluttering sensation returns, will order holter monitor   - Telemetry x 48 hours (12/1/22)  Hypotension:  - Cortisol level WNL 11/21/22. Repeat 12/2 10.7 (nl)  - TSH 12/2 4.88, T4 1.1  - Cont midodrine 5 mg TID (started 12/01/22), increased to 10 mg TID 12/5  - Place cecil hose      8. Pulmonary: H/o pulm nodule on CT 7/22  - CT 7/2022.   Repeat CT 1/2023 after 3 months of acalabrutininb.    - DVT Prophylaxis: Cont apixaban 2.5mg BID    - Disposition: Per ARU     Maribell Ng, BRAULIO - CNP

## 2022-12-05 NOTE — PROGRESS NOTES
Physical Therapy  Facility/Department: [facility] UNIT  Rehabilitation Physical Therapy Initial Assessment    NAME: Roopa Galarza  : 1940 (60 y.o.)  MRN: [id number]  CODE STATUS: Full Code    Date of Service: 22      Past Medical History:   Diagnosis Date    Allergic rhinitis due to pollen 2016    Arthritis, degenerative 2011    Cancer (HCC)     chronic lymphocytic leukemia    CLL (chronic lymphoblastic leukemia)     Closed compression fracture of thoracic vertebra ([facility] [address] 2016    Depression     History of blood transfusion     Hyperlipidemia     10/14/22 -  no medication currently    Melanoma of upper arm ([facility] [address]         Mild reactive airways disease 2016    Sternum fx 2022    vaccuming     Past Surgical History:   Procedure Laterality Date    APPENDECTOMY      AXILLARY SURGERY Right 10/18/2022    RIGHT AXILLARY EXCISIONAL LYMPH NODE BIOPSY performed by Brandon Swanson MD at [address]    NASAL SINUS SURGERY      OTHER SURGICAL HISTORY      melanoma removal    SHOULDER SURGERY Right     Rotator cuff    TONSILLECTOMY         Chart Reviewed: Yes  Additional Pertinent Hx: Pt is an 81 y.o. female admitted to [facility] on 22 s/p fall at home after being lightheaded. CT pelvia: mildly displaced pelvic fx L pubic body. CT lumbar: neg. XR pelvis: L obturator ring fx. PMH: arthritis, cancer, CLL, hx of blood transfusion, HLD, melanoma upper arm. Referring Practitioner: Gurinder Giles DO  Diagnosis: History of pelvic fracture  General Comment  Comments: Pt found sitting in chair upon PT arrival.  Pt agreeable to therapy session. Restrictions:  Position Activity Restriction  Other position/activity restrictions: PWB TTWB LLE     SUBJECTIVE  Subjective: .        Post Treatment Pain Screening       Prior Level of Function:         OBJECTIVE  Vision  Vision: Impaired  Vision Exceptions:  (\"I only wear glasses when I watch TV\")    Hearing  Hearing: Within functional limits         ROM- RLE within functional limitations, unable to test LLE       Strength- RLE within functional limitations, unable to test LLE       Quality of Movement  Tone RLE  RLE Tone: Normotonic  Tone LLE  LLE Tone: Normotonic         Functional Mobility  Transfers  Sit to Stand: Minimal Assistance (From recliner to RW, increased VC for handplacement and weight bearing precautions)  Stand to Sit: Minimal Assistance (To recliner, and car transfer from RW)  Stand Pivot Transfers: Minimal Assistance (With RW, increased time and effort)  Car Transfer: Minimal Assistance (With RW)  Balance  Sitting - Static: Good  Sitting - Dynamic: Good  Standing - Static: Fair    Environmental Mobility  Ambulation  Comments: unable to perform hopping, deemed unsafe to ambulate  Stairs/Curb  Stairs?: No  Wheelchair Activities  Wheelchair Type: Standard  Wheelchair Cushion: Standard  Propulsion: Yes  Propulsion 1  Propulsion: Manual  Level: Level Tile  Method: RUE;LUE  Level of Assistance: Stand by assistance  Distance: 150ft+50ft    Second session: Pt sitting in BS chair when PT arrived. Pt agreeable to therapy. Bed Mobility   Supine to Sit: Contact-guard assistance/ Min A : req VC for step by step technique and CGA/min A for trunk ascension  Sit to Supine:  Min A for maneuvering the LLE onto of bed  Rolling; VC for technique to both sides but pt only able to altaf partial ranges 2/2 to the severity of the pain that was elicited in pelvis  Scooting in supine with bridges req VC for technique  Transfer Training:   Sit to Stand: Min A x1 with VC for sequencing   Stand to Sit: Min  assistance;Assist X1 for hand placement  Stand Pivot Transfers: Minimum assistance;Assist X1( BS chair > w/c , w/c <> commode (ETS,)  w/c > bed .  Min A with stand pivot using the RW   PT instructed pt with the following ex: APs  x 10 reps with hold x 3  Combination isometrics x 10 reps of both. ( GS, QS and APs)  with hold x 3  Pt remained in bed with call light and phone placed within reach    ASSESSMENT  Vitals  BP: (!) 94/53  BP Location: Left upper arm  BP Method: Automatic  MAP (Calculated): 67    Activity Tolerance  Activity Tolerance: Patient tolerated treatment well  Activity Tolerance Comments: Sitting BP 88/46 in sitting Asympomatic, After activity: 96/56    Assessment  Assessment: Pt is an 82 yo female s/p pelvic fracture that presents with impaired functional mobility, ambulation and stair mechanics. Pt is currently requiring min A with a rolling walker for all OOB activities but is unable to ambulate at this time. Pt is able to perform stand pivots from bed to chair and wheelchair while maintaining her weight bearing precautions. Pt also demo the ability to propel herself in a wheelchair down the hallway with SBA and verbal cues for directing the wheelchair. Pt is signficantly limited by her endurance, increased pain, decreased ability to ambulate and perform stairs. Pt would benefit from continued therapy in order to improve her functional mobility, transfers, ambulation and stairs mechanics. Will continue to follow. Treatment Diagnosis: decreased functional mobility due to pelvic fx  Therapy Prognosis: Good  Decision Making: Medium Complexity  Discharge Recommendations: Continue to assess pending progress;Home with Home health PT;24 hour supervision or assist  PT D/C Equipment  Wheelchair: Light Weight  Other: lightweight 18' w/c with removable leg rests. Pt is currently unable to ambulate household distances with RW, making her unable to access area of home for ADLs including kitchen and bathroom. Use of w/c will allow pt to access these areas of her home to perform these ADLs. Pt has sufficient UE strength to propel w/c throughout home and has not expressed unwillingness to use w/c in home.   PT Equipment Recommendations  Equipment Needed: Yes  Mobility Devices: Wheelchair  Wheelchair: [facility]  Other: lightweight 18' w/c with removable leg rests. Pt is currently unable to ambulate household distances with RW, making her unable to access area of home for ADLs including kitchen and bathroom. Use of w/c will allow pt to access these areas of her home to perform these ADLs. Pt has sufficient UE strength to propel w/c throughout home and has not expressed unwillingness to use w/c in home. CLINICAL IMPRESSION   Pt is an 82 yo female s/p pelvic fracture that presents with impaired functional mobility, ambulation and stair mechanics. Pt is currently requiring min A with a rolling walker for all OOB activities but is unable to ambulate at this time. Pt is able to perform stand pivots from bed to chair and wheelchair while maintaining her weight bearing precautions. Pt also demo the ability to propel herself in a wheelchair down the hallway with SBA and verbal cues for directing the wheelchair. Pt is signficantly limited by her endurance, increased pain, decreased ability to ambulate and perform stairs. Pt would benefit from continued therapy in order to improve her functional mobility, transfers, ambulation and stairs mechanics. Will continue to follow. GOALS  Patient Goals   Patient Goals : \"To go home. \"  Short Term Goals  Time Frame for Short Term Goals: 7 days  Short Term Goal 1: Pt will perform supine<>Sit Mod I  Short Term Goal 2: Pt will perform sit<>stand Mod I with RW  Short Term Goal 3: Pt will perform stand pivot with RW Mod I  Short Term Goal 4: Pt will be able to hop 10ft with RW Min A  Short Term Goal 5: .Ascend/descend 2 steps SBA  Long Term Goals  Time Frame for Long Term Goals : 14 days  Long Term Goal 1: Ambulate 50 ft with RW while maintaining weight bearing precautions Mod I  Long Term Goal 2: Ascend/descend 5 steps SBA  Long Term Goal 3: Pt will be able to propel a wheelchair 150ft Mod I    PLAN OF CARE  Frequency: 1-2 treatment sessions per day, 5-7 days per week  Physcial Therapy Plan  Days Per Week: 5 Days  Hours Per Day: 1.5 hours  Therapy Duration: 2 Weeks  Current Treatment Recommendations: Strengthening;ROM;Balance training;Functional mobility training;Transfer training; Endurance training; Wheelchair mobility training;Gait training;Stair training;Neuromuscular re-education;Home exercise program;Safety education & training;Patient/Caregiver education & training;Equipment evaluation, education, & procurement; Therapeutic activities  Safety Devices  Type of Devices: All fall risk precautions in place;Call light within reach; Chair alarm in place;Gait belt;Left in chair;Nurse notified    EDUCATION  Education  Education Given To: Patient  Education Provided: Role of Therapy;Transfer Training  Education Method: Demonstration  Barriers to Learning: None  Education Outcome: Verbalized understanding    ELOS:          Therapy Time   Individual Concurrent Group Co-treatment   Time In 1030         Time Out 1130         Minutes 60         Second Session Therapy Time:   Individual Concurrent Group Co-treatment   Time In 8434         Time Out 1415         Minutes 30           Timed Code Treatment Minutes:  60+30    Total Treatment Minutes:   Mac [address] [place] [address], 12/05/22 at 3:18 PM  [address] [place] [address],[address] ( treating therapist for 2nd session)

## 2022-12-05 NOTE — H&P
Department of Physical Medicine & Rehabilitation  History & Physical      Patient Identification:  Hussain Manley  : 1940  Admit date: 2022   Attending provider: Kristen Quesada DO        Primary care provider: Brandon Han MD     Chief Complaint: pelvic ring fracture     History of Present Illness/Hospital Course: This is a 80yo F who was admitted on  with a pelvic ring fracture due to osteoporosis. She has a medical history of CLL, ITP, and cardiac PVC with hypotension. She is currently being treated in the [facility]. She has seen orthopedics who will continue to follow. She is having trouble with ADLs and walking around which is why she feels like she needs therapy before being discharged home. She is interested in coming to the ARU for further treatment.       Prior Level of Function:  Independent for mobility, ADLs, and IADLs    Current Level of Function:  Mod assist     Pertinent Social History:  Support: lives alone  Home set-up: no steps     Past Medical History:   Diagnosis Date    Allergic rhinitis due to pollen 2016    Arthritis, degenerative 2011    Cancer (HCC)     chronic lymphocytic leukemia    CLL (chronic lymphoblastic leukemia)     Closed compression fracture of thoracic vertebra ([facility] [place] [address] 2016    Depression     History of blood transfusion     Hyperlipidemia     10/14/22 -  no medication currently    Melanoma of upper arm ([facility] [place] [address]         Mild reactive airways disease 2016    Sternum fx 2022    vaccuming     Fall in past year: yes    Past Surgical History:   Procedure Laterality Date    APPENDECTOMY      AXILLARY SURGERY Right 10/18/2022    RIGHT AXILLARY EXCISIONAL LYMPH NODE BIOPSY performed by Allison Dang MD at [address]    NASAL SINUS SURGERY      OTHER SURGICAL HISTORY      melanoma removal    SHOULDER SURGERY Right 2018    Rotator cuff    TONSILLECTOMY       Major Surgery in past 100 days: No    Family History   Problem Relation Age of Onset    Cancer Mother         lymphoma    Heart Disease Father     Heart Disease Sister     Arthritis Sister        Social History     Socioeconomic History    Marital status:    Tobacco Use    Smoking status: Never    Smokeless tobacco: Never   Vaping Use    Vaping Use: Never used   Substance and Sexual Activity    Alcohol use:  Yes     Alcohol/week: 0.0 standard drinks     Comment: occasionally     Drug use: Never     Social Determinants of Health     Financial Resource Strain: Low Risk     Difficulty of Paying Living Expenses: Not hard at all   Food Insecurity: No Food Insecurity    Worried About [employer] in the Last Year: Never true    Ran Out of Food in the Last Year: Never true       Allergies   Allergen Reactions    Penicillins Rash    Potassium     Potassium-Containing Compounds          Current Facility-Administered Medications   Medication Dose Route Frequency Provider Last Rate Last Admin    Acalabrutinib CAPS 1 capsule (Patient Supplied)  1 capsule Oral Q12H Dom Giles, DO   1 capsule at 12/05/22 0806    allopurinol (ZYLOPRIM) tablet 300 mg  300 mg Oral Daily Dom Giles, DO   300 mg at 12/05/22 0851    apixaban (ELIQUIS) tablet 2.5 mg  2.5 mg Oral BID Debo Giles, DO   2.5 mg at 12/05/22 9275    ascorbic acid (VITAMIN C) tablet 1,000 mg  1,000 mg Oral Daily Dom Giles, DO   1,000 mg at 12/05/22 0851    b complex vitamins capsule 1 capsule  1 capsule Oral Daily Dom Giles, DO   1 capsule at 12/05/22 0852    calcium elemental (OSCAL) tablet 500 mg  500 mg Oral Daily Dom Giles, DO   500 mg at 12/05/22 1982    diphenhydrAMINE (BENADRYL) tablet 25 mg  25 mg Oral Nightly PRN Dom Giles, DO        HYDROcodone-acetaminophen (NORCO) 5-325 MG per tablet 1 tablet  1 tablet Oral Q4H PRN Debo Giles, DO   1 tablet at 12/05/22 0711    midodrine (PROAMATINE) tablet 10 mg  10 mg Oral TID WC Dom Giles, DO   10 mg at 12/05/22 0803    morphine (MSIR) tablet 15 mg  15 mg Oral Q4H PRN Buddie [place] Heis, DO   15 mg at 12/04/22 2133    multivitamin 1 tablet  1 tablet Oral Daily Dom L Heis, DO   1 tablet at 12/05/22 0851    ondansetron (ZOFRAN) tablet 4 mg  4 mg Oral Q8H PRN Dom L Heis, DO        prochlorperazine (COMPAZINE) tablet 5 mg  5 mg Oral Q6H PRN Dom L Heis, DO        Saline Mouthwash 15 mL  15 mL Swish & Spit 4x Daily AC & HS Dom L Heis, DO   15 mL at 12/05/22 0807    Saline Mouthwash 15 mL  15 mL Swish & Spit Q4H PRN Dom L Heis, DO        sennosides-docusate sodium (SENOKOT-S) 8.6-50 MG tablet 2 tablet  2 tablet Oral Daily Dom L Heis, DO   2 tablet at 12/05/22 0851    sodium chloride flush 0.9 % injection 5-40 mL  5-40 mL IntraVENous 2 times per day Dom L Heis, DO   10 mL at 12/05/22 6199    sodium chloride flush 0.9 % injection 5-40 mL  5-40 mL IntraVENous PRN Dom L Heis, DO        Vitamin D (CHOLECALCIFEROL) tablet 5,000 Units  5,000 Units Oral Daily Buddie Imani Heis, DO   5,000 Units at 12/05/22 3578    acetaminophen (TYLENOL) tablet 650 mg  650 mg Oral Q4H PRN Dom L Heis, DO        bisacodyl (DULCOLAX) EC tablet 5 mg  5 mg Oral Daily PRN Dom L Heis, DO        magnesium hydroxide (MILK OF MAGNESIA) 400 MG/5ML suspension 30 mL  30 mL Oral Daily PRN Dom L Heis, DO        polyethylene glycol (GLYCOLAX) packet 17 g  17 g Oral Daily PRN Dom L Heis, DO         Facility-Administered Medications Ordered in Other Encounters   Medication Dose Route Frequency Provider Last Rate Last Admin    0.9 % sodium chloride bolus  250 mL IntraVENous Once Joaquin Carbone MD             REVIEW OF SYSTEMS:   CONSTITUTIONAL: negative for fevers, chills, diaphoresis, appetite change, night sweats, unexpected weight change, fatigue  EYES: negative for blurred vision, eye discharge, visual disturbance and icterus  HEENT: negative for hearing loss, tinnitus, ear drainage, sinus pressure, nasal congestion, epistaxis and snoring  RESPIRATORY: Negative for hemoptysis, cough, sputum production  CARDIOVASCULAR: negative for chest pain, palpitations, exertional chest pressure/discomfort, syncope, edema  GASTROINTESTINAL: negative for nausea, vomiting, diarrhea, blood in stool, abdominal pain, constipation  GENITOURINARY: negative for frequency, dysuria, urinary incontinence, decreased urine volume, and hematuria  HEMATOLOGIC/LYMPHATIC: negative for easy bruising, bleeding and lymphadenopathy  ALLERGIC/IMMUNOLOGIC: negative for recurrent infections, angioedema, anaphylaxis and drug reactions  ENDOCRINE: negative for weight changes and diabetic symptoms including polyuria, polydipsia and polyphagia  MUSCULOSKELETAL: positive for pain, joint swelling, decreased range of motion  NEUROLOGICAL: negative for headaches, slurred speech, unilateral weakness  PSYCHIATRIC/BEHAVIORAL: negative for hallucinations, behavioral problems, confusion and agitation. All pertinent positives are noted in the HPI. Physical Examination:  Vitals: Patient Vitals for the past 24 hrs:   BP Temp Temp src Pulse Resp SpO2 Height Weight   12/05/22 0755 (!) 98/55 98.9 °F (37.2 °C) Oral 83 18 93 % -- --   12/05/22 0627 -- -- -- -- -- -- -- 112 lb 3.4 oz (50.9 kg)   12/04/22 2021 -- -- -- -- 17 -- -- --   12/04/22 2016 (!) 89/52 98.9 °F (37.2 °C) Oral 71 17 91 % -- --   12/04/22 1940 -- -- -- 73 16 91 % -- --   12/04/22 1845 (!) 96/52 98 °F (36.7 °C) Oral 72 16 -- 5' 1\" (1.549 m) 110 lb 3.7 oz (50 kg)       Const: Alert. WDWN. No distress  Eyes: Conjunctiva noninjected, no icterus noted; pupils equal, round, and reactive to light. HENT: Atraumatic, normocephalic; Oral mucosa moist  Neck: Trachea midline, neck supple. No thyromegaly noted. CV: Regular rate and rhythm, no murmur rub or gallop noted  Resp: Lungs clear to auscultation bilaterally, no rales wheezes or ronchi, no retractions. Respirations unlabored.    GI: Soft, nontender, nondistended. Normal bowel sounds. No palpable masses. Skin: Normal temperature and turgor. No rashes or breakdown noted. Ext: No significant edema appreciated. No varicosities. MSK: No joint tenderness, erythema, warmth noted. AROM intact. Neuro: Alert, oriented, appropriate. No cranial nerve deficits appreciated. Sensation intact to light touch. Motor examination reveals normal strength in bilateral UE and 4/5 strength in bilateral LE with pain. Psych: Stable mood, normal judgement, normal affect     Lab Results   Component Value Date    WBC 15.5 (H) 12/05/2022    HGB 9.0 (L) 12/05/2022    HCT 26.4 (L) 12/05/2022    .2 (H) 12/05/2022    PLT 95 (L) 12/05/2022     Lab Results   Component Value Date    INR 1.04 12/04/2022    INR 1.04 12/01/2022    INR 0.99 12/01/2022    PROTIME 13.5 12/04/2022    PROTIME 13.5 12/01/2022    PROTIME 13.0 12/01/2022     Lab Results   Component Value Date    CREATININE <0.5 (L) 12/05/2022    BUN 8 12/05/2022     (L) 12/05/2022    K 3.6 12/05/2022     12/05/2022    CO2 26 12/05/2022     Lab Results   Component Value Date    ALT 15 10/15/2022    AST 23 10/15/2022    ALKPHOS 107 10/15/2022    BILITOT 0.5 10/15/2022         No orders to display         The above laboratory data have been reviewed. The above imaging data have been reviewed. The above medical testing have been reviewed. Body mass index is 21.2 kg/m². POST ADMISSION PHYSICIAN EVALUATION  The patient has agreed to being admitted to our comprehensive inpatient rehabilitation facility and can tolerate the intensity of service consisting of at least:  --180 minutes of therapy a day, 5 out of 7 days a week. OR  --15 hours of intensive therapy within a 7 consecutive day period.      The patient/family has a good understanding of our discharge process and will benefit from an interdisciplinary inpatient rehabilitation program. The patient has potential to make improvement and is in need of at least two of the following multidisciplinary therapies including but not limited to physical, occupational, respiratory, and speech, nutritional services, wound care, and prosthetics and orthotics. Given the patients complex condition and risk of further medical complications, rehabilitation services cannot be safely provided at a lower level of care such as a skilled nursing facility. All of the goals listed below were reviewed with the patient and he/she is in agreement. I have compared the patients medical and functional status at the time of the preadmission screening and the same on this date, and there are no significant changes. By signing this document, I acknowledge that I have personally performed a full physical examination on this patient within 24 hours of admission to this inpatient rehabilitation facility and have determined the patient to be able to tolerate the above course of treatment at an intensive level for a reasonable period of time. I will be completing a detailed individualized  Plan of Care for this patient by day four of the patients stay based upon the Preadmission Screen, this Post-Admission Evaluation, and the therapy evaluations. Barriers: Decreased functional mobility, medical comorbidities  Services Required: PT, OT  Goals: mod I  Prognosis: Good  Anticipated Dispo: home  ELOS: TBD    Rehabilitation Diagnosis:   Orthopedic, 8.3, Pelvic Fracture      Assessment and Plan:  1. Left Pelvic ring fracture including superior and inferior pubic rami fracture and sacral fractures with minimal displacement- Oscal, Vit D, seen by ortho  - requires PT/OT  2. CLL- followed by [facility]  3. Hypotension- monitor   4. Debility- unable to ambulate long distances. Requires PT/ OT       Impairments: Decreased functional mobility, Decreased ADLs    Bladder - high risk retention - Monitor PVRs, SC prn >300cc    Bowel - high risk constipation - colace BID, PRN miralax and MoM. follow bowel movements. Enema or suppository if needed.      Safety - fall precautions    PPx  DVT: eliquis   GI: pepcid    FULL CODE    Sheila Parson D.O. M.P.H  PM&R  12/5/2022  9:58 AM

## 2022-12-06 LAB — HEMATOLOGY PATH CONSULT: NORMAL

## 2022-12-06 PROCEDURE — 99232 SBSQ HOSP IP/OBS MODERATE 35: CPT | Performed by: PHYSICAL MEDICINE & REHABILITATION

## 2022-12-06 PROCEDURE — 1280000000 HC REHAB R&B

## 2022-12-06 PROCEDURE — 97110 THERAPEUTIC EXERCISES: CPT

## 2022-12-06 PROCEDURE — 2580000003 HC RX 258: Performed by: PHYSICAL MEDICINE & REHABILITATION

## 2022-12-06 PROCEDURE — 97530 THERAPEUTIC ACTIVITIES: CPT

## 2022-12-06 PROCEDURE — 97116 GAIT TRAINING THERAPY: CPT

## 2022-12-06 PROCEDURE — 6370000000 HC RX 637 (ALT 250 FOR IP): Performed by: PHYSICAL MEDICINE & REHABILITATION

## 2022-12-06 PROCEDURE — 97535 SELF CARE MNGMENT TRAINING: CPT

## 2022-12-06 RX ADMIN — FAMOTIDINE 20 MG: 20 TABLET ORAL at 09:32

## 2022-12-06 RX ADMIN — SENNOSIDES AND DOCUSATE SODIUM 2 TABLET: 50; 8.6 TABLET ORAL at 09:32

## 2022-12-06 RX ADMIN — MORPHINE SULFATE 15 MG: 15 TABLET ORAL at 12:09

## 2022-12-06 RX ADMIN — APIXABAN 2.5 MG: 5 TABLET, FILM COATED ORAL at 20:25

## 2022-12-06 RX ADMIN — MIDODRINE HYDROCHLORIDE 10 MG: 5 TABLET ORAL at 17:33

## 2022-12-06 RX ADMIN — ALLOPURINOL 300 MG: 300 TABLET ORAL at 09:32

## 2022-12-06 RX ADMIN — SODIUM CHLORIDE, PRESERVATIVE FREE 10 ML: 5 INJECTION INTRAVENOUS at 10:46

## 2022-12-06 RX ADMIN — MORPHINE SULFATE 15 MG: 15 TABLET ORAL at 21:39

## 2022-12-06 RX ADMIN — HYDROCODONE BITARTRATE AND ACETAMINOPHEN 1 TABLET: 5; 325 TABLET ORAL at 05:57

## 2022-12-06 RX ADMIN — SODIUM CHLORIDE 15 ML: 900 IRRIGANT IRRIGATION at 20:26

## 2022-12-06 RX ADMIN — MIDODRINE HYDROCHLORIDE 10 MG: 5 TABLET ORAL at 09:32

## 2022-12-06 RX ADMIN — APIXABAN 2.5 MG: 5 TABLET, FILM COATED ORAL at 09:32

## 2022-12-06 RX ADMIN — FAMOTIDINE 20 MG: 20 TABLET ORAL at 20:25

## 2022-12-06 RX ADMIN — MIDODRINE HYDROCHLORIDE 10 MG: 5 TABLET ORAL at 12:15

## 2022-12-06 RX ADMIN — SODIUM CHLORIDE 15 ML: 900 IRRIGANT IRRIGATION at 10:47

## 2022-12-06 ASSESSMENT — PAIN DESCRIPTION - LOCATION
LOCATION: LEG;HIP
LOCATION: SHOULDER;HIP;LEG
LOCATION: PELVIS
LOCATION: PELVIS
LOCATION: HIP;LEG;SHOULDER

## 2022-12-06 ASSESSMENT — PAIN DESCRIPTION - ORIENTATION
ORIENTATION: LEFT

## 2022-12-06 ASSESSMENT — PAIN DESCRIPTION - PAIN TYPE
TYPE: ACUTE PAIN
TYPE: ACUTE PAIN

## 2022-12-06 ASSESSMENT — PAIN DESCRIPTION - FREQUENCY
FREQUENCY: INTERMITTENT
FREQUENCY: INTERMITTENT

## 2022-12-06 ASSESSMENT — PAIN SCALES - GENERAL
PAINLEVEL_OUTOF10: 10
PAINLEVEL_OUTOF10: 6
PAINLEVEL_OUTOF10: 8

## 2022-12-06 ASSESSMENT — PAIN - FUNCTIONAL ASSESSMENT
PAIN_FUNCTIONAL_ASSESSMENT: ACTIVITIES ARE NOT PREVENTED
PAIN_FUNCTIONAL_ASSESSMENT: PREVENTS OR INTERFERES SOME ACTIVE ACTIVITIES AND ADLS
PAIN_FUNCTIONAL_ASSESSMENT: ACTIVITIES ARE NOT PREVENTED

## 2022-12-06 ASSESSMENT — PAIN SCALES - WONG BAKER: WONGBAKER_NUMERICALRESPONSE: 0

## 2022-12-06 ASSESSMENT — PAIN DESCRIPTION - ONSET: ONSET: ON-GOING

## 2022-12-06 ASSESSMENT — PAIN DESCRIPTION - DESCRIPTORS
DESCRIPTORS: ACHING

## 2022-12-06 NOTE — PROGRESS NOTES
C/o gums bleeding this morning with toothbrushing, top back molars. She states first time seen. Oriented x 4. Assist x 1 with gait belt and wheelchair. Participated in therapy; at end of therapy day c/o right should and left hip pain 10/10, pain med admin per [facility], follow up pain score 6/10, patient satisfied. Call light in reach. Alarm on for safety.

## 2022-12-06 NOTE — PROGRESS NOTES
Occupational Therapy  Facility/Department: [facility] ACUTE REHAB UNIT  Rehabilitation Occupational Therapy Daily Treatment Note    Date: 22  Patient Name: Cristo Malloy       Room: 9849/0281-55  MRN: [id number]  Account: [de-identified]   : 1940  (81 y.o.) Gender: female                    Past Medical History:  has a past medical history of Allergic rhinitis due to pollen, Arthritis, degenerative, Cancer ([address] CLL (chronic lymphoblastic leukemia), Closed compression fracture of thoracic vertebra ([address] Depression, History of blood transfusion, Hyperlipidemia, Melanoma of upper arm ([address] Mild reactive airways disease, and Sternum fx. Past Surgical History:   has a past surgical history that includes Tonsillectomy; Appendectomy; Cataract removal with implant; Blepharoptosis Repair; Nasal sinus surgery; other surgical history; shoulder surgery (Right, ); and Axillary Surgery (Right, 10/18/2022). Restrictions  Other position/activity restrictions: PWB TTWB LLE    Subjective  Subjective: Pt seated in recliner upon arrival, pleasent and agreeable to OT. Objective     Cognition  Overall Cognitive Status: WFL  Orientation  Overall Orientation Status: Within Normal Limits         ADL  Grooming/Oral Hygiene  Assistance Level: Contact guard assist  Skilled Clinical Factors: Pt in stance at sink swished mouth wash through her mouth, washed her face w/ a wash cloth, and washed her hands w/ CGA for standing balance. Pt maintained her precautions while performing grooming/oral hygiene. Pt w/ unilateral UE supported on sink. Lower Extremity Dressing  Assistance Level: Contact guard assist  Skilled Clinical Factors: Pt seated in wc threaded BLE into sweat pants. Pt in stance w/ CGA managed pant up over hips. Pt w/ unilateral UE supported on RW. Putting On/Taking Off Footwear  Assistance Level: Stand by assist  Skilled Clinical Factors: Pt seated in wc doffed bilateral hospital socks.  Pt donned bilateral shoes w/ SBA. Toileting  Assistance Level: Contact guard assist  Skilled Clinical Factors: Pt in stance managed pants up/down over hips w/ CGA. Pt seated on toilet voided urine. Pt performed pericare. Toilet Transfers  Technique: Stand pivot  Equipment: Standard toilet;Grab bars  Assistance Level: Contact guard assist  Skilled Clinical Factors: Pt w/ use of L GB demo'd stand pivot w/ CGA. Pt w/ no use of RW only use of GB for UE support; pt provided w/ VCs to maintain precautions          Functional Mobility  Device: Wheelchair  Activity: To/From bathroom; To/From therapy gym  Assistance Level: Modified independent  Skilled Clinical Factors: Pt assisted into/out of bathroom. Pt propelled self down w/ BUE to/from therapy gym. Pt demo'd ability to locate breaks and safely managed herself through the halls without bumping into anything w/ mod I. Transfers  Surface: From chair with arms; Wheelchair; To chair with arms  Sit to Stand  Assistance Level: Contact guard assist  Skilled Clinical Factors: Recliner > RW; WC > RW; WC > Raised Table; VCs for hand placement and to maintain precautions  Stand to Sit  Assistance Level: Contact guard assist  Skilled Clinical Factors: RW > Recliner; RW > WC; VCs for hand placement and to maintain precautions  Stand Pivot  Assistance Level: Contact guard assist  Skilled Clinical Factors: WC > RW; VCs for hand placement and to maintain precautions   OT Exercises  Exercise Treatment: Pt aurea'hardeep the following activity for improved standing balance for ADLs. Pt in stance while maintaining NWB TTWB LLE precautions pt matched socks and threw them into a basket ~2 ft away. Pt was in stance performing this activity for ~3 minutes. Pt w/ intermittent UE supported on raised table. Pt w/ cognition w/ WNL to complete this activity matched each sock correctly. Pt w/ no LOB throughout the activity requiring CGA.   Static Standing Balance Exercises: Pt aurea'hardeep the following activity for improved standing balance for ADLs. Pt in stance while maintaining NWB TTWB LLE precautions pt matched socks and threw them into a basket ~2 ft away. Pt was in stance performing this activity for ~3 minutes. Pt w/ intermittent UE supported on raised table. Pt w/ no LOB throughout the activity requiring CGA. OT placed foot under pts LLE and pt was maintained precautions well. Second Session:    Pt seated in recliner upon arrival pleasant and agreeable to therapy. Transfers:   Stand pivot: CGA  Recliner <> WC w/ use of RW and good adherence to TTWB LLE    Functional Mobility:   Pt self propelled w/ BUE to/from therapy gym w/ mod I. Exercises:   Pt performed the following exercise to strengthen UB for ADLs and functional mobility. Pt performed 10 reps of each of the following: shoulder press (2lb R arm 3 lb L arm), external rotation (2lb R arm 3 lb L arm), Elbow flexion ( 3lb L arm 2 Lb R arm), punches w/ no weights, shoulder flexion (R arm no weight, L arm 3lbs), shoulder abduction (no weights). Pt was provided w/ Vcs and modeling to perform each exercise. Pt performed each exercise w/ no complaints of pain and w/ proper form. Pt required different weights due to R arm rotator cuff surgery in the past.    Pt left seated in recliner w/ chair alarm in place and call light within reach. Assessment  Assessment  Assessment: Pt demo'd good participation in therapy. Pt was CGA for LB dressing and SBA for footwear. Pt required CGA for standing activity while maintaining her precautions. Pt cont to require VCs to maintain precautions when performing sit to stand transfers and stand pivots. Pt is very pleasent and motivated and will cont to benefit from skilled OT to return pt to PLOF. Cont OT per POC.   Activity Tolerance: Patient tolerated treatment well  Discharge Recommendations: Continue to assess pending progress;24 hour supervision or assist;Patient would benefit from continued therapy after discharge  OT Equipment Recommendations  Other: cont to assess pending progress. Will likely benefit from RTS w/ rails  Safety Devices  Safety Devices in place: Yes  Type of devices: Left in chair;Call light within reach; Chair alarm in place    Patient Education  Education  Education Given To: Patient  Education Provided: Role of Therapy;Plan of Care;Home Exercise Program;ADL Function; Safety;IADL Function;Precautions; Equipment  Education Provided Comments: Pt provided education on maintaining precautions each time she sits and stands. Pt also educated on the fit of her wc and different cushions were tested for pts comfort. Pt would benefit from continued look into wc fit and comfort. Education Method: Verbal  Barriers to Learning: None  Education Outcome: Verbalized understanding;Continued education needed    Plan  Occupational Therapy Plan  Times Per Week: 5x/week, 90min/day  Current Treatment Recommendations: Strengthening;Balance training;Functional mobility training; Endurance training; Safety education & training;Patient/Caregiver education & training;Pain management;Self-Care / ADL; Home management training;Equipment evaluation, education, & procurement;ROM    Goals  Patient Goals   Patient goals :  \"To be independent again\"  Short Term Goals  Time Frame for Short Term Goals: 7 days  Short Term Goal 1: Pt will complete LE dressing w/ CGA and use of AE prn - Goal Met 12/6  Short Term Goal 2: Pt will complete bathing w/ CGA - ongoing  Short Term Goal 3: Pt will complete toilet transfer w/ CGA - Goal Met 12/6  Short Term Goal 4: Pt will complete toileting w/ CGA - Goal met 12/6  Short Term Goal 5: Pt will complete BUE HEP [id number] to increase strength required for functional mobility/transfers - ongoing  Long Term Goals  Time Frame for Long Term Goals : 14 days - ongoing for all  Long Term Goal 1: Pt will complete shower transfer w/ Mod I  Long Term Goal 2: Pt will complete LE dressing w/ Mod I and use of AE prn  Long Term Goal 3: Pt will complete bathing w/ Mod I and use of AE prn  Long Term Goal 4: Pt will complete toileting/ toilet transfer w/ Mod I  Long Term Goal 5: Pt will complete simple meal prep task w/ Mod I    AM-PAC Score               Therapy Time   Individual Second Session Group Co-treatment   Time In 0730  1130       Time Out 0830  1200       Minutes 60  30       Timed Code Treatment Minutes: 60 Minutes + 30 Minutes = 90 Minutes       Tu Mcdonald S/OT

## 2022-12-06 NOTE — PROGRESS NOTES
Pt A & O. VSS. Pt x 2 assist with walker. Unable to roll due to pelvic fx. Cont on Chemo precautions. Purewick in place. No complaints of pain voiced. Will cont to monitor.

## 2022-12-06 NOTE — PROGRESS NOTES
Department of Physical Medicine & Rehabilitation  Progress Note    Patient Identification:  Roopa Galarza  [id number]  : 1940  Admit date: 2022    Chief Complaint: History of pelvic fracture    Subjective:   Patient seen this am in recliner. Reports some leg swelling improved with leg elevation. No pain or dyspnea. Plan to work with therapy this morning. Needs her home chemo meds this morning. ROS: No f/c, n/v, cp     Objective:  Patient Vitals for the past 24 hrs:   BP Temp Temp src Pulse Resp SpO2   22 0909 (!) 91/42 97.8 °F (36.6 °C) Oral 80 17 97 %   22 0557 -- -- -- -- 17 --   22 (!) 95/49 98.6 °F (37 °C) Oral 74 18 91 %   22 1532 -- -- -- -- 16 --   22 1515 (!) 99/53 -- -- -- -- 97 %   22 1205 -- -- -- -- 18 --     Const: Alert. No distress, pleasant. HEENT: Normocephalic, atraumatic. Normal sclera/conjunctiva. MMM. CV: Regular rate and rhythm. Resp: No respiratory distress. Lungs CTAB. Abd: Soft, nontender, nondistended, NABS+   Ext: No edema. Neuro: Alert, oriented, appropriately interactive. Psych: Cooperative, appropriate mood and affect    Laboratory data: Available via EMR.    Last 24 hour lab  Recent Results (from the past 24 hour(s))   CBC auto differential    Collection Time: 22  1:32 PM   Result Value Ref Range    WBC 23.4 (H) 4.0 - 11.0 K/uL    RBC 2.73 (L) 4.00 - 5.20 M/uL    Hemoglobin 9.4 (L) 12.0 - 16.0 g/dL    Hematocrit 28.8 (L) 36.0 - 48.0 %    .6 (H) 80.0 - 100.0 fL    MCH 34.5 (H) 26.0 - 34.0 pg    MCHC 32.7 31.0 - 36.0 g/dL    RDW 15.9 (H) 12.4 - 15.4 %    Platelets 464 (L) 635 - 450 K/uL    MPV 10.3 5.0 - 10.5 fL    PLATELET SLIDE REVIEW Decreased     Path Consult Yes     Neutrophils % 16.0 %    Lymphocytes % 61.0 %    Monocytes % 6.0 %    Eosinophils % 0.0 %    Basophils % 0.0 %    Neutrophils Absolute 3.7 1.7 - 7.7 K/uL    Lymphocytes Absolute 18.3 (H) 1.0 - 5.1 K/uL    Monocytes Absolute 1.4 (H) 0.0 - 1.3 K/uL    Eosinophils Absolute 0.0 0.0 - 0.6 K/uL    Basophils Absolute 0.0 0.0 - 0.2 K/uL    Atypical Lymphocytes Relative 17 (H) 0 - 6 %    Anisocytosis 1+ (A)     Macrocytes Occasional (A)    Blood Smear Review    Collection Time: 12/05/22  1:32 PM   Result Value Ref Range    Path Consult Reviewed        Therapy progress:  PT  Position Activity Restriction  Other position/activity restrictions: PWB TTWB LLE  Objective     Sit to Stand: Minimal Assistance (From recliner to RW, increased VC for handplacement and weight bearing precautions)  Stand to Sit: Minimal Assistance (To recliner, and car transfer from RW)     OT  PT Equipment Recommendations  Equipment Needed: Yes  Mobility Devices: Wheelchair  Wheelchair: Light Weight  Other: lightweight 16' w/c with removable leg rests. Pt is currently unable to ambulate household distances with RW, making her unable to access area of home for ADLs including kitchen and bathroom. Use of w/c will allow pt to access these areas of her home to perform these ADLs. Pt has sufficient UE strength to propel w/c throughout home and has not expressed unwillingness to use w/c in home. Toilet - Technique: Stand pivot (w/ RW)  Equipment Used: Raised toilet seat with rails  Assessment        SLP          Body mass index is 21.2 kg/m². Assessment and Plan:  1. Left Pelvic ring fracture including superior and inferior pubic rami fracture and sacral fractures with minimal displacement- Oscal, Vit D, seen by ortho  - requires PT/OT  2. CLL- followed by [facility]  3. Hypotension- monitor - on midodrine   4. Debility- unable to ambulate long distances. Requires PT/ OT           Bladder - high risk retention - Monitor PVRs, SC prn >300cc     Bowel - high risk constipation - colace BID, PRN miralax and MoM. follow bowel movements. Enema or suppository if needed.       Safety - fall precautions    Rehab Progress: Improving  Anticipated Dispo: home  Services/DME: TBD  ELOS: TBD- team conference tomorrow. Michelle Ch MD PGY3    This patient has been seen, examined, and discussed with the resident. I was part of the key critical services provided to the patient. I agree with the residents documentation. This note may have been altered to reflect my own examination findings, impression, and recommendations.        LUDY [email or web address]  PM&R  12/6/2022  11:39 AM

## 2022-12-06 NOTE — PLAN OF CARE
Problem: Safety - Adult  Goal: Free from fall injury  Outcome: Progressing  Note: Compliant with use of call light, waits for assistance with transfers. Problem: Pain  Goal: Verbalizes/displays adequate comfort level or baseline comfort level  Outcome: Progressing  Flowsheets (Taken 12/6/2022 1620)  Verbalizes/displays adequate comfort level or baseline comfort level:   Encourage patient to monitor pain and request assistance   Assess pain using appropriate pain scale   Administer analgesics based on type and severity of pain and evaluate response   Implement non-pharmacological measures as appropriate and evaluate response   Consider cultural and social influences on pain and pain management     Problem: Skin/Tissue Integrity  Goal: Absence of new skin breakdown  Description: 1. Monitor for areas of redness and/or skin breakdown  2. Assess vascular access sites hourly  3. Every 4-6 hours minimum:  Change oxygen saturation probe site  4. Every 4-6 hours:  If on nasal continuous positive airway pressure, respiratory therapy assess nares and determine need for appliance change or resting period.   Outcome: Progressing  Note: Buttock blanchable redness, skin protectant applied

## 2022-12-06 NOTE — PATIENT CARE CONFERENCE
[facility]  Weekly Team Conference Note  The [address]  [phone]  Patient Name: Padmini Doan        MRN: [id number]    : 1940  (81 y.o.)  Gender: female   Referring Practitioner: Trine Kocher Heis, DO  Diagnosis: History of pelvic fracture  The team conference for this patient was held on 2022 at 10:30am by:  Trine Kocher. Heis, DO    Current/Goal QM SCORES  QM Current/Goal Score   Eating CARE Score: 5 / Discharge Goal: Independent   Oral Hygiene CARE Score: 4 / Discharge Goal: Independent   Shower/Bathing CARE Score: 1 / Discharge Goal: Independent   UB Dressing CARE Score: 3 / Discharge Goal: Independent   LB Dressing CARE Score: 4 / Discharge Goal: Independent   Putting on/off Footwear CARE Score: 4 / Discharge Goal: Independent   Toileting Hygiene CARE Score: 4 / Discharge Goal: Independent   Bladder Continence Bladder Continence: Incontinent less than daily    Bowel Continence Bowel Continence: Occassionally incontinent    Toilet Transfers CARE Score: 4 / Discharge Goal: Independent   Shower/Bathe Self  CARE Score: 1 / Discharge Goal: Independent   Rolling Left and Right CARE Score: 4 / Discharge Goal: Independent   Sit to Lying CARE Score: 4 / Discharge Goal: Independent   Lying to Sitting on Bedside CARE Score: 3 / Discharge Goal: Independent   Sit to Stand CARE Score: 4 / Discharge Goal: Independent   Chair/Bed to Chair Transfer CARE Score: 3 / Discharge Goal: Independent   Car Transfers CARE Score: 1 / Discharge Goal: Independent   Walk 10 Feet CARE Score: 1 / Discharge Goal: Supervision or touching assistance   Walk 50 Feet with Two Turns CARE Score: 88 / Discharge Goal: Supervision or touching assistance   Walk 150 Feet CARE Score: 88 / Discharge Goal: Supervision or touching assistance   Walk 10 Feet on Uneven Surfaces CARE Score: 88 / Discharge Goal: Supervision or touching assistance   1 Step (Curb) CARE Score: 80 / Discharge Goal: Supervision or touching assistance   4 Steps CARE Score: 88 / Discharge Goal: Supervision or touching assistance   12 Steps CARE Score: 88 / Discharge Goal: Partial/moderate assistance   Picking up Object from Floor CARE Score: 88 / Discharge Goal: Supervision or touching assistance   Wheel 50 Feet with 2 Turns CARE Score: 1 / Discharge Goal: Independent   Type         [x] Manual        [] Motorized        [] N/A   Wheel 150 Feet CARE Score: 1 / Discharge Goal: Independent   Type         [x] Manual        [] Motorized        [] N/A     NURSING:  A&Ox: Level of Consciousness: Alert (0)  Ureña Fall Risk Score: Ureña Total Score: 75  Admission BIMS: 15  Wounds/Incisions/Ulcers: N/A  Medication Review: Y  Pain: Y  Consultations: Case management   Imaging: XR  pelvis    Active Comorbid Conditions:Y  Systems Review:   Renal: N/A, Dialysis:N/A  Type: N/A, Frequency: N/A  Neurological: Y  Musculoskeletal: WEAK  Respiratory: WNL  Cardiac/Circulatory/Peripheral Vascular: WNL  Abnormal/Relevant Test Results: N/A  Abnormal/Relevant Lab Values:   CBC:   Recent Labs     12/05/22  0740 12/05/22  1332   WBC 15.5* 23.4*   HGB 9.0* 9.4*   HCT 26.4* 28.8*   .2* 105.6*   PLT 95* 132*     BMP:   Recent Labs     12/05/22  0740   *   K 3.6      CO2 26   BUN 8   CREATININE <0.5*   PT/INR:   No results for input(s): PROTIME, INR in the last 72 hours. APTT: No results for input(s): APTT in the last 72 hours.   Liver Profile:  Lab Results   Component Value Date/Time    AST 23 10/15/2022 09:00 AM    ALT 15 10/15/2022 09:00 AM    BILIDIR <0.2 03/23/2020 04:23 AM    BILITOT 0.5 10/15/2022 09:00 AM    ALKPHOS 107 10/15/2022 09:00 AM     Lab Results   Component Value Date/Time    CHOL 209 10/15/2022 09:00 AM    HDL 45 10/15/2022 09:00 AM    HDL 63 01/02/2012 10:23 AM    TRIG 171 10/15/2022 09:00 AM     Recent Labs     12/05/22  0740 12/05/22  1332   WBC 15.5* 23.4*   HGB 9.0* 9.4*   HCT 26.4* 28.8* PLT 95* 132*   .2* 105.6*     Recent Labs     12/05/22  0740   *   K 3.6      CO2 26   BUN 8   CREATININE <0.5*   No results for input(s): AST, ALT, ALB, BILIDIR, BILITOT, ALKPHOS in the last 72 hours. No results for input(s): MG in the last 72 hours. Recent Labs     12/05/22  0740 12/05/22  1332   WBC 15.5* 23.4*   HGB 9.0* 9.4*   HCT 26.4* 28.8*   PLT 95* 132*     Recent Labs     12/05/22  0740   *   K 3.6      CO2 26   BUN 8   CREATININE <0.5*   CALCIUM 8.6   No results for input(s): AST, ALT, BILIDIR, BILITOT, ALKPHOS in the last 72 hours. No results for input(s): INR in the last 72 hours. No results for input(s): [place] in the last 72 hours. PHYSICAL THERAPY:  Bed Mobility: Scooting: Stand by assistance (to scoot back in recliner in sitting, pt performing via shifting weight to side and sliding hip back)    Transfers:  Sit to Stand: Minimal Assistance (From recliner to RW, increased VC for handplacement and weight bearing precautions)  Stand to Sit: Minimal Assistance (To recliner, and car transfer from [address] [place] [address])    Ambulation  Comments: unable to perform hopping, deemed unsafe to ambulate    OCCUPATIONAL THERAPY:  ADL  Feeding: Setup  Feeding Skilled Clinical Factors: Pt setup w/ breakfast at end of session  Grooming: Setup, Stand by assistance, Increased time to complete  Grooming Skilled Clinical Factors: Sitting on TTB to wash face and hair w/ setup assist and SBA for sitting balance. Pt sitting in wc to complete oral hygiene at sink w/ setup assist. Pt reports completing grooming tasks in stance at baseline, unsafe to attempt this date d/t decreased standing balance and TTWB to LLE  UE Bathing: Setup, Stand by assistance, Increased time to complete  UE Bathing Skilled Clinical Factors: Pt seated on TTB to complete w/ setup assist and SBA for sitting balance.  Pt reports completing bathing in stance at baseline, however has access to built-in shower seat at home  LE Bathing: Moderate assistance, Verbal cueing, Increased time to complete, Setup  LE Bathing Skilled Clinical Factors: Pt seated on TTB to wash/dry BLEs and front parminder area w/ setup assist and SBA for sitting balance. Assist required to wash/dry BL feet while seated on TTB. Pt in stance at [facility] w/ CGA w/ total A to wash/dry buttocks. Pt reports completing bathing in stance at baseline, however has access to built-in shower seat at home  UE Dressing: Minimal assistance, Increased time to complete, Setup, Verbal cueing  UE Dressing Skilled Clinical Factors: doffed gown w/ min A to untie in back. Pt doffed/donned t-shirt seated on TTB w/ SBA and setup. LE Dressing: Maximum assistance, Setup, Verbal cueing, Increased time to complete  LE Dressing Skilled Clinical Factors: pt donned/doffed R slip on shoe while seated w/ CGA for dynamic sitting balance. Assist provided to thread/unthread BLE into/out of briefs and pants while seated. Pt in stance w/ unilateral UE support on GB or RW w/ CGA to complete clothing mgmt at hips. Toileting: Dependent/Total  Toileting Skilled Clinical Factors: Pt incontinent of urine in brief upon arrival, pt simulated toileting w/ CGA in stance for clothing mgmt and assist to complete rear parminder hygiene in stance during bathing    Toilet Transfers: Toilet Transfers  Toilet - Technique: Stand pivot (w/ RW)  Equipment Used: Raised toilet seat with rails  Toilet Transfer: Moderate assistance    Tub/ShowerTransfers:  Shower Transfers  Shower - Transfer From: Wheelchair  Shower - Transfer Type: To and From  Shower - Transfer To: Transfer tub bench  Shower - Technique: Stand pivot (w/ use of GBs)  Shower Transfers: Moderate assistance    NUTRITION:    Current Weight: 112 lb 3.4 oz (50.9 kg) BMI (Calculated): 21.2  Current diet order: Current diet and supplement order: ADULT DIET;  Regular; Low Microbial  ADULT ORAL NUTRITION SUPPLEMENT; Dinner; Standard High Calorie/High Protein Oral Supplement Feeding: Able to feed self      NSG Recorded PO: PO Meals Eaten (%): 76 - 100% PO Supplement (%): 1 - 25%  Malnutrition Status Malnutrition Status: At risk for malnutrition (Comment)    CASE MANAGEMENT:  Psychosocial/Behavioral Issues: none  Assessment:  Pt lives at home alone but states her sister can stay with her at [place]. SW will do full assessment after Team Conference today. Patient/Family Education provided by team:  Role of PT/OT, Safety w/ ADLs and IADLs    Patient Goals for Rehab stay:  1. To return to prior level of function and regain independence  2. To be independent again     Rehab Team Goals for patient for the Upcoming Week:  1. Improve gait mechanics and endurance while maintaining weight bearing precautions. 2. Increased independence w/ ADLs    Barriers to Progress/Attainment of Goals & Efforts/Interventions to remove Barriers:  1. Weight bearing status - cont therapy  2. Decreased endurance and pain - cont therapy    Discharge Plan:  Estimated Length of Stay: 13 days  Destination: home health  Services at Discharge: [address] [place], [place] [address], Occupational Therapy, and Nursing 3x week  Equipment at Discharge: wheelchair and walker, RTS w/ rails, handheld showerhead  Community Resources:   Factors facilitating achievement of predicted outcomes: Family support, Friend support, Motivated, Cooperative, Pleasant, Sense of humor, and Good insight into deficits  Barriers to the achievement of predicted outcomes: Pain    Special Needs in the Upcoming Week:   [x] Family/Caregiver Education  [] Home visit  []Therapeutic Pass [] Consults:_______   [] Family/Caregiver Training  [] Stroke Risk Factor Education     [] Other;_______     TEAM SUMMARY: Will continue with current poc & goals until anticipated d/c date of 12/17/2022.     MD:   Stroke Risk Factors:   [] N/A for this patient [] HTN  []  Diabetes  [x] Hyperlipidemia  []Obesity BMI >25  [] Atrial Fibrillation [] Smoker (current)  [] Smoker (quit in last 12 months)  [] Sleep Apnea [] Other:     Risk for Readmission: Moderate: 13%    Justification for Continued Stay:   Criteria for continued IRF stay:  Based on my medical assessment of the patient and review of information from the interdisciplinary team, as part of this weekly team conference, the patient continues to meet the following criteria for IRF level of care:  [x] The patient requires 24-hour rehabilitation nursing care   [x] The patient requires an intensive rehabilitation therapy program  [x] The patient requires active and ongoing intervention of multiple therapy disciplines  [x] The patient requires continued physician supervision by a rehabilitation physician  [x] The patient requires an intensive and coordinated interdisciplinary team approach to the delivery of rehabilitative care    Medical Necessity-continued close physician medical management is required for:   [] Cardiac/Circulatory dysfunction  [] Respiratory/Pulmonary dysfunction  [] Integumentary complications  [] Peripheral Vascular dysfunction  [x] Musculoskeletal dysfunction  [] Neurological dysfunction d/t:  [] CVA  [] SCI  [] TBI  [] Other: __________  [] Renal dysfunction  [] Hematologic dysfunction    [] Endocrine disorders  [] GI disorders     [] Genito-Urinary dysfunction    Assessment/Plan:  [x] The patient is making good progression towards their LTG's, is actively participating in, and has a reasonable expectation to continue to benefit from the intensive rehabilitation program.  [] The estimated discharge date has been changed from initial team conference due to:   [] The estimated discharge destination has been changed from initial team conference due to:     Rehab Team Members in attendance for Team Conference:  ARU Supervisor/PPS Coordinator:  Ardeth Castleman, PT, DPT    Therapy Manager/ARU :  Michael Wade PT, DPT    Social Work:  Jacey Covington, [facility]    Nursing:  Toni Fernandez, RN    Therapy:  Moreno Barnes Enrrique Cuellar, PT, DPT  Kassi Perea PT, DPT   Kecia Gold, PT, DPT    Supa Fay, OTR/L  Pete Dial, OTR/L  Christian Miranda, OTR/L    Nutrition:  Waleska Sandoval RD:    [place]: [phone]  Office:  [phone]    I approve the established interdisciplinary plan of care as documented within the medical record of Marilyn Tran.     MD Signature Kimberlee Sanchez D.O. M.P.H  PM&R  12/7/2022  12:23 PM

## 2022-12-06 NOTE — PLAN OF CARE
Problem: Discharge Planning  Goal: Discharge to home or other facility with appropriate resources  Recent Flowsheet Documentation  Taken 12/5/2022 2031 by Deo Hansen RN  Discharge to home or other facility with appropriate resources: Identify barriers to discharge with patient and caregiver     Problem: Safety - Adult  Goal: Free from fall injury  Recent Flowsheet Documentation  Taken 12/5/2022 2031 by Deo Hansen RN  Free From Fall Injury: Instruct family/caregiver on patient safety     Problem: Pain  Goal: Verbalizes/displays adequate comfort level or baseline comfort level  Recent Flowsheet Documentation  Taken 12/5/2022 2031 by Deo Hansen RN  Verbalizes/displays adequate comfort level or baseline comfort level: Encourage patient to monitor pain and request assistance     Problem: ABCDS Injury Assessment  Goal: Absence of physical injury  Recent Flowsheet Documentation  Taken 12/5/2022 2031 by Deo Hansen RN  Absence of Physical Injury: Implement safety measures based on patient assessment

## 2022-12-06 NOTE — PROGRESS NOTES
Physical Therapy  Facility/Department: [facility] ACUTE REHAB UNIT  Rehabilitation Physical Therapy Treatment Note    NAME: Shanti Miller  : 1940 (82 y.o.)  MRN: [id number]  CODE STATUS: Full Code    Date of Service: 22       Restrictions:  Position Activity Restriction  Other position/activity restrictions: PWB TTWB LLE     SUBJECTIVE  Subjective  Subjective: Pt found sitting in wheelchair. Agreeable to therapy, Nurse in the room administering morning medicine. Post Treatment Pain Screening         OBJECTIVE  Cognition  Overall Cognitive Status: WFL  Orientation  Overall Orientation Status: Within Normal Limits  Orientation Level: Oriented X4    Functional Mobility  Scooting  Assistance Level: Contact guard assist  Balance  Sitting Balance: Supervision  Standing Balance: Contact guard assistance (With a rolling walker)  Transfers  Surface: To chair with arms; To mat;From chair with arms;From mat  Additional Factors: Hand placement cues; Verbal cues  Device: Walker  Sit to Stand  Assistance Level: Contact guard assist (From wheelchair, mat and bedside chair to RW and parallel bars)  Skilled Clinical Factors: Increased verbal cues for hand placement and weight bearing on the left  Stand to Sit  Assistance Level: Contact guard assist (From RW and parallel bars to wheelchair, mat and beside chair)  Skilled Clinical Factors: Increased VC for hand placement and putting the LLE in front of her. Stand Pivot  Assistance Level: Contact guard assist (From Wheelchair<> to other wheelchair, Wheel chair to mat, mat to wheelchair, wheel chair to bedside chair, bed side chair to new bed side recliner. mix of right and left stand pivots)  Skilled Clinical Factors: She scoots her right foot over in order to get in front of the object she is going to, able to lift LLE up and move to the position that is needed without twisting on the left.       Environmental Mobility  Wheelchair  Surface: Level surface  Device: Standard wheelchair (16 inch wheelchair)  Additional Factors: Hand placement cues  Assistance Level for Propulsion: Stand by assist  Propulsion Method: Bilateral upper extremities  Propulsion Quality: Slow velocity  Propulsion Distance: 75ftx2             PT Exercises  Exercise Treatment: Pre Gait in the parallel bars: working on using BUE and RLE to take weight off the LLE in order to advance the RLE/worked on pushing up with arms and using the RLE to perform a calf raise 3x10 with CGA. Worked on advancing the RLE with the previous motions 2x10 Min A/ patient has signficantly weaker arms decreased hop time and little to no control of the RLE advancing will continue to progress and perfect. Patient then performed mat push ups using BUE to raise her bottom off the mat 2x10 CGA. Ankle pumps 2x20 on each leg. Over head presses with 3# assistance with RUE due to rotator cuff surgery. Pt then performed multiple sit<>stnands ~10-15 during the session at [facility] and multiple stand pivots. Second Session: Pt found napping in recliner, when awoken, pt was agreeable to therapy. Pt requested to use the restroom. Pt performed a sit<>stand transfers CGA from the recliner and then tried to take a few steps due to earlier exercises that were performed. Pt able to maintain weight bearing precautions but required MOD A from the therapist to maintain when advancing the RLE forward. The patient the able to stand at the sink CGA in order to wash her hands. Once sitting on the EOB the patient was able to perform multiple LE exercises; Marches, LAQ and ankle pumps 2x20. Then patient was able to get herself in the bed with the use of the handrail SBA. Pt left in the bed with all fall risk precautions in place. ASSESSMENT/PROGRESS TOWARDS GOALS      Assessment  Assessment: Pt continues to make progress with her transfers during today treatment session. Pt requesting a smaller wheelchair and likes the 16 inch wheelchair better than the 18.  She was challegened by working on pre gait activities while maintaining her weight bearing precautions. Pt struggled in the parallel bars but is very determined to gain her independence back. Pt is still limited by her fatigue levels and pain during the session which required multiple minute rest breaks before performing the next activity. Pt continues to very motivated, pleasant and determined for therapy but is limited by her pain, fatigue, inability to ambulate and climb stairs at this time. Pt would benefit from continued therapy in order to improve her transfers, functional mobility, ambulation status and functional strength. Will continue to follow. Activity Tolerance: Patient tolerated treatment well  Discharge Recommendations: Continue to assess pending progress;Home with Home health PT;24 hour supervision or assist  PT Equipment Recommendations  Equipment Needed: Yes  Mobility Devices: Wheelchair  Wheelchair: Light Weight  Other: lightweight 16' w/c with removable leg rests. Pt is currently unable to ambulate household distances with RW, making her unable to access area of home for ADLs including kitchen and bathroom. Use of w/c will allow pt to access these areas of her home to perform these ADLs. Pt has sufficient UE strength to propel w/c throughout home and has not expressed unwillingness to use w/c in home. Goals  Patient Goals   Patient Goals : \"To go home. \"  Short Term Goals  Time Frame for Short Term Goals: 7 days- ongoing  Short Term Goal 1: Pt will perform supine<>Sit Mod I  Short Term Goal 2: Pt will perform sit<>stand Mod I with RW  Short Term Goal 3: Pt will perform stand pivot with RW Mod I  Short Term Goal 4: Pt will be able to hop 10ft with RW Min A  Short Term Goal 5: .Ascend/descend 2 steps SBA  Long Term Goals  Time Frame for Long Term Goals : 14 days- ongoing  Long Term Goal 1: Ambulate 50 ft with RW while maintaining weight bearing precautions Mod I  Long Term Goal 2: Ascend/descend 5 steps SBA  Long Term Goal 3: Pt will be able to propel a wheelchair 150ft Mod I    PLAN OF CARE/SAFETY  Physcial Therapy Plan  Days Per Week: 5 Days  Hours Per Day: 1.5 hours  Therapy Duration: 2 Weeks  Current Treatment Recommendations: Strengthening;ROM;Balance training;Functional mobility training;Transfer training; Endurance training; Wheelchair mobility training;Gait training;Stair training;Neuromuscular re-education;Home exercise program;Safety education & training;Patient/Caregiver education & training;Equipment evaluation, education, & procurement; Therapeutic activities  Safety Devices  Type of Devices: All fall risk precautions in place;Call light within reach; Chair alarm in place;Gait belt;Left in chair;Nurse notified    EDUCATION  Education  Education Given To: Patient  Education Provided: Role of Therapy;Transfer Training  Education Method: Demonstration  Barriers to Learning: None  Education Outcome: Verbalized understanding        Therapy Time   Individual Concurrent Group Co-treatment   Time In 0930         Time Out 1038         Minutes 68           Second Session Therapy Time:   Individual Concurrent Group Co-treatment   Time In 1315         Time Out 1345         Minutes 30           Timed Code Treatment Minutes:  54+53    Total Treatment Minutes:  [address] [place], [place] #[address]

## 2022-12-07 LAB
ANION GAP SERPL CALCULATED.3IONS-SCNC: 11 MMOL/L (ref 3–16)
ANISOCYTOSIS: ABNORMAL
BASOPHILS ABSOLUTE: 0 K/UL (ref 0–0.2)
BASOPHILS RELATIVE PERCENT: 0 %
BUN BLDV-MCNC: 6 MG/DL (ref 7–20)
CALCIUM SERPL-MCNC: 8.8 MG/DL (ref 8.3–10.6)
CHLORIDE BLD-SCNC: 101 MMOL/L (ref 99–110)
CO2: 24 MMOL/L (ref 21–32)
CREAT SERPL-MCNC: <0.5 MG/DL (ref 0.6–1.2)
EOSINOPHILS ABSOLUTE: 0 K/UL (ref 0–0.6)
EOSINOPHILS RELATIVE PERCENT: 0 %
GFR SERPL CREATININE-BSD FRML MDRD: >60 ML/MIN/{1.73_M2}
GLUCOSE BLD-MCNC: 168 MG/DL (ref 70–99)
HCT VFR BLD CALC: 27.5 % (ref 36–48)
HEMOGLOBIN: 9.1 G/DL (ref 12–16)
LYMPHOCYTES ABSOLUTE: 12.2 K/UL (ref 1–5.1)
LYMPHOCYTES RELATIVE PERCENT: 78 %
MACROCYTES: ABNORMAL
MCH RBC QN AUTO: 34.8 PG (ref 26–34)
MCHC RBC AUTO-ENTMCNC: 33.3 G/DL (ref 31–36)
MCV RBC AUTO: 104.6 FL (ref 80–100)
MONOCYTES ABSOLUTE: 0.5 K/UL (ref 0–1.3)
MONOCYTES RELATIVE PERCENT: 3 %
NEUTROPHILS ABSOLUTE: 3 K/UL (ref 1.7–7.7)
NEUTROPHILS RELATIVE PERCENT: 19 %
PDW BLD-RTO: 16 % (ref 12.4–15.4)
PLATELET # BLD: 140 K/UL (ref 135–450)
PMV BLD AUTO: 10.1 FL (ref 5–10.5)
POTASSIUM REFLEX MAGNESIUM: 3.7 MMOL/L (ref 3.5–5.1)
RBC # BLD: 2.63 M/UL (ref 4–5.2)
SODIUM BLD-SCNC: 136 MMOL/L (ref 136–145)
WBC # BLD: 15.7 K/UL (ref 4–11)

## 2022-12-07 PROCEDURE — A4217 STERILE WATER/SALINE, 500 ML: HCPCS | Performed by: PHYSICAL MEDICINE & REHABILITATION

## 2022-12-07 PROCEDURE — 97110 THERAPEUTIC EXERCISES: CPT

## 2022-12-07 PROCEDURE — 97530 THERAPEUTIC ACTIVITIES: CPT

## 2022-12-07 PROCEDURE — 36415 COLL VENOUS BLD VENIPUNCTURE: CPT

## 2022-12-07 PROCEDURE — 6370000000 HC RX 637 (ALT 250 FOR IP): Performed by: PHYSICAL MEDICINE & REHABILITATION

## 2022-12-07 PROCEDURE — 99233 SBSQ HOSP IP/OBS HIGH 50: CPT | Performed by: PHYSICAL MEDICINE & REHABILITATION

## 2022-12-07 PROCEDURE — 85025 COMPLETE CBC W/AUTO DIFF WBC: CPT

## 2022-12-07 PROCEDURE — 97535 SELF CARE MNGMENT TRAINING: CPT

## 2022-12-07 PROCEDURE — 1280000000 HC REHAB R&B

## 2022-12-07 PROCEDURE — 97116 GAIT TRAINING THERAPY: CPT

## 2022-12-07 PROCEDURE — 80048 BASIC METABOLIC PNL TOTAL CA: CPT

## 2022-12-07 RX ORDER — FERROUS SULFATE 325(65) MG
325 TABLET ORAL
Status: DISCONTINUED | OUTPATIENT
Start: 2022-12-08 | End: 2022-12-16

## 2022-12-07 RX ADMIN — SENNOSIDES AND DOCUSATE SODIUM 2 TABLET: 50; 8.6 TABLET ORAL at 08:57

## 2022-12-07 RX ADMIN — ALLOPURINOL 300 MG: 300 TABLET ORAL at 08:57

## 2022-12-07 RX ADMIN — APIXABAN 2.5 MG: 5 TABLET, FILM COATED ORAL at 08:57

## 2022-12-07 RX ADMIN — SODIUM CHLORIDE 15 ML: 900 IRRIGANT IRRIGATION at 06:03

## 2022-12-07 RX ADMIN — FAMOTIDINE 20 MG: 20 TABLET ORAL at 20:56

## 2022-12-07 RX ADMIN — SODIUM CHLORIDE 15 ML: 900 IRRIGANT IRRIGATION at 12:04

## 2022-12-07 RX ADMIN — MIDODRINE HYDROCHLORIDE 10 MG: 5 TABLET ORAL at 17:26

## 2022-12-07 RX ADMIN — SODIUM CHLORIDE 15 ML: 900 IRRIGANT IRRIGATION at 20:56

## 2022-12-07 RX ADMIN — APIXABAN 2.5 MG: 5 TABLET, FILM COATED ORAL at 20:56

## 2022-12-07 RX ADMIN — SODIUM CHLORIDE 15 ML: 900 IRRIGANT IRRIGATION at 17:26

## 2022-12-07 RX ADMIN — SODIUM CHLORIDE 15 ML: 900 IRRIGANT IRRIGATION at 06:01

## 2022-12-07 RX ADMIN — MORPHINE SULFATE 15 MG: 15 TABLET ORAL at 06:01

## 2022-12-07 RX ADMIN — MIDODRINE HYDROCHLORIDE 10 MG: 5 TABLET ORAL at 08:57

## 2022-12-07 RX ADMIN — FAMOTIDINE 20 MG: 20 TABLET ORAL at 08:57

## 2022-12-07 RX ADMIN — MIDODRINE HYDROCHLORIDE 10 MG: 5 TABLET ORAL at 12:04

## 2022-12-07 RX ADMIN — MORPHINE SULFATE 15 MG: 15 TABLET ORAL at 12:00

## 2022-12-07 RX ADMIN — MORPHINE SULFATE 15 MG: 15 TABLET ORAL at 20:56

## 2022-12-07 ASSESSMENT — PAIN SCALES - GENERAL
PAINLEVEL_OUTOF10: 10
PAINLEVEL_OUTOF10: 8
PAINLEVEL_OUTOF10: 3
PAINLEVEL_OUTOF10: 10
PAINLEVEL_OUTOF10: 7
PAINLEVEL_OUTOF10: 10
PAINLEVEL_OUTOF10: 4

## 2022-12-07 ASSESSMENT — PAIN DESCRIPTION - ONSET
ONSET: ON-GOING
ONSET: ON-GOING

## 2022-12-07 ASSESSMENT — PAIN DESCRIPTION - FREQUENCY
FREQUENCY: INTERMITTENT
FREQUENCY: INTERMITTENT

## 2022-12-07 ASSESSMENT — PAIN SCALES - WONG BAKER
WONGBAKER_NUMERICALRESPONSE: 0
WONGBAKER_NUMERICALRESPONSE: 0

## 2022-12-07 ASSESSMENT — PAIN DESCRIPTION - ORIENTATION
ORIENTATION: LEFT

## 2022-12-07 ASSESSMENT — PAIN DESCRIPTION - LOCATION
LOCATION: HIP;LEG
LOCATION: HIP;LEG
LOCATION: GENERALIZED
LOCATION: LEG;HIP
LOCATION: HIP;LEG

## 2022-12-07 ASSESSMENT — PAIN DESCRIPTION - PAIN TYPE
TYPE: ACUTE PAIN
TYPE: ACUTE PAIN

## 2022-12-07 ASSESSMENT — PAIN DESCRIPTION - DESCRIPTORS
DESCRIPTORS: ACHING
DESCRIPTORS: ACHING;SHOOTING;STABBING;THROBBING

## 2022-12-07 NOTE — CARE COORDINATION
Case Management Assessment  Initial Evaluation    Date/Time of Evaluation: 12/7/2022 4:03 PM  Assessment Completed by: DEN Leigh    If patient is discharged prior to next notation, then this note serves as note for discharge by case management. Patient Name: Erin Covington                   YOB: 1940  Diagnosis: History of pelvic fracture [Z87.81]                   Date / Time: 12/4/2022  6:39 PM    Patient Admission Status: REHAB IP   Readmission Risk (Low < 19, Mod (19-27), High > 27): Readmission Risk Score: 12.7    Current PCP: Kar Hannah MD  PCP verified by CM? Chart Reviewed: Yes      History Provided by: Pt and son  Patient Orientation: alert and oriented x 4  Patient Cognition:     Hospitalization in the last 30 days (Readmission):  No    If yes, Readmission Assessment in  Navigator will be completed. Advance Directives:      Code Status: Full Code   Patient's Primary Decision Maker is:      Primary Decision Maker: Janet Zarate - Child - [phone]    Primary Decision Maker: Mary Aparicio - Child - [phone]    Discharge Planning:    Patient lives with: Alone Type of Home: House  Primary Care Giver:    Patient Support Systems include: Family Members, Children, Friends/Neighbors   Current Financial resources:    Current community resources:    Current services prior to admission: None            Current DME:              Type of Home Care services:  None    ADLS  Prior functional level: independent  Current functional level:      PT AM-PAC:   /24  OT AM-PAC:   /24    Family can provide assistance at DC: Pt's sister will stay with her. Would you like Case Management to discuss the discharge plan with any other family members/significant others, and if so, who?  Yes - son  Plans to Return to Present Housing: yes  Other Identified Issues/Barriers to RETURNING to current housing: none at this time  Potential Assistance needed at discharge: skilled nursing home Potential DME: wheelchair; walker  Patient expects to discharge to: [facility] for transportation at discharge: son    Financial    Payor: Mila Ricks / Plan: MEDICARE PART A AND B / Product Type: *No Product type* /     Does insurance require precert for SNF: No    Potential assistance Purchasing Medications: No  Meds-to-Beds request:        [facility] [id number] - [place], [address]  Phone: [phone] Fax: [phone]      Notes: Additional Case Management Notes:   Team Conference held this morning. Team discussed DC date for 12/17/22. Pt will need a wheelchair at DC. SW met with Pt and son at bedside this afternoon and discussed above - both in agreement with plan. Pt states her sister will be staying with her at her home. Pt is receptive to skilled Home Care with no preference in agency. Son states he can purchase a RW. SW will continue to follow. The Plan for Transition of Care is related to the following treatment goals of History of pelvic fracture [Z92.85]    IF APPLICABLE: The Patient and/or patient representative Jey Faulkner and her family were provided with a choice of provider and agrees with the discharge plan. Freedom of choice list with basic dialogue that supports the patient's individualized plan of care/goals and shares the quality data associated with the providers was provided to:     Patient Representative Name:       The Patient and/or Patient Representative Agree with the Discharge Plan?       Autumn Coreas [facility]  Case Management Department  Ph: [phone]

## 2022-12-07 NOTE — PLAN OF CARE
Problem: Safety - Adult  Goal: Free from fall injury  12/6/2022 1948 by Filiberto Grimm  Outcome: Progressing  12/6/2022 1620 by Julio C Monet RN  Outcome: Progressing  Note: Compliant with use of call light, waits for assistance with transfers. Problem: Pain  Goal: Verbalizes/displays adequate comfort level or baseline comfort level  12/6/2022 1948 by Filiberto Grimm  Outcome: Progressing  12/6/2022 1620 by Julio C Monet RN  Outcome: Progressing  Flowsheets (Taken 12/6/2022 1620)  Verbalizes/displays adequate comfort level or baseline comfort level:   Encourage patient to monitor pain and request assistance   Assess pain using appropriate pain scale   Administer analgesics based on type and severity of pain and evaluate response   Implement non-pharmacological measures as appropriate and evaluate response   Consider cultural and social influences on pain and pain management     Problem: Skin/Tissue Integrity  Goal: Absence of new skin breakdown  Description: 1. Monitor for areas of redness and/or skin breakdown  2. Assess vascular access sites hourly  3. Every 4-6 hours minimum:  Change oxygen saturation probe site  4. Every 4-6 hours:  If on nasal continuous positive airway pressure, respiratory therapy assess nares and determine need for appliance change or resting period.   12/6/2022 1948 by Filiberto Grimm  Outcome: Progressing  12/6/2022 1620 by Julio C Monet RN  Outcome: Progressing  Note: Buttock blanchable redness, skin protectant applied

## 2022-12-07 NOTE — PLAN OF CARE
Problem: Discharge Planning  Goal: Discharge to home or other facility with appropriate resources  Outcome: Progressing  Note: Patient focused on achieving goals by discharge 12/17. Problem: Safety - Adult  Goal: Free from fall injury  Outcome: Progressing  Note: Compliant with call light use, waits for assist.     Problem: Pain  Goal: Verbalizes/displays adequate comfort level or baseline comfort level  Outcome: Progressing  Flowsheets (Taken 12/7/2022 1066)  Verbalizes/displays adequate comfort level or baseline comfort level:   Encourage patient to monitor pain and request assistance   Assess pain using appropriate pain scale   Administer analgesics based on type and severity of pain and evaluate response   Implement non-pharmacological measures as appropriate and evaluate response   Consider cultural and social influences on pain and pain management   Notify Licensed Independent Practitioner if interventions unsuccessful or patient reports new pain     Problem: Nutrition Deficit:  Goal: Optimize nutritional status  Outcome: Progressing  Note: Drinking nutritional supplement.

## 2022-12-07 NOTE — PROGRESS NOTES
Ox4. Assist x 1, stand and pivot to wheelchair. Participated in therapy, c/o pain 10/10 during therapy, she states she will try to get pain medication q4hrs during therapy day. Call light in reach, alarm on for safety.

## 2022-12-07 NOTE — PROGRESS NOTES
Occupational Therapy  Facility/Department: [facility] AC[facility] REH[facility] UN[facility]  Rehabilitation Occupational Therapy Daily Treatment Note    Date: 22  Patient Name: Hiwot Salas       Room: 4042/1498-52  MRN: [id number]  Account: [de-identified]   : 1940  (81 y.o.) Gender: female                    Past Medical History:  has a past medical history of Allergic rhinitis due to pollen, Arthritis, degenerative, Cancer ([address] CLL (chronic lymphoblastic leukemia), Closed compression fracture of thoracic vertebra ([address] Depression, History of blood transfusion, Hyperlipidemia, Melanoma of upper arm ([address] Mild reactive airways disease, and Sternum fx. Past Surgical History:   has a past surgical history that includes Tonsillectomy; Appendectomy; Cataract removal with implant; Blepharoptosis Repair; Nasal sinus surgery; other surgical history; shoulder surgery (Right, ); and Axillary Surgery (Right, 10/18/2022). Restrictions  Other position/activity restrictions: PWB TTWB LLE    Subjective  Subjective: Pt seated in recliner upon arrival, pleasent and agreeable to OT. Objective     Cognition  Overall Cognitive Status: WFL  Orientation  Overall Orientation Status: Within Normal Limits         ADL  Grooming/Oral Hygiene  Assistance Level: Modified independent  Skilled Clinical Factors: Pt seated in wc brushed her hair. Upper Extremity Bathing  Assistance Level: Supervision  Skilled Clinical Factors: Pt seated on TTB washed/dried 4/4 UB components. Lower Extremity Bathing  Assistance Level: Contact guard assist  Skilled Clinical Factors: Pt seated on TTB washed 6/6 components of LB. Pt washed BLE using figure 4. Pt dried 5/6 components of LB seated on TTB. Pt in stance w/ unilateral UE supported on GB w/ CGA dried buttocks. Upper Extremity Dressing  Assistance Level: Modified independent  Skilled Clinical Factors: Pt seated on TTB doffed gown w/ assist to untie. Pt seated on TTB donned shirt w/ mod I.   Lower Extremity Dressing  Assistance Level: Contact guard assist  Skilled Clinical Factors: Pt seated in stance w/ unilaterla UE supported on GB managed pants/underwear up/down over hips w/ CGA. Pt seated in wc doffed briefs using contralateral feet and flexing at the hips. Pt seated on TTB threaded BLE into underwear w/ figure 4 technique w/ ++time. Pt seated in wc threaded BLE into pants flexing at the hips. Putting On/Taking Off Footwear  Assistance Level: Stand by assist;Increased time to complete  Skilled Clinical Factors: Pt seated on TTB doffed bilateral hospital socks using figure 4 technique. Pt seated on TTB donned bilateral socks using figure 4 technique. Toileting  Assistance Level: Contact guard assist  Skilled Clinical Factors: Pt in stance managed pants up/down over hips w/ CGA. Pt seated on toilet voided urine and a bowel movement. Pt performed pericare. Toilet Transfers  Technique: Stand pivot  Equipment: Standard toilet;Grab bars  Assistance Level: Contact guard assist  Skilled Clinical Factors: Pt w/ use of L GB demo'd stand pivot w/ CGA. Pt w/ no use of RW only use of GB for UE support; pt provided w/ VCs to maintain precautions  Tub/Shower Transfers  Type: Shower  Transfer From: Rolling walker (WC w/ GB into the shower)  Transfer To: Tub transfer bench  Additional Factors: Cues for hand placement; Increased time to complete  Assistance Level: Contact guard assist  Skilled Clinical Factors: Pt demo'd stand pivot from wc to TTB w/ use of GB w/ L hand. Pt demo'd sit to stand w/ use of GB, then w/ VCs place BUE onto RW and performed 2 hops out of the shower. Pt then reached back and demo'd stand to sit from RW to wc. Pt maintained TTWB precautions during shower transfer. Functional Mobility  Device: Wheelchair  Activity: To/From bathroom  Assistance Level: Modified independent  Skilled Clinical Factors: Pt self-propelled to/from the bathroom w/ mod I.   Transfers  Surface: From chair with arms;Wheelchair; To chair with arms;Standard toilet (TTB)  Sit to Stand  Assistance Level: Contact guard assist  Skilled Clinical Factors: Recliner > RW; VCs for hand placement and to maintain precautions  Stand Pivot  Assistance Level: Contact guard assist  Skilled Clinical Factors: RW > Recliner; VCs for hand placement and to maintain precautions   OT Exercises  Exercise Treatment: Pt performed the following activity to demonstrate independence w/ UB strengthening HEP for increased independence w/ functional mobility. Pt seated in recliner demo'd understanding and proper form of the following exercises w/ 2lb freeweight: bicep curls, internal/external rotation, shoulder abduction, shoulder flexion, punches, and shoulder presses. Pt instructed that if she is feeling any pain when performing the exercises due to her past R shoulder rotator cuff surgery to perform without weight or perform less reps. Pt verbalized her understanding and demo'd her independence in understanding the exercises on the HEP. Pt left w/ 2lb weight to cont exercises. Second session:  Pt seated in recliner upon arrival, pleasant and agreeable to OT session. Pt demo sit>stand from recliner>RW w/ CGA and stand pivot at RW>wc w/ CGA and increased time. Pt demo good adherence to WB precautions this session. Pt used BUE to self-propel wc to bathroom w/ spvn and increased time. Pt required VC and assist to et up wc properly for toilet transfer. Pt then demo sit>stand from wc>GB w/ CGA and stand pivot to toilet w/ Min A. Pt urinated while seated on toilet w/ increased time and CGA in stance for clothing mgmt at hips w/ unilateral UE support on GB. Pt completed perihygiene while seated on toilet w/ setup. Pt becoming emotional during session, stating \"I just never thought i'd be in a wheelchair. This isn't me. I see everyone else in the gym walking. \" Pt' feelings/frustrations validated and pt provided w/ emotional support and encouragement.  Pt denying desire to talk w/psych and/or  this date \"I just need to get through it. I cant do it\". Pt demo sit>stand from toilet to RW w/ CGA and VC for hand placement. Pt then demo pivot using RW to wc w/ CGA. Pt seated in wc to complete hand hygiene at sink w/ Mod I. Pt completing transfer back to recliner from wc w/ CGA at end of session using RW for stand pivot. Pt requesting to be untethered in wc on unit. Discussed w/ PT and RN who both agree pt would be a good candidate to be untethered in  on unit. Pt informed and demo improved mood at end of session. Pt left seated in recliner w/ chair alarm on, needs met, and call light w/in reach. Assessment  Assessment  Assessment: Pt demo'd good participation in therapy. Pt performed UB bathing w/ supervision, LB bathing and dressing w/ CGA, toileting and toilet transfer w/ CGA, UB dressing w/ mod I, and footwear w/ SBA. Pt cont to require VCs to maintain precautions when performing transfers. Pt is very pleasent and motivated and will cont to benefit from skilled OT to return pt to PLOF. Cont OT per POC. Activity Tolerance: Patient tolerated treatment well  Discharge Recommendations: Continue to assess pending progress;24 hour supervision or assist;Patient would benefit from continued therapy after discharge  OT Equipment Recommendations  Other: cont to assess pending progress  Safety Devices  Safety Devices in place: Yes  Type of devices: Left in chair;Call light within reach; Chair alarm in place    Patient Education  Education  Education Given To: Patient  Education Provided: Role of Therapy;Plan of Care;Home Exercise Program;ADL Function; Safety;IADL Function;Precautions; Equipment  Education Provided Comments: Pt educated on HEP and was able to demo each exercise w/ independence  Education Method: Verbal;Teach Back;Printed Information/Hand-outs  Barriers to Learning: None  Education Outcome: Verbalized understanding;Continued education needed    Plan  Occupational Therapy Plan  Times Per Week: 5x/week, 90min/day  Current Treatment Recommendations: Strengthening;Balance training;Functional mobility training; Endurance training; Safety education & training;Patient/Caregiver education & training;Pain management;Self-Care / ADL; Home management training;Equipment evaluation, education, & procurement;ROM    Goals  Patient Goals   Patient goals : \"To be independent again\"  Short Term Goals  Time Frame for Short Term Goals: 7 days  Short Term Goal 1: Pt will complete LE dressing w/ CGA and use of AE prn - Goal Met 12/6  Short Term Goal 2: Pt will complete bathing w/ CGA - Goal Met 12/7  Short Term Goal 3: Pt will complete toilet transfer w/ CGA - Goal Met 12/6  Short Term Goal 4: Pt will complete toileting w/ CGA - Goal met 12/6  Short Term Goal 5: Pt will complete BUE HEP [id number] to increase strength required for functional mobility/transfers - goal met 12/7  Long Term Goals  Time Frame for Long Term Goals : 14 days - ongoing for all  Long Term Goal 1: Pt will complete shower transfer w/ Mod I  Long Term Goal 2: Pt will complete LE dressing w/ Mod I and use of AE prn  Long Term Goal 3: Pt will complete bathing w/ Mod I and use of AE prn  Long Term Goal 4: Pt will complete toileting/ toilet transfer w/ Mod I  Long Term Goal 5: Pt will complete simple meal prep task w/ Mod I    AM-PAC Score               Therapy Time   Individual Concurrent Group Co-treatment   Time In 0930         Time Out 1030         Minutes 60         Timed Code Treatment Minutes: 60 Minutes     Second Session Therapy Time:   Individual Concurrent Group Co-treatment   Time In 1400         Time Out 1430         Minutes 30           Timed Code Treatment Minutes:  30    Total Treatment Minutes:  [address] S/OT  Therapist was present, directed the patient's care, made skilled judgement, and was responsible for assessment and treatment of the patient.  BEVERLY Coello/L Lotus Bautista (second session)

## 2022-12-07 NOTE — PROGRESS NOTES
Physical Therapy  Facility/Department: [facility] ACUTE REH[facility] UNIT  Rehabilitation Physical Therapy Treatment Note    NAME: Shanti Miller  : 1940 (82 y.o.)  MRN: [id number]  CODE STATUS: Full Code    Date of Service: 22       Restrictions:  Position Activity Restriction  Other position/activity restrictions: PWB TTWB LLE     SUBJECTIVE  Subjective  Subjective: Pt sitting in beside chair, agreeable to therapy. States \"I think I am ready to go home\"        Post Treatment Pain Screening         OBJECTIVE  Cognition  Overall Cognitive Status: WFL  Orientation  Overall Orientation Status: Within Normal Limits  Orientation Level: Oriented X4    Functional Mobility  Scooting  Assistance Level: Stand by assist  Skilled Clinical Factors: To the front of the chair  Balance  Sitting Balance: Supervision  Standing Balance: Contact guard assistance (With Rolling walker)  Transfers  Surface: To chair with arms; To mat;From chair with arms;From mat  Additional Factors: Hand placement cues; Verbal cues  Device: Walker  Sit to Stand  Assistance Level: Contact guard assist (From wheelchair, Bed side chair, Mat table)  Skilled Clinical Factors: Increased verbal cues for hand placement and weight bearing precautions  on the left  Stand to Sit  Assistance Level: Contact guard assist (From RW to mat, beside chair, wheel chair)  Skilled Clinical Factors: Increased VC for hand placement and putting the LLE in front of her. Stand Pivot  Assistance Level: Contact guard assist (Wheelchair<>Mat, Wheelchair to <>bedside chair)  Skilled Clinical Factors: She scoots her right foot over in order to get in front of the object she is going to, able to lift LLE up and move to the position that is needed without twisting on the left. Environmental Mobility  Ambulation  Surface: Level surface  Device: Rolling walker  Distance: 10ftx4  Assistance Level: Moderate assistance  Gait Deviations: Slow isabel  Skilled Clinical Factors:  Mod A for maintaining weight bearing precautions, at times would hop and keep the LLE off the ground, other would take steps. Wheelchair  Surface: Level surface  Device: Standard wheelchair  Additional Factors: Hand placement cues  Assistance Level for Propulsion: Stand by assist  Propulsion Method: Bilateral upper extremities  Propulsion Quality: Slow velocity  Propulsion Distance: 75ftx2             PT Exercises  Exercise Treatment: Pt performed 2 inch box jump on the right x3 mod A with use of the rolling walker. Dynamic Standing Balance Exercises: Standing with RW performed L sided marches and Hip flexion 2x10, Right sided pre gait calf raises and UE assistance (pregait activity) 2x20 CGA        Second Treatment Session:   Pt found sitting in her chair with complaints of 10/10 pain in her hip. Pt stated that she had asked for pain medicine but has yet to receive. Pt ambulate 4 ft to the wheelchair and wheeled to the gym (75ft). Pt then transferred to the mat table and was able to scoot backwards only using her BUE's and RLE SBA. Pt requested to work on rolling. When rolling left and right patient had too much pain to continue the movement. She was only able to go half the distance. Pt then needed a much needed break due to pain and was able to transfer back to the wheelchair and head back to her room. Pt then performed a stand pivot transfer to the recliner CGA. Pt left in recliner with all fall risks in place. ASSESSMENT/PROGRESS TOWARDS GOALS      Assessment  Assessment: Pt continues to make progress with endurance and transfers during the session. Pt was able to ambulate to the restroom with Mod A and stand at the sink to perform mouth hygiene. Pt then ambulated with weight bearing precautions to the wheelchair. Pt able to [facility] the wheelchair to and from the gym with no assistance. Pt was challegened by single leg dynamic standing balance on the right lower extremitiy and strengthening on the left.  Pre curb Strengthening;ROM;Balance training;Functional mobility training;Transfer training; Endurance training; Wheelchair mobility training;Gait training;Stair training;Neuromuscular re-education;Home exercise program;Safety education & training;Patient/Caregiver education & training;Equipment evaluation, education, & procurement; Therapeutic activities  Safety Devices  Type of Devices: All fall risk precautions in place;Call light within reach; Chair alarm in place;Gait belt;Left in chair;Nurse notified    EDUCATION  Education  Education Given To: Patient  Education Provided: Role of Therapy;Transfer Training  Education Method: Demonstration  Barriers to Learning: None  Education Outcome: Verbalized understanding        Therapy Time   Individual Concurrent Group Co-treatment   Time In 0730         Time Out 0830         Minutes 60           Second Session Therapy Time:   Individual Concurrent Group Co-treatment   Time In 1130         Time Out 1200         Minutes 30           Timed Code Treatment Minutes:  60+30    Total Treatment Minutes:  [address] [facility] , [address], [place] [id number]

## 2022-12-07 NOTE — PROGRESS NOTES
Department of Physical Medicine & Rehabilitation  Progress Note    Patient Identification:  Sherry Cox  [id number]  : 1940  Admit date: 2022    Chief Complaint: History of pelvic fracture    Subjective:   Feels tired today but she thinks she is getting better in therapy. Moving around in PT/OT Continues to have bilateral foot swelling (minor). Seen in her chair this morning. WC ordered. Team conference today. ROS: No f/c, n/v, cp     Objective:  Patient Vitals for the past 24 hrs:   BP Temp Temp src Pulse Resp SpO2   22 1200 108/67 -- -- 65 16 --   22 0837 104/65 97.7 °F (36.5 °C) Oral 75 18 98 %   22 0601 -- -- -- -- 18 --   229 -- -- -- -- 18 --   229 -- -- -- -- 18 --   22 (!) 132/96 97.4 °F (36.3 °C) Oral 74 18 95 %   22 1730 (!) 95/56 -- -- 83 -- --   22 1500 (!) 105/57 -- -- 79 -- --       Const: Alert. No distress, pleasant. HEENT: Normocephalic, atraumatic. Normal sclera/conjunctiva. MMM. CV: Regular rate and rhythm. Resp: No respiratory distress. Lungs CTAB. Abd: Soft, nontender, nondistended, NABS+   Ext: No edema. Neuro: Alert, oriented, appropriately interactive. Psych: Cooperative, appropriate mood and affect    Laboratory data: Available via EMR.    Last 24 hour lab  Recent Results (from the past 24 hour(s))   CBC auto differential    Collection Time: 22  9:34 AM   Result Value Ref Range    WBC 15.7 (H) 4.0 - 11.0 K/uL    RBC 2.63 (L) 4.00 - 5.20 M/uL    Hemoglobin 9.1 (L) 12.0 - 16.0 g/dL    Hematocrit 27.5 (L) 36.0 - 48.0 %    .6 (H) 80.0 - 100.0 fL    MCH 34.8 (H) 26.0 - 34.0 pg    MCHC 33.3 31.0 - 36.0 g/dL    RDW 16.0 (H) 12.4 - 15.4 %    Platelets 926 487 - 968 K/uL    MPV 10.1 5.0 - 10.5 fL    Neutrophils % 19.0 %    Lymphocytes % 78.0 %    Monocytes % 3.0 %    Eosinophils % 0.0 %    Basophils % 0.0 %    Neutrophils Absolute 3.0 1.7 - 7.7 K/uL    Lymphocytes Absolute 12.2 (H) 1.0 - 5.1 K/uL Monocytes Absolute 0.5 0.0 - 1.3 K/uL    Eosinophils Absolute 0.0 0.0 - 0.6 K/uL    Basophils Absolute 0.0 0.0 - 0.2 K/uL    Anisocytosis 1+ (A)     Macrocytes Occasional (A)    Basic Metabolic Panel w/ Reflex to MG    Collection Time: 12/07/22  9:34 AM   Result Value Ref Range    Sodium 136 136 - 145 mmol/L    Potassium reflex Magnesium 3.7 3.5 - 5.1 mmol/L    Chloride 101 99 - 110 mmol/L    CO2 24 21 - 32 mmol/L    Anion Gap 11 3 - 16    Glucose 168 (H) 70 - 99 mg/dL    BUN 6 (L) 7 - 20 mg/dL    Creatinine <0.5 (L) 0.6 - 1.2 mg/dL    Est, Glom Filt Rate >60 >60    Calcium 8.8 8.3 - 10.6 mg/dL       Therapy progress:  PT  Position Activity Restriction  Other position/activity restrictions: PWB TTWB LLE  Objective     Sit to Stand: Minimal Assistance (From recliner to RW, increased VC for handplacement and weight bearing precautions)  Stand to Sit: Minimal Assistance (To recliner, and car transfer from RW)     OT  PT Equipment Recommendations  Equipment Needed: Yes  Mobility Devices: Wheelchair  Wheelchair: Light Weight  Other: lightweight 16' w/c with removable leg rests. Pt is currently unable to ambulate household distances with RW, making her unable to access area of home for ADLs including kitchen and bathroom. Use of w/c will allow pt to access these areas of her home to perform these ADLs. Pt has sufficient UE strength to propel w/c throughout home and has not expressed unwillingness to use w/c in home. Toilet - Technique: Stand pivot (w/ RW)  Equipment Used: Raised toilet seat with rails  Assessment        SLP          Body mass index is 21.2 kg/m². Assessment and Plan:  1. Left Pelvic ring fracture including superior and inferior pubic rami fracture and sacral fractures with minimal displacement- Oscal, Vit D, seen by ortho  - requires PT/OT  2. CLL- followed by [facility]  3. Hypotension- monitor - on midodrine   4. Debility- unable to ambulate long distances.  Requires PT/ OT   - WC ordered     Team conference was held today on the patient and discussed directly with the patient utilizing their entire treatment team. Please see separate team note for details. Total treatment time for today's care >35 min. >50% of time spent counseling with patient and coordinating care. Bladder - high risk retention - Monitor PVRs, SC prn >300cc     Bowel - high risk constipation - colace BID, PRN miralax and MoM. follow bowel movements. Enema or suppository if needed.       Safety - fall precautions    Rehab Progress: Improving  Anticipated Dispo: home  Services/DME: TBD  ELOS: 12/17- team conference        LUDY BalderasP.H  PM&R  12/7/2022  12:24 PM

## 2022-12-07 NOTE — PROGRESS NOTES
Alert x4 c/o pelvis ,leg and shoulder pain, medicated with prn morphine / effective , up to the bathroom with assist x1 , s/p to w/c , and back to bed, took all meds w/o difficulty , no acute distress noted, resp e/e call light in reach.

## 2022-12-08 PROCEDURE — 1280000000 HC REHAB R&B

## 2022-12-08 PROCEDURE — 97530 THERAPEUTIC ACTIVITIES: CPT

## 2022-12-08 PROCEDURE — 99232 SBSQ HOSP IP/OBS MODERATE 35: CPT | Performed by: PHYSICAL MEDICINE & REHABILITATION

## 2022-12-08 PROCEDURE — 97535 SELF CARE MNGMENT TRAINING: CPT

## 2022-12-08 PROCEDURE — 97110 THERAPEUTIC EXERCISES: CPT

## 2022-12-08 PROCEDURE — 6370000000 HC RX 637 (ALT 250 FOR IP): Performed by: PHYSICAL MEDICINE & REHABILITATION

## 2022-12-08 RX ADMIN — SODIUM CHLORIDE 15 ML: 900 IRRIGANT IRRIGATION at 20:40

## 2022-12-08 RX ADMIN — FAMOTIDINE 20 MG: 20 TABLET ORAL at 20:29

## 2022-12-08 RX ADMIN — SODIUM CHLORIDE 15 ML: 900 IRRIGANT IRRIGATION at 17:19

## 2022-12-08 RX ADMIN — APIXABAN 2.5 MG: 5 TABLET, FILM COATED ORAL at 08:45

## 2022-12-08 RX ADMIN — ALLOPURINOL 300 MG: 300 TABLET ORAL at 08:45

## 2022-12-08 RX ADMIN — HYDROCODONE BITARTRATE AND ACETAMINOPHEN 1 TABLET: 5; 325 TABLET ORAL at 12:02

## 2022-12-08 RX ADMIN — MORPHINE SULFATE 15 MG: 15 TABLET ORAL at 23:50

## 2022-12-08 RX ADMIN — HYDROCODONE BITARTRATE AND ACETAMINOPHEN 1 TABLET: 5; 325 TABLET ORAL at 17:16

## 2022-12-08 RX ADMIN — FERROUS SULFATE TAB 325 MG (65 MG ELEMENTAL FE) 325 MG: 325 (65 FE) TAB at 08:47

## 2022-12-08 RX ADMIN — THERA TABS 1 TABLET: TAB at 08:45

## 2022-12-08 RX ADMIN — SODIUM CHLORIDE 15 ML: 900 IRRIGANT IRRIGATION at 06:33

## 2022-12-08 RX ADMIN — FAMOTIDINE 20 MG: 20 TABLET ORAL at 08:45

## 2022-12-08 RX ADMIN — APIXABAN 2.5 MG: 5 TABLET, FILM COATED ORAL at 20:29

## 2022-12-08 RX ADMIN — MORPHINE SULFATE 15 MG: 15 TABLET ORAL at 01:17

## 2022-12-08 RX ADMIN — MORPHINE SULFATE 15 MG: 15 TABLET ORAL at 07:00

## 2022-12-08 RX ADMIN — HYDROCODONE BITARTRATE AND ACETAMINOPHEN 1 TABLET: 5; 325 TABLET ORAL at 20:39

## 2022-12-08 ASSESSMENT — PAIN DESCRIPTION - PAIN TYPE
TYPE: ACUTE PAIN
TYPE: ACUTE PAIN

## 2022-12-08 ASSESSMENT — PAIN SCALES - GENERAL
PAINLEVEL_OUTOF10: 9
PAINLEVEL_OUTOF10: 5
PAINLEVEL_OUTOF10: 10
PAINLEVEL_OUTOF10: 9
PAINLEVEL_OUTOF10: 6
PAINLEVEL_OUTOF10: 10

## 2022-12-08 ASSESSMENT — PAIN DESCRIPTION - ONSET
ONSET: ON-GOING
ONSET: ON-GOING

## 2022-12-08 ASSESSMENT — PAIN DESCRIPTION - ORIENTATION
ORIENTATION: LEFT

## 2022-12-08 ASSESSMENT — PAIN - FUNCTIONAL ASSESSMENT
PAIN_FUNCTIONAL_ASSESSMENT: PREVENTS OR INTERFERES SOME ACTIVE ACTIVITIES AND ADLS
PAIN_FUNCTIONAL_ASSESSMENT: ACTIVITIES ARE NOT PREVENTED
PAIN_FUNCTIONAL_ASSESSMENT: PREVENTS OR INTERFERES SOME ACTIVE ACTIVITIES AND ADLS
PAIN_FUNCTIONAL_ASSESSMENT: PREVENTS OR INTERFERES SOME ACTIVE ACTIVITIES AND ADLS

## 2022-12-08 ASSESSMENT — PAIN DESCRIPTION - LOCATION
LOCATION: LEG;HIP
LOCATION: LEG;HIP
LOCATION: GROIN;HIP
LOCATION: LEG;BACK
LOCATION: LEG;HIP
LOCATION: HIP

## 2022-12-08 ASSESSMENT — PAIN DESCRIPTION - DESCRIPTORS
DESCRIPTORS: ACHING;THROBBING
DESCRIPTORS: ACHING
DESCRIPTORS: ACHING

## 2022-12-08 ASSESSMENT — PAIN DESCRIPTION - FREQUENCY
FREQUENCY: INTERMITTENT
FREQUENCY: INTERMITTENT

## 2022-12-08 ASSESSMENT — PAIN SCALES - WONG BAKER: WONGBAKER_NUMERICALRESPONSE: 0

## 2022-12-08 NOTE — PROGRESS NOTES
Occupational Therapy  Facility/Department: [facility] ACUTE REHAB UNIT  Rehabilitation Occupational Therapy Daily Treatment Note    Date: 22  Patient Name: Ayad Friday       Room: [id number]  MRN: [id number]  Account: [de-identified]   : 1940  (81 y.o.) Gender: female                    Past Medical History:  has a past medical history of Allergic rhinitis due to pollen, Arthritis, degenerative, Cancer ([address] CLL (chronic lymphoblastic leukemia), Closed compression fracture of thoracic vertebra ([address] Depression, History of blood transfusion, Hyperlipidemia, Melanoma of upper arm ([address] Mild reactive airways disease, and Sternum fx. Past Surgical History:   has a past surgical history that includes Tonsillectomy; Appendectomy; Cataract removal with implant; Blepharoptosis Repair; Nasal sinus surgery; other surgical history; shoulder surgery (Right, 2018); and Axillary Surgery (Right, 10/18/2022). Restrictions  Other position/activity restrictions: PWB TTWB LLE    Subjective  Subjective: Pt seated in recliner upon arrival, pleasent and agreeable to OT. Objective     Cognition  Overall Cognitive Status: WFL  Orientation  Overall Orientation Status: Within Normal Limits         ADL  Grooming/Oral Hygiene  Assistance Level: Modified independent  Skilled Clinical Factors: Pt seated in wc performed hand hygiene  Toileting  Assistance Level: Contact guard assist  Skilled Clinical Factors: Pt in stance managed pants up/down over hips w/ CGA. Pt seated on toilet voided urine and a bowel movement. Pt performed pericare. Toilet Transfers  Technique: Stand pivot  Equipment: Standard toilet;Grab bars  Assistance Level: Contact guard assist  Skilled Clinical Factors: Pt w/ use of L GB demo'd stand pivot w/ CGA.  Pt w/ no use of RW only use of GB for UE support; pt provided w/ VCs to maintain precautions    Instrumental ADL's  Instrumental ADLs: Yes  Meal Prep  Meal Prep Level: Wheelchair  Meal Prep Level of Assistance: Contact guard assistance  Meal Preparation: Pt self-propelled around kitchen performing item retrieval to simulate pt retrieving items around her kitchen. Pt retrieved 10 cones from high cabinets. low cabinets, fridge, freezer, microwave, and oven. Pt reached flexing at the hips to receive items from low cabinets and freezer. Pt stated that she would have someone home with her to assist her in retrieving items out of the oven. Pt retrieved cones from high cabinets performing sit > stand w/ unilateral UE supported on the counter to reach up to retrieve cones w CGA. Pt provided w/ VCs on how to utilize wc leg rest. Pt demo'd good understanding of locking the breaks prior to standing and moving leg rest to get close enough to the counter. Pt maintained precautions throughout this activity. Functional Mobility  Device: Wheelchair  Activity: To/From bathroom; To/From therapy gym  Assistance Level: Modified independent  Skilled Clinical Factors: Pt self-propelled to/from the bathroom w/ mod I using BUE. Pt self-propelled to/from therapy gym w/ mod I w/ BUE and LLE supported on leg rest for pain relief. Pt performed the following activity to simulate retrieving items throughout her home. Pt self propelled throughout the gym performing item retrieval w/ use of reacher. Pt retrieved items from counter height and on the ground. Pt self propelled through tight spaces and successfully retrieved all cones wheeling up close enough and w/ safety to retrieve the cones. Pt utilized reacher for cones on the ground only. Pt performed this activity w/ mod I. Transfers  Surface: From chair with arms; To chair with arms; Wheelchair  Sit to Stand  Assistance Level: Contact guard assist  Skilled Clinical Factors: wc > kitchen counter; VCs to maintain precautions  Stand to Sit  Assistance Level: Contact guard assist  Skilled Clinical Factors: kitchen counter > wc; VCs to maintain precautions  Stand Pivot  Assistance Level: Contact guard assist  Skilled Clinical Factors: wc <> recliner w/ RW; VCs for hand placement and to maintain precautions     Second Session:  Pt seated in recliner upon arrival, pleasant and agreeable to OT. Transfers:   Sit <> stand:   Wc <> kitchen counter CGA  Stand pivot  Recliner <> WC CGA; Maintained precautions well during transfer  Toilet transfer: Stand pivot wc <> toilet pt w/ use of L GB demo'd stand pivot w/ CGA. Pt w/ no use of RW only use of GB for UE support; pt provided w/ VCs to maintain precautions      Hygiene: Pt seated in wc washed hands w/ mod I    Meal Prep Activity:   Pt performed the following activity to simulate making coffee at home, an activity pt states she does often. Pt performed activity seated in wc w/ mod I, when pt was in stance to set up  pt required CGA. Pt provided w/ vcs on where to locate each item and how to work the  due to novelty. Pt successfully retrieved coffee filter and coffee. Pt correctly setup coffee and poured a cup. Pt stated family will be staying w/ her at d/c to assist w/ IADLs. Pt left seated in recliner w/ chair alarm on and call light within reach. Assessment  Assessment  Assessment: Pt demo'd good participation in therapy. Pt performed toileting and toilet transfer w/ CGA. Pt demo'd item retrieval w/ reacher throughout gym w/ mod I. Pt performed item retrieval throughout kitchen and made coffee demonstrating mod I at wc level and CGA when performing activities in stance. Pt cont to benefit from activities to improve balance and endurance and at times can be limited by pain. Pt is very pleasent and motivated and will cont to benefit from skilled OT to return pt to PLOF. Cont OT per POC.   Activity Tolerance: Patient tolerated treatment well  Discharge Recommendations: Continue to assess pending progress;24 hour supervision or assist;Patient would benefit from continued therapy after discharge  OT Equipment Recommendations  Other: cont to assess pending progress  Safety Devices  Safety Devices in place: Yes  Type of devices: Left in chair;Call light within reach; Chair alarm in place (Pt w/ complaint of groin soreness, provided w/ ice packs at EOS)    Patient Education  Education  Education Given To: Patient  Education Provided: Role of Therapy;Plan of Care;Home Exercise Program;ADL Function; Safety;IADL Function;Precautions; Equipment  Education Provided Comments: Pt educated on safety throughout kitchen and how to utilize [employer]  Education Method: Verbal;Demonstration  Barriers to Learning: None  Education Outcome: Verbalized understanding;Continued education needed    Plan  Occupational Therapy Plan  Times Per Week: 5x/week, 90min/day  Current Treatment Recommendations: Strengthening;Balance training;Functional mobility training; Endurance training; Safety education & training;Patient/Caregiver education & training;Pain management;Self-Care / ADL; Home management training;Equipment evaluation, education, & procurement;ROM    Goals  Patient Goals   Patient goals :  \"To be independent again\"  Short Term Goals  Time Frame for Short Term Goals: 7 days  Short Term Goal 1: Pt will complete LE dressing w/ CGA and use of AE prn - Goal Met 12/6  Short Term Goal 2: Pt will complete bathing w/ CGA - Goal Met 12/7  Short Term Goal 3: Pt will complete toilet transfer w/ CGA - Goal Met 12/6  Short Term Goal 4: Pt will complete toileting w/ CGA - Goal met 12/6  Short Term Goal 5: Pt will complete BUE HEP [id number] to increase strength required for functional mobility/transfers - goal met 12/7  Long Term Goals  Time Frame for Long Term Goals : 14 days - ongoing for all  Long Term Goal 1: Pt will complete shower transfer w/ Mod I  Long Term Goal 2: Pt will complete LE dressing w/ Mod I and use of AE prn  Long Term Goal 3: Pt will complete bathing w/ Mod I and use of AE prn  Long Term Goal 4: Pt will complete toileting/ toilet transfer w/ Mod I  Long Term Goal 5: Pt will complete simple meal prep task w/ Mod I    AM-PAC Score               Therapy Time   Individual Second Session Group Co-treatment   Time In 0730  945       Time Out 0830  1015       Minutes 60  30       Timed Code Treatment Minutes: 60 Minutes + 30 Minutes = 90 Minutes       Cuco Calvo S/OT

## 2022-12-08 NOTE — PROGRESS NOTES
Alert x4, c/o pain medicated with prn morphine , effective , up to BR x1 assist , no acute distress noted resp e/e call light in reach

## 2022-12-08 NOTE — PROGRESS NOTES
Department of Physical Medicine & Rehabilitation  Progress Note    Patient Identification:  Saurav Hawk  [id number]  : 1940  Admit date: 2022    Chief Complaint: History of pelvic fracture    Subjective:   Seen in her bed this morning. No new complaints overnight. Slowly showing some improvement. She did have some pain last night but was able to endure therapy. Feeling better today. ROS: No f/c, n/v, cp     Objective:  Patient Vitals for the past 24 hrs:   BP Temp Temp src Pulse Resp SpO2   22 0147 -- -- -- -- 18 --   22 0117 -- -- -- -- 18 --   22 -- -- -- -- 18 --   22 -- -- -- -- 18 --   22 (!) 101/58 98.2 °F (36.8 °C) Oral 71 16 95 %   22 1724 (!) 101/59 -- -- 78 -- --   22 1200 108/67 -- -- 65 16 --   22 0837 104/65 97.7 °F (36.5 °C) Oral 75 18 98 %       Const: Alert. No distress, pleasant. HEENT: Normocephalic, atraumatic. Normal sclera/conjunctiva. MMM. CV: Regular rate and rhythm. Resp: No respiratory distress. Lungs CTAB. Abd: Soft, nontender, nondistended, NABS+   Ext: No edema. Neuro: Alert, oriented, appropriately interactive. Psych: Cooperative, appropriate mood and affect    Laboratory data: Available via EMR.    Last 24 hour lab  Recent Results (from the past 24 hour(s))   CBC auto differential    Collection Time: 22  9:34 AM   Result Value Ref Range    WBC 15.7 (H) 4.0 - 11.0 K/uL    RBC 2.63 (L) 4.00 - 5.20 M/uL    Hemoglobin 9.1 (L) 12.0 - 16.0 g/dL    Hematocrit 27.5 (L) 36.0 - 48.0 %    .6 (H) 80.0 - 100.0 fL    MCH 34.8 (H) 26.0 - 34.0 pg    MCHC 33.3 31.0 - 36.0 g/dL    RDW 16.0 (H) 12.4 - 15.4 %    Platelets 186 583 - 606 K/uL    MPV 10.1 5.0 - 10.5 fL    Neutrophils % 19.0 %    Lymphocytes % 78.0 %    Monocytes % 3.0 %    Eosinophils % 0.0 %    Basophils % 0.0 %    Neutrophils Absolute 3.0 1.7 - 7.7 K/uL    Lymphocytes Absolute 12.2 (H) 1.0 - 5.1 K/uL    Monocytes Absolute 0.5 0.0 - 1.3 K/uL    Eosinophils Absolute 0.0 0.0 - 0.6 K/uL    Basophils Absolute 0.0 0.0 - 0.2 K/uL    Anisocytosis 1+ (A)     Macrocytes Occasional (A)    Basic Metabolic Panel w/ Reflex to MG    Collection Time: 12/07/22  9:34 AM   Result Value Ref Range    Sodium 136 136 - 145 mmol/L    Potassium reflex Magnesium 3.7 3.5 - 5.1 mmol/L    Chloride 101 99 - 110 mmol/L    CO2 24 21 - 32 mmol/L    Anion Gap 11 3 - 16    Glucose 168 (H) 70 - 99 mg/dL    BUN 6 (L) 7 - 20 mg/dL    Creatinine <0.5 (L) 0.6 - 1.2 mg/dL    Est, Glom Filt Rate >60 >60    Calcium 8.8 8.3 - 10.6 mg/dL       Therapy progress:  PT  Position Activity Restriction  Other position/activity restrictions: PWB TTWB LLE  Objective     Sit to Stand: Minimal Assistance (From recliner to RW, increased VC for handplacement and weight bearing precautions)  Stand to Sit: Minimal Assistance (To recliner, and car transfer from RW)     OT  PT Equipment Recommendations  Equipment Needed: Yes  Mobility Devices: Wheelchair  Wheelchair: Light Weight  Other: lightweight 16' w/c with removable leg rests. Pt is currently unable to ambulate household distances with RW, making her unable to access area of home for ADLs including kitchen and bathroom. Use of w/c will allow pt to access these areas of her home to perform these ADLs. Pt has sufficient UE strength to propel w/c throughout home and has not expressed unwillingness to use w/c in home. Toilet - Technique: Stand pivot (w/ RW)  Equipment Used: Raised toilet seat with rails  Assessment        SLP          Body mass index is 21.2 kg/m². Assessment and Plan:  1. Left Pelvic ring fracture including superior and inferior pubic rami fracture and sacral fractures with minimal displacement- Oscal, Vit D, seen by ortho  - requires PT/OT  2. CLL- followed by [facility]  3. Hypotension- monitor - on midodrine   4. Debility- unable to ambulate long distances.  Requires PT/ OT   - WC ordered       Monitor BP today- encourage fluids Bladder - high risk retention - Monitor PVRs, SC prn >300cc     Bowel - high risk constipation - colace BID, PRN miralax and MoM. follow bowel movements. Enema or suppository if needed.       Safety - fall precautions    Rehab Progress: Improving  Anticipated Dispo: home  Services/DME: [facility]  ELOS: 12/17- team conference        Shayna Armenta D.O. M.P.H  PM&R  12/8/2022  6:02 AM Ivermectin Pregnancy And Lactation Text: This medication is Pregnancy Category C and it isn't known if it is safe during pregnancy. It is also excreted in breast milk.

## 2022-12-08 NOTE — PLAN OF CARE
Problem: Discharge Planning  Goal: Discharge to home or other facility with appropriate resources  12/7/2022 1935 by Azam Perkins  Outcome: Progressing  12/7/2022 1736 by Michelle Panchal RN  Outcome: Progressing  Note: Patient focused on achieving goals by discharge 12/17. Problem: Safety - Adult  Goal: Free from fall injury  12/7/2022 1935 by Azam Perkins  Outcome: Progressing  12/7/2022 1736 by Michelle Panchal RN  Outcome: Progressing  Note: Compliant with call light use, waits for assist.     Problem: Pain  Goal: Verbalizes/displays adequate comfort level or baseline comfort level  12/7/2022 1935 by Azam Perkins  Outcome: Progressing  12/7/2022 1736 by Michelle Panchal RN  Outcome: Progressing  Flowsheets (Taken 12/7/2022 1736)  Verbalizes/displays adequate comfort level or baseline comfort level:   Encourage patient to monitor pain and request assistance   Assess pain using appropriate pain scale   Administer analgesics based on type and severity of pain and evaluate response   Implement non-pharmacological measures as appropriate and evaluate response   Consider cultural and social influences on pain and pain management   Notify Licensed Independent Practitioner if interventions unsuccessful or patient reports new pain     Problem: Nutrition Deficit:  Goal: Optimize nutritional status  12/7/2022 1736 by Michelle Panchal RN  Outcome: Progressing  Note: Drinking nutritional supplement.

## 2022-12-08 NOTE — PROGRESS NOTES
Physical Therapy  Facility/Department: [facility] ACUTE REHAB UNIT  Rehabilitation Physical Therapy Treatment Note    NAME: Ailin Conde  : 1940 (82 y.o.)  MRN: [id number]  CODE STATUS: Full Code    Date of Service: 22       Restrictions:  Position Activity Restriction  Other position/activity restrictions: PWB TTWB LLE     SUBJECTIVE  Subjective  Subjective: Pt sitting in bedside chair, agreeable to therapy. Pt states signficant soreness and fatigue before session and request to use the restroom. Post Treatment Pain Screening         OBJECTIVE  Cognition  Overall Cognitive Status: WFL  Orientation  Overall Orientation Status: Within Normal Limits  Orientation Level: Oriented X4    Functional Mobility  Roll Right  Assistance Level: Minimal assistance  Skilled Clinical Factors: increased pain and time to complete  Sit to Supine  Assistance Level: Stand by assist  Supine to Sit  Assistance Level: Minimal assistance  Scooting  Assistance Level: Stand by assist  Skilled Clinical Factors: To the front of the chair  Balance  Sitting Balance: Supervision  Standing Balance: Contact guard assistance  Transfers  Surface: To chair with arms; To mat;From chair with arms;From mat; Wheelchair  Additional Factors: Hand placement cues; Verbal cues  Device: Walker  Sit to Stand  Assistance Level: Contact guard assist (From bedside chair, wheelchair and mat to RW)  Skilled Clinical Factors: Increased verbal cues for hand placement and weight bearing precautions  on the left  Stand to Sit  Assistance Level: Contact guard assist (From RW to bedside chair, wheelchair and mat)      Environmental Mobility  Ambulation  Surface: Level surface  Device: Rolling walker  Distance: 10ft  Assistance Level: Moderate assistance  Gait Deviations: Slow isabel  Skilled Clinical Factors:  Mod A for maintaining weight bearing precautions, would only hop and keep the LLE off the ground, unable to perform the gait mechanics due to gaitgue PT Exercises  Exercise Treatment: Supine Straight Leg Raises, heel slides, abduction slide 2-3x10 reps. Assist LLE multiple minute rest break due to fatigue and pain    Second Treatment Session:  Upon entering the room, the patient was sitting in the chair looking diaphoretic and stating not feeling well. Pt'snurse was in the room to take vitals and all read normal for the patient. Pt requested to go to bed and was able to \"shimmy\" to the bed with the rolling walker CGA. Pt then needed some assistance with LLE and placed in bed. PT is missing 15 minutes with the patient. ASSESSMENT/PROGRESS TOWARDS GOALS      Assessment  Assessment: Pt had increased fatigue, soreness and pain before performing today's therapy session. Pt only able to perform supine exercises at this time. She was able to wheel to and from the gym as well as transfer, but needed signficant time in order to complete. At the end of the session that patient had requested to use the restroom. Once on the restroom, the patient stated light headedness and dizziness. Nurse was called in and therapist performed standing transfer mod A. Pt then place in [facility] and BP was taken and was higher than normal for the patient, she was running in the 120's when her normal is in the 90's. Pt is signifcantly limited by her pain, fatigue and endurance. Pt would benefit from continued therapy in order to improve her functional transfers, mobility, ambulatin and functinoal strength. Will continue to follow. Activity Tolerance: Patient tolerated treatment well  Discharge Recommendations: Continue to assess pending progress;Home with Home health PT;24 hour supervision or assist  PT Equipment Recommendations  Equipment Needed: Yes  Walker: Rolling  Wheelchair: Light Weight  Other: lightweight 16' w/c with removable leg rests.   Pt is currently unable to ambulate household distances with RW, making her unable to access area of home for ADLs including kitchen and bathroom. Use of w/c will allow pt to access these areas of her home to perform these ADLs. Pt has sufficient UE strength to propel w/c throughout home and has not expressed unwillingness to use w/c in home. Goals  Patient Goals   Patient Goals : \"To go home. \"  Short Term Goals  Time Frame for Short Term Goals: 7 days- ongoing  Short Term Goal 1: Pt will perform supine<>Sit Mod I  Short Term Goal 2: Pt will perform sit<>stand Mod I with RW  Short Term Goal 3: Pt will perform stand pivot with RW Mod I  Short Term Goal 4: Pt will be able to hop 10ft with RW Min A  Short Term Goal 5: .Ascend/descend 2 steps SBA  Long Term Goals  Time Frame for Long Term Goals : 14 days- ongoing  Long Term Goal 1: Ambulate 50 ft with RW while maintaining weight bearing precautions Mod I  Long Term Goal 2: Ascend/descend 5 steps SBA  Long Term Goal 3: Pt will be able to propel a wheelchair 150ft Mod I    PLAN OF CARE/SAFETY  Physcial Therapy Plan  Days Per Week: 5 Days  Hours Per Day: 1.5 hours  Therapy Duration: 2 Weeks  Current Treatment Recommendations: Strengthening;ROM;Balance training;Functional mobility training;Transfer training; Endurance training; Wheelchair mobility training;Gait training;Stair training;Neuromuscular re-education;Home exercise program;Safety education & training;Patient/Caregiver education & training;Equipment evaluation, education, & procurement; Therapeutic activities  Safety Devices  Type of Devices: All fall risk precautions in place;Call light within reach; Chair alarm in place;Gait belt;Left in chair;Nurse notified    EDUCATION  Education  Education Given To: Patient  Education Provided: Role of Therapy;Transfer Training  Education Method: Demonstration  Barriers to Learning: None  Education Outcome: Verbalized understanding        Therapy Time   Individual Concurrent Group Co-treatment   Time In 1100         Time Out 1200         Minutes 60           Second Session Therapy Time:   Individual Concurrent Group Co-treatment   Time In 1245         Time Out 1300         Minutes 15           Timed Code Treatment Minutes:  94+13    Total Treatment Minutes:  75    Minute Variance: 15 minutes due to illness          Carissa Waite, PT, 12/08/22 at 3:11 PM

## 2022-12-08 NOTE — DISCHARGE SUMMARY
[facility] Discharge Summary             Attending Physician: No att. providers found    Referring MD: No referring provider defined for this encounter. Name: Saurav Hawk :  1940  MRN:  [id number]    Admission: 2022   Discharge: 2022      Date: 2022    Diagnosis on admit: Pelvic fracture     Procedures: Routine chest x-ray, laboratories, EKG, IV fluid hydration, Panculture for fevers, IV antimicrobial therapy, Respiratory therapy, Oxygen therapy, Blood Product Infusions    Consultations:   Acute rehab    Medications:      Medication List        CONTINUE taking these medications      Acalabrutinib 100 MG Caps     allopurinol 300 MG tablet  Commonly known as: ZYLOPRIM     B-COMPLEX/B-12 PO     MVI (CELEBRATE) CHEWABLE TABLET     sennosides-docusate sodium 8.6-50 MG tablet  Commonly known as: SENOKOT-S     vitamin D-3 125 MCG (5000 UT) Tabs  Commonly known as: cholecalciferol  Take 1 tablet by mouth daily            STOP taking these medications      atorvastatin 20 MG tablet  Commonly known as: LIPITOR     BENADRYL ALLERGY PO     butalbital-aspirin-caffeine -40 MG capsule  Commonly known as: FIORINAL     calcium carbonate 500 MG chewable tablet  Commonly known as: TUMS     methotrexate 2.5 MG chemo tablet  Commonly known as: RHEUMATREX     vitamin C 500 MG tablet  Commonly known as: ASCORBIC ACID              Reason for Admission: acute pelvic fracture     Hospital Course:   Patient is a 20-year-old female with history of CLL currently on acalabrutinib who presented to the ER after a fall patient has been having hypotension and dizziness when she had a fall. She presented to the ER which showed a mildly displaced pelvic fracture involving the left pubic body extending into the superior and inferior pubic rami with surrounding stranding. Orthopedic was consulted and they recommended conservative management. During her admission she had started on midodrine for hypotension.   Her work-up for hypotension was unremarkable including negative blood cultures normal TSH normal cortisol and normal EKG patient. She was admitted for pain control and PT.patient was never evaluated by PT OT and is being discharged to rehab. Physical Exam:     Vital Signs:  BP (!) 97/47   Pulse 76   Temp 98 °F (36.7 °C) (Oral)   Resp 16   Wt 108 lb 0.4 oz (49 kg)   LMP  (LMP Unknown)   SpO2 90%   BMI 20.41 kg/m²     Weight:    Wt Readings from Last 3 Encounters:   12/05/22 112 lb 3.4 oz (50.9 kg)   12/02/22 108 lb 0.4 oz (49 kg)   10/14/22 100 lb (45.4 kg)           General: Awake, alert and oriented    HEENT: normocephalic, alopecia, PERRL, no scleral erythema or icterus, Oral mucosa moist and intact, throat clear. NECK: supple without palpable adenopathy  BACK: Straight, negative CVAT  SKIN: warm dry and intact without lesions rashes or masses  CHEST: CTA bilaterally without use of accessory muscles  CV: Normal S1 S2, RRR, no MRG  ABD: NT ND normoactive BS, no palpable masses or hepatosplenomegaly  EXTREMITIES: without edema, denies calf tenderness  NEURO: CN II - XII grossly intact      Discharge Laboratory Data:  CBC:   Recent Labs     12/05/22  0740 12/05/22  1332 12/07/22  0934   WBC 15.5* 23.4* 15.7*   HGB 9.0* 9.4* 9.1*   HCT 26.4* 28.8* 27.5*   .2* 105.6* 104.6*   PLT 95* 132* 140     BMP/Mag:  Recent Labs     12/05/22  0740 12/07/22  0934   * 136   K 3.6 3.7    101   CO2 26 24   BUN 8 6*   CREATININE <0.5* <0.5*     LIVP: No results for input(s): AST, ALT, LIPASE, BILIDIR, BILITOT, ALKPHOS in the last 72 hours. Invalid input(s): AMYLASE,  ALB  Coags: No results for input(s): PROTIME, INR, APTT in the last 72 hours. Uric Acid No results for input(s): LABURIC in the last 72 hours. [place]:  No results for input(s): TACROLEV in the last 72 hours. CMV Quant DNA by PCR: No results found for: CMVDNAQNT  IgG: No results for input(s): IGG in the last 72 hours.     Microbiology: Diagnostics:     Pathology:             ASSESSMENT AND PLAN:             . 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 - Repeat FISH (1/21/20) - trisomy 12  - Flow cytometry (3/18/20) - Chronic lymphocytic leukemia and 4% TLGL cells. There is no strong evidence to suggest the possibility of a clonal TLGL population, further the low percentage of cells argues against this entity.  - PB smear (3/18/20) - Occasional spherocytes and slight increase in polychromasia indicating likely ongoing hemolysis. Leukocytes remarkable for monomorphic small lymphoid cells indicating the presence of chronic lymphocytic leukemia. There are also occasional forms showing granules. Platelets unremarkable  - PET 10/3/22:  Thoracic and A/P  lymphadenopathy. Mild splenic enlargement and uptake suspicious for lymphoma.  - Lymph node biopsy 10/18/22:  Consistent with CLL/SLL; FISH negative  - CT A/P 12/01/22:  No abnormally enlarged lymph nodes in A/P     Biopsy of ax node C/W CLL - started acalabrutininb. Tolerating well with documented improvement. Cont acalabrutinib - Started 11/1/22   - she is responding on exam     1B. 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 - s/p MTX 10 mg weekly (started 3/21/20), increased to 12.5mg weekly (5/29/10) with neutropenia. Cont weekly on Fridays. Will hold ( after dose 10/21/22) with onset of acalabrutininb. 2. ID: No s/s of infection  - H/o recurrent acute sinusitis (follows up with ENT) - starting an antibiotic and steroids per ENT 5/10/22   - S/p COVID vaccines (2nd dose 2/18/21)   - COVID Ab (2/16/22): negative; Evusheld 4/1/22. She had a reaction and refuses repeat. - Schedule COVID  and flu immunizations  Hypogammaglobulinemia:   - Cont monthly immunoglobulin (started 1/4/19).  Dose 1/19/22, 2/16/22, 4/13/22, 5/10/22, 6/10/22, 7/8/22  - Last IgG 10/21/22:  484     3. Heme: H/o hemolytic anemia and ITP. +Leukocytosis, Anemia, Thrombocytopenia 2/2 CLL  - Transfuse for hgb <7 and plt <10  - No transfusion today   ITP / hemolytic anemia:    - Recurrent hemolysis (1/21/20) - [place] IgG + with retic 2.8  - No Current Tx  Previous Tx:  - S/p 4 cycles Rituxan (completed 9/14/16) & IVIG 20 gm daily for 5 days (completed 10/10/16)  - S/p Rituxan weekly x 4 (last dose 9/11/17) w/ normalization of her hemoglobin. Last dose 4/8/20  - S/p Prednisone taper (stopped 10/2019)  - S/p Prednisone: 2/17/20 -> Stop 9/12/22  - Rituxan weekly x 4 - #1 (3/18/20), #2(3/25/20), #3 (4/1/20), #4 (4/8/20)    - started eliquis 2.5 mg BID for ppx given pelvic fracture, will monitor platelets      4. Metabolic:  Stable renal fxn and e-lytes  - Encourage PO fluid intake  - Stop IVF 12/3/22     5. MSK: +Osteoporosis and now admitted s/p traumatic fall 12/1/22 resulting in left pelvic ring fracture and sacral fracture with minimal displacement. H/o Osteoporosis   - worse than 2018. Prolia 12/19/22  Pelvis/Sacral fracture  - Orthopedic surgery consulted, no surgical intervention              - Toe touch weight bearing to LLL              - Cont DVT prophylaxis approximately 1 month s/p injury (January 2023)              - F/U 2 weeks as outpatient for repeat evaluation and imaging of pelvis (week on 12/14/22)  - Monitor hemoglobin closely for signs of bleeding  - MSIR 15 mg  PO prn  - Consult PT/OT - [facility] 14- will need w/c in home at this time with 24 hour  supervision/assist  - Cont DVT ppx with Lovenox and SCD  - Consult ARU  Osteopenia/Osteoporosis:    - DEXA scan 08/2022:  Lumbar Spine - osteopenia. No significant change since 2018. Left femoral neck : Osteoporosis. Left hip : Osteoporosis. This corresponds to 5% worsening since 2018. 6. ENT: H/o rhinitis & followed by ENT  - Cont Flonase      7. Cardiac:  H/o PVCs.   Symptomatic hypotension  - If heart fluttering sensation returns, will order holter monitor   - Telemetry x 48 hours (12/01/22)  Hypotension:  - Cortisol level WNL 11/21/22  - Cont midodrine 5 mg TID (started 12/01/22), increase it to 10 mg TID  - Place cecil hose      8. Pulmonary: H/o pulm nodule on CT 7/22  - CT 7/2022. Repeat CT 1/2023 after 3 months of acalabrutininb. Condition on discharge: stable     Discharge Instructions: The patient was advised on activity and dietary restrictions. The patient was advised to follow up in the emergency department or contact the physician with any unresolved nausea/vomiting/diarrhea/pain or temperature greater than 100.5 F or any other unusual symptoms.          Fabienne Tena, ,

## 2022-12-09 LAB
ANION GAP SERPL CALCULATED.3IONS-SCNC: 8 MMOL/L (ref 3–16)
BASOPHILS ABSOLUTE: 0 K/UL (ref 0–0.2)
BASOPHILS RELATIVE PERCENT: 0 %
BUN BLDV-MCNC: 8 MG/DL (ref 7–20)
CALCIUM SERPL-MCNC: 8.7 MG/DL (ref 8.3–10.6)
CHLORIDE BLD-SCNC: 104 MMOL/L (ref 99–110)
CO2: 27 MMOL/L (ref 21–32)
CREAT SERPL-MCNC: <0.5 MG/DL (ref 0.6–1.2)
EOSINOPHILS ABSOLUTE: 0.1 K/UL (ref 0–0.6)
EOSINOPHILS RELATIVE PERCENT: 1 %
GFR SERPL CREATININE-BSD FRML MDRD: >60 ML/MIN/{1.73_M2}
GLUCOSE BLD-MCNC: 90 MG/DL (ref 70–99)
HCT VFR BLD CALC: 27.1 % (ref 36–48)
HEMOGLOBIN: 9 G/DL (ref 12–16)
LYMPHOCYTES ABSOLUTE: 12.4 K/UL (ref 1–5.1)
LYMPHOCYTES RELATIVE PERCENT: 89 %
MACROCYTES: ABNORMAL
MCH RBC QN AUTO: 34.8 PG (ref 26–34)
MCHC RBC AUTO-ENTMCNC: 33.2 G/DL (ref 31–36)
MCV RBC AUTO: 105 FL (ref 80–100)
MONOCYTES ABSOLUTE: 0 K/UL (ref 0–1.3)
MONOCYTES RELATIVE PERCENT: 0 %
NEUTROPHILS ABSOLUTE: 1.4 K/UL (ref 1.7–7.7)
NEUTROPHILS RELATIVE PERCENT: 10 %
PDW BLD-RTO: 16.2 % (ref 12.4–15.4)
PLATELET # BLD: 185 K/UL (ref 135–450)
PMV BLD AUTO: 9.4 FL (ref 5–10.5)
POTASSIUM REFLEX MAGNESIUM: 3.8 MMOL/L (ref 3.5–5.1)
RBC # BLD: 2.58 M/UL (ref 4–5.2)
SODIUM BLD-SCNC: 139 MMOL/L (ref 136–145)
WBC # BLD: 13.9 K/UL (ref 4–11)

## 2022-12-09 PROCEDURE — 6370000000 HC RX 637 (ALT 250 FOR IP): Performed by: NURSE PRACTITIONER

## 2022-12-09 PROCEDURE — 97110 THERAPEUTIC EXERCISES: CPT

## 2022-12-09 PROCEDURE — 36415 COLL VENOUS BLD VENIPUNCTURE: CPT

## 2022-12-09 PROCEDURE — 6370000000 HC RX 637 (ALT 250 FOR IP): Performed by: PHYSICAL MEDICINE & REHABILITATION

## 2022-12-09 PROCEDURE — 80048 BASIC METABOLIC PNL TOTAL CA: CPT

## 2022-12-09 PROCEDURE — 97542 WHEELCHAIR MNGMENT TRAINING: CPT

## 2022-12-09 PROCEDURE — 97530 THERAPEUTIC ACTIVITIES: CPT

## 2022-12-09 PROCEDURE — 85025 COMPLETE CBC W/AUTO DIFF WBC: CPT

## 2022-12-09 PROCEDURE — 1280000000 HC REHAB R&B

## 2022-12-09 PROCEDURE — 97535 SELF CARE MNGMENT TRAINING: CPT

## 2022-12-09 RX ORDER — HYDROCODONE BITARTRATE AND ACETAMINOPHEN 5; 325 MG/1; MG/1
1 TABLET ORAL EVERY 6 HOURS PRN
Status: DISCONTINUED | OUTPATIENT
Start: 2022-12-09 | End: 2022-12-10

## 2022-12-09 RX ORDER — HYDROCODONE BITARTRATE AND ACETAMINOPHEN 5; 325 MG/1; MG/1
2 TABLET ORAL EVERY 6 HOURS PRN
Status: DISCONTINUED | OUTPATIENT
Start: 2022-12-09 | End: 2022-12-10

## 2022-12-09 RX ADMIN — MIDODRINE HYDROCHLORIDE 10 MG: 5 TABLET ORAL at 10:04

## 2022-12-09 RX ADMIN — SENNOSIDES AND DOCUSATE SODIUM 2 TABLET: 50; 8.6 TABLET ORAL at 09:58

## 2022-12-09 RX ADMIN — FAMOTIDINE 20 MG: 20 TABLET ORAL at 09:57

## 2022-12-09 RX ADMIN — FERROUS SULFATE TAB 325 MG (65 MG ELEMENTAL FE) 325 MG: 325 (65 FE) TAB at 09:57

## 2022-12-09 RX ADMIN — ALLOPURINOL 300 MG: 300 TABLET ORAL at 09:57

## 2022-12-09 RX ADMIN — HYDROCODONE BITARTRATE AND ACETAMINOPHEN 1 TABLET: 5; 325 TABLET ORAL at 10:19

## 2022-12-09 RX ADMIN — MIDODRINE HYDROCHLORIDE 10 MG: 5 TABLET ORAL at 13:19

## 2022-12-09 RX ADMIN — HYDROCODONE BITARTRATE AND ACETAMINOPHEN 2 TABLET: 5; 325 TABLET ORAL at 15:00

## 2022-12-09 RX ADMIN — FAMOTIDINE 20 MG: 20 TABLET ORAL at 21:15

## 2022-12-09 RX ADMIN — MIDODRINE HYDROCHLORIDE 10 MG: 5 TABLET ORAL at 17:24

## 2022-12-09 RX ADMIN — APIXABAN 2.5 MG: 5 TABLET, FILM COATED ORAL at 09:58

## 2022-12-09 RX ADMIN — THERA TABS 1 TABLET: TAB at 09:57

## 2022-12-09 RX ADMIN — APIXABAN 2.5 MG: 5 TABLET, FILM COATED ORAL at 21:14

## 2022-12-09 RX ADMIN — MORPHINE SULFATE 15 MG: 15 TABLET ORAL at 05:34

## 2022-12-09 RX ADMIN — SODIUM CHLORIDE 15 ML: 900 IRRIGANT IRRIGATION at 05:37

## 2022-12-09 RX ADMIN — HYDROCODONE BITARTRATE AND ACETAMINOPHEN 1 TABLET: 5; 325 TABLET ORAL at 21:14

## 2022-12-09 ASSESSMENT — PAIN - FUNCTIONAL ASSESSMENT
PAIN_FUNCTIONAL_ASSESSMENT: PREVENTS OR INTERFERES SOME ACTIVE ACTIVITIES AND ADLS
PAIN_FUNCTIONAL_ASSESSMENT: ACTIVITIES ARE NOT PREVENTED
PAIN_FUNCTIONAL_ASSESSMENT: PREVENTS OR INTERFERES SOME ACTIVE ACTIVITIES AND ADLS

## 2022-12-09 ASSESSMENT — PAIN DESCRIPTION - LOCATION
LOCATION: HIP;GROIN;LEG
LOCATION: HIP;GROIN
LOCATION: PELVIS

## 2022-12-09 ASSESSMENT — PAIN DESCRIPTION - FREQUENCY
FREQUENCY: CONTINUOUS
FREQUENCY: CONTINUOUS
FREQUENCY: INTERMITTENT

## 2022-12-09 ASSESSMENT — PAIN DESCRIPTION - ORIENTATION
ORIENTATION: MID;LEFT
ORIENTATION: LEFT

## 2022-12-09 ASSESSMENT — PAIN DESCRIPTION - ONSET
ONSET: ON-GOING

## 2022-12-09 ASSESSMENT — PAIN SCALES - GENERAL
PAINLEVEL_OUTOF10: 5
PAINLEVEL_OUTOF10: 4
PAINLEVEL_OUTOF10: 7
PAINLEVEL_OUTOF10: 4
PAINLEVEL_OUTOF10: 6
PAINLEVEL_OUTOF10: 9
PAINLEVEL_OUTOF10: 0
PAINLEVEL_OUTOF10: 9

## 2022-12-09 ASSESSMENT — PAIN DESCRIPTION - PAIN TYPE
TYPE: ACUTE PAIN

## 2022-12-09 ASSESSMENT — PAIN DESCRIPTION - DESCRIPTORS
DESCRIPTORS: ACHING;THROBBING
DESCRIPTORS: ACHING;THROBBING
DESCRIPTORS: ACHING;THROBBING;STABBING
DESCRIPTORS: ACHING;DISCOMFORT
DESCRIPTORS: ACHING;THROBBING

## 2022-12-09 ASSESSMENT — PAIN SCALES - WONG BAKER: WONGBAKER_NUMERICALRESPONSE: 0

## 2022-12-09 NOTE — PROGRESS NOTES
Occupational Therapy  Facility/Department: [facility] ACUTE REHAB UNIT  Rehabilitation Occupational Therapy Daily Treatment Note    Date: 22  Patient Name: Shanti Miller       Room: 4536/1847-50  MRN: [id number]  Account: [de-identified]   : 1940  (81 y.o.) Gender: female                    Past Medical History:  has a past medical history of Allergic rhinitis due to pollen, Arthritis, degenerative, Cancer ([address] CLL (chronic lymphoblastic leukemia), Closed compression fracture of thoracic vertebra ([address] Depression, History of blood transfusion, Hyperlipidemia, Melanoma of upper arm ([address] Mild reactive airways disease, and Sternum fx. Past Surgical History:   has a past surgical history that includes Tonsillectomy; Appendectomy; Cataract removal with implant; Blepharoptosis Repair; Nasal sinus surgery; other surgical history; shoulder surgery (Right, ); and Axillary Surgery (Right, 10/18/2022). Restrictions  Other position/activity restrictions: PWB TTWB LLE    Subjective  Subjective: Pt supine in bed upon arrival, pleasent and agreeable to OT. Objective     Cognition  Overall Cognitive Status: WFL  Orientation  Overall Orientation Status: Within Normal Limits         ADL  Grooming/Oral Hygiene  Assistance Level: Modified independent  Skilled Clinical Factors: Pt seated in wc performed oral hygiene, brushed her hair, and applied lotion to face/hands  Upper Extremity Bathing  Assistance Level: Modified independent  Skilled Clinical Factors: Pt seated on TTB washed/dried 4/4 UB components. Lower Extremity Bathing  Assistance Level: Contact guard assist  Skilled Clinical Factors: Pt seated on TTB washed 6/6 components of LB. Pt washed BLE using figure 4. Pt dried 5/6 components of LB seated on TTB. Pt in stance w/ unilateral UE supported on GB w/ CGA dried buttocks. Upper Extremity Dressing  Assistance Level: Modified independent  Skilled Clinical Factors: Pt seated on TTB doffed shirt.  Pt seated on TTB donned shirt w/ mod I. Lower Extremity Dressing  Assistance Level: Contact guard assist  Skilled Clinical Factors: Pt seated in partial stance w/ unilateral UE supported on TTB managed pants/underwear up/down over hips w/ CGA. Pt seated in wc doffed briefs using contralateral feet and flexing at the hips. Pt seated on TTB threaded BLE into underwear w/ figure 4 technique w/ ++time. Pt seated in wc threaded BLE into pants flexing at the hips. Putting On/Taking Off Footwear  Assistance Level: Supervision  Skilled Clinical Factors: Pt seated on TTB doffed bilateral hospital socks using figure 4 technique. Pt seated on TTB donned bilateral socks using figure 4 technique. Tub/Shower Transfers  Type: Shower  Transfer From:  (lateral scooting)  Transfer To: Tub transfer bench  Additional Factors: Cues for hand placement  Assistance Level: Contact guard assist  Skilled Clinical Factors: Pt demo'd lateral scooting from wc > TTB to simulate home shower because RW will not fit in front of wc for UE support and pt does not have GBs. Pt supported BUE on wc and TTB and required CGA. Pt on lateral scoot out performed partial stance then sat in wc. Pt maintained TTWB precautions during shower transfer. Pt also recommended installing GBs professionally to increase safety in shower. Functional Mobility  Device: Wheelchair  Activity: To/From bathroom  Assistance Level: Modified independent  Skilled Clinical Factors: Pt self propelled to/from bathroom w/ mod I w/ BUE. Supine to Sit  Assistance Level: Supervision  Skilled Clinical Factors: Pt w/ HOB elevated and use of GB performed supine to sit  Transfers  Surface: To chair with arms;From chair with arms; Wheelchair  Device: Walker  Stand Pivot  Skilled Clinical Factors: wc <> recliner w/ RW; VCs for hand placement to maintain precautions         Second Session:     Pt seated in recliner chair upon arrival, pleasant and agreeable to OT session.  Pt reporting feel \"much better after taking a shower this morning\". Pt reporting need to toilet at beginning of session. Pt demo stand pivot transfer from recliner to [facility] leading L w/ CGA and w/o use of AD. Pt then self propelled WC to bathroom w/ Mod I for use of BUE. Once in bathroom, Pt demo stand pivot transfer from [facility] to standard toilet w/ SBA and use of GB. Pt continent of urine while seated on standard toilet. Pt completed toileting w/ SBA for clothing mgmt at hips in stance and completed perihygiene while seated on toilet w/ spvn for dynamic sitting balance. Pt completed stand pivot from toilet to [facility] w/ SBA and use of GB. Pt seated in wc to complete hand hygiene at sink w/ Mod I. Pt reporting not having GB available at her house up dc. Pt agreeable to practice toilet transfer w/o use of GB. Pt demo stand pivot to/from standard toilet w/ use of RW to \"shimmy\"/pivot w/ SBA. Pt then self propelled WC back to room from bathroom w/ Mod I. Once in room, pt complete the following BUE therex in order to increase strength required for independence w/ transfers, functional mobility, and ADLs: shoulder press (2lb R arm 3 lb L arm), external rotation (2lb R arm 3 lb L arm), Elbow flexion (3lb L arm 2 Lb R arm), punches w/ no weights, shoulder flexion (R arm no weight, L arm 3lbs), shoulder abduction (no weights). Pt demo stand pivot from [facility] to recliner leading R w/ SBA and w/o use of RW. Pt left seated in recliner w/ chair alarm on, needs met, and call light w/in reach. Assessment  Assessment  Assessment: Pt w/ good participation in therapy demo'd less fatigue then previous session. Pt demo'd UB bathing and dressing w/ mod I. Pt w/ LB bathing and dressing requiring CGA. Pt demo'd lateral scoot transfer from wc to TTB w/ CGA to simulate home environment w/ good maintance of precautions. Pt cont to benefit from activities to improve balance and endurance and at times can be limited by pain.  Pt is very pleasent and motivated and will cont to benefit from skilled OT to return pt to PLOF. Cont OT per POC. Activity Tolerance: Patient tolerated treatment well  Discharge Recommendations: Continue to assess pending progress;24 hour supervision or assist;Patient would benefit from continued therapy after discharge  OT Equipment Recommendations  Other: cont to assess pending progress  Safety Devices  Safety Devices in place: Yes  Type of devices: Left in chair;Call light within reach; Chair alarm in place    Patient Education  Education  Education Given To: Patient  Education Provided: Role of Therapy;Plan of Care;Home Exercise Program;ADL Function; Safety;IADL Function;Precautions; Equipment  Education Provided Comments: Pt educated on not using a purewick so the pt is ambulating to/from the bathroom or onto a BSC. Pt also educated on professionally installing GBs in shower for increased safety. Education Method: Verbal  Barriers to Learning: None  Education Outcome: Verbalized understanding;Continued education needed    Plan  Occupational Therapy Plan  Times Per Week: 5x/week, 90min/day  Current Treatment Recommendations: Strengthening;Balance training;Functional mobility training; Endurance training; Safety education & training;Patient/Caregiver education & training;Pain management;Self-Care / ADL; Home management training;Equipment evaluation, education, & procurement;ROM    Goals  Patient Goals   Patient goals :  \"To be independent again\"  Short Term Goals  Time Frame for Short Term Goals: 7 days  Short Term Goal 1: Pt will complete LE dressing w/ CGA and use of AE prn - Goal Met 12/6  Short Term Goal 2: Pt will complete bathing w/ CGA - Goal Met 12/7  Short Term Goal 3: Pt will complete toilet transfer w/ CGA - Goal Met 12/6  Short Term Goal 4: Pt will complete toileting w/ CGA - Goal met 12/6  Short Term Goal 5: Pt will complete BUE HEP [id number] to increase strength required for functional mobility/transfers - goal met 12/7  Long Term Goals  Time Frame for Long Term Goals : 14 days - ongoing for all  Long Term Goal 1: Pt will complete shower transfer w/ Mod I  Long Term Goal 2: Pt will complete LE dressing w/ Mod I and use of AE prn  Long Term Goal 3: Pt will complete bathing w/ Mod I and use of AE prn  Long Term Goal 4: Pt will complete toileting/ toilet transfer w/ Mod I  Long Term Goal 5: Pt will complete simple meal prep task w/ Mod I    AM-PAC Score               Therapy Time   Individual Concurrent Group Co-treatment   Time In 0730         Time Out 0830         Minutes 60         Timed Code Treatment Minutes: 60 Minutes       Second Session Therapy Time:   Individual Concurrent Group Co-treatment   Time In 0900         Time Out 0930         Minutes 30           Timed Code Treatment Minutes:  78+91    Total Treatment Minutes:  [address], OT (second session)   Flavio Urban S/OT

## 2022-12-09 NOTE — PROGRESS NOTES
Comprehensive Nutrition Assessment    RECOMMENDATIONS:  PO Diet: Regular; Low Microbial  ONS: discontinue ensure  Nutrition Education: No recommendation at this time       NUTRITION ASSESSMENT:   Nutritional summary & status: Follow up. Pt is on a Regular;Low Microbial Diet. Meal intakes have improved with % consumed. States that she is enjoying the meals and wishes to discontinue the ensure supplement as not needed. Will discontinue per pt request. Continue to follow throughout stay. Admission/PMH: Admit; Pelvic ring fx due to OP  PMHx; CLL, ITP, Cardiac PVC with hypotensioon    MALNUTRITION ASSESSMENT  Context of Malnutrition: Acute Illness   Malnutrition Status: At risk for malnutrition (Comment)      NUTRITION DIAGNOSIS   Predicted inadequate energy intake related to increase demand for energy/nutrients as evidenced by intake 0-25%, intake 51-75%    Nutrition Monitoring and Evaluation:   Food/Nutrient Intake Outcomes:  Food and Nutrient Intake  Physical Signs/Symptoms Outcomes:  Biochemical Data, Weight     OBJECTIVE DATA: Significant to nutrition assessment  Nutrition Related Findings: LBM12/08. RLE/LLE trace edema. Labs reviewed; glu and lytes wnl. Wounds: None  Nutrition Goals: PO intake 75% or greater, other (specify) (throughout admit)     CURRENT NUTRITION THERAPIES  ADULT DIET;  Regular; Low Microbial  ADULT ORAL NUTRITION SUPPLEMENT; Dinner; Standard High Calorie/High Protein Oral Supplement  PO Intake: %   PO Supplement Intake:26-50%  Additional Sources of Calories/IVF:n/a     ANTHROPOMETRICS  Current Height: 5' 1\" (154.9 cm)  Current Weight: 112 lb 3.4 oz (50.9 kg)    Admission weight: 110 lb 3.7 oz (50 kg)  Ideal Body Weight (IBW): 105 lbs  (48 kg)    Usual Bodyweight     BMI: 21.2    COMPARATIVE STANDARDS  Energy (kcal):  4753-7655 (25-30 kcal/CBW/50 kg)     Protein (g):  60-70 (1.2-1.4 gm/CBW/50 kg)       Fluid (mL/day):  1250 1500 (1ml/kcal) or per provider    The patient will be monitored per nutrition standards of care. Consult dietitian if additional nutrition interventions are needed prior to RD reassessment.      Dannis Kehr, [address]:  [phone]  Office:  [phone]

## 2022-12-09 NOTE — PROGRESS NOTES
Physical Therapy  Facility/Department: [facility] ACUTE REHAB UNIT  Rehabilitation Physical Therapy Treatment Note    NAME: Anabelle Millan  : 1940 (82 y.o.)  MRN: [id number]  CODE STATUS: Full Code    Date of Service: 22  Restrictions:  Position Activity Restriction  Other position/activity restrictions: PWB TTWB LLE     SUBJECTIVE  Subjective  Subjective: Pt sitting in bedside chair, agreeable to therapy. Pt states signficant soreness and fatigue before session and request to use the restroom. Pain: Initially 8/10, dec to 6/10 by end session. Pt states she received pain meds prior to session, positioned for comfort at end. OBJECTIVE  Cognition  Overall Cognitive Status: WFL  Orientation  Overall Orientation Status: Within Normal Limits    Functional Mobility  Roll Left  Assistance Level: Supervision  Roll Right  Assistance Level: Supervision  Skilled Clinical Factors: increased pain and time to complete  Sit to Supine  Assistance Level: Stand by assist  Skilled Clinical Factors: HOB flat, no rails. Inc time to complete, pt manually assisting LLE. Supine to Sit  Assistance Level: Stand by assist  Skilled Clinical Factors: HOB flat, no rails. Inc time to complete  Scooting  Assistance Level: Stand by assist  Skilled Clinical Factors: To the front of the chair/bed  Balance  Sitting Balance: Supervision  Standing Balance: Stand by assistance  Transfers  Surface: To chair with arms;From chair with arms;From mat; Wheelchair;From bed; To bed;Standard toilet  Additional Factors: Hand placement cues; Verbal cues; Increased time to complete  Device:  (WC)  Sit to Stand  Assistance Level: Stand by assist  Skilled Clinical Factors: Increased verbal cues for hand placement and weight bearing precautions on the left  Stand to Sit  Assistance Level: Stand by assist  Skilled Clinical Factors: Increased VC for hand placement and putting the LLE in front of her.   Bed To/From Chair  Technique: Stand pivot  Assistance Level: Stand by assist  Skilled Clinical Factors: Trialed wc<>bed t/f with bed at 29\" to simulate home environment with step stool used to scoot back onto bed. Discussed parking wc at [facility] facing foot of bed, using RW to stand, pivot. Foot stool under bed with RLE used to scoot self back onto bed. Pt able to complete full wc>sit EOB>supine>rolling L/R>sit>wc with SBA and cues for setting up transfer. Stand Pivot  Assistance Level: Stand by assist  Skilled Clinical Factors: SPT wc<>toilet 2x (continent of bladder at beginning and end of session). Pt req'd cues for setting up transfer, able to stand pivot to toilet with SBA. With RUE support on  armrest, pt able to complete pants mgmt up and down with LUE and inc time, cues for TTWB LLE. Car Transfer  Assistance Level: Stand by assist  Skilled Clinical Factors: wc<>transit t/f at [employer] to simulate daughter's vehicle. Pt able to complete stand pivot without walker with cues for setting up transfer, cues for WBing precautions. Inc time to complete and lift LEs into transit. Environmental Mobility  Wheelchair  Surface: Level surface  Device: Standard wheelchair  Assistance Level for Propulsion: Modified independent  Propulsion Method: Bilateral upper extremities  Propulsion Quality: Slow velocity  Propulsion Distance: [id number]  Skilled Clinical Factors: Demo'd good ability to make tight turns, navigate thresholds, avoid obstacles in hallway. Second Session:   Pt found seated in recliner upon approach and agreeable to PT. 8/10 pain reported in L hip. Pt completes sit pivot transfers from recliner>wc and wc>EOB with SBA. Pt completes 115' wc mobility to gym with B UE propulsion and mod I. While in gym pt complete 2 x 15 of the following exercises while standing at ballet bar: L LE marches/hip abduction/hamstring curl and R LE calf raise/ SL mini sqaut. Verbal visual cues for upright posture and technique.  Pt then show technique to ascend curb step without hopping using chair on top of curb. Demonstrates ability to complete pivot from wc<>chair on top of 8\" curb with RW via stand pivot (completes by dragging foot, not hopping), CGA, and demonstration/VC for technique. Despite performing well in this task pt becomes overwhelmed when sequence of getting up 2 curb steps described , pt becomes tearful and reports she is \"overwhelmed\" and \"tired\"- pt reassured that PT can talk to children about game plan and that they will decide best game plan, but that this is just one way of several options. Upon return to room pt son present and method discussed with him. Son endorses that he can put ramp on top of curb steps in order to wheel chair into home. Pt completes sit>supine with HOB flat without use of hand rail and SBA. Pt denies increased pain during exercises however in bed at end of session pt reports increased pain to 10/10 and requests pain meds- RN notified. Pt left supine in bed with needs in reach and bed alarm on. ASSESSMENT/PROGRESS TOWARDS GOALS  Assessment  Assessment: This session spent providing teachback of transfer strategies and techniques for home and discharge planning. Pt progressed to SBA with all transfers including bed<>wc, wc<>toilet, wc<>car, continues to require some cues for setup of transfer. Pt states she plans to have her sister stay with her at [place] and several family members. Discussed possibility of family training closer to [place] to educate family on pt's mobility status, edu on ramp resources or bumping wc up steps if pt unable to complete by DC. Pt is significantly limted by her pain level, endurance and requires multiple minute rest breaks. She is highly motivated to continue therapy and regain independence. Pt would benefit from continued therapy in order to improve her functional transfers, mobility, ambulation and functional strength. Will continue to follow.   Activity Tolerance: Patient tolerated treatment well  Discharge Recommendations: Continue to assess pending progress;Home with Home health PT;24 hour supervision or assist  PT Equipment Recommendations  Equipment Needed: Yes  Mobility Devices: Wheelchair  Wheelchair: Light Weight  Other: lightweight 16' w/c with removable leg rests, flip back armrests, comfort cushion. Pt is currently unable to ambulate household distances with RW, making her unable to access area of home for ADLs including kitchen and bathroom. Use of w/c will allow pt to access these areas of her home to perform these ADLs and allow for safe independent mobility in the home and community. Pt has sufficient UE strength to propel w/c throughout home and has not expressed unwillingness to use w/c in home. Goals  Patient Goals   Patient Goals : \"To go home. \"  Short Term Goals  Time Frame for Short Term Goals: 7 days- ongoing  Short Term Goal 1: Pt will perform supine<>Sit Mod I  Short Term Goal 2: Pt will perform sit<>stand Mod I with RW  Short Term Goal 3: Pt will perform stand pivot with RW Mod I  Short Term Goal 4: Pt will be able to hop 10ft with RW Min A  Short Term Goal 5: .Ascend/descend 2 steps SBA  Long Term Goals  Time Frame for Long Term Goals : 14 days- ongoing  Long Term Goal 1: Ambulate 50 ft with RW while maintaining weight bearing precautions Mod I  Long Term Goal 2: Ascend/descend 5 steps SBA  Long Term Goal 3: Pt will be able to propel a wheelchair 150ft Mod I    PLAN OF CARE/SAFETY  Physcial Therapy Plan  Days Per Week: 5 Days  Hours Per Day: 1.5 hours  Therapy Duration: 2 Weeks  Current Treatment Recommendations: Strengthening;ROM;Balance training;Functional mobility training;Transfer training; Endurance training; Wheelchair mobility training;Gait training;Stair training;Neuromuscular re-education;Home exercise program;Safety education & training;Patient/Caregiver education & training;Equipment evaluation, education, & procurement; Therapeutic activities  Safety Devices  Type of Devices:  All fall risk precautions in place;Call light within reach; Chair alarm in place;Gait belt;Left in chair;Nurse notified    EDUCATION  Education  Education Given To: Patient  Education Provided: Role of Therapy;Transfer Training;Mobility Training;Precautions;DME/Home Modifications; Safety  Education Method: Demonstration;Verbal;Teach Back  Barriers to Learning: None  Education Outcome: Verbalized understanding        Therapy Time   Individual Concurrent Group Co-treatment   Time In 1100         Time Out 1210         Minutes 70            Second Session Therapy Time:   Individual Concurrent Group Co-treatment   Time In 2622         Time Out 1445         Minutes 60              Timed Code Treatment Minutes: 130    Total Treatment Minutes: 171 Kristine Lennon:   Anni Keane, PT, 12/09/22 at 12:51 PM     Second Session:   Anushka Kessler PT, DPT [id number]

## 2022-12-09 NOTE — PROGRESS NOTES
Pt in bed, awake watching television. Alert & oriented x 4. /62, other VSS. Assessment completed. Pt complained of pain to right hip area. Nighttime medications given including Norco for pain. Pt tolerated well. Reminded pt to call for assistance with any needs. Call light within reach. Safety measures in place.

## 2022-12-09 NOTE — PROGRESS NOTES
[facility] Progress Note    2022     Shanti Miller    MRN: [id number]    : 1940    Referring MD: No referring provider defined for this encounter. SUBJECTIVE:     ECOG PS:  (3) Capable of limited self-care, confined to bed or chair > 50% of waking hours    KPS: 40% Disabled; requires special care and assistance    Isolation: None    Medications    Scheduled Meds:   ferrous sulfate  325 mg Oral Daily with breakfast    famotidine  20 mg Oral BID    Acalabrutinib  1 capsule Oral Q12H    allopurinol  300 mg Oral Daily    apixaban  2.5 mg Oral BID    midodrine  10 mg Oral TID WC    multivitamin  1 tablet Oral Daily    Saline Mouthwash  15 mL Swish & Spit 4x Daily AC & HS    sennosides-docusate sodium  2 tablet Oral Daily    sodium chloride flush  5-40 mL IntraVENous 2 times per day     Continuous Infusions:      PRN Meds:.diphenhydramine, HYDROcodone 5 mg - acetaminophen, morphine, ondansetron, prochlorperazine, Saline Mouthwash, sodium chloride flush, acetaminophen, bisacodyl, magnesium hydroxide, polyethylene glycol    ROS:  As noted above, otherwise remainder of 10-point ROS negative    Physical Exam:     I&O:    Intake/Output Summary (Last 24 hours) at 2022 0930  Last data filed at 2022 4056  Gross per 24 hour   Intake 120 ml   Output 1850 ml   Net -1730 ml         Vital Signs:  /65   Pulse 72   Temp 97.8 °F (36.6 °C) (Oral)   Resp 18   Ht 5' 1\" (1.549 m)   Wt 112 lb 3.4 oz (50.9 kg)   LMP  (LMP Unknown)   SpO2 94%   BMI 21.20 kg/m²     Weight:    Wt Readings from Last 3 Encounters:   22 112 lb 3.4 oz (50.9 kg)   22 108 lb 0.4 oz (49 kg)   10/14/22 100 lb (45.4 kg)       General: Awake, alert and oriented.   HEENT: normocephalic, PERRL, no scleral erythema or icterus, Oral mucosa moist and intact, throat clear  NECK: supple without palpable adenopathy  BACK: Straight   SKIN: warm dry and intact without lesions rashes or masses  CHEST: CTA bilaterally without use of accessory muscles  CV: Normal S1 S2, RRR, no MRG  ABD: NT ND normoactive BS, no palpable masses or hepatosplenomegaly  EXTREMITIES: without edema, denies calf tenderness  NEURO: CN II - XII grossly intact  CATHETER: PIV    Data    CBC:   Recent Labs     12/07/22  0934 12/09/22  0645   WBC 15.7* 13.9*   HGB 9.1* 9.0*   HCT 27.5* 27.1*   .6* 105.0*    185       BMP/Mag:  Recent Labs     12/07/22  0934 12/09/22  0645    139   K 3.7 3.8    104   CO2 24 27   BUN 6* 8   CREATININE <0.5* <0.5*       LIVP: No results for input(s): AST, ALT, LIPASE, BILIDIR, BILITOT, ALKPHOS in the last 72 hours. Invalid input(s): AMYLASE,  ALB  Coags:   No results for input(s): PROTIME, INR, APTT in the last 72 hours. PROBLEM LIST:             CLL  Osteopenia  ITP/hemolytic anemia  LGL      TREATMENT:            CLL  1. Ibrutinib  2. Acalabrutinib (11/1/2022)     LGL  MTX weekly (d/c 10/2022)      ASSESSMENT AND PLAN:            1A. 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 - Repeat FISH (1/21/20) - trisomy 12  - Flow cytometry (3/18/20) - Chronic lymphocytic leukemia and 4% TLGL cells. There is no strong evidence to suggest the possibility of a clonal TLGL population, further the low percentage of cells argues against this entity.  - PB smear (3/18/20) - Occasional spherocytes and slight increase in polychromasia indicating likely ongoing hemolysis. Leukocytes remarkable for monomorphic small lymphoid cells indicating the presence of chronic lymphocytic leukemia. There are also occasional forms showing granules. Platelets unremarkable  - PET 10/3/22:  Thoracic and A/P  lymphadenopathy.   Mild splenic enlargement and uptake suspicious for lymphoma.  - Lymph node biopsy 10/18/22:  Consistent with CLL/SLL; FISH negative  - CT A/P 12/01/22:  No abnormally enlarged lymph nodes in A/P     Biopsy of ax node C/W CLL - started acalabrutininb. Tolerating well with documented improvement. Cont acalabrutinib - Started 11/1/22   - she is responding on exam     1B. 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 - S/p MTX 10 mg weekly (started 3/21/20), increased to 12.5mg weekly (5/29/10) with neutropenia. Cont weekly on Fridays. Will hold ( after dose 10/21/22) with onset of acalabrutininb. 2. ID: No s/s of infection  - H/o recurrent acute sinusitis (follows up with ENT) - starting an antibiotic and steroids per ENT 5/10/22   - S/p COVID vaccines (2nd dose 2/18/21)   - COVID Ab (2/16/22): negative; Evusheld 4/1/22. She had a reaction and refuses repeat. - Schedule COVID  and flu immunizations  Hypogammaglobulinemia:   - Cont monthly immunoglobulin (started 1/4/19). Dose 1/19/22, 2/16/22, 4/13/22, 5/10/22, 6/10/22, 7/8/22  - Last IgG 10/21/22:  484     3. Heme: H/o hemolytic anemia and ITP. +Leukocytosis, Anemia 2/2 CLL  - Transfuse for hgb <7 and plt <10  - No transfusion today   ITP / hemolytic anemia:    - Recurrent hemolysis (1/21/20) - Viji IgG + with retic 2.8  - No Current Tx  Previous Tx:  - S/p 4 cycles Rituxan (completed 9/14/16) & IVIG 20 gm daily for 5 days (completed 10/10/16)  - S/p Rituxan weekly x 4 (last dose 9/11/17) w/ normalization of her hemoglobin. Last dose 4/8/20  - S/p Prednisone taper (stopped 10/2019)  - S/p Prednisone: 2/17/20 -> Stop 9/12/22  - Rituxan weekly x 4 - #1 (3/18/20), #2(3/25/20), #3 (4/1/20), #4 (4/8/20)    - Cont eliquis 2.5 mg BID for ppx given pelvic fracture per ortho recs     4. Metabolic: HypoNa  - Encourage PO fluid intake     5. MSK: +Osteoporosis and now s/p traumatic fall 12/1/22 resulting in left pelvic ring fracture and sacral fracture with minimal displacement. H/o Osteoporosis   - worse than 2018.   Prolia 12/19/22  Pelvis/Sacral fracture  - Orthopedic surgery consulted, no surgical intervention              - Toe touch weight bearing to LLL              - Cont DVT prophylaxis approximately 1 month s/p injury (January 2023)              - F/U 2 weeks as outpatient for repeat evaluation and imaging of pelvis (week on 12/14/22)  - Monitor hemoglobin closely for signs of bleeding  - MSIR 15 mg  PO prn and/or hydrocodone PRN - utilizing both  - Cont tx in ARU  Osteopenia/Osteoporosis:    - Cont oscal/Vit D  - DEXA scan 08/2022:  Lumbar Spine - osteopenia. No significant change since 2018. Left femoral neck: Osteoporosis  Left hip: Osteoporosis. This corresponds to 5% worsening since 2018. 6. ENT: H/o rhinitis & followed by ENT  - Cont Flonase      7. Cardiac:  H/o PVCs. Symptomatic hypotension  Hypotension: improved  - Cortisol level WNL 11/21/22. Repeat 12/2 10.7 (nl)  - TSH 12/2 4.88, T4 1.1  - Cont midodrine 5 mg TID (started 12/01/22), increased to 10 mg TID 12/5  - Place cecil hose      8. Pulmonary: H/o pulm nodule on CT 7/22  - CT 7/2022.   Repeat CT 1/2023 after 3 months of acalabrutininb.      - DVT Prophylaxis: Cont apixaban 2.5mg BID    - Disposition: Per ARU, potential D/c date 12/17/22 - planning on d/c home with sister & w/c    BRAULIO Ayala - CNP

## 2022-12-09 NOTE — PROGRESS NOTES
Department of Physical Medicine & Rehabilitation  Progress Note    Patient Identification:  Suhail Mckay  [id number]  : 1940  Admit date: 2022    Chief Complaint: History of pelvic fracture    Subjective:   Seen in chair. Has worked with therapy this morning. No new complaints overnight. Slowly showing some improvement. Continues to report pain but is well controlled with pain medications. ROS: No f/c, n/v, cp     Objective:  Patient Vitals for the past 24 hrs:   BP Temp Temp src Pulse Resp SpO2   22 0951 (!) 102/59 98 °F (36.7 °C) Oral 72 18 98 %   22 0534 -- -- -- -- 18 --   22 0530 109/65 -- -- 72 -- --   22 0020 -- -- -- -- 18 --   22 2350 106/60 -- -- 75 18 --   22 2109 -- -- -- -- 18 --   22 102/62 97.8 °F (36.6 °C) Oral 74 18 94 %   22 1711 116/70 -- -- -- -- --   22 1500 (!) 94/50 -- -- -- -- --   22 1252 (!) 100/59 -- -- 75 -- 98 %   22 1201 120/82 -- -- -- -- --       Const: Alert. No distress, pleasant. HEENT: Normocephalic, atraumatic. Normal sclera/conjunctiva. MMM. CV: Regular rate and rhythm. Resp: No respiratory distress. Lungs CTAB. Abd: Soft, nontender, nondistended, NABS+   Ext: No edema. Neuro: Alert, oriented, appropriately interactive. Psych: Cooperative, appropriate mood and affect    Laboratory data: Available via EMR.    Last 24 hour lab  Recent Results (from the past 24 hour(s))   CBC auto differential    Collection Time: 22  6:45 AM   Result Value Ref Range    WBC 13.9 (H) 4.0 - 11.0 K/uL    RBC 2.58 (L) 4.00 - 5.20 M/uL    Hemoglobin 9.0 (L) 12.0 - 16.0 g/dL    Hematocrit 27.1 (L) 36.0 - 48.0 %    .0 (H) 80.0 - 100.0 fL    MCH 34.8 (H) 26.0 - 34.0 pg    MCHC 33.2 31.0 - 36.0 g/dL    RDW 16.2 (H) 12.4 - 15.4 %    Platelets 696 106 - 794 K/uL    MPV 9.4 5.0 - 10.5 fL    Neutrophils % 10.0 %    Lymphocytes % 89.0 %    Monocytes % 0.0 %    Eosinophils % 1.0 %    Basophils % 0.0 % Neutrophils Absolute 1.4 (L) 1.7 - 7.7 K/uL    Lymphocytes Absolute 12.4 (H) 1.0 - 5.1 K/uL    Monocytes Absolute 0.0 0.0 - 1.3 K/uL    Eosinophils Absolute 0.1 0.0 - 0.6 K/uL    Basophils Absolute 0.0 0.0 - 0.2 K/uL    Macrocytes Occasional (A)    Basic Metabolic Panel w/ Reflex to MG    Collection Time: 12/09/22  6:45 AM   Result Value Ref Range    Sodium 139 136 - 145 mmol/L    Potassium reflex Magnesium 3.8 3.5 - 5.1 mmol/L    Chloride 104 99 - 110 mmol/L    CO2 27 21 - 32 mmol/L    Anion Gap 8 3 - 16    Glucose 90 70 - 99 mg/dL    BUN 8 7 - 20 mg/dL    Creatinine <0.5 (L) 0.6 - 1.2 mg/dL    Est, Glom Filt Rate >60 >60    Calcium 8.7 8.3 - 10.6 mg/dL       Therapy progress:  PT  Position Activity Restriction  Other position/activity restrictions: PWB TTWB LLE  Objective     Sit to Stand: Minimal Assistance (From recliner to RW, increased VC for handplacement and weight bearing precautions)  Stand to Sit: Minimal Assistance (To recliner, and car transfer from RW)     OT  PT Equipment Recommendations  Equipment Needed: Yes  Mobility Devices: Wheelchair  Walker: Rolling  Wheelchair: Light [place] [employer]  Other: lightweight 16' w/c with removable leg rests. Pt is currently unable to ambulate household distances with RW, making her unable to access area of home for ADLs including kitchen and bathroom. Use of w/c will allow pt to access these areas of her home to perform these ADLs. Pt has sufficient UE strength to propel w/c throughout home and has not expressed unwillingness to use w/c in home. Toilet - Technique: Stand pivot (w/ RW)  Equipment Used: Raised toilet seat with rails  Assessment        SLP          Body mass index is 21.2 kg/m². Assessment and Plan:  1. Left Pelvic ring fracture including superior and inferior pubic rami fracture and sacral fractures with minimal displacement- Oscal, Vit D, seen by ortho  - requires PT/OT  2. CLL- followed by [facility]  3. Hypotension- monitor - on midodrine   4.  Debility- unable to ambulate long distances. Requires PT/ OT   - WC ordered       Monitor BP today- encourage fluids        Bladder - high risk retention - Monitor PVRs, SC prn >300cc     Bowel - high risk constipation - colace BID, PRN miralax and MoM. follow bowel movements. Enema or suppository if needed. Safety - fall precautions    Rehab Progress: Improving  Anticipated Dispo: home  Services/DME: TBD  ELOS: 12/17- team conference    Pasha Wagner MD PGY3      This patient has been seen, examined, and discussed with the resident. I was part of the key critical services provided to the patient. I agree with the residents documentation. This note may have been altered to reflect my own examination findings, impression, and recommendations. Mariaa Jones.  MD Tisha  PM&R  12/9/2022  10:08 AM

## 2022-12-09 NOTE — PROGRESS NOTES
Pt awake in her recliner. Physical assessment and vital signs as charted. Pt currently rates her pain as a 6 out of 10 on the pain scale, 1 tablet of PRN norco was administered to the Pt at 1019. Call light placed within reach. RN will continue to monitor Pt.

## 2022-12-09 NOTE — PLAN OF CARE
Problem: Discharge Planning  Goal: Discharge to home or other facility with appropriate resources  Outcome: Progressing  Flowsheets (Taken 12/9/2022 0008)  Discharge to home or other facility with appropriate resources:   Identify barriers to discharge with patient and caregiver   Identify discharge learning needs (meds, wound care, etc)     Problem: Safety - Adult  Goal: Free from fall injury  Outcome: Progressing  Flowsheets (Taken 12/9/2022 0009)  Free From Fall Injury: Instruct family/caregiver on patient safety     Problem: Skin/Tissue Integrity  Goal: Absence of new skin breakdown  Description: 1.   Monitor for areas of redness and/or skin breakdown  Outcome: Progressing     Problem: Pain  Goal: Verbalizes/displays adequate comfort level or baseline comfort level  Outcome: Not Progressing  Flowsheets (Taken 12/9/2022 0009)  Verbalizes/displays adequate comfort level or baseline comfort level:   Encourage patient to monitor pain and request assistance   Assess pain using appropriate pain scale   Administer analgesics based on type and severity of pain and evaluate response   Implement non-pharmacological measures as appropriate and evaluate response     Problem: ABCDS Injury Assessment  Goal: Absence of physical injury  Outcome: Progressing  Flowsheets (Taken 12/9/2022 0009)  Absence of Physical Injury: Implement safety measures based on patient assessment

## 2022-12-10 PROCEDURE — 1280000000 HC REHAB R&B

## 2022-12-10 PROCEDURE — 6370000000 HC RX 637 (ALT 250 FOR IP): Performed by: NURSE PRACTITIONER

## 2022-12-10 PROCEDURE — 6370000000 HC RX 637 (ALT 250 FOR IP): Performed by: PHYSICAL MEDICINE & REHABILITATION

## 2022-12-10 RX ORDER — OXYCODONE HYDROCHLORIDE AND ACETAMINOPHEN 5; 325 MG/1; MG/1
1 TABLET ORAL EVERY 4 HOURS PRN
Status: DISCONTINUED | OUTPATIENT
Start: 2022-12-10 | End: 2022-12-17 | Stop reason: HOSPADM

## 2022-12-10 RX ORDER — OXYCODONE HYDROCHLORIDE AND ACETAMINOPHEN 5; 325 MG/1; MG/1
2 TABLET ORAL EVERY 4 HOURS PRN
Status: DISCONTINUED | OUTPATIENT
Start: 2022-12-10 | End: 2022-12-10

## 2022-12-10 RX ORDER — OXYCODONE HYDROCHLORIDE AND ACETAMINOPHEN 5; 325 MG/1; MG/1
1 TABLET ORAL EVERY 4 HOURS PRN
Status: DISCONTINUED | OUTPATIENT
Start: 2022-12-10 | End: 2022-12-10

## 2022-12-10 RX ORDER — OXYCODONE HYDROCHLORIDE AND ACETAMINOPHEN 5; 325 MG/1; MG/1
2 TABLET ORAL EVERY 4 HOURS PRN
Status: DISCONTINUED | OUTPATIENT
Start: 2022-12-10 | End: 2022-12-17 | Stop reason: HOSPADM

## 2022-12-10 RX ADMIN — MIDODRINE HYDROCHLORIDE 10 MG: 5 TABLET ORAL at 13:16

## 2022-12-10 RX ADMIN — ALLOPURINOL 300 MG: 300 TABLET ORAL at 10:03

## 2022-12-10 RX ADMIN — FAMOTIDINE 20 MG: 20 TABLET ORAL at 20:31

## 2022-12-10 RX ADMIN — OXYCODONE AND ACETAMINOPHEN 2 TABLET: 5; 325 TABLET ORAL at 09:58

## 2022-12-10 RX ADMIN — OXYCODONE AND ACETAMINOPHEN 2 TABLET: 5; 325 TABLET ORAL at 13:14

## 2022-12-10 RX ADMIN — ONDANSETRON HYDROCHLORIDE 4 MG: 8 TABLET, FILM COATED ORAL at 09:58

## 2022-12-10 RX ADMIN — MIDODRINE HYDROCHLORIDE 10 MG: 5 TABLET ORAL at 10:02

## 2022-12-10 RX ADMIN — APIXABAN 2.5 MG: 5 TABLET, FILM COATED ORAL at 10:01

## 2022-12-10 RX ADMIN — SENNOSIDES AND DOCUSATE SODIUM 2 TABLET: 50; 8.6 TABLET ORAL at 10:03

## 2022-12-10 RX ADMIN — MIDODRINE HYDROCHLORIDE 10 MG: 5 TABLET ORAL at 17:56

## 2022-12-10 RX ADMIN — APIXABAN 2.5 MG: 5 TABLET, FILM COATED ORAL at 20:31

## 2022-12-10 RX ADMIN — OXYCODONE AND ACETAMINOPHEN 2 TABLET: 5; 325 TABLET ORAL at 20:58

## 2022-12-10 RX ADMIN — FAMOTIDINE 20 MG: 20 TABLET ORAL at 10:03

## 2022-12-10 RX ADMIN — HYDROCODONE BITARTRATE AND ACETAMINOPHEN 1 TABLET: 5; 325 TABLET ORAL at 05:24

## 2022-12-10 RX ADMIN — FERROUS SULFATE TAB 325 MG (65 MG ELEMENTAL FE) 325 MG: 325 (65 FE) TAB at 10:03

## 2022-12-10 ASSESSMENT — PAIN - FUNCTIONAL ASSESSMENT
PAIN_FUNCTIONAL_ASSESSMENT: ACTIVITIES ARE NOT PREVENTED
PAIN_FUNCTIONAL_ASSESSMENT: PREVENTS OR INTERFERES SOME ACTIVE ACTIVITIES AND ADLS
PAIN_FUNCTIONAL_ASSESSMENT: ACTIVITIES ARE NOT PREVENTED
PAIN_FUNCTIONAL_ASSESSMENT: PREVENTS OR INTERFERES SOME ACTIVE ACTIVITIES AND ADLS

## 2022-12-10 ASSESSMENT — PAIN SCALES - WONG BAKER: WONGBAKER_NUMERICALRESPONSE: 0

## 2022-12-10 ASSESSMENT — PAIN DESCRIPTION - LOCATION
LOCATION: HIP
LOCATION: HIP;SACRUM
LOCATION: PELVIS
LOCATION: HIP

## 2022-12-10 ASSESSMENT — PAIN DESCRIPTION - DESCRIPTORS
DESCRIPTORS: ACHING
DESCRIPTORS: ACHING
DESCRIPTORS: STABBING
DESCRIPTORS: ACHING

## 2022-12-10 ASSESSMENT — PAIN DESCRIPTION - ORIENTATION
ORIENTATION: LEFT

## 2022-12-10 ASSESSMENT — PAIN DESCRIPTION - FREQUENCY
FREQUENCY: CONTINUOUS
FREQUENCY: CONTINUOUS

## 2022-12-10 ASSESSMENT — PAIN SCALES - GENERAL
PAINLEVEL_OUTOF10: 0
PAINLEVEL_OUTOF10: 6
PAINLEVEL_OUTOF10: 8
PAINLEVEL_OUTOF10: 8
PAINLEVEL_OUTOF10: 10
PAINLEVEL_OUTOF10: 7

## 2022-12-10 ASSESSMENT — PAIN DESCRIPTION - PAIN TYPE
TYPE: ACUTE PAIN
TYPE: ACUTE PAIN

## 2022-12-10 ASSESSMENT — PAIN DESCRIPTION - DIRECTION: RADIATING_TOWARDS: SACRUM

## 2022-12-10 ASSESSMENT — PAIN DESCRIPTION - ONSET
ONSET: ON-GOING
ONSET: ON-GOING

## 2022-12-10 NOTE — PROGRESS NOTES
Oriented x 4. Assist x 1 to stand pivot to wheelchair. Pain spikes with activity, Percocet replaces Norco due to pain not abated with Norco. Call light in reach, alarm on for safety.

## 2022-12-10 NOTE — PLAN OF CARE
Problem: Discharge Planning  Goal: Discharge to home or other facility with appropriate resources  Outcome: Progressing  Flowsheets (Taken 12/9/2022 2110)  Discharge to home or other facility with appropriate resources: Identify barriers to discharge with patient and caregiver     Problem: Safety - Adult  Goal: Free from fall injury  Outcome: Progressing  Flowsheets (Taken 12/9/2022 2110)  Free From Fall Injury: Instruct family/caregiver on patient safety     Problem: Pain  Goal: Verbalizes/displays adequate comfort level or baseline comfort level  Outcome: Progressing  Flowsheets (Taken 12/9/2022 2109)  Verbalizes/displays adequate comfort level or baseline comfort level: Encourage patient to monitor pain and request assistance

## 2022-12-10 NOTE — PROGRESS NOTES
Pt evening shift assessment complete. VSS and medication given as ordered. Pt assessed for comfort needs, pt requests pain medication, pt given PRN per request. Pt does not express any additional needs at this time, resting comfortably in bed.

## 2022-12-11 VITALS
WEIGHT: 112.21 LBS | RESPIRATION RATE: 16 BRPM | DIASTOLIC BLOOD PRESSURE: 56 MMHG | HEIGHT: 61 IN | HEART RATE: 68 BPM | BODY MASS INDEX: 21.19 KG/M2 | TEMPERATURE: 98.2 F | SYSTOLIC BLOOD PRESSURE: 107 MMHG | OXYGEN SATURATION: 97 %

## 2022-12-11 PROCEDURE — 1280000000 HC REHAB R&B

## 2022-12-11 PROCEDURE — 6370000000 HC RX 637 (ALT 250 FOR IP): Performed by: PHYSICAL MEDICINE & REHABILITATION

## 2022-12-11 RX ADMIN — FAMOTIDINE 20 MG: 20 TABLET ORAL at 08:23

## 2022-12-11 RX ADMIN — MIDODRINE HYDROCHLORIDE 10 MG: 5 TABLET ORAL at 17:31

## 2022-12-11 RX ADMIN — SENNOSIDES AND DOCUSATE SODIUM 2 TABLET: 50; 8.6 TABLET ORAL at 08:23

## 2022-12-11 RX ADMIN — MIDODRINE HYDROCHLORIDE 10 MG: 5 TABLET ORAL at 08:21

## 2022-12-11 RX ADMIN — OXYCODONE AND ACETAMINOPHEN 2 TABLET: 5; 325 TABLET ORAL at 14:34

## 2022-12-11 RX ADMIN — APIXABAN 2.5 MG: 5 TABLET, FILM COATED ORAL at 08:23

## 2022-12-11 RX ADMIN — FAMOTIDINE 20 MG: 20 TABLET ORAL at 20:30

## 2022-12-11 RX ADMIN — MIDODRINE HYDROCHLORIDE 10 MG: 5 TABLET ORAL at 12:02

## 2022-12-11 RX ADMIN — ALLOPURINOL 300 MG: 300 TABLET ORAL at 08:23

## 2022-12-11 RX ADMIN — APIXABAN 2.5 MG: 5 TABLET, FILM COATED ORAL at 20:29

## 2022-12-11 RX ADMIN — OXYCODONE AND ACETAMINOPHEN 2 TABLET: 5; 325 TABLET ORAL at 03:14

## 2022-12-11 RX ADMIN — OXYCODONE AND ACETAMINOPHEN 2 TABLET: 5; 325 TABLET ORAL at 20:53

## 2022-12-11 RX ADMIN — FERROUS SULFATE TAB 325 MG (65 MG ELEMENTAL FE) 325 MG: 325 (65 FE) TAB at 08:23

## 2022-12-11 RX ADMIN — OXYCODONE AND ACETAMINOPHEN 2 TABLET: 5; 325 TABLET ORAL at 09:15

## 2022-12-11 ASSESSMENT — PAIN DESCRIPTION - FREQUENCY
FREQUENCY: CONTINUOUS

## 2022-12-11 ASSESSMENT — PAIN DESCRIPTION - LOCATION
LOCATION: SACRUM;HIP
LOCATION: GROIN;HIP
LOCATION: SACRUM;HIP
LOCATION: HIP

## 2022-12-11 ASSESSMENT — PAIN SCALES - GENERAL
PAINLEVEL_OUTOF10: 6
PAINLEVEL_OUTOF10: 8
PAINLEVEL_OUTOF10: 7
PAINLEVEL_OUTOF10: 4
PAINLEVEL_OUTOF10: 6
PAINLEVEL_OUTOF10: 7

## 2022-12-11 ASSESSMENT — PAIN DESCRIPTION - DIRECTION
RADIATING_TOWARDS: SACRUM
RADIATING_TOWARDS: SACRUM

## 2022-12-11 ASSESSMENT — PAIN DESCRIPTION - DESCRIPTORS
DESCRIPTORS: ACHING;SORE
DESCRIPTORS: ACHING

## 2022-12-11 ASSESSMENT — PAIN - FUNCTIONAL ASSESSMENT
PAIN_FUNCTIONAL_ASSESSMENT: ACTIVITIES ARE NOT PREVENTED

## 2022-12-11 ASSESSMENT — PAIN DESCRIPTION - PAIN TYPE
TYPE: ACUTE PAIN

## 2022-12-11 ASSESSMENT — PAIN DESCRIPTION - ORIENTATION
ORIENTATION: LEFT

## 2022-12-11 ASSESSMENT — PAIN DESCRIPTION - ONSET
ONSET: ON-GOING

## 2022-12-11 NOTE — PLAN OF CARE
Problem: Pain  Goal: Verbalizes/displays adequate comfort level or baseline comfort level  Outcome: Progressing  Flowsheets (Taken 12/11/2022 1650)  Verbalizes/displays adequate comfort level or baseline comfort level:   Encourage patient to monitor pain and request assistance   Administer analgesics based on type and severity of pain and evaluate response   Assess pain using appropriate pain scale   Consider cultural and social influences on pain and pain management   Notify Licensed Independent Practitioner if interventions unsuccessful or patient reports new pain   Implement non-pharmacological measures as appropriate and evaluate response  Note: Patient reports Percocet has controlled her pain better than previous Norco.      Problem: Skin/Tissue Integrity  Goal: Absence of new skin breakdown  Description: 1. Monitor for areas of redness and/or skin breakdown  2. Assess vascular access sites hourly  3. Every 4-6 hours minimum:  Change oxygen saturation probe site  4. Every 4-6 hours:  If on nasal continuous positive airway pressure, respiratory therapy assess nares and determine need for appliance change or resting period. Outcome: Progressing     Problem: ABCDS Injury Assessment  Goal: Absence of physical injury  Outcome: Progressing     Problem: Nutrition Deficit:  Goal: Optimize nutritional status  Outcome: Progressing  Note: Patient has a good appetite.

## 2022-12-11 NOTE — PLAN OF CARE
Problem: Discharge Planning  Goal: Discharge to home or other facility with appropriate resources  Outcome: Progressing  Flowsheets (Taken 12/10/2022 2340)  Discharge to home or other facility with appropriate resources: Identify barriers to discharge with patient and caregiver     Problem: Safety - Adult  Goal: Free from fall injury  Outcome: Progressing  Flowsheets (Taken 12/10/2022 2340)  Free From Fall Injury: Instruct family/caregiver on patient safety     Problem: Pain  Goal: Verbalizes/displays adequate comfort level or baseline comfort level  Outcome: Not Progressing  Flowsheets (Taken 12/10/2022 2340)  Verbalizes/displays adequate comfort level or baseline comfort level:   Encourage patient to monitor pain and request assistance   Assess pain using appropriate pain scale   Administer analgesics based on type and severity of pain and evaluate response   Implement non-pharmacological measures as appropriate and evaluate response     Problem: Skin/Tissue Integrity  Goal: Absence of new skin breakdown  Description: 1.   Monitor for areas of redness and/or skin breakdown  Outcome: Progressing     Problem: ABCDS Injury Assessment  Goal: Absence of physical injury  Outcome: Progressing  Flowsheets (Taken 12/10/2022 2340)  Absence of Physical Injury: Implement safety measures based on patient assessment     Problem: Nutrition Deficit:  Goal: Optimize nutritional status  Outcome: Progressing  Flowsheets (Taken 12/10/2022 2340)  Nutrient intake appropriate for improving, restoring, or maintaining nutritional needs:   Assess nutritional status and recommend course of action   Monitor oral intake, labs, and treatment plans   Recommend appropriate diets, oral nutritional supplements, and vitamin/mineral supplements

## 2022-12-11 NOTE — PROGRESS NOTES
Pt in bed, awake. Alert & oriented x 4. /58, other VSS. Assessment completed. Complains of pain to left hip rating pain 8 out of 10. Pt requested to go to the bathroom. Assisted pt stand pivot to the wheelchair and pt wheeled herself into the bathroom. With CGA, pt transferred herself onto the toilet. Pt performed her toileting hygiene. Assisted pt in transferring back to the wheelchair and then back into bed. Nighttime medications given including Percocet for pain. Pt tolerated medications well. Reminded pt to call for assistance with any needs. Call light within reach. Safety measures in place.

## 2022-12-11 NOTE — PROGRESS NOTES
Oriented x 4. Assist x 1, stand pivot to wheelchair, NWB toe touch left foot. Patient has been working with [employer]" during the weekend. Call light in reach, alarm on for safety.

## 2022-12-12 LAB
ANION GAP SERPL CALCULATED.3IONS-SCNC: 9 MMOL/L (ref 3–16)
BANDED NEUTROPHILS RELATIVE PERCENT: 2 % (ref 0–7)
BASOPHILS ABSOLUTE: 0 K/UL (ref 0–0.2)
BASOPHILS RELATIVE PERCENT: 0 %
BUN BLDV-MCNC: 10 MG/DL (ref 7–20)
CALCIUM SERPL-MCNC: 8.8 MG/DL (ref 8.3–10.6)
CHLORIDE BLD-SCNC: 106 MMOL/L (ref 99–110)
CO2: 26 MMOL/L (ref 21–32)
CREAT SERPL-MCNC: <0.5 MG/DL (ref 0.6–1.2)
EOSINOPHILS ABSOLUTE: 0 K/UL (ref 0–0.6)
EOSINOPHILS RELATIVE PERCENT: 0 %
GFR SERPL CREATININE-BSD FRML MDRD: >60 ML/MIN/{1.73_M2}
GLUCOSE BLD-MCNC: 89 MG/DL (ref 70–99)
HCT VFR BLD CALC: 26.6 % (ref 36–48)
HEMOGLOBIN: 9.2 G/DL (ref 12–16)
LYMPHOCYTES ABSOLUTE: 11.1 K/UL (ref 1–5.1)
LYMPHOCYTES RELATIVE PERCENT: 88 %
MCH RBC QN AUTO: 35.6 PG (ref 26–34)
MCHC RBC AUTO-ENTMCNC: 34.7 G/DL (ref 31–36)
MCV RBC AUTO: 102.7 FL (ref 80–100)
MONOCYTES ABSOLUTE: 0.1 K/UL (ref 0–1.3)
MONOCYTES RELATIVE PERCENT: 1 %
NEUTROPHILS ABSOLUTE: 1.4 K/UL (ref 1.7–7.7)
NEUTROPHILS RELATIVE PERCENT: 9 %
PDW BLD-RTO: 16.9 % (ref 12.4–15.4)
PLATELET # BLD: 205 K/UL (ref 135–450)
PLATELET SLIDE REVIEW: ADEQUATE
PMV BLD AUTO: 9.3 FL (ref 5–10.5)
POTASSIUM REFLEX MAGNESIUM: 3.8 MMOL/L (ref 3.5–5.1)
RBC # BLD: 2.59 M/UL (ref 4–5.2)
RBC # BLD: NORMAL 10*6/UL
SLIDE REVIEW: ABNORMAL
SODIUM BLD-SCNC: 141 MMOL/L (ref 136–145)
WBC # BLD: 12.6 K/UL (ref 4–11)

## 2022-12-12 PROCEDURE — 6370000000 HC RX 637 (ALT 250 FOR IP): Performed by: PHYSICAL MEDICINE & REHABILITATION

## 2022-12-12 PROCEDURE — 97530 THERAPEUTIC ACTIVITIES: CPT

## 2022-12-12 PROCEDURE — 99232 SBSQ HOSP IP/OBS MODERATE 35: CPT | Performed by: PHYSICAL MEDICINE & REHABILITATION

## 2022-12-12 PROCEDURE — 85025 COMPLETE CBC W/AUTO DIFF WBC: CPT

## 2022-12-12 PROCEDURE — 1280000000 HC REHAB R&B

## 2022-12-12 PROCEDURE — 97110 THERAPEUTIC EXERCISES: CPT

## 2022-12-12 PROCEDURE — 36415 COLL VENOUS BLD VENIPUNCTURE: CPT

## 2022-12-12 PROCEDURE — 97535 SELF CARE MNGMENT TRAINING: CPT

## 2022-12-12 PROCEDURE — 80048 BASIC METABOLIC PNL TOTAL CA: CPT

## 2022-12-12 RX ADMIN — MIDODRINE HYDROCHLORIDE 10 MG: 5 TABLET ORAL at 12:28

## 2022-12-12 RX ADMIN — FAMOTIDINE 20 MG: 20 TABLET ORAL at 21:03

## 2022-12-12 RX ADMIN — MIDODRINE HYDROCHLORIDE 10 MG: 5 TABLET ORAL at 17:02

## 2022-12-12 RX ADMIN — FERROUS SULFATE TAB 325 MG (65 MG ELEMENTAL FE) 325 MG: 325 (65 FE) TAB at 08:54

## 2022-12-12 RX ADMIN — OXYCODONE AND ACETAMINOPHEN 2 TABLET: 5; 325 TABLET ORAL at 15:08

## 2022-12-12 RX ADMIN — FAMOTIDINE 20 MG: 20 TABLET ORAL at 08:54

## 2022-12-12 RX ADMIN — ALLOPURINOL 300 MG: 300 TABLET ORAL at 08:53

## 2022-12-12 RX ADMIN — OXYCODONE AND ACETAMINOPHEN 2 TABLET: 5; 325 TABLET ORAL at 21:02

## 2022-12-12 RX ADMIN — APIXABAN 2.5 MG: 5 TABLET, FILM COATED ORAL at 21:03

## 2022-12-12 RX ADMIN — MIDODRINE HYDROCHLORIDE 10 MG: 5 TABLET ORAL at 08:48

## 2022-12-12 RX ADMIN — OXYCODONE AND ACETAMINOPHEN 2 TABLET: 5; 325 TABLET ORAL at 09:57

## 2022-12-12 RX ADMIN — THERA TABS 1 TABLET: TAB at 08:55

## 2022-12-12 RX ADMIN — OXYCODONE AND ACETAMINOPHEN 2 TABLET: 5; 325 TABLET ORAL at 05:58

## 2022-12-12 RX ADMIN — APIXABAN 2.5 MG: 5 TABLET, FILM COATED ORAL at 08:54

## 2022-12-12 RX ADMIN — SENNOSIDES AND DOCUSATE SODIUM 2 TABLET: 50; 8.6 TABLET ORAL at 08:54

## 2022-12-12 ASSESSMENT — PAIN DESCRIPTION - LOCATION
LOCATION: HIP
LOCATION: HIP
LOCATION: BACK;HIP
LOCATION: HIP
LOCATION: HIP
LOCATION: BACK;HIP
LOCATION: HIP

## 2022-12-12 ASSESSMENT — PAIN DESCRIPTION - ORIENTATION
ORIENTATION: LEFT
ORIENTATION: RIGHT
ORIENTATION: RIGHT
ORIENTATION: LEFT

## 2022-12-12 ASSESSMENT — PAIN DESCRIPTION - DESCRIPTORS
DESCRIPTORS: ACHING
DESCRIPTORS: ACHING
DESCRIPTORS: ACHING;DISCOMFORT
DESCRIPTORS: ACHING
DESCRIPTORS: ACHING;DISCOMFORT

## 2022-12-12 ASSESSMENT — PAIN SCALES - GENERAL
PAINLEVEL_OUTOF10: 8
PAINLEVEL_OUTOF10: 6
PAINLEVEL_OUTOF10: 6
PAINLEVEL_OUTOF10: 5
PAINLEVEL_OUTOF10: 4
PAINLEVEL_OUTOF10: 5
PAINLEVEL_OUTOF10: 8
PAINLEVEL_OUTOF10: 4
PAINLEVEL_OUTOF10: 8

## 2022-12-12 ASSESSMENT — PAIN DESCRIPTION - FREQUENCY
FREQUENCY: CONTINUOUS
FREQUENCY: CONTINUOUS

## 2022-12-12 ASSESSMENT — PAIN - FUNCTIONAL ASSESSMENT
PAIN_FUNCTIONAL_ASSESSMENT: ACTIVITIES ARE NOT PREVENTED

## 2022-12-12 ASSESSMENT — PAIN DESCRIPTION - ONSET
ONSET: ON-GOING

## 2022-12-12 ASSESSMENT — PAIN DESCRIPTION - PAIN TYPE
TYPE: ACUTE PAIN
TYPE: ACUTE PAIN

## 2022-12-12 ASSESSMENT — PAIN SCALES - WONG BAKER
WONGBAKER_NUMERICALRESPONSE: 0
WONGBAKER_NUMERICALRESPONSE: 0

## 2022-12-12 NOTE — PROGRESS NOTES
Alert, oriented. VSS on RA and BP stable and improving to higher level. See Flow sheet. Continues on scheduled midodrine. Rating Left hip and sacral pain at 5-8/10, controlled with PRN percocet and rest. X1 S/P transfer. Stating she feels as if she is improving and getting stronger. Participated in therapy. Assessment complete. No new issues.

## 2022-12-12 NOTE — PATIENT CARE CONFERENCE
Inpatient Rehabilitation  Weekly Team Conference Note  The [address] [place] [address] Drive,[address]  [phone]  Patient Name: River Henning        MRN: [id number]    : 1940  (81 y.o.)  Gender: female   Referring Practitioner: Ap Giles DO  Diagnosis: History of pelvic fracture  The team conference for this patient was held on 2022 at 10:30am by:  Ap Albarado.  DO Tisha    Current/Goal QM SCORES  QM Current/Goal Score   Eating CARE Score: 6 / Discharge Goal: Independent   Oral Hygiene CARE Score: 6 / Discharge Goal: Independent   Shower/Bathing CARE Score: 4 / Discharge Goal: Independent   UB Dressing CARE Score: 6 / Discharge Goal: Independent   LB Dressing CARE Score: 4 / Discharge Goal: Independent   Putting on/off Footwear CARE Score: 6 / Discharge Goal: Independent   Toileting Hygiene CARE Score: 4 / Discharge Goal: Independent   Bladder Continence Bladder Continence: Incontinent less than daily    Bowel Continence Bowel Continence: Occassionally incontinent    Toilet Transfers CARE Score: 4 / Discharge Goal: Independent   Shower/Bathe Self  CARE Score: 4 / Discharge Goal: Independent   Rolling Left and Right CARE Score: 4 / Discharge Goal: Independent   Sit to Lying CARE Score: 4 / Discharge Goal: Independent   Lying to Sitting on Bedside CARE Score: 4 / Discharge Goal: Independent   Sit to Stand CARE Score: 4 / Discharge Goal: Independent   Chair/Bed to Chair Transfer CARE Score: 4 / Discharge Goal: Independent   Car Transfers CARE Score: 4 / Discharge Goal: Independent   Walk 10 Feet CARE Score: 3 / Discharge Goal: Supervision or touching assistance   Walk 50 Feet with Two Turns CARE Score: 88 / Discharge Goal: Supervision or touching assistance   Walk 150 Feet CARE Score: 88 / Discharge Goal: Supervision or touching assistance   Walk 10 Feet on Uneven Surfaces CARE Score: 88 / Discharge Goal: Supervision or touching assistance   1 Step (Curb) CARE Score: 4 / Discharge Goal: Supervision or touching assistance   4 Steps CARE Score: 88 / Discharge Goal: Supervision or touching assistance   12 Steps CARE Score: 88 / Discharge Goal: Partial/moderate assistance   Picking up Object from Floor CARE Score: 88 / Discharge Goal: Supervision or touching assistance   Wheel 50 Feet with 2 Turns CARE Score: 6 / Discharge Goal: Independent   Type         [x] Manual        [] Motorized        [] N/A   Wheel 150 Feet CARE Score: 6 / Discharge Goal: Independent   Type         [x] Manual        [] Motorized        [] N/A     NURSING:  A&Ox: Level of Consciousness: Alert (0)  Orientation Level: Oriented X4  Ureña Fall Risk Score: Ureña Total Score: 75  Admission BIMS: 15  Wounds/Incisions/Ulcers: none  Medication Review: Yes, with patient and family  Pain: Back and hip pain, PRN Percocet and tylenol  Consultations: Oncology following  Imaging: N/A     Active Comorbid Conditions:CLL  Systems Review:   Renal: W, Dialysis: N/A Type: N/A, Frequency: N/A  Neurological: X  Musculoskeletal: X  Respiratory: W  Cardiac/Circulatory/Peripheral Vascular: W  Abnormal/Relevant Test Results: See chart below  Abnormal/Relevant Lab Values:   CBC:   Recent Labs     12/12/22  0529   WBC 12.6*   HGB 9.2*   HCT 26.6*   .7*        BMP:   Recent Labs     12/12/22  0529      K 3.8      CO2 26   BUN 10   CREATININE <0.5*   PT/INR: No results for input(s): PROTIME, INR in the last 72 hours. APTT: No results for input(s): APTT in the last 72 hours.   Liver Profile:  Lab Results   Component Value Date/Time    AST 23 10/15/2022 09:00 AM    ALT 15 10/15/2022 09:00 AM    BILIDIR <0.2 03/23/2020 04:23 AM    BILITOT 0.5 10/15/2022 09:00 AM    ALKPHOS 107 10/15/2022 09:00 AM     Lab Results   Component Value Date/Time    CHOL 209 10/15/2022 09:00 AM    HDL 45 10/15/2022 09:00 AM    HDL 63 01/02/2012 10:23 AM    TRIG 171 10/15/2022 09:00 AM     Recent Labs     12/12/22  0529   WBC 12.6* HGB 9.2*   HCT 26.6*      .7*     Recent Labs     12/12/22  0529      K 3.8      CO2 26   BUN 10   CREATININE <0.5*   No results for input(s): AST, ALT, ALB, BILIDIR, BILITOT, ALKPHOS in the last 72 hours. No results for input(s): MG in the last 72 hours. Recent Labs     12/12/22  0529   WBC 12.6*   HGB 9.2*   HCT 26.6*        Recent Labs     12/12/22  0529      K 3.8      CO2 26   BUN 10   CREATININE <0.5*   CALCIUM 8.8   No results for input(s): AST, ALT, BILIDIR, BILITOT, ALKPHOS in the last 72 hours. No results for input(s): INR in the last 72 hours. No results for input(s): Othelia Nutley in the last 72 hours. PHYSICAL THERAPY:  Bed Mobility: Scooting: Stand by assistance (to scoot back in recliner in sitting, pt performing via shifting weight to side and sliding hip back)    Transfers:  Sit to Stand: Minimal Assistance (From recliner to RW, increased VC for handplacement and weight bearing precautions)  Stand to Sit: Minimal Assistance (To recliner, and car transfer from [facility])    Ambulation  Comments: unable to perform hopping, deemed unsafe to ambulate    OCCUPATIONAL THERAPY:  ADL  Feeding: Setup  Feeding Skilled Clinical Factors: Pt setup w/ breakfast at end of session  Grooming: Setup, Stand by assistance, Increased time to complete  Grooming Skilled Clinical Factors: Sitting on TTB to wash face and hair w/ setup assist and SBA for sitting balance. Pt sitting in wc to complete oral hygiene at sink w/ setup assist. Pt reports completing grooming tasks in stance at baseline, unsafe to attempt this date d/t decreased standing balance and TTWB to LLE  UE Bathing: Setup, Stand by assistance, Increased time to complete  UE Bathing Skilled Clinical Factors: Pt seated on TTB to complete w/ setup assist and SBA for sitting balance. Pt reports completing bathing in stance at baseline, however has access to built-in shower seat at home  LE Bathing:  Moderate assistance, Verbal cueing, Increased time to complete, Setup  LE Bathing Skilled Clinical Factors: Pt seated on TTB to wash/dry BLEs and front parminder area w/ setup assist and SBA for sitting balance. Assist required to wash/dry BL feet while seated on TTB. Pt in stance at [facility] w/ CGA w/ total A to wash/dry buttocks. Pt reports completing bathing in stance at baseline, however has access to built-in shower seat at home  UE Dressing: Minimal assistance, Increased time to complete, Setup, Verbal cueing  UE Dressing Skilled Clinical Factors: doffed gown w/ min A to untie in back. Pt doffed/donned t-shirt seated on TTB w/ SBA and setup. LE Dressing: Maximum assistance, Setup, Verbal cueing, Increased time to complete  LE Dressing Skilled Clinical Factors: pt donned/doffed R slip on shoe while seated w/ CGA for dynamic sitting balance. Assist provided to thread/unthread BLE into/out of briefs and pants while seated. Pt in stance w/ unilateral UE support on GB or RW w/ CGA to complete clothing mgmt at hips. Toileting: Dependent/Total  Toileting Skilled Clinical Factors: Pt incontinent of urine in brief upon arrival, pt simulated toileting w/ CGA in stance for clothing mgmt and assist to complete rear parminder hygiene in stance during bathing    Toilet Transfers: Toilet Transfers  Toilet - Technique: Stand pivot (w/ RW)  Equipment Used: Raised toilet seat with rails  Toilet Transfer: Moderate assistance    Tub/ShowerTransfers:  Shower Transfers  Shower - Transfer From: Wheelchair  Shower - Transfer Type: To and From  Shower - Transfer To: Transfer tub bench  Shower - Technique: Stand pivot (w/ use of GBs)  Shower Transfers: Moderate assistance    NUTRITION:    Current Weight: 97 lb 3.6 oz (44.1 kg) BMI (Calculated): 18.4  Current diet order: Current diet and supplement order: ADULT DIET;  Regular; Low Microbial     Feeding: Able to feed self  Room Service: Selective   NSG Recorded PO: PO Meals Eaten (%): 51 - 75% PO Supplement (%): 26 - 50%  Malnutrition Status Malnutrition Status: At risk for malnutrition (Comment)    CASE MANAGEMENT:  Psychosocial/Behavioral Issues: none  Assessment:  Pt will be returning home and her sister will be staying with her. SW will make skilled Home Care arrangements. Pt will also need a wheelchair. Patient/Family Education provided by team:  Role of PT/OT, Safety w/ ADLs/IADLs    Patient Goals for Rehab stay:  1. To be independent again     Rehab Team Goals for patient for the Upcoming Week:  1. Greater independence w/ IADLs    Barriers to Progress/Attainment of Goals & Efforts/Interventions to remove Barriers:  1. Pain - cont pain mgmt strategies    Discharge Plan:  Estimated Length of Stay: 13 days  Destination: home health  Services at Discharge: [address] [place] [address], Occupational Therapy, and Nursing 3x week  Equipment at Discharge: no OT equipment  Community Resources:   Factors facilitating achievement of predicted outcomes: Family support, Motivated, Cooperative, and Pleasant  Barriers to the achievement of predicted outcomes: Pain    Special Needs in the Upcoming Week:   [x] Family/Caregiver Education  [] Home visit  []Therapeutic Pass [] Consults:_______   [] Family/Caregiver Training  [] Stroke Risk Factor Education     [] Other;_______     TEAM SUMMARY: Will continue with current poc & goals until anticipated d/c date of 12/17/2022.     MD:   Stroke Risk Factors:   [] N/A for this patient [] HTN  []  Diabetes  [x] Hyperlipidemia  []Obesity BMI >25  [] Atrial Fibrillation [] Smoker (current)  [] Smoker (quit in last 12 months)  [] Sleep Apnea [] Other:     Risk for Readmission: Moderate: 14%    Justification for Continued Stay:   Criteria for continued IRF stay:  Based on my medical assessment of the patient and review of information from the interdisciplinary team, as part of this weekly team conference, the patient continues to meet the following criteria for IRF level of care:  [x] The patient requires 24-hour rehabilitation nursing care   [x] The patient requires an intensive rehabilitation therapy program  [x] The patient requires active and ongoing intervention of multiple therapy disciplines  [x] The patient requires continued physician supervision by a rehabilitation physician  [x] The patient requires an intensive and coordinated interdisciplinary team approach to the delivery of rehabilitative care    Medical Necessity-continued close physician medical management is required for:   [] Cardiac/Circulatory dysfunction  [] Respiratory/Pulmonary dysfunction  [] Integumentary complications  [] Peripheral Vascular dysfunction  [x] Musculoskeletal dysfunction  [] Neurological dysfunction d/t:  [] CVA  [] SCI  [] TBI  [] Other: __________  [] Renal dysfunction  [] Hematologic dysfunction    [] Endocrine disorders  [] GI disorders     [] Genito-Urinary dysfunction    Assessment/Plan:  [x] The patient is making good progression towards their LTG's, is actively participating in, and has a reasonable expectation to continue to benefit from the intensive rehabilitation program.  [] The estimated discharge date has been changed from initial team conference due to:   [] The estimated discharge destination has been changed from initial team conference due to:     Rehab Team Members in attendance for Team Conference:  ARU Supervisor/PPS Coordinator:  Augie Sloan, PT, DPT    Therapy Manager/ARU :  Sharee Pierre, PT, DPT    Nursing Manager:  Lino Guzman RN    Social Work:  Stella Moreland [place]    Therapy:  Vance Aleman, PT  Karlos Zhang, PT, DPT  Elissa Bamberger PT, DPT   Damien Faulkner, PT, DPT    Colten Flynn, OTR/L  Johana Tolentino, OTR/L  Jas Narvaez, OTR/L    Nutrition:  Carly Singleton RD:    [place]: [phone]  Office:  [phone]    I approve the established interdisciplinary plan of care as documented within the medical record of Kevin Cardenas MD Signature Waldemar Arora, D.O. MONIKA  PM&R  12/13/2022  11:29 AM

## 2022-12-12 NOTE — PROGRESS NOTES
Physical Therapy  Facility/Department: [facility] ACUTE REHAB UNIT  Rehabilitation Physical Therapy Treatment Note    NAME: Naila Modi  : 1940 (82 y.o.)  MRN: [id number]  CODE STATUS: Full Code    Date of Service: 22    Restrictions:  Position Activity Restriction  Other position/activity restrictions: PWB TTWB LLE     SUBJECTIVE  Subjective  Subjective: Pt sitting in wc upon approach and agreeable to PT. Pain: Pt reports 5-6/10 pain throughout session. Reports recently recieving pain medicine which has helped to imporve pain. OBJECTIVE  Cognition  Overall Cognitive Status: WFL  Orientation  Overall Orientation Status: Within Normal Limits    Functional Mobility  Sit to Supine  Assistance Level: Supervision  Skilled Clinical Factors: compelted on mat table. ++ time and effort  Supine to Sit  Assistance Level: Supervision  Skilled Clinical Factors: compelted on mat table. ++ time and effort, VC for sequencing  Balance  Sitting Balance: Independent (seated EOM/ on commode)  Standing Balance: Stand by assistance (for brief/pants management)  Transfers  Surface: From mat; Wheelchair;Standard toilet; To mat  Additional Factors: Set-up; Increased time to complete  Stand Pivot  Assistance Level: Stand by assist  Skilled Clinical Factors: wc<>mat using RW on carpet to simulate bedroom set up; wc<>commode with RW  Sit Pivot  Assistance Level: Stand by assist;Supervision (SBA)  Skilled Clinical Factors: wc>mat, wc<>mat on carpet to simulate BR set up, and wc>recliner; VC for set up of wc for increased ease of transfer      Environmental Mobility  Wheelchair  Surface: Level surface  Device: Standard wheelchair  Assistance Level for Propulsion: Modified independent  Propulsion Method: Bilateral upper extremities  Propulsion Quality: Slow velocity  Propulsion Distance: 125' x 2 + small distances in room  Skilled Clinical Factors: includes 180 deg turn on carpet to simulate bedroom environment             PT Exercises  Exercise Treatment: Pt completes 2 x 10 heelslides and supine hip abduction + 2x 5 SLR L LE. x10 SL bridge R LE. VC/TC/demonstration for technique. Increased time and effort required due to weakness/fatigue. Second Treatment Session: Pt found sitting in chair upon entering the room. Pt requested to use the restroom. Pt performed multiple stand pivot from BSC<WC<[place]<WC<EOB CGA while maintaining weight bearing precautions. Pt then performed a series of exercises seated marched, LAQ, ankle pumps, heel slides, abduction slides, SLR, and bridges 2x10. Pt then reported a 9/10 pain and was given a ice pack and laying in bed. ASSESSMENT/PROGRESS TOWARDS GOALS       Assessment  Assessment: Pt demonstrates increased L LE strength through progressing in supine exercises. Demonstrates ability to complete transfers and wc mobility on carpet in order to simulate home set up. Pt would benefit from continued skilled PT inorder to to progress towards prior level of function and independence. Activity Tolerance: Patient tolerated treatment well  Discharge Recommendations: Continue to assess pending progress;Home with Home health PT;24 hour supervision or assist  PT Equipment Recommendations  Mobility Devices: Wheelchair  Walker: Rolling  Wheelchair: Light Weight  Other: lightweight 16' w/c with removable leg rests, flip back armrests, comfort cushion. Pt is currently unable to ambulate household distances with RW, making her unable to access area of home for ADLs including kitchen and bathroom. Use of w/c will allow pt to access these areas of her home to perform these ADLs and allow for safe independent mobility in the home and community. Pt has sufficient UE strength to propel w/c throughout home and has not expressed unwillingness to use w/c in home. Goals  Patient Goals   Patient Goals : \"To go home. \"  Short Term Goals  Time Frame for Short Term Goals: 7 days- ongoing  Short Term Goal 1: Pt will perform supine<>Sit Mod I  Short Term Goal 2: Pt will perform sit<>stand Mod I with RW  Short Term Goal 3: Pt will perform stand pivot with RW Mod I  Short Term Goal 4: Pt will be able to hop 10ft with RW Min A  Short Term Goal 5: .Ascend/descend 2 steps SBA  Long Term Goals  Time Frame for Long Term Goals : 14 days- ongoing  Long Term Goal 1: Ambulate 50 ft with RW while maintaining weight bearing precautions Mod I  Long Term Goal 2: Ascend/descend 5 steps SBA  Long Term Goal 3: Pt will be able to propel a wheelchair 150ft Mod I    PLAN OF CARE/SAFETY  Physcial Therapy Plan  Days Per Week: 5 Days  Hours Per Day: 1.5 hours  Therapy Duration: 2 Weeks  Current Treatment Recommendations: Strengthening;ROM;Balance training;Functional mobility training;Transfer training; Endurance training; Wheelchair mobility training;Gait training;Stair training;Neuromuscular re-education;Home exercise program;Safety education & training;Patient/Caregiver education & training;Equipment evaluation, education, & procurement; Therapeutic activities  Safety Devices  Type of Devices: All fall risk precautions in place;Call light within reach; Chair alarm in place;Gait belt;Left in chair;Nurse notified    EDUCATION  Education  Education Given To: Patient  Education Provided: Transfer Training;Mobility Training;Precautions;DME/Home Modifications; Safety  Education Method: Demonstration;Verbal;Teach Back  Barriers to Learning: None  Education Outcome: Verbalized understanding        Therapy Time   Individual Concurrent Group Co-treatment   Time In 1100         Time Out 1200         Minutes 60           Second Session Therapy Time:   Individual Concurrent Group Co-treatment   Time In 1330         Time Out 1400         Minutes 30           Timed Code Treatment Minutes:  60+30    Total Treatment Minutes:  90    Timed Code Treatment Minutes: 75 Brennen Alberto PT, [place] 1310 Renae Rivera PT, DPT #[id number]

## 2022-12-12 NOTE — PLAN OF CARE
Problem: Discharge Planning  Goal: Discharge to home or other facility with appropriate resources  Outcome: Progressing  Flowsheets  Taken 12/12/2022 1609  Discharge to home or other facility with appropriate resources:   Identify barriers to discharge with patient and caregiver   Arrange for needed discharge resources and transportation as appropriate   Identify discharge learning needs (meds, wound care, etc)  Taken 12/12/2022 0851  Discharge to home or other facility with appropriate resources: Identify barriers to discharge with patient and caregiver     Problem: Safety - Adult  Goal: Free from fall injury  Outcome: Progressing  Flowsheets (Taken 12/12/2022 1609)  Free From Fall Injury: Instruct family/caregiver on patient safety  Note: Pt remains free of falls thus far this shift. All fall precautions in place and functioning properly.       Problem: Pain  Goal: Verbalizes/displays adequate comfort level or baseline comfort level  Outcome: Progressing  Flowsheets  Taken 12/12/2022 1609  Verbalizes/displays adequate comfort level or baseline comfort level:   Encourage patient to monitor pain and request assistance   Assess pain using appropriate pain scale   Administer analgesics based on type and severity of pain and evaluate response  Taken 12/12/2022 0851  Verbalizes/displays adequate comfort level or baseline comfort level: Encourage patient to monitor pain and request assistance  Note: Patient's pain is controlled with current regime

## 2022-12-12 NOTE — PROGRESS NOTES
Department of Physical Medicine & Rehabilitation  Progress Note    Patient Identification:  Suhail Mckay  [id number]  : 1940  Admit date: 2022    Chief Complaint: History of pelvic fracture    Subjective:   Resting in her chair this morning on exam. No new complaints overnight. She is worried bout her anemia but labs are improving. Participating well in therapy. Plan for continued exercise. Plan DC home by the end of the week. ROS: No f/c, n/v, cp     Objective:  Patient Vitals for the past 24 hrs:   BP Temp Temp src Pulse Resp SpO2 Weight   22 0957 -- -- -- -- 18 -- --   22 0955 122/69 -- Oral -- 16 -- --   22 0851 99/65 97.5 °F (36.4 °C) Oral 69 16 98 % --   22 0738 115/68 -- -- 73 -- -- --   22 0558 -- -- -- -- 18 -- --   22 0545 110/61 -- -- 80 -- -- 97 lb 3.6 oz (44.1 kg)   22 -- -- -- -- 16 -- --   22 2030 (!) 107/56 98.2 °F (36.8 °C) Oral 68 16 97 % --   22 2015 -- -- Oral -- 16 -- --   22 1715 (!) 110/58 -- -- 70 -- -- --   22 1434 -- -- -- -- 16 -- --   22 1415 107/62 -- -- 65 -- -- --       Const: Alert. No distress, pleasant. HEENT: Normocephalic, atraumatic. Normal sclera/conjunctiva. MMM. CV: Regular rate and rhythm. Resp: No respiratory distress. Lungs CTAB. Abd: Soft, nontender, nondistended, NABS+   Ext: No edema. Neuro: Alert, oriented, appropriately interactive. Psych: Cooperative, appropriate mood and affect    Laboratory data: Available via EMR.    Last 24 hour lab  Recent Results (from the past 24 hour(s))   CBC auto differential    Collection Time: 22  5:29 AM   Result Value Ref Range    WBC 12.6 (H) 4.0 - 11.0 K/uL    RBC 2.59 (L) 4.00 - 5.20 M/uL    Hemoglobin 9.2 (L) 12.0 - 16.0 g/dL    Hematocrit 26.6 (L) 36.0 - 48.0 %    .7 (H) 80.0 - 100.0 fL    MCH 35.6 (H) 26.0 - 34.0 pg    MCHC 34.7 31.0 - 36.0 g/dL    RDW 16.9 (H) 12.4 - 15.4 %    Platelets 123 970 - 927 K/uL    MPV 9.3 5.0 - 10.5 fL    PLATELET SLIDE REVIEW Adequate     SLIDE REVIEW see below     Neutrophils % 9.0 %    Lymphocytes % 88.0 %    Monocytes % 1.0 %    Eosinophils % 0.0 %    Basophils % 0.0 %    Neutrophils Absolute 1.4 (L) 1.7 - 7.7 K/uL    Lymphocytes Absolute 11.1 (H) 1.0 - 5.1 K/uL    Monocytes Absolute 0.1 0.0 - 1.3 K/uL    Eosinophils Absolute 0.0 0.0 - 0.6 K/uL    Basophils Absolute 0.0 0.0 - 0.2 K/uL    Bands Relative 2 0 - 7 %    RBC Morphology Normal    Basic Metabolic Panel w/ Reflex to MG    Collection Time: 12/12/22  5:29 AM   Result Value Ref Range    Sodium 141 136 - 145 mmol/L    Potassium reflex Magnesium 3.8 3.5 - 5.1 mmol/L    Chloride 106 99 - 110 mmol/L    CO2 26 21 - 32 mmol/L    Anion Gap 9 3 - 16    Glucose 89 70 - 99 mg/dL    BUN 10 7 - 20 mg/dL    Creatinine <0.5 (L) 0.6 - 1.2 mg/dL    Est, Glom Filt Rate >60 >60    Calcium 8.8 8.3 - 10.6 mg/dL       Therapy progress:  PT  Position Activity Restriction  Other position/activity restrictions: PWB TTWB LLE  Objective     Sit to Stand: Minimal Assistance (From recliner to RW, increased VC for handplacement and weight bearing precautions)  Stand to Sit: Minimal Assistance (To recliner, and car transfer from RW)     OT  PT Equipment Recommendations  Equipment Needed: Yes  Mobility Devices: Wheelchair  Walker: Rolling  Wheelchair: Light Weight  Other: lightweight 16' w/c with removable leg rests, flip back armrests, comfort cushion. Pt is currently unable to ambulate household distances with RW, making her unable to access area of home for ADLs including kitchen and bathroom. Use of w/c will allow pt to access these areas of her home to perform these ADLs and allow for safe independent mobility in the home and community. Pt has sufficient UE strength to propel w/c throughout home and has not expressed unwillingness to use w/c in home.   Toilet - Technique: Stand pivot (w/ RW)  Equipment Used: Raised toilet seat with rails  Assessment SLP          Body mass index is 18.37 kg/m². Assessment and Plan:  1. Left Pelvic ring fracture including superior and inferior pubic rami fracture and sacral fractures with minimal displacement- Oscal, Vit D, seen by ortho  - requires PT/OT  2. CLL- followed by [facility]- neutropenic precautions? 3. Hypotension- monitor - on midodrine   4. Debility- unable to ambulate long distances. Requires PT/ OT   - WC ordered       Monitor BP today- encourage fluids        Bladder - high risk retention - Monitor PVRs, SC prn >300cc     Bowel - high risk constipation - colace BID, PRN miralax and MoM. follow bowel movements. Enema or suppository if needed.       Safety - fall precautions    Rehab Progress: Improving  Anticipated Dispo: home  Services/DME: [facility]  ELOS: 12/17Barry Rausch, D.O. M.P.H  PM&R  12/12/2022  10:43 AM

## 2022-12-12 NOTE — PROGRESS NOTES
Occupational Therapy  Facility/Department: [facility] ACUTE REHAB UNIT  Rehabilitation Occupational Therapy Daily Treatment Note    Date: 22  Patient Name: Chandler Lombard       Room: [facility]-  MRN: [id number]  Account: [de-identified]   : 1940  (81 y.o.) Gender: female                    Past Medical History:  has a past medical history of Allergic rhinitis due to pollen, Arthritis, degenerative, Cancer ([address] CLL (chronic lymphoblastic leukemia), Closed compression fracture of thoracic vertebra ([address] Depression, History of blood transfusion, Hyperlipidemia, Melanoma of upper arm ([address] Mild reactive airways disease, and Sternum fx. Past Surgical History:   has a past surgical history that includes Tonsillectomy; Appendectomy; Cataract removal with implant; Blepharoptosis Repair; Nasal sinus surgery; other surgical history; shoulder surgery (Right, ); and Axillary Surgery (Right, 10/18/2022). Restrictions  Other position/activity restrictions: PWB TTWB LLE    Subjective  Subjective: Pt supine in bed upon arrival, pleasent and agreeable to OT. Pt seated EOB vitals /68, HR 73                Objective     Cognition  Overall Cognitive Status: WFL  Orientation  Overall Orientation Status: Within Normal Limits         ADL  Feeding  Assistance Level: Independent  Skilled Clinical Factors: Provided w/ breakfast tray at [facility]  Grooming/Oral Hygiene  Assistance Level: Modified independent  Skilled Clinical Factors: Pt seated in wc performed oral hygiene, brushed her hair, and applied lotion to face/hands  Upper Extremity Bathing  Assistance Level: Modified independent  Skilled Clinical Factors: Pt seated on TTB washed/dried 4/4 UB components. Lower Extremity Bathing  Assistance Level: Stand by assist  Skilled Clinical Factors: Pt seated on TTB washed 6/6 components of LB. Pt washed BLE using figure 4. Pt dried 5/6 components of LB seated on TTB.  Pt performed lateral scoot from TTB to wc w/ towel on wc to dry buttocks  Upper Extremity Dressing  Assistance Level: Modified independent  Skilled Clinical Factors: Pt seated on TTB doffed shirt. Pt seated on TTB donned shirt w/ mod I. Pt seated in recliner doffed shirt and donned new shirt. Lower Extremity Dressing  Assistance Level: Stand by assist  Skilled Clinical Factors: Pt in stance managed pants up/down over hips w/ SBA. Pt seated in wc removed underwear. Pt seated in wc threaded BLE into underwear/pants w/ figure 4 technique. Putting On/Taking Off Footwear  Assistance Level: Modified independent  Skilled Clinical Factors: Pt seated on TTB doffed bilateral hospital socks using figure 4 technique. Pt seated on TTB donned bilateral socks using figure 4 technique. Toileting  Assistance Level: Stand by assist  Skilled Clinical Factors: Pt in stance managed pants up/down over hips w/ CGA. Pt seated on toilet voided urine. Pt performed pericare. Toilet Transfers  Equipment: Standard toilet;Grab bars  Assistance Level: Contact guard assist  Skilled Clinical Factors: Pt w/ use of L GB demo'd stand pivot w/ CGA. Pt w/ no use of RW only use of GB for UE support; pt provided w/ VCs to maintain precautions  Tub/Shower Transfers  Type: Shower  Transfer From: Wheelchair  Transfer To: Tub transfer bench  Assistance Level: Stand by assist  Skilled Clinical Factors: Pt demo'd lateral scooting from wc <> TTB to simulate home shower because RW will not fit in front of wc for UE support and pt does not have GBs. Pt supported BUE on wc and TTB and required SBA. Pt maintained TTWB precautions during shower transfer. Pt also recommended installing GBs professionally to increase safety in shower. Functional Mobility  Device: Wheelchair  Activity: To/From bathroom  Assistance Level: Modified independent  Skilled Clinical Factors: Pt self propelled to/from bathroom w/ mod I w/ BUE. Transfers  Surface: Standard toilet; To chair with arms;From bed; Wheelchair  Device: [employer] Pivot  Assistance Level: Stand by assist  Skilled Clinical Factors: Bed > wc w/ RW; wc > Recliner w/ RW; VCs for hand placement and to maintain precautions     Second Session:     Pt seated in recliner upon arrival, pleasant and agreeable to OT. Transfers:   Stand Pivot: SBA  Recliner <> WC w/ RW  Sit to stand SBA  WC > RW  Stand to Sit SBA  RW > WC    Toileting Transfer SBA  Stand pivot WC > Toilet w/ RW, pt in stance in front of toilet place LUE on GB to sit/stand  Toileting: SBA  Pt in stance managed pants up/down over hips w/ SBA. Pt seated on toilet continent of urine and bowel movement. Pt performed pericare independently. Pt seated in wc performed hand hygiene w/ mod I. Functional Mobility:   Pt self propelled to/from bathroom w/ BUE w/ mod I. Pt self propelled to/from therapy gym w/ BUE w/ LLE leg rest w/ mod I.     Standing Balance:   Pt in stance performed the following activity to improve the pts standing balance and activity tolerance. Pt in stance played connect 4 w/ OT at a raised table. Pt w/ unilateral UE supported on RW. Pts cognition WFL for game rules. Pt placed LLE over Ots foot to test for maintaining precautions during increased standing time and pt placed no weight through LLE while standing or performing sit <> stand. Pt w/ no complaints of pain during game and no LOB throughout. Pt in stance for ~4 minutes and maintained precautions. Pt required SBA for entirety of activity. Pt left seated in wc w/ chair alarm on and call light within reach. Assessment  Assessment  Assessment: Pt demo'd decreased fatigue and pain during todays session participating in a shower requiring SBA for LB bathing and LB dressing. Pt performing lateral scoot to/from wc to/from TTB w/ SBA w/ good maintenance of precautions. Pt cont to benefit from activities to improve balance and endurance and at times cont to be limited by pain.  Pt is very pleasent and movtivated and cont to benefit from skilled OT to return to PLOF. Cont OT per POC. Activity Tolerance: Patient tolerated treatment well  Discharge Recommendations: Continue to assess pending progress;24 hour supervision or assist;Patient would benefit from continued therapy after discharge  OT Equipment Recommendations  Other: Pt already has built in shower bench and is purchasing a hospital bed cont to assess pending progress  Safety Devices  Safety Devices in place: Yes  Type of devices: Left in chair;Call light within reach; Chair alarm in place    Patient Education  Education  Education Given To: Patient  Education Provided: Role of Therapy;Plan of Care;Home Exercise Program;ADL Function; Safety;IADL Function;Precautions; Equipment  Education Provided Comments: Pt educated on the possibility of using a RW basket or RW tray. Pt shown each item and explained the use of them, however pt did not have interest in purchasing either item. Education Method: Verbal  Barriers to Learning: None  Education Outcome: Verbalized understanding;Continued education needed    Plan  Occupational Therapy Plan  Times Per Week: 5x/week, 90min/day  Current Treatment Recommendations: Strengthening;Balance training;Functional mobility training; Endurance training; Safety education & training;Patient/Caregiver education & training;Pain management;Self-Care / ADL; Home management training;Equipment evaluation, education, & procurement;ROM    Goals  Patient Goals   Patient goals :  \"To be independent again\"  Short Term Goals  Time Frame for Short Term Goals: 7 days  Short Term Goal 1: Pt will complete LE dressing w/ CGA and use of AE prn - Goal Met 12/6  Short Term Goal 2: Pt will complete bathing w/ CGA - Goal Met 12/7  Short Term Goal 3: Pt will complete toilet transfer w/ CGA - Goal Met 12/6  Short Term Goal 4: Pt will complete toileting w/ CGA - Goal met 12/6  Short Term Goal 5: Pt will complete BUE HEP [id number] to increase strength required for functional mobility/transfers - goal met 12/7  Long Term Goals  Time Frame for Long Term Goals : 14 days - ongoing for all  Long Term Goal 1: Pt will complete shower transfer w/ Mod I  Long Term Goal 2: Pt will complete LE dressing w/ Mod I and use of AE prn  Long Term Goal 3: Pt will complete bathing w/ Mod I and use of AE prn  Long Term Goal 4: Pt will complete toileting/ toilet transfer w/ Mod I  Long Term Goal 5: Pt will complete simple meal prep task w/ Mod I    AM-PAC Score               Therapy Time   Individual Second Session Group Co-treatment   Time In 0730  1000       Time Out 0830  1030       Minutes 60  30       Timed Code Treatment Minutes: 60 Minutes + 30 Minutes = 90 Minutes       Andreina Rivas S/OT

## 2022-12-12 NOTE — PLAN OF CARE
Problem: Discharge Planning  Goal: Discharge to home or other facility with appropriate resources  Outcome: Progressing  Flowsheets (Taken 12/11/2022 2358)  Discharge to home or other facility with appropriate resources: Identify barriers to discharge with patient and caregiver     Problem: Safety - Adult  Goal: Free from fall injury  Outcome: Progressing  Flowsheets (Taken 12/11/2022 2358)  Free From Fall Injury: Instruct family/caregiver on patient safety     Problem: Pain  Goal: Verbalizes/displays adequate comfort level or baseline comfort level  12/11/2022 2358 by Samir Temple RN  Outcome: Progressing  Flowsheets (Taken 12/11/2022 2358)  Verbalizes/displays adequate comfort level or baseline comfort level:   Encourage patient to monitor pain and request assistance   Assess pain using appropriate pain scale   Implement non-pharmacological measures as appropriate and evaluate response   Administer analgesics based on type and severity of pain and evaluate response

## 2022-12-12 NOTE — PROGRESS NOTES
Pt in bed, awake watching television. Alert & oriented x 4. VSS. Assessment completed. Nighttime medications given. Pt continues to complain of pain to left hip, left groin and sacrum. Assisted pt to the bedside commode, with CGA assistance and gait belt. Pt voided and returned to the bed. Percocet given for pain. Reminded pt to call for assistance with any additional needs. Call light within reach. Safety measures in place.

## 2022-12-13 ENCOUNTER — APPOINTMENT (OUTPATIENT)
Dept: GENERAL RADIOLOGY | Age: 82
DRG: 560 | End: 2022-12-13
Attending: PHYSICAL MEDICINE & REHABILITATION
Payer: MEDICARE

## 2022-12-13 PROCEDURE — 97110 THERAPEUTIC EXERCISES: CPT

## 2022-12-13 PROCEDURE — 1280000000 HC REHAB R&B

## 2022-12-13 PROCEDURE — 97530 THERAPEUTIC ACTIVITIES: CPT | Performed by: PHYSICAL THERAPIST

## 2022-12-13 PROCEDURE — 6370000000 HC RX 637 (ALT 250 FOR IP): Performed by: PHYSICAL MEDICINE & REHABILITATION

## 2022-12-13 PROCEDURE — 97110 THERAPEUTIC EXERCISES: CPT | Performed by: PHYSICAL THERAPIST

## 2022-12-13 PROCEDURE — 97530 THERAPEUTIC ACTIVITIES: CPT

## 2022-12-13 PROCEDURE — 97535 SELF CARE MNGMENT TRAINING: CPT

## 2022-12-13 PROCEDURE — 72170 X-RAY EXAM OF PELVIS: CPT

## 2022-12-13 PROCEDURE — 99233 SBSQ HOSP IP/OBS HIGH 50: CPT | Performed by: PHYSICAL MEDICINE & REHABILITATION

## 2022-12-13 RX ADMIN — FERROUS SULFATE TAB 325 MG (65 MG ELEMENTAL FE) 325 MG: 325 (65 FE) TAB at 09:23

## 2022-12-13 RX ADMIN — APIXABAN 2.5 MG: 5 TABLET, FILM COATED ORAL at 21:29

## 2022-12-13 RX ADMIN — FAMOTIDINE 20 MG: 20 TABLET ORAL at 09:23

## 2022-12-13 RX ADMIN — FAMOTIDINE 20 MG: 20 TABLET ORAL at 21:29

## 2022-12-13 RX ADMIN — ALLOPURINOL 300 MG: 300 TABLET ORAL at 09:23

## 2022-12-13 RX ADMIN — MIDODRINE HYDROCHLORIDE 10 MG: 5 TABLET ORAL at 17:20

## 2022-12-13 RX ADMIN — OXYCODONE AND ACETAMINOPHEN 2 TABLET: 5; 325 TABLET ORAL at 04:47

## 2022-12-13 RX ADMIN — SENNOSIDES AND DOCUSATE SODIUM 2 TABLET: 50; 8.6 TABLET ORAL at 09:24

## 2022-12-13 RX ADMIN — OXYCODONE AND ACETAMINOPHEN 2 TABLET: 5; 325 TABLET ORAL at 15:27

## 2022-12-13 RX ADMIN — APIXABAN 2.5 MG: 5 TABLET, FILM COATED ORAL at 09:25

## 2022-12-13 RX ADMIN — OXYCODONE AND ACETAMINOPHEN 2 TABLET: 5; 325 TABLET ORAL at 09:25

## 2022-12-13 RX ADMIN — MIDODRINE HYDROCHLORIDE 10 MG: 5 TABLET ORAL at 12:17

## 2022-12-13 RX ADMIN — MIDODRINE HYDROCHLORIDE 10 MG: 5 TABLET ORAL at 09:24

## 2022-12-13 RX ADMIN — THERA TABS 1 TABLET: TAB at 09:23

## 2022-12-13 RX ADMIN — OXYCODONE AND ACETAMINOPHEN 2 TABLET: 5; 325 TABLET ORAL at 21:29

## 2022-12-13 ASSESSMENT — PAIN DESCRIPTION - ONSET
ONSET: ON-GOING

## 2022-12-13 ASSESSMENT — PAIN DESCRIPTION - FREQUENCY
FREQUENCY: CONTINUOUS
FREQUENCY: CONTINUOUS

## 2022-12-13 ASSESSMENT — PAIN DESCRIPTION - DESCRIPTORS
DESCRIPTORS: ACHING
DESCRIPTORS: ACHING;SHARP;STABBING
DESCRIPTORS: ACHING;SHARP;STABBING
DESCRIPTORS: ACHING

## 2022-12-13 ASSESSMENT — PAIN DESCRIPTION - LOCATION
LOCATION: PELVIS

## 2022-12-13 ASSESSMENT — PAIN SCALES - GENERAL
PAINLEVEL_OUTOF10: 9
PAINLEVEL_OUTOF10: 0
PAINLEVEL_OUTOF10: 7
PAINLEVEL_OUTOF10: 9
PAINLEVEL_OUTOF10: 8
PAINLEVEL_OUTOF10: 0
PAINLEVEL_OUTOF10: 10
PAINLEVEL_OUTOF10: 0
PAINLEVEL_OUTOF10: 5

## 2022-12-13 ASSESSMENT — PAIN DESCRIPTION - ORIENTATION
ORIENTATION: RIGHT;LEFT
ORIENTATION: MID
ORIENTATION: LEFT

## 2022-12-13 ASSESSMENT — PAIN SCALES - WONG BAKER: WONGBAKER_NUMERICALRESPONSE: 0

## 2022-12-13 ASSESSMENT — PAIN DESCRIPTION - PAIN TYPE
TYPE: ACUTE PAIN
TYPE: ACUTE PAIN

## 2022-12-13 NOTE — PROGRESS NOTES
Physical Therapy  Facility/Department: [facility] ACUTE REHAB UNIT  Rehabilitation Physical Therapy Treatment Note    NAME: Kevin Dunn  : 1940 (82 y.o.)  MRN: [id number]  CODE STATUS: Full Code    Date of Service: 22       Restrictions:   Position Activity Restriction  Other position/activity restrictions: PWB TTWB LLE     SUBJECTIVE  Subjective  Subjective: Pt sitting in bedside chair, agreeable to therapy. Pain: Intermittent pain in pelvis throughout session. Pt did not rate her pain        Post Treatment Pain Screening         OBJECTIVE  Cognition  Overall Cognitive Status: WFL  Orientation  Overall Orientation Status: Within Normal Limits  Pt stayed in [facility] chair throughout the 1st session. PT instructed pt with sitting in erect position and doing ex throughout the day to increase overall conditioning  . PT emphasized  the importance of doing the ex correctly to optimize benefits vs doing increased reps. PT instructed pt with the following sitting ex;   1. TR/HR   2. LAQS  3. Marching  4. Hip abd / add with knees flexed  [place] 10 reps of each to BLES. PT provided VC for posture throughout session. PT provided pt a written copy of these ex. PT also instructed pt with  ex in sittin. Shoulder retractions  2. Shoulder depressions   3. Anterior pelvic tilts  4. Cervical retractions   [place] 10 reps of each ex with VC for alignment     Second session: Pt supine in bed when PT arrived. Pt agreeable to therapy. Pt requested to do supine ex in bed vs the mat table since pt will be continuing to do the ex in bed at home. Bed positioned flat with only 1 pillow used to support head. PT instructed pt with the following ex performed in supine to BLES:  1. AP/ QS/ GS  ( combination isometrics) with LES in abd position  2. Hip abd with knees extended  3. Alternating hip/knee flex/ext  4. IR/ ER with knees extended  6. [place] 10 reps of each   Pt req rests between.  PT again instructed pt with modified PNF patterns for UEs with focus on gravity facilitated upper trunk ext. Pt altaf 10 reps of each. Pt reported being extremely fatigued following ex and requested to stay in the bed. Pt in bed with call light and phone placed within reach. Bed alarm reactivated. ASSESSMENT/PROGRESS TOWARDS GOALS       Assessment  Assessment: PT provided significant eduaction on the importance of alignement and for pt to be aware of it throughout the day despite whatever postion she was in. Therapy focused on ex in both sitting and supine on this date. Pt reported feeling very fatigued after each session. Pt is below baseline and would benefit from continued thaerpy to maximize potential and increase functional mobility towards Ind to allow for a safer d/c to home. Activity Tolerance: Patient limited by fatigue;Patient limited by pain; Patient limited by endurance  PT Equipment Recommendations  Walker: Rolling (Note, pt's friend dropped RW off while PT present. PT adjusted the RW to ~height as well as changed out the gliders on the RW. PT reviewed with pt \"how to collapse the RW \")  Other: lightweight 16' w/c with removable leg rests, flip back armrests, comfort cushion. Pt is currently unable to ambulate household distances with RW, making her unable to access area of home for ADLs including kitchen and bathroom. Use of w/c will allow pt to access these areas of her home to perform these ADLs and allow for safe independent mobility in the home and community. Pt has sufficient UE strength to propel w/c throughout home and has not expressed unwillingness to use w/c in home. Goals  Patient Goals   Patient Goals : \"To go home. \"  Short Term Goals  Time Frame for Short Term Goals: 7 days- ongoing  Short Term Goal 1: Pt will perform supine<>Sit Mod I  Short Term Goal 2: Pt will perform sit<>stand Mod I with RW  Short Term Goal 3: Pt will perform stand pivot with RW Mod I  Short Term Goal 4: Pt will be able to hop 10ft with RW Min A  Short Term Goal 5: .Ascend/descend 2 steps SBA  Long Term Goals  Time Frame for Long Term Goals : 14 days- ongoing  Long Term Goal 1: Ambulate 50 ft with RW while maintaining weight bearing precautions Mod I  Long Term Goal 2: Ascend/descend 5 steps SBA  Long Term Goal 3: Pt will be able to propel a wheelchair 150ft Mod I    PLAN OF CARE/SAFETY  Physcial Therapy Plan  Days Per Week: 5 Days  Hours Per Day: 1.5 hours  Therapy Duration: 2 Weeks  Current Treatment Recommendations: Strengthening;ROM;Balance training;Functional mobility training;Transfer training; Endurance training; Wheelchair mobility training;Gait training;Stair training;Neuromuscular re-education;Home exercise program;Safety education & training;Patient/Caregiver education & training;Equipment evaluation, education, & procurement; Therapeutic activities  Safety Devices  Type of Devices: All fall risk precautions in place;Call light within reach; Chair alarm in place;Gait belt;Left in chair;Nurse notified    EDUCATION  Education  Education Given To: Patient  Education Provided: Transfer Training;Mobility Training;Precautions;DME/Home Modifications; Safety  Education Provided Comments: PT emphasized the importance of alignement especially when performing ex.   Education Method: Demonstration;Verbal;Teach Back  Barriers to Learning: None  Education Outcome: Verbalized understanding        Therapy Time   Individual Concurrent Group Co-treatment   Time In 4167         Time Out 1200         Minutes 39         Second Session Therapy Time:   Individual Concurrent Group Co-treatment   Time In 6204         Time Out 1400         Minutes 45           Timed Code Treatment Minutes:  36+06    Total Treatment Minutes:   80          Kate Olvera PT, 12/13/22 at 3:47 PM

## 2022-12-13 NOTE — PROGRESS NOTES
Alert x4 , c/o back and hip pain medicated with prn/ pct effective, up to bathroom x1 assist, in w/c no acute distress noted resp e/e call light in reach

## 2022-12-13 NOTE — PROGRESS NOTES
Pt awake in her recliner. Physical assessment and vital signs as charted. Pt currently rates her L hip/groin pain as a 7 out of 10 on the pain scale, 2 tablets of PRN percocet were administered to the Pt at 0925. Call light placed within reach. RN will continue to monitor Pt.

## 2022-12-13 NOTE — PROGRESS NOTES
Orthopedic Surgery   Progress Note      SUBJECTIVE:  The patient has been stable in acute rehab . She is continuing to progress with rehab and giving great effort per report. She reports intermittent pain in left pelvis, no right hip or pelvic pain. She has been Toe touch LLE, using a walker with assist. Continent of bowel and bladder. No additional leg pain or numbness or tingling. No chest pain or shortness of breath. She is on Eliquis for anticoagulation. No pain with ambulation using RLE as primary support         OBJECTIVE:  /62   Pulse 64   Temp 97.3 °F (36.3 °C) (Oral)   Resp 18   Ht 5' 1\" (1.549 m)   Wt 97 lb 3.6 oz (44.1 kg)   LMP  (LMP Unknown)   SpO2 99%   BMI 18.37 kg/m²   awake, alert, cooperative, no apparent distress, and appears stated age  MUSCULOSKELETAL:  there is no redness, warmth, or swelling of the joints  full range of motion noted  motor strength is 5 out of 5 all extremities bilaterally  tone is normal  with exception of  Pelvis/left lower extremity  No obvious swelling or hematoma at the hip or pelvis. No Obvious ecchymosis or additional skin changes  No evidence of leg length discrepancy or malrotation with hips held in comfortable position  Right lower extremity there is no pain with gentle hip internal and external rotation as well as knee flexion and extension   No pain with left hip logroll and gentle hip rotation today. Mild pain with lateral compression gently of pelvis, no pain with gentle ap compression, no gross instability. No tenderness to palpation throughout the remainder of the thigh knees legs and ankles  5 out of 5 strength EHL FHL tibialis anterior and gastrocsoleus bilaterally  .  sensation intact deep and superficial peroneal tibial sural and saphenous nerves bilaterally  Palpable DP pulses with brisk capillary refill all toes  Thigh compartments soft and compressible, calves are soft and nontender, no swelling       CBC:   Lab Results   Component Value Date/Time    WBC 12.6 12/12/2022 05:29 AM    RBC 2.59 12/12/2022 05:29 AM    RBC 3.33 08/18/2017 01:18 PM    HGB 9.2 12/12/2022 05:29 AM    HCT 26.6 12/12/2022 05:29 AM    .7 12/12/2022 05:29 AM    MCH 35.6 12/12/2022 05:29 AM    MCHC 34.7 12/12/2022 05:29 AM    RDW 16.9 12/12/2022 05:29 AM     12/12/2022 05:29 AM    MPV 9.3 12/12/2022 05:29 AM     Hemoglobin/Hematocrit:    Lab Results   Component Value Date/Time    HGB 9.2 12/12/2022 05:29 AM    HCT 26.6 12/12/2022 05:29 AM     PT/INR:    Lab Results   Component Value Date/Time    PROTIME 13.5 12/04/2022 05:06 AM    INR 1.04 12/04/2022 05:06 AM     Chem Basic:   Lab Results   Component Value Date/Time     12/12/2022 05:29 AM    K 3.8 12/12/2022 05:29 AM     12/12/2022 05:29 AM    CO2 26 12/12/2022 05:29 AM    GLUCOSE 89 12/12/2022 05:29 AM    GLUCOSE 100 06/14/2017 01:52 PM    BUN 10 12/12/2022 05:29 AM    CREATININE <0.5 12/12/2022 05:29 AM       PELVIS 3 VIEWS       INDICATIONS: Pain, fracture. Comparison with 12/1/2022 with pelvic fracture of the obturator ring noted. Redemonstrated is comminuted fracture of the pubic rami at the pubic symphysis, superior and inferior pubic ramus on the left and superior ramus on the right       Sclerosis of the iliac wings, fracture these levels are unchanged. Impression   1. Comminuted fracture the left superior and inferior pubic ramus are redemonstrated. 2.  There appears to be fracture on the right pubic ramus which can be confirmed with repeat computed tomography. 3.  Sclerotic changes, stress insufficiency fractures of the posterior iliac crest cannot be excluded versus degenerative change. Imperfect ap pelvis image, no significant changes in alignment of left pelvic ring injury on inlet/outlet. Sclerotic changes are noted near right pubic ramus.    No additional osseous abnormalities acutely    ASSESSMENT AND PLAN:  Assessment: 82 y/o F setting for 3 of fall and was pain with inability to ambulate after fall  1. Left pelvic ring fractures including superior and inferior pubic rami fractures and sacral fracture with minimal displacement    Continue with walker and encourage up with assist. [id number] [address] to advance to weight of leg weight bearing LLE for increased support as patient tolerates  Continue with eliquis for dvt ppx  Possible fracture of right pelvic ring but patient is currently asymptomatic, unlikely to affect stability. I spoke with patient and I do not think that additional CT bony pelvis will affect management at this time   Postmob films reviewed, no significant change in alignment. I anticipate pelvic pain will continue to improve in the next one week or so and may be appropriate soon to advance to weight bearing as tolerated in next 2 weeks or so. Plan followup visit in outpatient ortho clinic in 3-4 weeks for repeat clinical evalaution and imaging.            Stephany Jacob MD  12/13/2022  5:54 PM

## 2022-12-13 NOTE — PROGRESS NOTES
Department of Physical Medicine & Rehabilitation  Progress Note    Patient Identification:  Selina Mcfarland  [id number]  : 1940  Admit date: 2022    Chief Complaint: History of pelvic fracture    Subjective:   Doing well overnight. Continues to work hard in therapy. No new complaints. She would like to have an xray of her pelvis before she leaves and would like to talk to the ortho team before discharge. She is worried that it will be hard for her to get to an outpatient appointment. Slept well last night. Team conference today. ROS: No f/c, n/v, cp     Objective:  Patient Vitals for the past 24 hrs:   BP Temp Temp src Pulse Resp SpO2   22 0918 (!) 100/59 97.3 °F (36.3 °C) Oral 70 18 99 %   22 0827 (!) 101/55 -- -- 70 -- --   22 0735 117/72 -- -- -- -- --   22 0517 -- -- -- -- 18 --   22 0447 -- -- -- -- 18 --   22 2132 -- -- -- -- 18 --   22 2102 -- -- -- -- 18 --   22 2045 116/60 98.3 °F (36.8 °C) Oral 73 16 97 %   22 1701 (!) 103/58 -- -- -- 16 96 %   22 1508 -- -- -- -- 18 --       Const: Alert. No distress, pleasant. HEENT: Normocephalic, atraumatic. Normal sclera/conjunctiva. MMM. CV: Regular rate and rhythm. Resp: No respiratory distress. Lungs CTAB. Abd: Soft, nontender, nondistended, NABS+   Ext: No edema. Neuro: Alert, oriented, appropriately interactive. Psych: Cooperative, appropriate mood and affect    Laboratory data: Available via EMR. Last 24 hour lab  No results found for this or any previous visit (from the past 24 hour(s)).       Therapy progress:  PT  Position Activity Restriction  Other position/activity restrictions: PWB TTWB LLE  Objective     Sit to Stand: Minimal Assistance (From recliner to RW, increased VC for handplacement and weight bearing precautions)  Stand to Sit: Minimal Assistance (To recliner, and car transfer from [address] [place] [address])     OT  PT Equipment Recommendations  Equipment Needed: Yes  Mobility Devices: Wheelchair  Walker: Rolling  Wheelchair: Light [place]  Other: lightweight 16' w/c with removable leg rests, flip back armrests, comfort cushion. Pt is currently unable to ambulate household distances with RW, making her unable to access area of home for ADLs including kitchen and bathroom. Use of w/c will allow pt to access these areas of her home to perform these ADLs and allow for safe independent mobility in the home and community. Pt has sufficient UE strength to propel w/c throughout home and has not expressed unwillingness to use w/c in home. Toilet - Technique: Stand pivot (w/ RW)  Equipment Used: Raised toilet seat with rails  Assessment        SLP          Body mass index is 18.37 kg/m². Assessment and Plan:  1. Left Pelvic ring fracture including superior and inferior pubic rami fracture and sacral fractures with minimal displacement- Oscal, Vit D, seen by ortho  - requires PT/OT  2. CLL- followed by [facility]- neutropenic precautions? 3. Hypotension- monitor - on midodrine   4. Debility- unable to ambulate long distances. Requires PT/ OT   - WC ordered       Follow up xray ordered-  Plan for ortho follow up in ARU    Monitor BP today- encourage fluids        Bladder - high risk retention - Monitor PVRs, SC prn >300cc     Bowel - high risk constipation - colace BID, PRN miralax and MoM. follow bowel movements. Enema or suppository if needed. Safety - fall precautions      Team conference was held today on the patient and discussed directly with the patient utilizing their entire treatment team. Please see separate team note for details. Total treatment time for today's care >35 min. >50% of time spent counseling with patient and coordinating care.        Rehab Progress: Improving  Anticipated Dispo: home  Services/DME: [facility]  ELOS: 12/17Stephanie Giles D.O. M.P.H  PM&R  12/13/2022  11:29 AM

## 2022-12-13 NOTE — PROGRESS NOTES
Occupational Therapy  Facility/Department: [facility] ACUTE REHAB UNIT  Rehabilitation Occupational Therapy Daily Treatment Note    Date: 22  Patient Name: Padmini Doan       Room: 0149/5503-11  MRN: [id number]  Account: [de-identified]   : 1940  (81 y.o.) Gender: female                    Past Medical History:  has a past medical history of Allergic rhinitis due to pollen, Arthritis, degenerative, Cancer ([address] CLL (chronic lymphoblastic leukemia), Closed compression fracture of thoracic vertebra ([address] Depression, History of blood transfusion, Hyperlipidemia, Melanoma of upper arm ([facility] [address] [facility] Mild reactive airways disease, and Sternum fx. Past Surgical History:   has a past surgical history that includes Tonsillectomy; Appendectomy; Cataract removal with implant; Blepharoptosis Repair; Nasal sinus surgery; other surgical history; shoulder surgery (Right, 2018); and Axillary Surgery (Right, 10/18/2022). Restrictions  Other position/activity restrictions: PWB TTWB LLE    Subjective  Subjective: Pt supine in bed upon arrival, pleasent and agreeable to OT. Pt feeling lightheaded her vitals /72, HR 71, pt concerned about weight loss and was measured at start of session weighing 100.7 lbs. Objective     Cognition  Overall Cognitive Status: WFL  Orientation  Overall Orientation Status: Within Normal Limits         ADL  Feeding  Assistance Level: Independent  Skilled Clinical Factors: Provided w/ breakfast tray at [facility]  Grooming/Oral Hygiene  Assistance Level: Modified independent  Skilled Clinical Factors: Pt seated in wc performed oral hygiene, brushed her hair, and applied lotion to face/hands  Upper Extremity Dressing  Assistance Level: Modified independent  Skilled Clinical Factors: Pt seated in wc donned sweatshirt  Toileting  Assistance Level: Stand by assist  Skilled Clinical Factors: Pt in stance managed pants up/down over hips w/ SBA. Pt seated on toilet voided urine.  Pt performed pericare. Toilet Transfers  Equipment: Standard toilet  Assistance Level: Stand by assist  Skilled Clinical Factors: Pt demo'd stand pivot from wc > toilet w/ RW w/ SBA. Pt w/ LUE supported on RW and RUE reaching back for toilet demo'd sit <> stand          Functional Mobility  Device: Wheelchair  Activity: To/From bathroom; To/From therapy gym  Assistance Level: Modified independent  Skilled Clinical Factors: Pt self propelled to/from bathroom and to/from therapy gym w/ mod I w/ BUE propelling and LUE supported w/ leg rest.  Supine to Sit  Assistance Level: Independent  Skilled Clinical Factors: Pt w/ HOB elevated performed supine to sit  Transfers  Surface: Standard toilet; To chair with arms; Wheelchair  Device: Walker  Sit to [employer] Level: Stand by assist  Skilled Clinical Factors: WC > raised table; WC > RW  Stand to [employer] Level: Stand by assist  Skilled Clinical Factors: raised table > wc; RW > wc  Stand Pivot  Assistance Level: Stand by assist  Skilled Clinical Factors: Bed > wc w/ RW; wc > Recliner w/ RW   OT Exercises  Exercise Treatment: Pt in stance performed the following activity to improve standing balance and BUE strengthening for improved independence w/ functional mobility. Pt in stance w/ 2lb ball spelled the alphabet w/ BUE. Pt w/ no complaints of pain and formed letters clearly in the air. Pt w/ slight unsteadiness requrining unilateral UE support on RW one time. Pt required CGA for the activity. Pt placed LLE on OTs foot to measure PWB TTWB, pt w/ no weight placed on LLE. Static Standing Balance Exercises: Pt demo'd the following activity for improved standing balance for ADLs. Pt  stated she likes to do crafts w/ her grandchildren. Pt in stance while maintaining PWB TTWB LLE precautions stood and made snowflakes cutting out a design into folded paper that revealed a snowflake. Pt in stance w/ no UE support for 8min and 45 sec w/ no complaints of pain and SBA.          Second Session  Pt met seated in recliner chair and agreeable to OT session and requesting to go to bathroom. Pt reporting her dizziness and fatigue improving from earlier this morning. ADLs  Pt completing toilet transfer with SBA increased time and cues for safe positioning. Pt is very thoughtful completing transfer slowly verbalizing tech. Toileting tasks completed with SBA and cues for safe positioning while completing pants management. Whit care completed seated. Pt with questions about home environment due to her decision not to use master bathroom. All questions answered to pt satisfaction. ADL washing hands completed at [facility] level Independently. Transfers and Mobility  WC mobility completed to and from bathroom and to multiple transfer surfaces in room with Supervision and Vcs for safe positioning and  WC mechanics (removal of foot rest). Transfers completed from WC<>toilet<>WC<>couch<> room chair with arm<>WC<>recliner chair. All transfer set up to simulate home environment for increased independence ands safety at home. Pt requiring SBA and cues for set up of each transfer. Pt moving slowly and thoughtfully. RW used for each transfer with pt maintaining TTB WC precautions without cues needed. Pt planning on having hospital bed delivered. Pt with questions about bed place and how bed will be delivered. Pt educated that bed will probably arrive disassembled and will be set up in her home. Pt planning on calling [employer] for more information. Pt left seated in recliner chair with alarm on and call light in reach. Assessment  Assessment  Assessment: Pt w/ good standing balance and activity tolerance during session performing activity unsupported w/ BUE in use for 8 minutes and 45 seconds w/ SBA. Pt w/ greater fatigue, decreased endurance, and greater pain throughout session. Pt cont to benefit from activities to improve balance.  Pt is pleasent and motivated and cont to benefit from skilled OT to return to PLOF. Cont OT per POC. Activity Tolerance: Patient tolerated treatment well  Discharge Recommendations: Continue to assess pending progress;24 hour supervision or assist;Patient would benefit from continued therapy after discharge  OT Equipment Recommendations  Other: Pt already has built in shower bench and is purchasing a hospital bed cont to assess pending progress  Safety Devices  Safety Devices in place: Yes  Type of devices: Left in chair;Call light within reach; Chair alarm in place    Patient Education  Education  Education Given To: Patient  Education Provided: Role of Therapy;Plan of Care;Home Exercise Program;ADL Function; Safety;IADL Function;Precautions; Equipment  Education Method: Verbal  Barriers to Learning: None  Education Outcome: Verbalized understanding;Continued education needed    Plan  Occupational Therapy Plan  Times Per Week: 5x/week, 90min/day  Current Treatment Recommendations: Strengthening;Balance training;Functional mobility training; Endurance training; Safety education & training;Patient/Caregiver education & training;Pain management;Self-Care / ADL; Home management training;Equipment evaluation, education, & procurement;ROM    Goals  Patient Goals   Patient goals :  \"To be independent again\"  Short Term Goals  Time Frame for Short Term Goals: 7 days  Short Term Goal 1: Pt will complete LE dressing w/ CGA and use of AE prn - Goal Met 12/6  Short Term Goal 2: Pt will complete bathing w/ CGA - Goal Met 12/7  Short Term Goal 3: Pt will complete toilet transfer w/ CGA - Goal Met 12/6  Short Term Goal 4: Pt will complete toileting w/ CGA - Goal met 12/6  Short Term Goal 5: Pt will complete BUE HEP [id number] to increase strength required for functional mobility/transfers - goal met 12/7  Long Term Goals  Time Frame for Long Term Goals : 14 days - ongoing for all  Long Term Goal 1: Pt will complete shower transfer w/ Mod I  Long Term Goal 2: Pt will complete LE dressing w/ Mod I and use of AE prn  Long Term Goal 3: Pt will complete bathing w/ Mod I and use of AE prn  Long Term Goal 4: Pt will complete toileting/ toilet transfer w/ Mod I  Long Term Goal 5: Pt will complete simple meal prep task w/ Mod I    AM-PAC Score               Therapy Time   Individual Second Session Group Co-treatment   Time In 0730  932       Time Out 0830  1002       Minutes 60  30       Timed Code Treatment Minutes: 60 Minutes +30 min    Total Treatment Min 90 min     Issac Cole S/OT    [address] [place] [address], Ne PERSAUD/L

## 2022-12-13 NOTE — PLAN OF CARE
Problem: Discharge Planning  Goal: Discharge to home or other facility with appropriate resources  12/12/2022 2232 by Juni Fonseca  Outcome: Progressing  Flowsheets (Taken 12/12/2022 2030)  Discharge to home or other facility with appropriate resources: Identify barriers to discharge with patient and caregiver  12/12/2022 1609 by Cherelle Freeman RN  Outcome: Progressing  Flowsheets  Taken 12/12/2022 1609  Discharge to home or other facility with appropriate resources:   Identify barriers to discharge with patient and caregiver   Arrange for needed discharge resources and transportation as appropriate   Identify discharge learning needs (meds, wound care, etc)  Taken 12/12/2022 0851  Discharge to home or other facility with appropriate resources: Identify barriers to discharge with patient and caregiver     Problem: Safety - Adult  Goal: Free from fall injury  12/12/2022 2232 by Juni Fonseca  Outcome: Progressing  12/12/2022 1609 by Cherelle Freeman RN  Outcome: Progressing  Flowsheets (Taken 12/12/2022 1609)  Free From Fall Injury: Instruct family/caregiver on patient safety  Note: Pt remains free of falls thus far this shift. All fall precautions in place and functioning properly.       Problem: Pain  Goal: Verbalizes/displays adequate comfort level or baseline comfort level  12/12/2022 2232 by Juni Fonseca  Outcome: Progressing  12/12/2022 1609 by Cherelle Freeman RN  Outcome: Progressing  Flowsheets  Taken 12/12/2022 1609  Verbalizes/displays adequate comfort level or baseline comfort level:   Encourage patient to monitor pain and request assistance   Assess pain using appropriate pain scale   Administer analgesics based on type and severity of pain and evaluate response  Taken 12/12/2022 0851  Verbalizes/displays adequate comfort level or baseline comfort level: Encourage patient to monitor pain and request assistance  Note: Patient's pain is controlled with current regime

## 2022-12-14 LAB
ANION GAP SERPL CALCULATED.3IONS-SCNC: 11 MMOL/L (ref 3–16)
BASOPHILS ABSOLUTE: 0 K/UL (ref 0–0.2)
BASOPHILS RELATIVE PERCENT: 0 %
BUN BLDV-MCNC: 13 MG/DL (ref 7–20)
CALCIUM SERPL-MCNC: 8.9 MG/DL (ref 8.3–10.6)
CHLORIDE BLD-SCNC: 104 MMOL/L (ref 99–110)
CO2: 25 MMOL/L (ref 21–32)
CREAT SERPL-MCNC: 0.5 MG/DL (ref 0.6–1.2)
EOSINOPHILS ABSOLUTE: 0 K/UL (ref 0–0.6)
EOSINOPHILS RELATIVE PERCENT: 0 %
GFR SERPL CREATININE-BSD FRML MDRD: >60 ML/MIN/{1.73_M2}
GLUCOSE BLD-MCNC: 102 MG/DL (ref 70–99)
HCT VFR BLD CALC: 25.8 % (ref 36–48)
HEMOGLOBIN: 8.7 G/DL (ref 12–16)
LYMPHOCYTES ABSOLUTE: 8.7 K/UL (ref 1–5.1)
LYMPHOCYTES RELATIVE PERCENT: 82 %
MACROCYTES: ABNORMAL
MCH RBC QN AUTO: 35.2 PG (ref 26–34)
MCHC RBC AUTO-ENTMCNC: 33.7 G/DL (ref 31–36)
MCV RBC AUTO: 104.4 FL (ref 80–100)
MONOCYTES ABSOLUTE: 0.3 K/UL (ref 0–1.3)
MONOCYTES RELATIVE PERCENT: 3 %
NEUTROPHILS ABSOLUTE: 1.6 K/UL (ref 1.7–7.7)
NEUTROPHILS RELATIVE PERCENT: 15 %
PDW BLD-RTO: 16.6 % (ref 12.4–15.4)
PLATELET # BLD: 202 K/UL (ref 135–450)
PMV BLD AUTO: 9.2 FL (ref 5–10.5)
POTASSIUM REFLEX MAGNESIUM: 3.8 MMOL/L (ref 3.5–5.1)
RBC # BLD: 2.47 M/UL (ref 4–5.2)
SODIUM BLD-SCNC: 140 MMOL/L (ref 136–145)
WBC # BLD: 10.6 K/UL (ref 4–11)

## 2022-12-14 PROCEDURE — 97530 THERAPEUTIC ACTIVITIES: CPT

## 2022-12-14 PROCEDURE — 97116 GAIT TRAINING THERAPY: CPT

## 2022-12-14 PROCEDURE — 1280000000 HC REHAB R&B

## 2022-12-14 PROCEDURE — 97110 THERAPEUTIC EXERCISES: CPT

## 2022-12-14 PROCEDURE — 80048 BASIC METABOLIC PNL TOTAL CA: CPT

## 2022-12-14 PROCEDURE — 36415 COLL VENOUS BLD VENIPUNCTURE: CPT

## 2022-12-14 PROCEDURE — 97535 SELF CARE MNGMENT TRAINING: CPT

## 2022-12-14 PROCEDURE — 99232 SBSQ HOSP IP/OBS MODERATE 35: CPT | Performed by: PHYSICAL MEDICINE & REHABILITATION

## 2022-12-14 PROCEDURE — 6370000000 HC RX 637 (ALT 250 FOR IP): Performed by: PHYSICAL MEDICINE & REHABILITATION

## 2022-12-14 PROCEDURE — 85025 COMPLETE CBC W/AUTO DIFF WBC: CPT

## 2022-12-14 RX ADMIN — APIXABAN 2.5 MG: 5 TABLET, FILM COATED ORAL at 09:33

## 2022-12-14 RX ADMIN — MIDODRINE HYDROCHLORIDE 10 MG: 5 TABLET ORAL at 09:32

## 2022-12-14 RX ADMIN — FERROUS SULFATE TAB 325 MG (65 MG ELEMENTAL FE) 325 MG: 325 (65 FE) TAB at 09:34

## 2022-12-14 RX ADMIN — APIXABAN 2.5 MG: 5 TABLET, FILM COATED ORAL at 21:52

## 2022-12-14 RX ADMIN — ALLOPURINOL 300 MG: 300 TABLET ORAL at 09:32

## 2022-12-14 RX ADMIN — OXYCODONE AND ACETAMINOPHEN 2 TABLET: 5; 325 TABLET ORAL at 16:34

## 2022-12-14 RX ADMIN — MIDODRINE HYDROCHLORIDE 10 MG: 5 TABLET ORAL at 16:35

## 2022-12-14 RX ADMIN — OXYCODONE AND ACETAMINOPHEN 2 TABLET: 5; 325 TABLET ORAL at 21:52

## 2022-12-14 RX ADMIN — MIDODRINE HYDROCHLORIDE 10 MG: 5 TABLET ORAL at 12:31

## 2022-12-14 RX ADMIN — OXYCODONE AND ACETAMINOPHEN 2 TABLET: 5; 325 TABLET ORAL at 05:34

## 2022-12-14 RX ADMIN — FAMOTIDINE 20 MG: 20 TABLET ORAL at 09:33

## 2022-12-14 RX ADMIN — SENNOSIDES AND DOCUSATE SODIUM 2 TABLET: 50; 8.6 TABLET ORAL at 09:34

## 2022-12-14 RX ADMIN — FAMOTIDINE 20 MG: 20 TABLET ORAL at 21:53

## 2022-12-14 RX ADMIN — OXYCODONE AND ACETAMINOPHEN 2 TABLET: 5; 325 TABLET ORAL at 09:33

## 2022-12-14 ASSESSMENT — PAIN SCALES - GENERAL
PAINLEVEL_OUTOF10: 6
PAINLEVEL_OUTOF10: 0
PAINLEVEL_OUTOF10: 7
PAINLEVEL_OUTOF10: 8
PAINLEVEL_OUTOF10: 7
PAINLEVEL_OUTOF10: 7

## 2022-12-14 ASSESSMENT — PAIN DESCRIPTION - ONSET: ONSET: ON-GOING

## 2022-12-14 ASSESSMENT — PAIN DESCRIPTION - DESCRIPTORS
DESCRIPTORS: ACHING

## 2022-12-14 ASSESSMENT — PAIN - FUNCTIONAL ASSESSMENT
PAIN_FUNCTIONAL_ASSESSMENT: ACTIVITIES ARE NOT PREVENTED

## 2022-12-14 ASSESSMENT — PAIN DESCRIPTION - ORIENTATION
ORIENTATION: LEFT

## 2022-12-14 ASSESSMENT — PAIN DESCRIPTION - DIRECTION: RADIATING_TOWARDS: LEFT THIGH

## 2022-12-14 ASSESSMENT — PAIN DESCRIPTION - LOCATION
LOCATION: HIP
LOCATION: PELVIS

## 2022-12-14 ASSESSMENT — PAIN DESCRIPTION - FREQUENCY: FREQUENCY: CONTINUOUS

## 2022-12-14 ASSESSMENT — PAIN DESCRIPTION - PAIN TYPE: TYPE: ACUTE PAIN

## 2022-12-14 ASSESSMENT — PAIN SCALES - WONG BAKER: WONGBAKER_NUMERICALRESPONSE: 0

## 2022-12-14 NOTE — PROGRESS NOTES
Physical Therapy  Facility/Department: [facility] ACUTE REHAB UNIT  Rehabilitation Physical Therapy Treatment Note    NAME: Megan Tejeda  : 1940 (82 y.o.)  MRN: [id number]  CODE STATUS: Full Code    Date of Service: 22    Restrictions:  Position Activity Restriction  Other position/activity restrictions: Weight of leg weight bearing to left lower extremity - Per 's report from pt she is 25% weight bearing in her LLE, awaiting written confirmation of order     SUBJECTIVE  Subjective  Subjective: Pt sitting in bedside chair, agreeable to therapy. Pain: Intermittent pain in pelvis throughout session. Pt did not rate her pain    OBJECTIVE  Cognition  Overall Cognitive Status: WFL  Orientation  Overall Orientation Status: Within Normal Limits  Orientation Level: Oriented X4    Functional Mobility  Balance  Sitting Balance: Independent (seated on commode)  Standing Balance: Stand by assistance (SBA for brief/pants management in br with UL- no UE support)  Transfers  Surface: From mat; Wheelchair;Standard toilet; To mat  Additional Factors: Set-up; Increased time to complete;Verbal cues  Device: Walker  Sit to Stand  Assistance Level: Supervision (at [employer] with RW/ commode with GB)  Skilled Clinical Factors: VC for hand placement  Stand to Sit  Assistance Level: Supervision (at  with RW/ commode with GB)  Stand Pivot  Assistance Level: Supervision  Skilled Clinical Factors: wc<>chair with arms with RW, wc<>commode with GB ; VC for set up of wc  Sit Pivot  Assistance Level: Supervision  Skilled Clinical Factors: wc<>recliner, VC for wc set up      Environmental Mobility  Wheelchair  Surface: Level surface  Device: Standard wheelchair  Assistance Required to Manage Parts: Left leg rest  Assistance Level for Propulsion: Modified independent  Propulsion Method: Bilateral upper extremities  Propulsion Quality: Slow velocity  Propulsion Distance: 125' x 2 + small distances in room  Skilled Clinical Factors: VC for leg rest management and set up of transfers      PT Exercises  Static Standing Balance Exercises: Pt completes x 30 seconds + 1 min 30 seconds R SL stance no UE support with CGA and 1 brief period of min A during both stands. Limited by L LE fatigue during second stand. Dynamic Standing Balance Exercises: Pt compeltes 2 x ~ 1min beanbag toss activity including reaching outside of JONI and cross body with B UEs- UL UE support for most of activity except for when reaching for beanbags with L UE completes brief no UE support to pass beanbag from L to R UE. CGA>SBA. Second Session: Pt seated in recliner alert and agreeable to session . Pt reprots pain 4/10 in LLE. Pt reports needs to use bathroom. Pt transferred with supervision recliner >WC , WC<> toilet, WC<>scifit , and WC>EOB with supervision and RW via stand pivot. Pt able to maintain TTWB LLE with out cues. Pt then performed standing balance to don and doff pants in standing with SBA. Pt independent for parminder care in sitting. Pt able to have small BM. Pt performed standing there ex 2x 10 LLE only marches, hip abd, HS curl . 2x10 RLE only one leg squat and calf raises . Seated there ex 2 x 10 BLE LAQ, RLE hip abd, and marches, and LLE ankle pumps. Cues for form and multiple rest breaks to complete. Pt then performed hopping RLE with TTWB LLE in // bars x 5 ft with CGA. Distance limited by pain and fatigue. Pt then performed scitfit level 2 BUE and RLE only to increase activity tolerance x 3 mins. Pt sit to supine on be dwith supervision . Pt left supine in bed with alarm on, call light in reach, and all needs met. ASSESSMENT/PROGRESS TOWARDS GOALS    Assessment  Assessment: Pt demonstrates progression in functional mobility through compelting all transfers with supervision - cues for set up of transfers only. Pt demonstrates progression in balance during SL stance without UE support this date.  Pt would benefit from cotninued skilled PT in order to maximize functional mobility and independence. Activity Tolerance: Patient limited by fatigue;Patient limited by pain; Patient limited by endurance  Discharge Recommendations: Continue to assess pending progress;Home with Home health PT;Home with assist PRN  PT Equipment Recommendations  Mobility Devices: Wheelchair  Wheelchair: Light Weight  Other: lightweight 16' w/c with removable leg rests, flip back armrests, comfort cushion. Pt is currently unable to ambulate household distances with RW, making her unable to access area of home for ADLs including kitchen and bathroom. Use of w/c will allow pt to access these areas of her home to perform these ADLs and allow for safe independent mobility in the home and community. Pt has sufficient UE strength to propel w/c throughout home and has not expressed unwillingness to use w/c in home. Goals  Patient Goals   Patient Goals : \"To go home. \"  Short Term Goals  Time Frame for Short Term Goals: 7 days- ongoing  Short Term Goal 1: Pt will perform supine<>Sit Mod I  Short Term Goal 2: Pt will perform sit<>stand Mod I with RW  Short Term Goal 3: Pt will perform stand pivot with RW Mod I  Short Term Goal 4: Pt will be able to hop 10ft with RW Min A  Short Term Goal 5: .Ascend/descend 2 steps SBA  Long Term Goals  Time Frame for Long Term Goals : 14 days- ongoing  Long Term Goal 1: Ambulate 50 ft with RW while maintaining weight bearing precautions Mod I  Long Term Goal 2: Ascend/descend 5 steps SBA  Long Term Goal 3: Pt will be able to propel a wheelchair 150ft Mod I    PLAN OF CARE/SAFETY  Physcial Therapy Plan  Days Per Week: 5 Days  Hours Per Day: 1.5 hours  Therapy Duration: 2 Weeks  Current Treatment Recommendations: Strengthening;ROM;Balance training;Functional mobility training;Transfer training; Endurance training; Wheelchair mobility training;Gait training;Stair training;Neuromuscular re-education;Home exercise program;Safety education & training;Patient/Caregiver education & training;Equipment evaluation, education, & procurement; Therapeutic activities  Safety Devices  Type of Devices: All fall risk precautions in place;Call light within reach; Chair alarm in place;Gait belt;Left in chair;Nurse notified    EDUCATION  Education  Education Given To: Patient  Education Provided: Transfer Training;Mobility Training;Precautions; Safety  Education Method: Demonstration;Verbal;Teach Back  Barriers to Learning: None  Education Outcome: Verbalized understanding        Therapy Time   Individual Concurrent Group Co-treatment   Time In 1100         Time Out 1130         Minutes 30           Timed Code Treatment Minutes: [address], [place] [id number]    Second Session Therapy Time:   Individual Concurrent Group Co-treatment   Time In 3757         Time Out 8226         Minutes 60         30+60    Total Treatment Minutes:  90  Second session documentation only Vanessa White, PT, DPT

## 2022-12-14 NOTE — CARE COORDINATION
We are still planning DC home with Pt's sister on 12/17/22. SW will arrange skilled Home Care closer to DC date. DME order placed for a light weight wheelchair. SW sent referral to [facility] via email and advised of DC date. ALESHIA will follow.     Antonieta Soler [place]  Case Management  [phone]

## 2022-12-14 NOTE — PROGRESS NOTES
Alert x4, , c/o pelvic and back pain medicated with prn pct/ effective, assist x1 to [facility] , no acute distress noted call light in reach

## 2022-12-14 NOTE — PLAN OF CARE
Problem: Discharge Planning  Goal: Discharge to home or other facility with appropriate resources  Outcome: Progressing     Problem: Pain  Goal: Verbalizes/displays adequate comfort level or baseline comfort level  Outcome: Progressing  Note: Patient is stretching the PRN 4 hour administration window to decrease use of pain med. Problem: Nutrition Deficit:  Goal: Optimize nutritional status  Outcome: Progressing  Note: Appetite moderate.

## 2022-12-14 NOTE — PROGRESS NOTES
Occupational Therapy  Facility/Department: [facility] ACUTE REHAB UNIT  Rehabilitation Occupational Therapy Daily Treatment Note    Date: 22  Patient Name: Saurav Hawk       Room: [facility]  MRN: [id number]  Account: [de-identified]   : 1940  (81 y.o.) Gender: female                    Past Medical History:  has a past medical history of Allergic rhinitis due to pollen, Arthritis, degenerative, Cancer ([address] CLL (chronic lymphoblastic leukemia), Closed compression fracture of thoracic vertebra ([address] Depression, History of blood transfusion, Hyperlipidemia, Melanoma of upper arm ([address] Mild reactive airways disease, and Sternum fx. Past Surgical History:   has a past surgical history that includes Tonsillectomy; Appendectomy; Cataract removal with implant; Blepharoptosis Repair; Nasal sinus surgery; other surgical history; shoulder surgery (Right, ); and Axillary Surgery (Right, 10/18/2022). Restrictions  Other position/activity restrictions: Weight of leg weight bearing to left lower extremity - Per 's report from pt she is 25% weight bearing in her LLE, reached out to ortho to clarify 25% weight bearing in order     Subjective  Subjective: Pt supine in bed upon arrival, pleasent and agreeable to OT. Objective     Cognition  Overall Cognitive Status: WFL  Orientation  Overall Orientation Status: Within Normal Limits         ADL  Feeding  Assistance Level: Independent  Skilled Clinical Factors: Provided w/ breakfast tray at [facility]  Grooming/Oral Hygiene  Assistance Level: Modified independent  Skilled Clinical Factors: Pt seated in wc performed oral hygiene, brushed her hair, and applied lotion to face/hands  Upper Extremity Bathing  Assistance Level: Modified independent  Skilled Clinical Factors: Pt seated on TTB washed/dried 4/4 UB components. Lower Extremity Bathing  Assistance Level: Supervision  Skilled Clinical Factors: Pt seated on TTB washed 6/6 components of LB.  Pt washed BLE using figure 4. Pt dried 5/6 components of LB seated on TTB. Pt performed lateral scoot from TTB to wc w/ towel on wc to dry buttocks  Upper Extremity Dressing  Assistance Level: Modified independent  Skilled Clinical Factors: Pt seated on TTB doffed/donned shirt  Lower Extremity Dressing  Assistance Level: Supervision  Skilled Clinical Factors: Pt in stance managed pants up/down over hips w/ supervision. Pt seated in wc removed underwear/pants. Pt seated in wc threaded BLE into underwear/pants w/ figure 4 technique. Putting On/Taking Off Footwear  Assistance Level: Modified independent  Skilled Clinical Factors: Pt seated on TTB doffed bilateral hospital socks using figure 4 technique. Pt seated on TTB donned bilateral socks using figure 4 technique. Toileting  Assistance Level: Supervision  Skilled Clinical Factors: Pt in stance managed pants up/down over hips w/ Supervision. Pt seated on toilet voided urine. Pt performed pericare. Pt performed this activity 2x during session. Toilet Transfers  Technique: Stand pivot  Equipment: Standard toilet  Assistance Level: Supervision  Skilled Clinical Factors: Pt demo'd stand pivot from wc > toilet w/ RW w/ supervision. Pt w/ LUE supported on RW and RUE reaching back for toilet demo'd sit <> stand  Tub/Shower Transfers  Type: Shower  Transfer From: Wheelchair  Transfer To: Tub transfer bench  Assistance Level: Supervision  Skilled Clinical Factors: Pt demo'd lateral scooting from wc <> TTB to simulate home shower because RW will not fit in front of wc for UE support and pt does not have GBs. Pt supported BUE on wc and TTB w/ supervision. Pt maintained PWB precautions during shower transfer. Pt did not use GBs during transfer because she does not want to install them in her home. Light Housekeeping  Light Housekeeping Level:  Other  Light Housekeeping Level of Assistance: Supervision  Light Housekeeping: Pt performed the following activity to simulate setting the table for chiara when the pt will have numerous people coming for dinner. Pt in stance at raised table maintaining PWB precautions set 3 table settings. Pt reached outside JONI to set 2 table settings and placed one right in front of her. Pt set the table properly w/ no hesitation or LOB throughout the activity. Pt required supervision for the activity. Pt in stance for ~4 minutes. Functional Mobility  Device: Wheelchair  Activity: To/From bathroom; To/From therapy gym  Assistance Level: Modified independent  Skilled Clinical Factors: Pt self propelled to/from bathroom and to/from therapy gym w/ mod I w/ BUE propelling. Supine to Sit  Assistance Level: Independent  Skilled Clinical Factors: Pt w/ HOB elevated performed supine to sit. Pt is purchasing a hospital bed for home. Transfers  Surface: Standard toilet; To chair with arms; Wheelchair (TTB)  Device: Walker  Sit to [employer] Level: Supervision  Skilled Clinical Factors: WC > raised table  Stand to Sit  Skilled Clinical Factors: raised table > wc  Stand Pivot  Assistance Level: Supervision  Skilled Clinical Factors: bed > wc w/ RW; wc > recliner w/ RW     Second Session:     Pt seated in recliner upon arrival w/  present. Transfers:   Stand pivot:   Recliner <> wc w/ RW Supervision    Toilet transfer:   Pt demo'd stand pivot from wc > toilet w/ RW w/ supervision. Pt w/ LUE supported on RW and RUE reaching back for toilet demo'd sit <> stand    Toileting:   Pt in stance managed pants up/down over hips w/ Supervision. Pt seated on toilet voided urine. Pt performed pericare. Pt performed this activity 2x during session. Pt seated in wc performed the following exercises to increase pts UB strength for functional mobility. Pt was provided w/ a medium resistance theraband and HEP. Pt performed 2 rounds of each exercises 10 reps the first round and 5 reps the second round.  Pt performed elbow flexion, shoulder flexion, shoulder abduction, horizontal abduction, diagonal up, and diagonal down. Pt provided w/ verbal instructions and modeling of each exercise. Pt performed each exercise w/ good form and no pain. Pt in second round demo'd independence performing each exercise. Pt left seated in recliner w/ chair alarm on and call light within place. Assessment  Assessment  Assessment: Pt w/ good standing balance and activity tolerance throughout the session in stance for ~4 minutes setting the table. Pt performed all transfers w/ supervision throughout the session. Pt cont to benefit from activities to improve balance and maneuver wc. Pt is pleasent and motivated and cont to benefit from skilled OT to return to PLOF. Cont OT per POC. Activity Tolerance: Patient limited by fatigue;Patient limited by pain; Patient limited by endurance  Discharge Recommendations: 24 hour supervision or assist;Home with Home health OT  OT Equipment Recommendations  Equipment Needed: No  Other: Pt already has built in shower bench and is purchasing a hospital bed  [facility] in place: Yes  Type of devices: Left in chair;Call light within reach; Chair alarm in place    Patient Education  Education  Education Given To: Patient  Education Provided: Role of Therapy;Plan of Care;Home Exercise Program;ADL Function; Safety;IADL Function;Precautions; Equipment  Education Method: Verbal  Barriers to Learning: None  Education Outcome: Verbalized understanding;Continued education needed    Plan  Occupational Therapy Plan  Times Per Week: 5x/week, 90min/day  Current Treatment Recommendations: Strengthening;Balance training;Functional mobility training; Endurance training; Safety education & training;Patient/Caregiver education & training;Pain management;Self-Care / ADL; Home management training;Equipment evaluation, education, & procurement;ROM    Goals  Patient Goals   Patient goals :  \"To be independent again\"  Short Term Goals  Time Frame for Short Term Goals: 7 days  Short Term Goal 1: Pt will complete LE dressing w/ CGA and use of AE prn - Goal Met 12/6  Short Term Goal 2: Pt will complete bathing w/ CGA - Goal Met 12/7  Short Term Goal 3: Pt will complete toilet transfer w/ CGA - Goal Met 12/6  Short Term Goal 4: Pt will complete toileting w/ CGA - Goal met 12/6  Short Term Goal 5: Pt will complete BUE HEP [id number] to increase strength required for functional mobility/transfers - goal met 12/7  Long Term Goals  Time Frame for Long Term Goals : 14 days - ongoing for all  Long Term Goal 1: Pt will complete shower transfer w/ Mod I  Long Term Goal 2: Pt will complete LE dressing w/ Mod I and use of AE prn  Long Term Goal 3: Pt will complete bathing w/ Mod I and use of AE prn  Long Term Goal 4: Pt will complete toileting/ toilet transfer w/ Mod I  Long Term Goal 5: Pt will complete simple meal prep task w/ Mod I    AM-PAC Score               Therapy Time   Individual Second Session Group Co-treatment   Time In 0730  1015       Time Out 0830  1045       Minutes 60  30       Timed Code Treatment Minutes: 60 Minutes + 30 Minutes = 90 Minutes       Isabela Vail S/OT

## 2022-12-14 NOTE — PROGRESS NOTES
[facility] Progress Note    2022     Aj Jeong    MRN: [id number]    : 1940    Referring MD: No referring provider defined for this encounter. SUBJECTIVE: working with PT. No issues. She is wondering if we can push appt back next week. ECOG PS:  (3) Capable of limited self-care, confined to bed or chair > 50% of waking hours    KPS: 40% Disabled; requires special care and assistance    Isolation: None    Medications    Scheduled Meds:   ferrous sulfate  325 mg Oral Daily with breakfast    famotidine  20 mg Oral BID    Acalabrutinib  1 capsule Oral Q12H    allopurinol  300 mg Oral Daily    apixaban  2.5 mg Oral BID    midodrine  10 mg Oral TID WC    multivitamin  1 tablet Oral Daily    sennosides-docusate sodium  2 tablet Oral Daily    sodium chloride flush  5-40 mL IntraVENous 2 times per day     Continuous Infusions:      PRN Meds:.oxyCODONE-acetaminophen **OR** oxyCODONE-acetaminophen, diphenhydramine, ondansetron, prochlorperazine, Saline Mouthwash, sodium chloride flush, bisacodyl, magnesium hydroxide, polyethylene glycol    ROS:  As noted above, otherwise remainder of 10-point ROS negative    Physical Exam:     I&O:  No intake or output data in the 24 hours ending 22 1016      Vital Signs:  /62   Pulse 80   Temp 97.9 °F (36.6 °C) (Oral)   Resp 18   Ht 5' 1\" (1.549 m)   Wt 97 lb 3.6 oz (44.1 kg)   LMP  (LMP Unknown)   SpO2 98%   BMI 18.37 kg/m²     Weight:    Wt Readings from Last 3 Encounters:   22 97 lb 3.6 oz (44.1 kg)   22 108 lb 0.4 oz (49 kg)   10/14/22 100 lb (45.4 kg)       General: Awake, alert and oriented.   HEENT: normocephalic, PERRL, no scleral erythema or icterus, Oral mucosa moist and intact, throat clear  NECK: supple without palpable adenopathy  BACK: Straight   SKIN: warm dry and intact without lesions rashes or masses  CHEST: CTA bilaterally without use of accessory muscles  CV: Normal S1 S2, RRR, no MRG  ABD: NT ND normoactive BS, no palpable masses or hepatosplenomegaly  EXTREMITIES: without edema, denies calf tenderness  NEURO: CN II - XII grossly intact  CATHETER: PIV    Data    CBC:   Recent Labs     12/12/22  0529 12/14/22  0622   WBC 12.6* 10.6   HGB 9.2* 8.7*   HCT 26.6* 25.8*   .7* 104.4*    202       BMP/Mag:  Recent Labs     12/12/22  0529 12/14/22  0622    140   K 3.8 3.8    104   CO2 26 25   BUN 10 13   CREATININE <0.5* 0.5*       LIVP: No results for input(s): AST, ALT, LIPASE, BILIDIR, BILITOT, ALKPHOS in the last 72 hours. Invalid input(s): AMYLASE,  ALB  Coags:   No results for input(s): PROTIME, INR, APTT in the last 72 hours. PROBLEM LIST:             CLL  Osteopenia  ITP/hemolytic anemia  LGL      TREATMENT:            CLL  1. Ibrutinib  2. Acalabrutinib (11/1/2022)     LGL  MTX weekly (d/c 10/2022)      ASSESSMENT AND PLAN:            1A. 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 - Repeat FISH (1/21/20) - trisomy 12  - Flow cytometry (3/18/20) - Chronic lymphocytic leukemia and 4% TLGL cells. There is no strong evidence to suggest the possibility of a clonal TLGL population, further the low percentage of cells argues against this entity.  - PB smear (3/18/20) - Occasional spherocytes and slight increase in polychromasia indicating likely ongoing hemolysis. Leukocytes remarkable for monomorphic small lymphoid cells indicating the presence of chronic lymphocytic leukemia. There are also occasional forms showing granules. Platelets unremarkable  - PET 10/3/22:  Thoracic and A/P  lymphadenopathy. Mild splenic enlargement and uptake suspicious for lymphoma.  - Lymph node biopsy 10/18/22:  Consistent with CLL/SLL; FISH negative  - CT A/P 12/01/22:  No abnormally enlarged lymph nodes in A/P     Biopsy of ax node C/W CLL - started acalabrutininb.   Tolerating well with documented improvement. Cont acalabrutinib - Started 11/1/22   - she is responding on exam and wbc     1B. 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 - S/p MTX 10 mg weekly (started 3/21/20), increased to 12.5mg weekly (5/29/10) with neutropenia. Cont weekly on Fridays. Will hold ( after dose 10/21/22) with onset of acalabrutininb. 2. ID: No s/s of infection  - H/o recurrent acute sinusitis (follows up with ENT) - starting an antibiotic and steroids per ENT 5/10/22   - S/p COVID vaccines (2nd dose 2/18/21)   - COVID Ab (2/16/22): negative; Evusheld 4/1/22. She had a reaction and refuses repeat. - Schedule COVID  and flu immunizations  Hypogammaglobulinemia:   - Cont monthly immunoglobulin (started 1/4/19). Dose 1/19/22, 2/16/22, 4/13/22, 5/10/22, 6/10/22, 7/8/22  - Last IgG 10/21/22:  484  - will need follow up for IgG check     3. Heme: H/o hemolytic anemia and ITP. 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 Previous Tx:  - S/p 4 cycles Rituxan (completed 9/14/16) & IVIG 20 gm daily for 5 days (completed 10/10/16)  - S/p Rituxan weekly x 4 (last dose 9/11/17) w/ normalization of her hemoglobin. Last dose 4/8/20  - S/p Prednisone taper (stopped 10/2019)  - S/p Prednisone: 2/17/20 -> Stop 9/12/22  - Rituxan weekly x 4 - #1 (3/18/20), #2(3/25/20), #3 (4/1/20), #4 (4/8/20)    - Cont eliquis 2.5 mg BID for ppx given pelvic fracture per ortho recs     4. Metabolic: HypoNa  - Encourage PO fluid intake     5. MSK: +Osteoporosis and now s/p traumatic fall 12/1/22 resulting in left pelvic ring fracture and sacral fracture with minimal displacement. H/o Osteoporosis   - worse than 2018.   Prolia 12/19/22  Pelvis/Sacral fracture  - Orthopedic surgery consulted, no surgical intervention              - Toe touch weight bearing to LLL              - Cont DVT prophylaxis approximately 1 month s/p injury (January 2023)  - F/U 2 weeks as outpatient for repeat evaluation and imaging of pelvis (week of 12/14/22)  - Monitor hemoglobin closely for signs of bleeding  - MSIR 15 mg  PO prn and/or hydrocodone PRN - utilizing both  - Cont tx in ARU  Osteopenia/Osteoporosis:    - Cont oscal/Vit D  - DEXA scan 08/2022:  Lumbar Spine - osteopenia. No significant change since 2018. Left femoral neck: Osteoporosis  Left hip: Osteoporosis. This corresponds to 5% worsening since 2018.    Jon Patel in clinic    6. ENT: H/o rhinitis & followed by ENT  - Cont Flonase      7. Cardiac:  H/o PVCs. Symptomatic hypotension  Hypotension: improved  - Cortisol level WNL 11/21/22. Repeat 12/2 10.7 (nl)  - TSH 12/2 4.88, T4 1.1  - Cont midodrine 5 mg TID (started 12/01/22), increased to 10 mg TID 12/5  - Place cecil hose      8. Pulmonary: H/o pulm nodule on CT 7/22  - CT 7/2022.   Repeat CT 1/2023 after 3 months of acalabrutininb.      - DVT Prophylaxis: Cont apixaban 2.5mg BID    - Disposition: Per ARU, potential D/c date 12/17/22 - planning on d/c home with sister & w/c    Farzana Calix, APRN - NP

## 2022-12-14 NOTE — PROGRESS NOTES
O x 4. Assist x 1, gait belt to transfer, pivot with toe-touch weight on left foot. C/o pain, Percocet admin per MAR. Call light in reach. Alarm on for safety.

## 2022-12-14 NOTE — PROGRESS NOTES
Department of Physical Medicine & Rehabilitation  Progress Note    Patient Identification:  Lera Mcardle  [id number]  : 1940  Admit date: 2022    Chief Complaint: History of pelvic fracture    Subjective:   No new complaints overnight. Improving daily and feels better with less pain today. She had xrays last night which showed a continued fracture. Seen in her room this morning and is planning on DC this Saturday. Follow up with [facility] on Monday next week. ROS: No f/c, n/v, cp     Objective:  Patient Vitals for the past 24 hrs:   BP Temp Temp src Pulse Resp SpO2   22 0909 102/62 97.9 °F (36.6 °C) Oral 80 18 98 %   22 0604 -- -- -- -- 18 --   22 0534 -- -- -- -- 18 --   229 -- -- -- -- 18 --   22 -- -- -- -- 18 --   22 (!) 106/58 97.9 °F (36.6 °C) Oral 72 18 99 %   22 1717 106/62 -- -- 64 -- --   22 1216 111/65 -- -- 62 -- --       Const: Alert. No distress, pleasant. HEENT: Normocephalic, atraumatic. Normal sclera/conjunctiva. MMM. CV: Regular rate and rhythm. Resp: No respiratory distress. Lungs CTAB. Abd: Soft, nontender, nondistended, NABS+   Ext: No edema. Neuro: Alert, oriented, appropriately interactive. Psych: Cooperative, appropriate mood and affect    Laboratory data: Available via EMR.    Last 24 hour lab  Recent Results (from the past 24 hour(s))   CBC auto differential    Collection Time: 22  6:22 AM   Result Value Ref Range    WBC 10.6 4.0 - 11.0 K/uL    RBC 2.47 (L) 4.00 - 5.20 M/uL    Hemoglobin 8.7 (L) 12.0 - 16.0 g/dL    Hematocrit 25.8 (L) 36.0 - 48.0 %    .4 (H) 80.0 - 100.0 fL    MCH 35.2 (H) 26.0 - 34.0 pg    MCHC 33.7 31.0 - 36.0 g/dL    RDW 16.6 (H) 12.4 - 15.4 %    Platelets 600 159 - 232 K/uL    MPV 9.2 5.0 - 10.5 fL    Neutrophils % 15.0 %    Lymphocytes % 82.0 %    Monocytes % 3.0 %    Eosinophils % 0.0 %    Basophils % 0.0 %    Neutrophils Absolute 1.6 (L) 1.7 - 7.7 K/uL    Lymphocytes Absolute 8.7 (H) 1.0 - 5.1 K/uL    Monocytes Absolute 0.3 0.0 - 1.3 K/uL    Eosinophils Absolute 0.0 0.0 - 0.6 K/uL    Basophils Absolute 0.0 0.0 - 0.2 K/uL    Macrocytes Occasional (A)    Basic Metabolic Panel w/ Reflex to MG    Collection Time: 12/14/22  6:22 AM   Result Value Ref Range    Sodium 140 136 - 145 mmol/L    Potassium reflex Magnesium 3.8 3.5 - 5.1 mmol/L    Chloride 104 99 - 110 mmol/L    CO2 25 21 - 32 mmol/L    Anion Gap 11 3 - 16    Glucose 102 (H) 70 - 99 mg/dL    BUN 13 7 - 20 mg/dL    Creatinine 0.5 (L) 0.6 - 1.2 mg/dL    Est, Glom Filt Rate >60 >60    Calcium 8.9 8.3 - 10.6 mg/dL         Therapy progress:  PT  Position Activity Restriction  Other position/activity restrictions: Weight of leg weight bearing to left lower extremity - Per 's report from pt she is 25% weight bearing in her LLE  Objective     Sit to Stand: Minimal Assistance (From recliner to RW, increased VC for handplacement and weight bearing precautions)  Stand to Sit: Minimal Assistance (To recliner, and car transfer from RW)     OT  PT Equipment Recommendations  Equipment Needed: Yes  Mobility Devices: Wheelchair  Walker: Rolling (Note, pt's friend dropped RW off while PT present. PT adjusted the RW to ~height as well as changed out the gliders on the RW. PT reviewed with pt \"how to collapse the RW \")  Wheelchair: Light Weight  Other: lightweight 16' w/c with removable leg rests, flip back armrests, comfort cushion. Pt is currently unable to ambulate household distances with RW, making her unable to access area of home for ADLs including kitchen and bathroom. Use of w/c will allow pt to access these areas of her home to perform these ADLs and allow for safe independent mobility in the home and community. Pt has sufficient UE strength to propel w/c throughout home and has not expressed unwillingness to use w/c in home.   Toilet - Technique: Stand pivot (w/ RW)  Equipment Used: Raised toilet seat with rails  Assessment SLP          Body mass index is 18.37 kg/m². Assessment and Plan:  1. Left Pelvic ring fracture including superior and inferior pubic rami fracture and sacral fractures with minimal displacement- Oscal, Vit D, seen by ortho  - requires PT/OT  2. CLL- followed by [facility]- neutropenic precautions? 3. Hypotension- monitor - on midodrine   4. Debility- unable to ambulate long distances. Requires PT/ OT   - WC ordered         Plan for ortho follow up in ARU    Monitor BP today- encourage fluids        Bladder - high risk retention - Monitor PVRs, SC prn >300cc     Bowel - high risk constipation - colace BID, PRN miralax and MoM. follow bowel movements. Enema or suppository if needed. Safety - fall precautions      [facility] follow up on Monday.      Rehab Progress: Improving  Anticipated Dispo: home  Services/DME: [facility]  ELOS: 12/17Lucius Hammonds D.O. M.P.H  PM&R  12/14/2022  10:58 AM

## 2022-12-15 ENCOUNTER — APPOINTMENT (OUTPATIENT)
Dept: GENERAL RADIOLOGY | Age: 82
DRG: 560 | End: 2022-12-15
Attending: PHYSICAL MEDICINE & REHABILITATION
Payer: MEDICARE

## 2022-12-15 PROCEDURE — 1280000000 HC REHAB R&B

## 2022-12-15 PROCEDURE — 97542 WHEELCHAIR MNGMENT TRAINING: CPT

## 2022-12-15 PROCEDURE — 72170 X-RAY EXAM OF PELVIS: CPT

## 2022-12-15 PROCEDURE — 97110 THERAPEUTIC EXERCISES: CPT

## 2022-12-15 PROCEDURE — 99232 SBSQ HOSP IP/OBS MODERATE 35: CPT | Performed by: PHYSICAL MEDICINE & REHABILITATION

## 2022-12-15 PROCEDURE — 97530 THERAPEUTIC ACTIVITIES: CPT

## 2022-12-15 PROCEDURE — 6370000000 HC RX 637 (ALT 250 FOR IP): Performed by: PHYSICAL MEDICINE & REHABILITATION

## 2022-12-15 PROCEDURE — 97535 SELF CARE MNGMENT TRAINING: CPT

## 2022-12-15 RX ADMIN — OXYCODONE AND ACETAMINOPHEN 2 TABLET: 5; 325 TABLET ORAL at 14:38

## 2022-12-15 RX ADMIN — SENNOSIDES AND DOCUSATE SODIUM 2 TABLET: 50; 8.6 TABLET ORAL at 08:51

## 2022-12-15 RX ADMIN — MIDODRINE HYDROCHLORIDE 10 MG: 5 TABLET ORAL at 13:18

## 2022-12-15 RX ADMIN — FAMOTIDINE 20 MG: 20 TABLET ORAL at 08:53

## 2022-12-15 RX ADMIN — APIXABAN 2.5 MG: 5 TABLET, FILM COATED ORAL at 08:52

## 2022-12-15 RX ADMIN — OXYCODONE AND ACETAMINOPHEN 2 TABLET: 5; 325 TABLET ORAL at 20:29

## 2022-12-15 RX ADMIN — FERROUS SULFATE TAB 325 MG (65 MG ELEMENTAL FE) 325 MG: 325 (65 FE) TAB at 08:54

## 2022-12-15 RX ADMIN — FAMOTIDINE 20 MG: 20 TABLET ORAL at 20:26

## 2022-12-15 RX ADMIN — OXYCODONE AND ACETAMINOPHEN 2 TABLET: 5; 325 TABLET ORAL at 10:40

## 2022-12-15 RX ADMIN — OXYCODONE AND ACETAMINOPHEN 2 TABLET: 5; 325 TABLET ORAL at 06:41

## 2022-12-15 RX ADMIN — APIXABAN 2.5 MG: 5 TABLET, FILM COATED ORAL at 20:26

## 2022-12-15 RX ADMIN — MIDODRINE HYDROCHLORIDE 10 MG: 5 TABLET ORAL at 16:32

## 2022-12-15 RX ADMIN — MIDODRINE HYDROCHLORIDE 10 MG: 5 TABLET ORAL at 08:54

## 2022-12-15 RX ADMIN — ALLOPURINOL 300 MG: 300 TABLET ORAL at 08:54

## 2022-12-15 RX ADMIN — OXYCODONE AND ACETAMINOPHEN 2 TABLET: 5; 325 TABLET ORAL at 02:23

## 2022-12-15 ASSESSMENT — PAIN DESCRIPTION - LOCATION
LOCATION: PELVIS

## 2022-12-15 ASSESSMENT — PAIN SCALES - WONG BAKER: WONGBAKER_NUMERICALRESPONSE: 0

## 2022-12-15 ASSESSMENT — PAIN DESCRIPTION - ORIENTATION
ORIENTATION: LOWER
ORIENTATION: LEFT
ORIENTATION: MID
ORIENTATION: MID

## 2022-12-15 ASSESSMENT — PAIN SCALES - GENERAL
PAINLEVEL_OUTOF10: 8
PAINLEVEL_OUTOF10: 6
PAINLEVEL_OUTOF10: 5
PAINLEVEL_OUTOF10: 7
PAINLEVEL_OUTOF10: 6
PAINLEVEL_OUTOF10: 7
PAINLEVEL_OUTOF10: 5

## 2022-12-15 ASSESSMENT — PAIN DESCRIPTION - DESCRIPTORS
DESCRIPTORS: ACHING
DESCRIPTORS: ACHING;SHARP
DESCRIPTORS: ACHING
DESCRIPTORS: ACHING

## 2022-12-15 ASSESSMENT — PAIN - FUNCTIONAL ASSESSMENT
PAIN_FUNCTIONAL_ASSESSMENT: ACTIVITIES ARE NOT PREVENTED
PAIN_FUNCTIONAL_ASSESSMENT: ACTIVITIES ARE NOT PREVENTED
PAIN_FUNCTIONAL_ASSESSMENT: PREVENTS OR INTERFERES SOME ACTIVE ACTIVITIES AND ADLS
PAIN_FUNCTIONAL_ASSESSMENT: ACTIVITIES ARE NOT PREVENTED

## 2022-12-15 ASSESSMENT — PAIN DESCRIPTION - PAIN TYPE
TYPE: ACUTE PAIN
TYPE: ACUTE PAIN

## 2022-12-15 ASSESSMENT — PAIN DESCRIPTION - FREQUENCY
FREQUENCY: CONTINUOUS
FREQUENCY: CONTINUOUS

## 2022-12-15 ASSESSMENT — PAIN DESCRIPTION - ONSET
ONSET: ON-GOING
ONSET: ON-GOING

## 2022-12-15 ASSESSMENT — PAIN DESCRIPTION - DIRECTION: RADIATING_TOWARDS: BUTTOCK

## 2022-12-15 NOTE — PROGRESS NOTES
Department of Physical Medicine & Rehabilitation  Progress Note    Patient Identification:  Aria Pelayo  [id number]  : 1940  Admit date: 2022    Chief Complaint: History of pelvic fracture    Subjective:   Feels well today. Improving daily in therapy. She was seen this morning with OT. Moving around well with a wheelchair. Plan for DC in 48 hours. No new complaints. ROS: No f/c, n/v, cp     Objective:  Patient Vitals for the past 24 hrs:   BP Temp Temp src Pulse Resp SpO2   12/15/22 1040 -- -- -- -- 16 --   12/15/22 0837 (!) 97/59 98.7 °F (37.1 °C) Oral 73 18 96 %   12/15/22 0641 -- -- -- -- 18 --   12/15/22 0253 -- -- -- -- 18 --   12/15/22 0223 -- -- -- -- 18 --   22 2222 -- -- -- -- 18 --   22 2152 -- -- -- -- 18 --   22 2145 (!) 104/58 97.4 °F (36.3 °C) Oral 68 16 96 %   22 1756 119/65 -- -- 75 -- --   22 1634 -- -- -- -- 16 --       Const: Alert. No distress, pleasant. HEENT: Normocephalic, atraumatic. Normal sclera/conjunctiva. MMM. CV: Regular rate and rhythm. Resp: No respiratory distress. Lungs CTAB. Abd: Soft, nontender, nondistended, NABS+   Ext: No edema. Neuro: Alert, oriented, appropriately interactive. Psych: Cooperative, appropriate mood and affect    Laboratory data: Available via EMR. Last 24 hour lab  No results found for this or any previous visit (from the past 24 hour(s)).         Therapy progress:  PT  Position Activity Restriction  Other position/activity restrictions: Weight of leg weight bearing to left lower extremity - Per 's report from pt she is 25% weight bearing in her LLE, reached out to ortho to clarify 25% weight bearing in order   Objective     Sit to Stand: Minimal Assistance (From recliner to RW, increased VC for handplacement and weight bearing precautions)  Stand to Sit: Minimal Assistance (To recliner, and car transfer from [address] [place] [address])     OT  PT Equipment Recommendations  Equipment Needed: Yes  Mobility Devices: Wheelchair  Walker: Rolling (Note, pt's friend dropped RW off while PT present. PT adjusted the RW to ~height as well as changed out the gliders on the RW. PT reviewed with pt \"how to collapse the RW \")  Wheelchair: Light Weight  Other: lightweight 16' w/c with removable leg rests, flip back armrests, comfort cushion. Pt is currently unable to ambulate household distances with RW, making her unable to access area of home for ADLs including kitchen and bathroom. Use of w/c will allow pt to access these areas of her home to perform these ADLs and allow for safe independent mobility in the home and community. Pt has sufficient UE strength to propel w/c throughout home and has not expressed unwillingness to use w/c in home. Toilet - Technique: Stand pivot (w/ RW)  Equipment Used: Raised toilet seat with rails  Assessment        SLP          Body mass index is 18.37 kg/m². Assessment and Plan:  1. Left Pelvic ring fracture including superior and inferior pubic rami fracture and sacral fractures with minimal displacement- Oscal, Vit D, seen by ortho  - requires PT/OT  2. CLL- followed by [facility]- neutropenic precautions? 3. Hypotension- monitor - on midodrine   4. Debility- unable to ambulate long distances. Requires PT/ OT   - WC ordered         Plan for ortho follow up in ARU    Monitor BP today- encourage fluids        Bladder - high risk retention - Monitor PVRs, SC prn >300cc     Bowel - high risk constipation - colace BID, PRN miralax and MoM. follow bowel movements. Enema or suppository if needed. Safety - fall precautions      [facility] follow up on Monday.      Rehab Progress: Improving  Anticipated Dispo: home  Services/DME: [facility]  ELOS: 12/17Murpotf Giles D.O. M.P.H  PM&R  12/15/2022  11:22 AM

## 2022-12-15 NOTE — CARE COORDINATION
ANGIE spoke with Jeronimo Kiser. She is aware of need for wheelchair. Giuliana plans to deliver it to pt tomorrow morning. ANGIE spoke with Adelfo Charles at [facility]. They can accept and are aware of dc date 12/17.

## 2022-12-15 NOTE — PROGRESS NOTES
Comprehensive Nutrition Assessment    RECOMMENDATIONS:  PO Diet: Regular; low microbial  ONS: n/a  Nutrition Education: No recommendation at this time       NUTRITION ASSESSMENT:   Nutritional summary & status: Follow up. Pt continues on a Regular; Low Microbial Diet. Meal intakes remain adequate with % consumed most meals. Noted wt of 97 lbs on 12/12. Wt has fluctuated from 101 lbs-112 lbs throughout admit. Plans to discharge home 12/17. Will continue to follow throughout admit. Admission/PMH: Admit; Pelvic ring fx due to OP  PMHx; CLL, ITP, Cardiac PVC with hypotensioon    MALNUTRITION ASSESSMENT  Context of Malnutrition: Acute Illness   Malnutrition Status: At risk for malnutrition (Comment)  Findings of the 6 clinical characteristics of malnutrition (Minimum of 2 out of 6 clinical characteristics is required to make the diagnosis of moderate or severe Protein Calorie Malnutrition based on AND/ASPEN Guidelines):  Energy Intake:  Mild decrease in energy intake (Comment)  Weight Loss:  No significant weight loss     Body Fat Loss:  No significant body fat loss     Muscle Mass Loss:  No significant muscle mass loss    Fluid Accumulation:  No significant fluid accumulation          NUTRITION DIAGNOSIS   Predicted inadequate energy intake related to increase demand for energy/nutrients as evidenced by intake 51-75%    Nutrition Monitoring and Evaluation:   Food/Nutrient Intake Outcomes:  Food and Nutrient Intake  Physical Signs/Symptoms Outcomes:  Biochemical Data, Weight     OBJECTIVE DATA: Significant to nutrition assessment  Nutrition Related Findings: LBM12/14. No edema. Glu 102. Wounds: None  Nutrition Goals: PO intake 75% or greater, prior to discharge     [address] [facility] DIET;  Regular; Low Microbial  PO Intake: %   PO Supplement Intake:None Ordered  Additional Sources of Calories/IVF:n/a     ANTHROPOMETRICS  Current Height: 5' 1\" (154.9 cm)  Current Weight: 97 lb 3.6 oz (44.1 kg)    Admission weight: 110 lb 3.7 oz (50 kg)  Ideal Body Weight (IBW): 105 lbs  (48 kg)    Usual Bodyweight     BMI: 18.4    COMPARATIVE STANDARDS  Energy (kcal):  6171-9579 (25-30 kcal/CBW/50 kg)     Protein (g):  60-70 (1.2-1.4 gm/CBW/50 kg)       Fluid (mL/day):  1250 1500 (1ml/kcal) or per provider    The patient will be monitored per nutrition standards of care. Consult dietitian if additional nutrition interventions are needed prior to RD reassessment.      Caleb Tenorio, [address]:  [phone]  Office:  [phone]

## 2022-12-15 NOTE — PROGRESS NOTES
Oriented x 4. Pain lessens with use of Percocet otherwise VSS. Assist x 1 with gait belt and wheelchair. Participation in therapy increases pain, rest decreases pain. Left foot Toe touch wt bearing. C/o feeling light-headed, BP stable, drinking water often. Call light in reach. Alarm on for safety.

## 2022-12-15 NOTE — PROGRESS NOTES
Occupational Therapy  Facility/Department: [place] [address] [facility]UTE [facility]HAB UNIT  Rehabilitation Occupational Therapy Daily Treatment Note    Date: 12/15/22  Patient Name: Erin Covington       Room: 2477/8371-53  MRN: [id number]  Account: [de-identified]   : 1940  (81 y.o.) Gender: female                    Past Medical History:  has a past medical history of Allergic rhinitis due to pollen, Arthritis, degenerative, Cancer ([address] CLL (chronic lymphoblastic leukemia), Closed compression fracture of thoracic vertebra ([facility] [address] Depression, History of blood transfusion, Hyperlipidemia, Melanoma of upper arm ([facility] [address] Mild reactive airways disease, and Sternum fx. Past Surgical History:   has a past surgical history that includes Tonsillectomy; Appendectomy; Cataract removal with implant; Blepharoptosis Repair; Nasal sinus surgery; other surgical history; shoulder surgery (Right, ); and Axillary Surgery (Right, 10/18/2022). Restrictions  Other position/activity restrictions: Weight of leg weight bearing to left lower extremity - Per 's report from pt she is 25% weight bearing in her LLE, reached out to ortho to clarify 25% weight bearing in order     Subjective  Subjective: Pt supine in bed upon arrival, pleasent and agreeable to OT. Objective     Cognition  Overall Cognitive Status: WFL  Orientation  Overall Orientation Status: Within Normal Limits         ADL  Feeding  Assistance Level: Independent  Skilled Clinical Factors: Provided w/ breakfast tray at [facility]  Grooming/Oral Hygiene  Assistance Level: Modified independent  Skilled Clinical Factors: Pt seated in wc performed oral hygiene, brushed her hair, and applied lotion to face/hands  Lower Extremity Dressing  Assistance Level: Supervision  Skilled Clinical Factors: Pt in stance managed pants up/down over hips w/ supervision. Pt seated in wc removed underwear/pants.  Pt seated in wc threaded BLE into underwear/pants w/ figure 4 technique. Toileting  Assistance Level: Supervision  Skilled Clinical Factors: Pt in stance managed pants up/down over hips w/ Supervision. Pt seated on toilet voided urine. Pt performed pericare. Toilet Transfers  Technique: Stand pivot  Equipment: Standard toilet  Assistance Level: Supervision  Skilled Clinical Factors: Pt demo'd stand pivot from wc > toilet w/ RW w/ supervision. Pt w/ LUE supported on RW and RUE reaching back for toilet demo'd sit <> stand    Meal Prep  Meal Prep Level: Wheelchair  Meal Prep Level of Assistance: Supervision  Meal Preparation: Pt performed the following activity to simulate baking/cooking in the kitchen at home. Pt seated in the wc self propelled w/ BUE to retrieve items to bake muffins. Pt retrieved muffins, milk, bowl, pan, and cooking spray. Pt at wc level was supervision-mod I for completing the activity. Pt was in stance to retrieve items from upper cabinets and to combine the ingredients in the bowl. Pt required supervision for standing tasks. Pt performed entire activity w/ no assistance except for retrieving items out of the oven, however pt stated her family would be there to assist her with that. Pt after baking the muffins placed garbage in the trash and rinsed out all dishes used. Pt seated in wc wiped down all counters. Pt required some VCs for where to place the wc when standing and to lock breaks when reaching into the oven. Pt maintained precautions throughout activity. Functional Mobility  Device: Wheelchair  Activity: To/From bathroom; To/From therapy gym  Assistance Level: Modified independent  Skilled Clinical Factors: Pt self propelled to/from bathroom and to/from therapy gym w/ mod I w/ BUE propelling. Supine to Sit  Assistance Level: Independent  Skilled Clinical Factors: Pt w/ HOB elevated performed supine to sit. Pt is purchasing a hospital bed for home. Transfers  Surface: Standard toilet; To chair with arms; Wheelchair  Device: Walker  Sit to Stand  Assistance Level: Supervision  Skilled Clinical Factors: WC > raised table; wc > kitchen counter  Stand to Sit  Assistance Level: Supervision  Skilled Clinical Factors: raised table > wc; kitchen counter > wc  Stand Pivot  Assistance Level: Supervision  Skilled Clinical Factors: bed > wc w/ RW; wc > recliner w/ RW   OT Exercises  Exercise Treatment: Pt in stance performed the following activity to improve standing balance and activity tolerance for ADLs/IADLs. Pt in stance played a novel game of [employer]. Pt was provided w/ directions and was [place] cognitively to play the entire game w/ min VCs to remind her of rules. Pt w/ no complaints of pain maintained her precautions throughout the activity. Pt played w/ no UE support throughout the game and had no LOB. Pt required supervision for standing balance throughout the game. Pt in stance for ~6 minutes playing ARLENE. Second Session:     Pt seated in recliner upon arrival, pleasant and agreeable to therapy. Transfers:   Stand Pivot: supervision  Recliner > wc w/ RW w/ supervision    Toilet transfer: supervision  Pt demo'd stand pivot from wc > toilet w/ RW w/ supervision. Pt w/ LUE supported on RW and RUE reaching back for toilet demo'd sit <> stand    Toileting: Supervision  Pt in stance managed pants up/down over hips w/ Supervision. Pt seated on toilet voided urine. Pt performed pericare. Pt performed the following activity to simulate community integration. Pt self propelled to/from the gift shop >800ft w/ BUEs. Pt discussed having someone with her when she went out. Pt demo'd good endurance requiring no rest breaks to/from gift shop. Pt w/ ++ time when fitting through tight spaces throughout the gift shop. Pt w/ no difficulty propelling over various thresholds including elevator and carpet threshold. Pt propelled throughout gift shop appropriately interacting w/ other individuals. Pt was mod I throughout activity.      Pt left seated in wc w/ chair alarm on and call light within reach. Assessment  Assessment  Assessment: Pt w/ good standing balance and throughout session stood for extended periods of time baking and playing ARLENE. Pt requiring supervision while in the kitchen in stance and stated she will have family around if she is baking/cooking anything. Pt demo'd good endurance self-propelling >800 ft over various thresholds to/from the gift shop. Pt is motivated and cont to benefit from skilled OT to return to [facility]. Cont OT per POC. Activity Tolerance: Patient limited by fatigue;Patient limited by pain; Patient limited by endurance  Discharge Recommendations: 24 hour supervision or assist;Home with Home health OT  OT Equipment Recommendations  Other: Pt already has built in shower bench and is purchasing a hospital bed  Safety Devices  Safety Devices in place: Yes  Type of devices: Left in chair;Call light within reach; Chair alarm in place    Patient Education  Education  Education Given To: Patient  Education Provided: Role of Therapy;Plan of Care;Home Exercise Program;ADL Function; Safety;IADL Function;Precautions; Equipment  Education Method: Verbal  Barriers to Learning: None  Education Outcome: Verbalized understanding;Continued education needed    Plan  Occupational Therapy Plan  Times Per Week: 5x/week, 90min/day  Current Treatment Recommendations: Strengthening;Balance training;Functional mobility training; Endurance training; Safety education & training;Patient/Caregiver education & training;Pain management;Self-Care / ADL; Home management training;Equipment evaluation, education, & procurement;ROM    Goals  Patient Goals   Patient goals :  \"To be independent again\"  Short Term Goals  Time Frame for Short Term Goals: 7 days  Short Term Goal 1: Pt will complete LE dressing w/ CGA and use of AE prn - Goal Met 12/6  Short Term Goal 2: Pt will complete bathing w/ CGA - Goal Met 12/7  Short Term Goal 3: Pt will complete toilet transfer w/ CGA - Goal Met 12/6  Short Term Goal 4: Pt will complete toileting w/ CGA - Goal met 12/6  Short Term Goal 5: Pt will complete BUE HEP [id number] to increase strength required for functional mobility/transfers - goal met 12/7  Long Term Goals  Time Frame for Long Term Goals : 14 days - ongoing for all  Long Term Goal 1: Pt will complete shower transfer w/ Mod I  Long Term Goal 2: Pt will complete LE dressing w/ Mod I and use of AE prn  Long Term Goal 3: Pt will complete bathing w/ Mod I and use of AE prn  Long Term Goal 4: Pt will complete toileting/ toilet transfer w/ Mod I  Long Term Goal 5: Pt will complete simple meal prep task w/ Mod I    AM-PAC Score               Therapy Time   Individual Second Session Group Co-treatment   Time In 0730  950       Time Out 0830  1030       Minutes 60  40       Timed Code Treatment Minutes: 60 Minutes + 40 Minutes = [address] [facility]/OT

## 2022-12-15 NOTE — PROGRESS NOTES
Alert x4 c/o pelvic pain medicated with prn pct x2 /effective.  Up to BR assist x1 no acute distress noted call light in reach

## 2022-12-15 NOTE — PLAN OF CARE
Problem: Discharge Planning  Goal: Discharge to home or other facility with appropriate resources  12/14/2022 1906 by Azam Perkins  Outcome: Progressing  12/14/2022 1722 by Michelle Panchal RN  Outcome: Progressing     Problem: Safety - Adult  Goal: Free from fall injury  Outcome: Progressing     Problem: Pain  Goal: Verbalizes/displays adequate comfort level or baseline comfort level  12/14/2022 1906 by Azam Perkins  Outcome: Progressing  12/14/2022 1722 by Michelle Panchal RN  Outcome: Progressing  Note: Patient is stretching the PRN 4 hour administration window to decrease use of pain med. Problem: Nutrition Deficit:  Goal: Optimize nutritional status  12/14/2022 1722 by Michelle Panchal RN  Outcome: Progressing  Note: Appetite moderate.

## 2022-12-15 NOTE — PROGRESS NOTES
Physical Therapy  Facility/Department: [facility] ACUTE REHAB UNIT  Rehabilitation Physical Therapy Treatment Note    NAME: Megan Tejeda  : 1940 (82 y.o.)  MRN: [id number]  CODE STATUS: Full Code    Date of Service: 12/15/22    Restrictions:  Position Activity Restriction  Other position/activity restrictions: Weight of leg weight bearing to left lower extremity - Per 's report from pt she is 25% weight bearing in her LLE, reached out to ortho to clarify 25% weight bearing in order      SUBJECTIVE  Subjective  Subjective: Pt sitting in wc, agreeable to therapy. Pain: Pelvic pain increases with activity throughuot session (not rated) - RN notified    OBJECTIVE  Cognition  Overall Cognitive Status: WFL  Orientation  Overall Orientation Status: Within Normal Limits  Orientation Level: Oriented X4    Functional Mobility  Sit to Supine  Assistance Level: Supervision  Skilled Clinical Factors: compelted on mat table and bed with HOB slightly elevated. ++ time and effort. Cues for decreased WBing through L LE. Supine to Sit  Assistance Level: Supervision  Skilled Clinical Factors: compelted on mat table. ++ time and effort, VC for sequencing. Increased time required seated EOM post supine>sit due to increased dizziness. Resolves with seated rest.  Scooting  Assistance Level: Supervision  Skilled Clinical Factors: cues for decreased WBing through L LE when position on mat  Balance  Sitting Balance: Independent (seated on commode)  Standing Balance: Stand by assistance (standing balance with UL-no UE support while completing brief/pants management and hand washing at sink)  Transfers  Surface: From mat; Wheelchair;Standard toilet; To mat (mantains TTWBing throughout)  Additional Factors: Set-up; Increased time to complete;Verbal cues  Stand Pivot  Assistance Level: Supervision  Skilled Clinical Factors: wc<>commode with GB' VC for set p of wc and L leg rest management  Sit Pivot  Assistance Level: Supervision  Skilled Clinical Factors: wc<>mat x 2, wc>EOB; VC for set p of wc and L leg rest management      Environmental Mobility  Wheelchair  Surface: Level surface  Device: Standard wheelchair  Additional Factors: Hand placement cues  Assistance Required to Manage Parts: Left leg rest  Assistance Level for Propulsion: Modified independent  Propulsion Method: Bilateral upper extremities  Propulsion Quality: Slow velocity  Propulsion Distance: 125' x 2 + small distances in room  Skilled Clinical Factors: VC for leg rest management and set up of transfers        PT Exercises  Exercise Treatment: Pt completes x5 L LE SLR and supine hip abduction. x10 R LE SL bridges. x5 B LE heelsldie with B LEs on physio ball - pt reports end range flexion feels good. Therefore pt shown how to do knees to chest strech so she can do as part of HEP. VC/TC throughout exercises. Second Session:   Pt seated in wc upon approach and agreeable to PT despite reports of fatigue and dizziness. Vitals monitored and remain stable, RN aware. Pt completes ~450' wc mobility including ascent/descent of 79' ramp with mod I on level surfaces and supervision for ascent/descent of ramp. VC for backwards propulsion on ascent/ forwards propulsion on descent as well as use of B Ues and R LE for increased ease of propulsion. Requires 2 seated rest breaks on ascent of ramp due to SOB/dizziness/fatigue with dizziness decreasing with seated rest. Pt completes sit pivots throughput session from wc<>chair with arms and wc>EOB with supv>mod I. VC for positioning provided at beginning of session. Pt completes stand pivots with RW from wc<>commode with supv and VC for set up. Pt manages L leg rest and brakes independently throughout session. Pt completes 2 x 7 min 30 second stands with UL UE support on RW while engaging in cognitive task of word finding during [employer] board game.  Pt able to maintain precautions even with extended time spent in standing and distractions with supervision. Pt would benefit from continued skilled PT in order to progress towards PLOF and independence. Upon completion of session pt completes sit>supine with mod I (HOB slightly elevated) and is left supine in bed positioned to comfort with needs in reach and chair alarm on. Pt reports increased pelvic pain and requests pain meds- RN notified. ASSESSMENT/PROGRESS TOWARDS GOALS       Assessment  Assessment: Pt continues to require supervision for all transfers while continueing to demonstarte TTWB despite education on/awareness of weight of leg weight bearing. While pt demonstrates increased ROM in SLR limited in increasing # of reps by pain. Pt would benefit from continued skilled PT in order to maximize functional mobility and independence. Activity Tolerance: Patient limited by fatigue;Patient limited by pain; Patient limited by endurance  Discharge Recommendations: Continue to assess pending progress;Home with Home health PT;Home with assist PRN  PT Equipment Recommendations  Equipment Needed: Yes  Walker: Rolling  Wheelchair: Light Weight  Other: lightweight 16' w/c with removable leg rests, flip back armrests, comfort cushion. Pt is currently unable to ambulate household distances with RW, making her unable to access area of home for ADLs including kitchen and bathroom. Use of w/c will allow pt to access these areas of her home to perform these ADLs and allow for safe independent mobility in the home and community. Pt has sufficient UE strength to propel w/c throughout home and has not expressed unwillingness to use w/c in home. Goals  Patient Goals   Patient Goals : \"To go home. \"  Short Term Goals  Time Frame for Short Term Goals: 7 days- ongoing  Short Term Goal 1: Pt will perform supine<>Sit Mod I  Short Term Goal 2: Pt will perform sit<>stand Mod I with RW  Short Term Goal 3: Pt will perform stand pivot with RW Mod I  Short Term Goal 4: Pt will be able to hop 10ft with RW Min A  Short Term Goal 5: .Ascend/descend 2 steps SBA  Long Term Goals  Time Frame for Long Term Goals : 14 days- ongoing  Long Term Goal 1: Ambulate 50 ft with RW while maintaining weight bearing precautions Mod I  Long Term Goal 2: Ascend/descend 5 steps SBA  Long Term Goal 3: Pt will be able to propel a wheelchair 150ft Mod I    PLAN OF CARE/SAFETY  Physcial Therapy Plan  Days Per Week: 5 Days  Hours Per Day: 1.5 hours  Therapy Duration: 2 Weeks  Current Treatment Recommendations: Strengthening;ROM;Balance training;Functional mobility training;Transfer training; Endurance training; Wheelchair mobility training;Gait training;Stair training;Neuromuscular re-education;Home exercise program;Safety education & training;Patient/Caregiver education & training;Equipment evaluation, education, & procurement; Therapeutic activities  Safety Devices  Type of Devices: All fall risk precautions in place;Call light within reach;Gait belt;Nurse notified; Bed alarm in place; Left in bed    EDUCATION  Education  Education Given To: Patient  Education Provided: Transfer Training;Mobility Training;Precautions; Safety  Education Provided Comments: educated on \"weight of leg\" precautions  Education Method: Demonstration;Verbal;Teach Back  Barriers to Learning: None  Education Outcome: Verbalized understanding        Therapy Time   Individual Concurrent Group Co-treatment   Time In 1130         Time Out 1208         Minutes 45           Second Session Therapy Time:   Individual Concurrent Group Co-treatment   Time In 1330         Time Out 1430         Minutes 60           Timed Code Treatment Minutes:  98    Total Treatment Minutes:   [address] [facility]  [address]

## 2022-12-16 LAB
ANION GAP SERPL CALCULATED.3IONS-SCNC: 6 MMOL/L (ref 3–16)
ANISOCYTOSIS: ABNORMAL
BASOPHILS ABSOLUTE: 0 K/UL (ref 0–0.2)
BASOPHILS RELATIVE PERCENT: 0 %
BUN BLDV-MCNC: 12 MG/DL (ref 7–20)
CALCIUM SERPL-MCNC: 8.9 MG/DL (ref 8.3–10.6)
CHLORIDE BLD-SCNC: 109 MMOL/L (ref 99–110)
CO2: 27 MMOL/L (ref 21–32)
CREAT SERPL-MCNC: <0.5 MG/DL (ref 0.6–1.2)
EOSINOPHILS ABSOLUTE: 0 K/UL (ref 0–0.6)
EOSINOPHILS RELATIVE PERCENT: 0 %
GFR SERPL CREATININE-BSD FRML MDRD: >60 ML/MIN/{1.73_M2}
GLUCOSE BLD-MCNC: 92 MG/DL (ref 70–99)
HCT VFR BLD CALC: 27.1 % (ref 36–48)
HEMOGLOBIN: 9 G/DL (ref 12–16)
LYMPHOCYTES ABSOLUTE: 13.8 K/UL (ref 1–5.1)
LYMPHOCYTES RELATIVE PERCENT: 92 %
MACROCYTES: ABNORMAL
MCH RBC QN AUTO: 34.6 PG (ref 26–34)
MCHC RBC AUTO-ENTMCNC: 33.2 G/DL (ref 31–36)
MCV RBC AUTO: 104.1 FL (ref 80–100)
MONOCYTES ABSOLUTE: 0.9 K/UL (ref 0–1.3)
MONOCYTES RELATIVE PERCENT: 6 %
NEUTROPHILS ABSOLUTE: 0.3 K/UL (ref 1.7–7.7)
NEUTROPHILS RELATIVE PERCENT: 2 %
PDW BLD-RTO: 16.8 % (ref 12.4–15.4)
PLATELET # BLD: 236 K/UL (ref 135–450)
PMV BLD AUTO: 9.4 FL (ref 5–10.5)
POTASSIUM REFLEX MAGNESIUM: 4.1 MMOL/L (ref 3.5–5.1)
RBC # BLD: 2.61 M/UL (ref 4–5.2)
SODIUM BLD-SCNC: 142 MMOL/L (ref 136–145)
WBC # BLD: 15 K/UL (ref 4–11)

## 2022-12-16 PROCEDURE — 99232 SBSQ HOSP IP/OBS MODERATE 35: CPT | Performed by: PHYSICAL MEDICINE & REHABILITATION

## 2022-12-16 PROCEDURE — 6370000000 HC RX 637 (ALT 250 FOR IP): Performed by: PHYSICAL MEDICINE & REHABILITATION

## 2022-12-16 PROCEDURE — 6360000002 HC RX W HCPCS: Performed by: INTERNAL MEDICINE

## 2022-12-16 PROCEDURE — 97110 THERAPEUTIC EXERCISES: CPT

## 2022-12-16 PROCEDURE — 80048 BASIC METABOLIC PNL TOTAL CA: CPT

## 2022-12-16 PROCEDURE — 97530 THERAPEUTIC ACTIVITIES: CPT

## 2022-12-16 PROCEDURE — 97535 SELF CARE MNGMENT TRAINING: CPT

## 2022-12-16 PROCEDURE — 85025 COMPLETE CBC W/AUTO DIFF WBC: CPT

## 2022-12-16 PROCEDURE — 1280000000 HC REHAB R&B

## 2022-12-16 PROCEDURE — 36415 COLL VENOUS BLD VENIPUNCTURE: CPT

## 2022-12-16 PROCEDURE — 97542 WHEELCHAIR MNGMENT TRAINING: CPT

## 2022-12-16 PROCEDURE — 6370000000 HC RX 637 (ALT 250 FOR IP): Performed by: INTERNAL MEDICINE

## 2022-12-16 RX ORDER — FLUDROCORTISONE ACETATE 0.1 MG/1
0.1 TABLET ORAL DAILY
Status: DISCONTINUED | OUTPATIENT
Start: 2022-12-16 | End: 2022-12-17 | Stop reason: HOSPADM

## 2022-12-16 RX ORDER — LEVOFLOXACIN 500 MG/1
500 TABLET, FILM COATED ORAL DAILY
Status: DISCONTINUED | OUTPATIENT
Start: 2022-12-16 | End: 2022-12-17 | Stop reason: HOSPADM

## 2022-12-16 RX ADMIN — MIDODRINE HYDROCHLORIDE 10 MG: 5 TABLET ORAL at 09:23

## 2022-12-16 RX ADMIN — THERA TABS 1 TABLET: TAB at 09:22

## 2022-12-16 RX ADMIN — ONDANSETRON HYDROCHLORIDE 4 MG: 8 TABLET, FILM COATED ORAL at 21:44

## 2022-12-16 RX ADMIN — LEVOFLOXACIN 500 MG: 500 TABLET, FILM COATED ORAL at 09:35

## 2022-12-16 RX ADMIN — OXYCODONE AND ACETAMINOPHEN 2 TABLET: 5; 325 TABLET ORAL at 11:03

## 2022-12-16 RX ADMIN — OXYCODONE AND ACETAMINOPHEN 2 TABLET: 5; 325 TABLET ORAL at 16:30

## 2022-12-16 RX ADMIN — APIXABAN 2.5 MG: 5 TABLET, FILM COATED ORAL at 09:23

## 2022-12-16 RX ADMIN — METHOTREXATE 12.5 MG: 2.5 TABLET ORAL at 11:49

## 2022-12-16 RX ADMIN — ONDANSETRON HYDROCHLORIDE 4 MG: 8 TABLET, FILM COATED ORAL at 04:02

## 2022-12-16 RX ADMIN — MIDODRINE HYDROCHLORIDE 10 MG: 5 TABLET ORAL at 12:55

## 2022-12-16 RX ADMIN — OXYCODONE AND ACETAMINOPHEN 2 TABLET: 5; 325 TABLET ORAL at 21:44

## 2022-12-16 RX ADMIN — APIXABAN 2.5 MG: 5 TABLET, FILM COATED ORAL at 20:03

## 2022-12-16 RX ADMIN — ALLOPURINOL 300 MG: 300 TABLET ORAL at 09:23

## 2022-12-16 RX ADMIN — FLUDROCORTISONE ACETATE 0.1 MG: 0.1 TABLET ORAL at 11:49

## 2022-12-16 RX ADMIN — SENNOSIDES AND DOCUSATE SODIUM 2 TABLET: 50; 8.6 TABLET ORAL at 09:24

## 2022-12-16 RX ADMIN — OXYCODONE AND ACETAMINOPHEN 2 TABLET: 5; 325 TABLET ORAL at 06:38

## 2022-12-16 RX ADMIN — MIDODRINE HYDROCHLORIDE 10 MG: 5 TABLET ORAL at 16:32

## 2022-12-16 ASSESSMENT — PAIN DESCRIPTION - ONSET
ONSET: ON-GOING
ONSET: ON-GOING

## 2022-12-16 ASSESSMENT — PAIN SCALES - WONG BAKER
WONGBAKER_NUMERICALRESPONSE: 2

## 2022-12-16 ASSESSMENT — PAIN DESCRIPTION - LOCATION
LOCATION: HIP
LOCATION: HIP;GROIN
LOCATION: HIP;LEG
LOCATION: BACK;HIP;LEG
LOCATION: HIP

## 2022-12-16 ASSESSMENT — PAIN SCALES - GENERAL
PAINLEVEL_OUTOF10: 10
PAINLEVEL_OUTOF10: 9
PAINLEVEL_OUTOF10: 7
PAINLEVEL_OUTOF10: 6
PAINLEVEL_OUTOF10: 0
PAINLEVEL_OUTOF10: 7
PAINLEVEL_OUTOF10: 6
PAINLEVEL_OUTOF10: 6

## 2022-12-16 ASSESSMENT — PAIN DESCRIPTION - DESCRIPTORS
DESCRIPTORS: ACHING;STABBING
DESCRIPTORS: ACHING;SHARP
DESCRIPTORS: ACHING;SHOOTING
DESCRIPTORS: ACHING
DESCRIPTORS: ACHING;SHARP
DESCRIPTORS: ACHING;DISCOMFORT

## 2022-12-16 ASSESSMENT — PAIN DESCRIPTION - FREQUENCY
FREQUENCY: CONTINUOUS

## 2022-12-16 ASSESSMENT — PAIN DESCRIPTION - DIRECTION: RADIATING_TOWARDS: BUTTOCK

## 2022-12-16 ASSESSMENT — PAIN DESCRIPTION - ORIENTATION
ORIENTATION: LEFT

## 2022-12-16 ASSESSMENT — PAIN DESCRIPTION - PAIN TYPE
TYPE: ACUTE PAIN

## 2022-12-16 NOTE — PROGRESS NOTES
Occupational Therapy  Facility/Department: [facility] ACUTE REHAB UNIT  Rehabilitation Occupational Therapy Daily Treatment Note/Discharge    Date: 22  Patient Name: Gardenia Rico       Room: 5913/9712-06  MRN: [id number]  Account: [de-identified]   : 1940  (81 y.o.) Gender: female                    Past Medical History:  has a past medical history of Allergic rhinitis due to pollen, Arthritis, degenerative, Cancer ([facility] [address] CLL (chronic lymphoblastic leukemia), Closed compression fracture of thoracic vertebra ([facility] [address] Depression, History of blood transfusion, Hyperlipidemia, Melanoma of upper arm ([facility] [address] Mild reactive airways disease, and Sternum fx. Past Surgical History:   has a past surgical history that includes Tonsillectomy; Appendectomy; Cataract removal with implant; Blepharoptosis Repair; Nasal sinus surgery; other surgical history; shoulder surgery (Right, ); and Axillary Surgery (Right, 10/18/2022). Restrictions  Other position/activity restrictions: Weight of leg weight bearing to left lower extremity - Per 's report from pt she is 25% weight bearing in her LLE, reached out to ortho to clarify 25% weight bearing in order     Subjective  Subjective: Pt supine in bed upon arrival, in great pain stating BP had been to low throughout the night to receive pain medicine. Pt stated she finally received some prior to OTs coming in. Pts vitals were checked prior to functional mobility because pt stated she was light headed and dizzy. Pts /65, HR 73. Objective     Cognition  Overall Cognitive Status: WFL  Orientation  Overall Orientation Status: Within Normal Limits  Orientation Level: Oriented X4         ADL  Feeding  Assistance Level:  Independent  Skilled Clinical Factors: Provided w/ breakfast tray at [facility]  Grooming/Oral Hygiene  Assistance Level: Modified independent  Skilled Clinical Factors: Pt seated in wc performed oral hygiene, brushed her hair, and applied lotion to face/hands  Upper Extremity Bathing  Assistance Level: Modified independent  Skilled Clinical Factors: Pt seated on TTB washed/dried 4/4 UB components. Lower Extremity Bathing  Assistance Level: Modified independent  Skilled Clinical Factors: Pt seated on TTB washed 6/6 components of LB. Pt washed BLE using figure 4. Pt dried 5/6 components of LB seated on TTB. Pt performed lateral scoot from TTB to wc w/ towel on wc to dry buttocks  Upper Extremity Dressing  Assistance Level: Modified independent  Skilled Clinical Factors: Pt seated doffed/donned shirt  Lower Extremity Dressing  Assistance Level: Modified independent  Skilled Clinical Factors: Pt in stance managed pants up/down over hips. Pt seated in wc removed underwear/pants. Pt seated in wc threaded BLE into underwear/pants w/ figure 4 technique. Putting On/Taking Off Footwear  Assistance Level: Modified independent  Skilled Clinical Factors: Pt seated on TTB doffed bilateral hospital socks using figure 4 technique. Pt seated on TTB donned bilateral socks using figure 4 technique. Toileting  Assistance Level: Modified independent  Skilled Clinical Factors: Pt in stance managed pants up/down over hips. Pt seated on toilet voided urine. Pt performed pericare. Toilet Transfers  Technique: Stand pivot  Equipment: Standard toilet  Assistance Level: Modified independent  Skilled Clinical Factors: Pt demo'd stand pivot from wc > toilet w/ RW w/ mod I. Pt w/ LUE supported on RW and RUE reaching back for toilet demo'd sit <> stand  Tub/Shower Transfers  Type: Shower  Transfer From: Wheelchair  Transfer To: Tub transfer bench  Assistance Level: Modified independent  Skilled Clinical Factors: Pt demo'd lateral scooting from wc <> TTB to simulate home shower because RW will not fit in front of wc for UE support and pt does not have GBs. Pt supported BUE on wc and TTB. Pt maintained PWB precautions during shower transfer.  Pt did not use GBs during transfer because she does not want to install them in her home. Functional Mobility  Device: Wheelchair  Activity: To/From bathroom  Assistance Level: Modified independent  Skilled Clinical Factors: Pt self propelled to/from bathroom w/ mod I w/ BUE propelling. Supine to Sit  Assistance Level: Independent  Skilled Clinical Factors: Pt w/ HOB elevated performed supine to sit. Pt is purchasing a hospital bed for home. Transfers  Surface: Standard toilet; To chair with arms; Wheelchair  Device: Walker  Stand Pivot  Assistance Level: Modified independent  Skilled Clinical Factors: bed > wc w/ RW; wc > recliner w/ RW   OT Exercises  Exercise Treatment: Pt performed the following UB exercises to increase BUE strength for functional mobility. Pt seated in recliner performed 10 of each of the following exercises w/ medium resistance therex band and HEP. Pt performed elbow flexion, shoulder flexion, shoulder abduction, horizontal abduction, diaganol up, and diaganol down. Pt performed each exercise w/ good form requiring VCs to affirm she was doing the moves correctly. Pt w/ no complaints of pain from doing the exercises and no questions following the completion of the exercises. Second Session:     Pt seated in recliner requesting to practice car transfer due to anxiety of performing it w/ her new wc. Transfers:   Stand pivot: Mod I  Recliner <> WC w/ RW    Functional Mobility: Pt assisted in wc to/from car transfer simulator due to time constraint. Car Transfer: Mod I    Pt performed the following activity to simulate car transfers at home. Pt demo'd stand pivot to/from wc <> car seat. Pt pushing off from wc performed stand pivot w/ UE supported on car chair and wc. Pt then seated on chair lifted BLE over car threshold. Pt then lifted BLE over car threshold and pushed up from the car chair w/ BUE, reached for wc and performed stand pivot and sat in the wc. Pt performed car transfer w/ mod I.  Pt educated that if she needed more UE support for the transfer to use the dashboard or handles within the car and to not use the car door at all because that is considered an unstable/movable surface. Pt verbalized feeling more comfortable and confident in performing car transfer after practicing w/ new wc. Pt also practiced removing the wc armrest, leg rest, and unlocking and locking breaks on new wc. Pt w/ ++ time to locate button to flip back arm rest, OT taped yellow paper to help provide assistance in locating it faster. Pt left seated in recliner w/ chair alarm on and call light within reach. Assessment  Assessment  Assessment: Pt made great progress during her stay at [facility]. Pt is now performing ADLs w/ independence and IADLs w/ supervision. Pt is returning home w/ her sister who will be staying w/ her to provide 24 hr support. Pt is being recommended HHOT at d/c as well as a shower aide due to pts concern w/ showering alone. Activity Tolerance: Patient limited by fatigue;Patient limited by pain; Patient limited by endurance  Discharge Recommendations: 24 hour supervision or assist;Home with Home health OT  OT Equipment Recommendations  Equipment Needed: No  Other: Pt already has built in shower bench and is purchasing a hospital bed  [facility] in place: Yes  Type of devices: Left in chair;Call light within reach; Chair alarm in place    Patient Education  Education  Education Given To: Patient  Education Provided: Role of Therapy;Plan of Care;Home Exercise Program;ADL Function; Safety;IADL Function;Precautions; Equipment  Education Provided Comments: Pt educated on HHOT/PT and shower aide coming to the home  Education Method: Verbal  Barriers to Learning: None  Education Outcome: Verbalized understanding    Plan  Occupational Therapy Plan  Times Per Week: 5x/week, 90min/day  Current Treatment Recommendations: Strengthening;Balance training;Functional mobility training; Endurance training; Safety education & training;Patient/Caregiver education & training;Pain management;Self-Care / ADL; Home management training;Equipment evaluation, education, & procurement;ROM    Goals  Patient Goals   Patient goals :  \"To be independent again\"  Short Term Goals  Time Frame for Short Term Goals: 7 days  Short Term Goal 1: Pt will complete LE dressing w/ CGA and use of AE prn - Goal Met 12/6  Short Term Goal 2: Pt will complete bathing w/ CGA - Goal Met 12/7  Short Term Goal 3: Pt will complete toilet transfer w/ CGA - Goal Met 12/6  Short Term Goal 4: Pt will complete toileting w/ CGA - Goal met 12/6  Short Term Goal 5: Pt will complete BUE HEP [id number] to increase strength required for functional mobility/transfers - goal met 12/7  Long Term Goals  Time Frame for Long Term Goals : 14 days - ongoing for all  Long Term Goal 1: Pt will complete shower transfer w/ Mod I - Goal Met 12/16  Long Term Goal 2: Pt will complete LE dressing w/ Mod I and use of AE prn - Goal Met 12/16  Long Term Goal 3: Pt will complete bathing w/ Mod I and use of AE prn - Goal Met 12/16  Long Term Goal 4: Pt will complete toileting/ toilet transfer w/ Mod I - Goal Met 12/16  Long Term Goal 5: Pt will complete simple meal prep task w/ Mod I - Goal Not Met 12/16    AM-PAC Score               Therapy Time   Individual Second Session Group Co-treatment   Time In 0730  1345       Time Out 0830  1415       Minutes 60  30       Timed Code Treatment Minutes: 60 Minutes + 30 Minutes = 90 Minutes       Alexandro Galvan S/OT

## 2022-12-16 NOTE — PLAN OF CARE
Problem: Discharge Planning  Goal: Discharge to home or other facility with appropriate resources  Outcome: Progressing  Flowsheets (Taken 12/16/2022 0029)  Discharge to home or other facility with appropriate resources:   Identify barriers to discharge with patient and caregiver   Identify discharge learning needs (meds, wound care, etc)     Problem: Safety - Adult  Goal: Free from fall injury  Outcome: Progressing  Flowsheets (Taken 12/16/2022 0029)  Free From Fall Injury: Instruct family/caregiver on patient safety     Problem: Skin/Tissue Integrity  Goal: Absence of new skin breakdown  Description: 1.   Monitor for areas of redness and/or skin breakdown  Outcome: Progressing     Problem: Pain  Goal: Verbalizes/displays adequate comfort level or baseline comfort level  Outcome: Not Progressing  Flowsheets (Taken 12/16/2022 0029)  Verbalizes/displays adequate comfort level or baseline comfort level:   Encourage patient to monitor pain and request assistance   Assess pain using appropriate pain scale   Administer analgesics based on type and severity of pain and evaluate response   Implement non-pharmacological measures as appropriate and evaluate response     Problem: Nutrition Deficit:  Goal: Optimize nutritional status  Outcome: Progressing  Flowsheets  Taken 12/16/2022 0029 by Rojelio Kraft RN  Nutrient intake appropriate for improving, restoring, or maintaining nutritional needs:   Assess nutritional status and recommend course of action   Monitor oral intake, labs, and treatment plans   Recommend appropriate diets, oral nutritional supplements, and vitamin/mineral supplements

## 2022-12-16 NOTE — PROGRESS NOTES
[address] Progress Note    2022     Gardenia Rico    MRN: [id number]    : 1940    Referring MD: No referring provider defined for this encounter. SUBJECTIVE: Still limited mobility secondary to pain. She can ambulate short distances with a walker    ECOG PS:  (3) Capable of limited self-care, confined to bed or chair > 50% of waking hours    KPS: 40% Disabled; requires special care and assistance    Isolation: None    Medications    Scheduled Meds:   Acalabrutinib  1 capsule Oral Q12H    allopurinol  300 mg Oral Daily    apixaban  2.5 mg Oral BID    midodrine  10 mg Oral TID WC    multivitamin  1 tablet Oral Daily    sennosides-docusate sodium  2 tablet Oral Daily    sodium chloride flush  5-40 mL IntraVENous 2 times per day     Continuous Infusions:      PRN Meds:.oxyCODONE-acetaminophen **OR** oxyCODONE-acetaminophen, diphenhydramine, ondansetron, prochlorperazine, Saline Mouthwash, sodium chloride flush, bisacodyl, magnesium hydroxide, polyethylene glycol    ROS:  As noted above, otherwise remainder of 10-point ROS negative    Physical Exam:     I&O:    Intake/Output Summary (Last 24 hours) at 2022 0723  Last data filed at 12/15/2022 1315  Gross per 24 hour   Intake 720 ml   Output --   Net 720 ml         Vital Signs:  BP (!) 107/55   Pulse 74   Temp 98.5 °F (36.9 °C) (Oral)   Resp 18   Ht 5' 1\" (1.549 m)   Wt 97 lb 3.6 oz (44.1 kg)   LMP  (LMP Unknown)   SpO2 95%   BMI 18.37 kg/m²     Weight:    Wt Readings from Last 3 Encounters:   22 97 lb 3.6 oz (44.1 kg)   22 108 lb 0.4 oz (49 kg)   10/14/22 100 lb (45.4 kg)       General: Awake, alert and oriented.   HEENT: normocephalic, PERRL, no scleral erythema or icterus, Oral mucosa moist and intact, throat clear  NECK: supple without palpable adenopathy  BACK: Straight   SKIN: warm dry and intact without lesions rashes or masses  CHEST: CTA bilaterally without use of accessory muscles  CV: Normal S1 S2, RRR, no MRG  ABD: NT ND normoactive BS, no palpable masses or hepatosplenomegaly  EXTREMITIES: without edema, denies calf tenderness  NEURO: CN II - XII grossly intact  CATHETER: PIV    Data    CBC:   Recent Labs     12/14/22  0622 12/16/22  0504   WBC 10.6 15.0*   HGB 8.7* 9.0*   HCT 25.8* 27.1*   .4* 104.1*    236       BMP/Mag:  Recent Labs     12/14/22  0622 12/16/22  0504    142   K 3.8 4.1    109   CO2 25 27   BUN 13 12   CREATININE 0.5* <0.5*       LIVP: No results for input(s): AST, ALT, LIPASE, BILIDIR, BILITOT, ALKPHOS in the last 72 hours. Invalid input(s): AMYLASE,  ALB  Coags:   No results for input(s): PROTIME, INR, APTT in the last 72 hours. PROBLEM LIST:             CLL  Osteopenia  ITP/hemolytic anemia  LGL      TREATMENT:            CLL  1. Ibrutinib  2. Acalabrutinib (11/1/2022)     LGL  MTX weekly (d/c 10/2022)      ASSESSMENT AND PLAN:            1A. 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 - Repeat FISH (1/21/20) - trisomy 12  - Flow cytometry (3/18/20) - Chronic lymphocytic leukemia and 4% TLGL cells. There is no strong evidence to suggest the possibility of a clonal TLGL population, further the low percentage of cells argues against this entity.  - PB smear (3/18/20) - Occasional spherocytes and slight increase in polychromasia indicating likely ongoing hemolysis. Leukocytes remarkable for monomorphic small lymphoid cells indicating the presence of chronic lymphocytic leukemia. There are also occasional forms showing granules. Platelets unremarkable  - PET 10/3/22:  Thoracic and A/P  lymphadenopathy. Mild splenic enlargement and uptake suspicious for lymphoma.  - Lymph node biopsy 10/18/22:  Consistent with CLL/SLL; FISH negative  - CT A/P 12/01/22:  No abnormally enlarged lymph nodes in A/P     Biopsy of ax node C/W CLL - started acalabrutininb.   Tolerating well with documented improvement. Cont acalabrutinib - Started 11/1/22   - she is responding on exam and wbc. Lymphocytosis worse. Stop Pepcid which can block absorption. 1B. 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 - S/p MTX 10 mg weekly (started 3/21/20), increased to 12.5mg weekly (5/29/10) with neutropenia. Cont weekly on Fridays. Will hold ( after dose 10/21/22) with onset of acalabrutininb. - restart MTX 12.5 mg weekly 12/16/22 with progressive neutropenia     2. ID: No s/s of infection  - H/o recurrent acute sinusitis (follows up with ENT) - starting an antibiotic and steroids per ENT 5/10/22   - S/p COVID vaccines (2nd dose 2/18/21)   - COVID Ab (2/16/22): negative; Evusheld 4/1/22. She had a reaction and refuses repeat. - Schedule COVID  and flu immunizations  Hypogammaglobulinemia:   - Cont monthly immunoglobulin (started 1/4/19). Dose 1/19/22, 2/16/22, 4/13/22, 5/10/22, 6/10/22, 7/8/22  - Last IgG 10/21/22:  484  - will need follow up for IgG check. Repeat sent 12/17/22  - Levaquin PPX 12/16/22. She will call immediately with fever     3. Heme: H/o hemolytic anemia and ITP. +Leukocytosis, Anemia 2/2 CLL  - Transfuse for hgb <7 and plt <10  - No transfusion today   ITP / hemolytic anemia:    - Recurrent hemolysis (1/21/20) - Viji IgG + with retic 2.8  - No Current Tx      Previous Tx:  - S/p 4 cycles Rituxan (completed 9/14/16) & IVIG 20 gm daily for 5 days (completed 10/10/16)  - S/p Rituxan weekly x 4 (last dose 9/11/17) w/ normalization of her hemoglobin. Last dose 4/8/20  - S/p Prednisone taper (stopped 10/2019)  - S/p Prednisone: 2/17/20 -> Stop 9/12/22  - Rituxan weekly x 4 - #1 (3/18/20), #2(3/25/20), #3 (4/1/20), #4 (4/8/20)    - Cont eliquis 2.5 mg BID for ppx given pelvic fracture per ortho recs     4. Metabolic: HypoNa  - Encourage PO fluid intake     5.  MSK: +Osteoporosis and now s/p traumatic fall 12/1/22 resulting in left pelvic ring fracture and sacral fracture with minimal displacement. H/o Osteoporosis   - worse than 2018. Prolia 12/19/22  Pelvis/Sacral fracture  - Orthopedic surgery consulted, no surgical intervention              - Toe touch weight bearing to LLL              - Cont DVT prophylaxis approximately 1 month s/p injury (January 2023)  - F/U 2 weeks as outpatient for repeat evaluation and imaging of pelvis (week of 12/14/22)  - Monitor hemoglobin closely for signs of bleeding  - MSIR 15 mg  PO prn and/or hydrocodone PRN - utilizing both  - Cont tx in ARU  Osteopenia/Osteoporosis:    - Cont oscal/Vit D  - DEXA scan 08/2022:  Lumbar Spine - osteopenia. No significant change since 2018. Left femoral neck: Osteoporosis  Left hip: Osteoporosis. This corresponds to 5% worsening since 2018.    Jesus Layton in clinic    6. ENT: H/o rhinitis & followed by ENT  - Cont Flonase      7. Cardiac:  H/o PVCs. Symptomatic hypotension  Hypotension: improved  - Cortisol level WNL 11/21/22. Repeat 12/2 10.7 (nl)  - TSH 12/2 4.88, T4 1.1  - Cont midodrine 5 mg TID (started 12/01/22), increased to 10 mg TID 12/5/22  - Place cecil hose  - add Florinef 0.1 mg 12/16/22      8. Pulmonary: H/o pulm nodule on CT 7/22  - CT 7/2022.   Repeat CT 1/2023 after 3 months of acalabrutininb.      - DVT Prophylaxis: Cont apixaban 2.5mg BID    - Disposition: Per ARU, potential D/c date 12/17/22 - planning on d/c home with sister & w/c    F/U [facility] early Jan 2023    Tiffanie Bautista MD

## 2022-12-16 NOTE — PROGRESS NOTES
Patient alert and oriented x4, shift assessment done as charted. Neutrophil count low this morning, oncologist addedflorineff, levaquin, and weekly methotrexate to medications to help with labs and keep BP up. Patient continues to receive midodrine, fluids encouraged. Patient set to discharge tomorrow, educated on medications, reviewed pain management and how to cut back on pain medications little by little. Patient concerned about f/u appointment on 12/23 with [facility], phone call to [place] office and received a call back stating that patient did not need to go in 12/23 and changed appointment 1/06 in order to give patient longer to heal from fractured pelvis. Discharge paperwork started. Patient said that daughter will be picking her up tomorrow. Received wheelchair today, ordered cushion for it from [employer], therapy assisted in adjusting armrests.

## 2022-12-16 NOTE — CARE COORDINATION
Discharge plan: to home tomorrow with DME (W/C) delivered by [facility] on Friday. Patient will have home care by [facility]. Patient's sister will be staying with her. CM will continue to follow patient until discharge.   Electronically signed by Brenda Rios RN on 12/16/2022 at 11:54 AM

## 2022-12-16 NOTE — PLAN OF CARE
Problem: Discharge Planning  Goal: Discharge to home or other facility with appropriate resources  Flowsheets (Taken 12/16/2022 0029 by Lindsay Randhawa RN)  Discharge to home or other facility with appropriate resources:   Identify barriers to discharge with patient and caregiver   Identify discharge learning needs (meds, wound care, etc)     Problem: Safety - Adult  Goal: Free from fall injury  Outcome: Progressing  Flowsheets (Taken 12/16/2022 0029 by Lindsay Randhawa RN)  Free From Fall Injury: Instruct family/caregiver on patient safety     Problem: Pain  Goal: Verbalizes/displays adequate comfort level or baseline comfort level  Outcome: Progressing  Flowsheets (Taken 12/16/2022 0029 by Lindsay Randhawa RN)  Verbalizes/displays adequate comfort level or baseline comfort level:   Encourage patient to monitor pain and request assistance   Assess pain using appropriate pain scale   Administer analgesics based on type and severity of pain and evaluate response   Implement non-pharmacological measures as appropriate and evaluate response     Problem: Skin/Tissue Integrity  Goal: Absence of new skin breakdown  Description: 1. Monitor for areas of redness and/or skin breakdown  2. Assess vascular access sites hourly  3. Every 4-6 hours minimum:  Change oxygen saturation probe site  4. Every 4-6 hours:  If on nasal continuous positive airway pressure, respiratory therapy assess nares and determine need for appliance change or resting period.   Outcome: Progressing    Problem: Nutrition Deficit:  Goal: Optimize nutritional status  Outcome: Progressing  Flowsheets (Taken 12/16/2022 0029 by Lindsay Randhawa RN)  Nutrient intake appropriate for improving, restoring, or maintaining nutritional needs:   Assess nutritional status and recommend course of action   Monitor oral intake, labs, and treatment plans   Recommend appropriate diets, oral nutritional supplements, and vitamin/mineral supplements        Problem: ABCDS Injury Assessment  Goal: Absence of physical injury  Outcome: Progressing  Flowsheets (Taken 12/10/2022 2340 by America Steinberg RN)  Absence of Physical Injury: Implement safety measures based on patient assessment

## 2022-12-16 NOTE — PROGRESS NOTES
Pt in bed, awake watching television. Alert & oriented x 4. /52,other VSS. Complains of pain to left hip, left groin and sacrum. Assessment completed. Nighttime medications given including Oxycodone for pain. Pt medications tolerated well. Reminded pt to call for assistance with any needs. Call light within reach. Safety measures in place.

## 2022-12-16 NOTE — PROGRESS NOTES
Department of Physical Medicine & Rehabilitation  Progress Note    Patient Identification:  Janeth Henry  [id number]  : 1940  Admit date: 2022    Chief Complaint: History of pelvic fracture    Subjective:   Doing well this morning. Very happy about discharge tomorrow. She does not want another xray today. Prep DC today. Seen in her chair this morning. ROS: No f/c, n/v, cp     Objective:  Patient Vitals for the past 24 hrs:   BP Temp Temp src Pulse Resp SpO2   22 1103 -- -- -- -- 16 --   22 0915 (!) 102/56 97.8 °F (36.6 °C) Oral 72 18 98 %   22 0638 -- -- -- -- 18 --   22 0630 (!) 107/55 -- -- 74 -- --   22 0345 (!) 98/55 -- -- 72 -- --   12/15/22 2015 (!) 100/52 98.5 °F (36.9 °C) Oral 66 16 95 %   12/15/22 1438 -- -- -- -- 16 --   12/15/22 1315 113/62 -- -- 69 -- --       Const: Alert. No distress, pleasant. HEENT: Normocephalic, atraumatic. Normal sclera/conjunctiva. MMM. CV: Regular rate and rhythm. Resp: No respiratory distress. Lungs CTAB. Abd: Soft, nontender, nondistended, NABS+   Ext: No edema. Neuro: Alert, oriented, appropriately interactive. Psych: Cooperative, appropriate mood and affect    Laboratory data: Available via EMR.    Last 24 hour lab  Recent Results (from the past 24 hour(s))   CBC auto differential    Collection Time: 22  5:04 AM   Result Value Ref Range    WBC 15.0 (H) 4.0 - 11.0 K/uL    RBC 2.61 (L) 4.00 - 5.20 M/uL    Hemoglobin 9.0 (L) 12.0 - 16.0 g/dL    Hematocrit 27.1 (L) 36.0 - 48.0 %    .1 (H) 80.0 - 100.0 fL    MCH 34.6 (H) 26.0 - 34.0 pg    MCHC 33.2 31.0 - 36.0 g/dL    RDW 16.8 (H) 12.4 - 15.4 %    Platelets 214 916 - 827 K/uL    MPV 9.4 5.0 - 10.5 fL    Neutrophils % 2.0 %    Lymphocytes % 92.0 %    Monocytes % 6.0 %    Eosinophils % 0.0 %    Basophils % 0.0 %    Neutrophils Absolute 0.3 (LL) 1.7 - 7.7 K/uL    Lymphocytes Absolute 13.8 (H) 1.0 - 5.1 K/uL    Monocytes Absolute 0.9 0.0 - 1.3 K/uL    Eosinophils Absolute 0.0 0.0 - 0.6 K/uL    Basophils Absolute 0.0 0.0 - 0.2 K/uL    Anisocytosis 1+ (A)     Macrocytes Occasional (A)    Basic Metabolic Panel w/ Reflex to MG    Collection Time: 12/16/22  5:04 AM   Result Value Ref Range    Sodium 142 136 - 145 mmol/L    Potassium reflex Magnesium 4.1 3.5 - 5.1 mmol/L    Chloride 109 99 - 110 mmol/L    CO2 27 21 - 32 mmol/L    Anion Gap 6 3 - 16    Glucose 92 70 - 99 mg/dL    BUN 12 7 - 20 mg/dL    Creatinine <0.5 (L) 0.6 - 1.2 mg/dL    Est, Glom Filt Rate >60 >60    Calcium 8.9 8.3 - 10.6 mg/dL           Therapy progress:  PT  Position Activity Restriction  Other position/activity restrictions: Weight of leg weight bearing to left lower extremity - Per 's report from pt she is 25% weight bearing in her LLE, reached out to ortho to clarify 25% weight bearing in order 12/14  Objective     Sit to Stand: Minimal Assistance (From recliner to RW, increased VC for handplacement and weight bearing precautions)  Stand to Sit: Minimal Assistance (To recliner, and car transfer from RW)     OT  PT Equipment Recommendations  Equipment Needed: Yes  Mobility Devices: Wheelchair  Walker: Rolling  Wheelchair: Light Weight  Other: lightweight 16' w/c with removable leg rests, flip back armrests, comfort cushion. Pt is currently unable to ambulate household distances with RW, making her unable to access area of home for ADLs including kitchen and bathroom. Use of w/c will allow pt to access these areas of her home to perform these ADLs and allow for safe independent mobility in the home and community. Pt has sufficient UE strength to propel w/c throughout home and has not expressed unwillingness to use w/c in home. Toilet - Technique: Stand pivot (w/ RW)  Equipment Used: Raised toilet seat with rails  Assessment        SLP          Body mass index is 18.37 kg/m². Assessment and Plan:  1.  Left Pelvic ring fracture including superior and inferior pubic rami fracture and sacral fractures with minimal displacement- Oscal, Vit D, seen by ortho  - requires PT/OT  2. CLL- followed by [facility]- neutropenic precautions? 3. Hypotension- monitor - on midodrine   4. Debility- unable to ambulate long distances. Requires PT/ OT   - WC ordered         Plan for ortho follow up in ARU    Monitor BP today- encourage fluids        Bladder - high risk retention - Monitor PVRs, SC prn >300cc     Bowel - high risk constipation - colace BID, PRN miralax and MoM. follow bowel movements. Enema or suppository if needed. Safety - fall precautions      [facility] follow up on Monday.      Rehab Progress: Improving  Anticipated Dispo: home  Services/DME: [facility] [place]  ELOS: 12/17Stdon Giles D.O. M.P.H  PM&R  12/16/2022  11:45 AM

## 2022-12-17 VITALS
WEIGHT: 97.22 LBS | BODY MASS INDEX: 18.36 KG/M2 | HEIGHT: 61 IN | TEMPERATURE: 98 F | SYSTOLIC BLOOD PRESSURE: 119 MMHG | RESPIRATION RATE: 16 BRPM | OXYGEN SATURATION: 97 % | HEART RATE: 71 BPM | DIASTOLIC BLOOD PRESSURE: 78 MMHG

## 2022-12-17 LAB — IGG: 362 MG/DL (ref 700–1600)

## 2022-12-17 PROCEDURE — 6370000000 HC RX 637 (ALT 250 FOR IP): Performed by: PHYSICAL MEDICINE & REHABILITATION

## 2022-12-17 PROCEDURE — 82784 ASSAY IGA/IGD/IGG/IGM EACH: CPT

## 2022-12-17 PROCEDURE — 99239 HOSP IP/OBS DSCHRG MGMT >30: CPT | Performed by: PHYSICAL MEDICINE & REHABILITATION

## 2022-12-17 PROCEDURE — 6370000000 HC RX 637 (ALT 250 FOR IP): Performed by: INTERNAL MEDICINE

## 2022-12-17 PROCEDURE — 36415 COLL VENOUS BLD VENIPUNCTURE: CPT

## 2022-12-17 RX ORDER — PROCHLORPERAZINE MALEATE 5 MG/1
5 TABLET ORAL EVERY 6 HOURS PRN
Qty: 120 TABLET | Refills: 0 | Status: SHIPPED | OUTPATIENT
Start: 2022-12-17

## 2022-12-17 RX ORDER — LEVOFLOXACIN 500 MG/1
500 TABLET, FILM COATED ORAL DAILY
Qty: 10 TABLET | Refills: 0 | Status: SHIPPED | OUTPATIENT
Start: 2022-12-18 | End: 2022-12-28

## 2022-12-17 RX ORDER — FLUDROCORTISONE ACETATE 0.1 MG/1
0.1 TABLET ORAL DAILY
Qty: 30 TABLET | Refills: 3 | Status: SHIPPED | OUTPATIENT
Start: 2022-12-18 | End: 2023-01-17

## 2022-12-17 RX ORDER — OXYCODONE HYDROCHLORIDE AND ACETAMINOPHEN 5; 325 MG/1; MG/1
1 TABLET ORAL EVERY 4 HOURS PRN
Qty: 30 TABLET | Refills: 0 | Status: SHIPPED | OUTPATIENT
Start: 2022-12-17 | End: 2022-12-22

## 2022-12-17 RX ORDER — MIDODRINE HYDROCHLORIDE 10 MG/1
10 TABLET ORAL
Qty: 90 TABLET | Refills: 3 | Status: SHIPPED | OUTPATIENT
Start: 2022-12-17

## 2022-12-17 RX ORDER — SENNA AND DOCUSATE SODIUM 50; 8.6 MG/1; MG/1
2 TABLET, FILM COATED ORAL DAILY
Qty: 60 TABLET | Refills: 0 | Status: SHIPPED | OUTPATIENT
Start: 2022-12-18

## 2022-12-17 RX ORDER — POLYETHYLENE GLYCOL 3350 17 G/17G
17 POWDER, FOR SOLUTION ORAL DAILY PRN
Qty: 527 G | Refills: 1 | Status: SHIPPED | OUTPATIENT
Start: 2022-12-17 | End: 2023-01-16

## 2022-12-17 RX ADMIN — SENNOSIDES AND DOCUSATE SODIUM 2 TABLET: 50; 8.6 TABLET ORAL at 08:11

## 2022-12-17 RX ADMIN — ONDANSETRON HYDROCHLORIDE 4 MG: 8 TABLET, FILM COATED ORAL at 10:26

## 2022-12-17 RX ADMIN — OXYCODONE AND ACETAMINOPHEN 2 TABLET: 5; 325 TABLET ORAL at 08:12

## 2022-12-17 RX ADMIN — THERA TABS 1 TABLET: TAB at 08:12

## 2022-12-17 RX ADMIN — OXYCODONE AND ACETAMINOPHEN 2 TABLET: 5; 325 TABLET ORAL at 14:13

## 2022-12-17 RX ADMIN — LEVOFLOXACIN 500 MG: 500 TABLET, FILM COATED ORAL at 08:11

## 2022-12-17 RX ADMIN — ALLOPURINOL 300 MG: 300 TABLET ORAL at 08:12

## 2022-12-17 RX ADMIN — APIXABAN 2.5 MG: 5 TABLET, FILM COATED ORAL at 08:11

## 2022-12-17 RX ADMIN — MIDODRINE HYDROCHLORIDE 10 MG: 5 TABLET ORAL at 11:54

## 2022-12-17 RX ADMIN — FLUDROCORTISONE ACETATE 0.1 MG: 0.1 TABLET ORAL at 08:12

## 2022-12-17 RX ADMIN — MIDODRINE HYDROCHLORIDE 10 MG: 5 TABLET ORAL at 08:11

## 2022-12-17 ASSESSMENT — PAIN SCALES - WONG BAKER
WONGBAKER_NUMERICALRESPONSE: 2

## 2022-12-17 ASSESSMENT — PAIN DESCRIPTION - LOCATION
LOCATION: HIP
LOCATION: BACK

## 2022-12-17 ASSESSMENT — PAIN DESCRIPTION - DESCRIPTORS
DESCRIPTORS: ACHING;DISCOMFORT
DESCRIPTORS: ACHING

## 2022-12-17 ASSESSMENT — PAIN DESCRIPTION - FREQUENCY: FREQUENCY: CONTINUOUS

## 2022-12-17 ASSESSMENT — PAIN SCALES - GENERAL
PAINLEVEL_OUTOF10: 8
PAINLEVEL_OUTOF10: 0
PAINLEVEL_OUTOF10: 7

## 2022-12-17 ASSESSMENT — PAIN DESCRIPTION - ONSET: ONSET: ON-GOING

## 2022-12-17 ASSESSMENT — PAIN DESCRIPTION - PAIN TYPE: TYPE: ACUTE PAIN

## 2022-12-17 ASSESSMENT — PAIN DESCRIPTION - ORIENTATION: ORIENTATION: LEFT

## 2022-12-17 NOTE — PROGRESS NOTES
Patient's discharge instructions reviewed with her. She denies any questions or concerns about discharge. Patient's daughter and son here to discharge patient to patient's home. Patient given pain med earlier and patient denies any pain at this time. Patient has belongings in her [place]. Taken to car per wheelchair and assisted into car safely.

## 2022-12-17 NOTE — DISCHARGE SUMMARY
Physical Medicine & Rehabilitation  Discharge Summary     Patient Identification:  Radha Judge  : 1940  Admit date: 2022  Discharge date:  22  Attending provider: Jae Blount DO        Primary care provider: Aisha Hamilton MD     Discharge Diagnoses:   Patient Active Problem List   Diagnosis    Lumbar radiculopathy    Other chronic sinusitis    Chronic large granular lymphocytic leukemia ([facility] [address]    Age-related osteoporosis with current pathological fracture with routine healing    Closed compression fracture of thoracic vertebra (HCC)    Hypogammaglobulinemia (HCC)    PRCA (pure red cell aplasia) (HCC)    CLL (chronic lymphoid leukemia) in relapse (HCC)    Non-seasonal allergic rhinitis due to pollen    Mixed hyperlipidemia    Acquired autoimmune hemolytic anemia (HCC)    Arthritis, degenerative    Primary insomnia    History of melanoma    Lightheadedness    Heme positive stool    Solitary pulmonary nodule on lung CT    Lymph node enlargement    Pelvic ring fracture with nonunion    History of pelvic fracture       History of Present Illness/Acute Hospital Course: This is a 82yo F who was admitted on  with a pelvic ring fracture due to osteoporosis. She has a medical history of CLL, ITP, and cardiac PVC with hypotension. She is currently being treated in the [address] [place], [place] [address]. She has seen orthopedics who will continue to follow. She is having trouble with ADLs and walking around which is why she feels like she needs therapy before being discharged home. She is interested in coming to the ARU for further treatment. Inpatient Rehabilitation Course:   Radha Judge is a 81 y.o. female admitted to inpatient rehabilitation on 2022 with History of pelvic fracture. The patient participated in an aggressive multidisciplinary inpatient rehabilitation program involving 3 hours of therapy per day, at least 5 days per week. Impairments: Decreased functional mobility     Medical Management:  1.  Left Pelvic ring fracture including superior and inferior pubic rami fracture and sacral fractures with minimal displacement- Oscal, Vit D, seen by ortho  - requires PT/OT  2. CLL- followed by [facility]- neutropenic precautions? 3. Hypotension- monitor - on midodrine   4. Debility- unable to ambulate long distances. Requires PT/ OT   - WC ordered        Discharge Exam:  Constitutional: Alert, WDWN, Pleasant, no distress  Head: Normocephalic, atruamatic, MMM  Eyes: Conjunctiva noninjected, no icterus, no drainage  Pulm: CTA bilat. Respirations non-labored. CV: No murmurs noted. RRR. Abd: Soft, nontender. NABS+  Ext: No edema, no varicosities  Neuro: Alert, fully oriented, appropriate   MSK: No joint abnormalities noted       Discharge Functional Status:    Physical therapy:  Bed Mobility: Scooting: Stand by assistance (to scoot back in recliner in sitting, pt performing via shifting weight to side and sliding hip back)  Transfers: Sit to Stand: Minimal Assistance (From recliner to RW, increased VC for handplacement and weight bearing precautions)  Stand to Sit: Minimal Assistance (To recliner, and car transfer from [address] [place] [address]), Ambulation  Comments: unable to perform hopping, deemed unsafe to ambulate,    Mobility:  , PT Equipment Recommendations  Equipment Needed: Yes  Mobility Devices: Wheelchair, Mojgan Bolds: Rolling  Wheelchair: Light Weight  Other: lightweight 16' w/c with removable leg rests, flip back armrests, comfort cushion. Pt is currently unable to ambulate household distances with RW, making her unable to access area of home for ADLs including kitchen and bathroom. Use of w/c will allow pt to access these areas of her home to perform these ADLs and allow for safe independent mobility in the home and community.   Pt has sufficient UE strength to propel w/c throughout home and has not expressed unwillingness to use w/c in home., Assessment: Pt is an 80 yo female s/p pelvic fracture that presents with impaired functional mobility, ambulation and stair mechanics. Pt is currently requiring min A with a rolling walker for all OOB activities but is unable to ambulate at this time. Pt is able to perform stand pivots from bed to chair and wheelchair while maintaining her weight bearing precautions. Pt also demo the ability to propel herself in a wheelchair down the hallway with SBA and verbal cues for directing the wheelchair. Pt is signficantly limited by her endurance, increased pain, decreased ability to ambulate and perform stairs. Pt would benefit from continued therapy in order to improve her functional mobility, transfers, ambulation and stairs mechanics. Will continue to follow. Occupational therapy:  ,  , Assessment: From home alone, IND pta, admit with fall and pelvic fx, now TTWB to LLE. Pt completing mobility with ModA for sit to stand to RW, Maritza for stand pivot using RW. Able to maintain TTWB with pivots during session. Pt currently requires increased assist for all ADLs and for funcitonal transfers, such as shower and toilet transfers. Pt presents significantly below baseline and would benefit from cont inpt OT serivces to maximize safety and functional independence prior to dc home. Pt is highly motivated for therapy.  Cont OT per POC    Speech therapy:         Significant Diagnostics:   Lab Results   Component Value Date    CREATININE <0.5 (L) 12/16/2022    BUN 12 12/16/2022     12/16/2022    K 4.1 12/16/2022     12/16/2022    CO2 27 12/16/2022       Lab Results   Component Value Date    WBC 15.0 (H) 12/16/2022    HGB 9.0 (L) 12/16/2022    HCT 27.1 (L) 12/16/2022    .1 (H) 12/16/2022     12/16/2022       Disposition:  home    Services:PT/OT  DME: WC    Discharge Condition: Stable    Follow-up:  See after visit summary from hospitalization    Discharge Medications:     Medication List        START taking these medications      apixaban 2.5 MG Tabs tablet  Commonly known as: ELIQUIS  Take 1 tablet by mouth 2 times daily     fludrocortisone 0.1 MG tablet  Commonly known as: FLORINEF  Take 1 tablet by mouth daily  Start taking on: December 18, 2022     levoFLOXacin 500 MG tablet  Commonly known as: LEVAQUIN  Take 1 tablet by mouth daily for 10 days  Start taking on: December 18, 2022     methotrexate 2.5 MG chemo tablet  Commonly known as: RHEUMATREX  Take 5 tablets by mouth once a week  Start taking on: December 23, 2022     midodrine 10 MG tablet  Commonly known as: PROAMATINE  Take 1 tablet by mouth 3 times daily (with meals)     oxyCODONE-acetaminophen 5-325 MG per tablet  Commonly known as: PERCOCET  Take 1 tablet by mouth every 4 hours as needed (Pain score 1-5) for up to 5 days.      polyethylene glycol 17 g packet  Commonly known as: GLYCOLAX  Take 17 g by mouth daily as needed for Constipation            CHANGE how you take these medications      sennosides-docusate sodium 8.6-50 MG tablet  Commonly known as: SENOKOT-S  Take 2 tablets by mouth daily  Start taking on: December 18, 2022  What changed:   when to take this  reasons to take this            CONTINUE taking these medications      Acalabrutinib 100 MG Caps     allopurinol 300 MG tablet  Commonly known as: ZYLOPRIM     B-COMPLEX/B-12 PO     MVI (CELEBRATE) CHEWABLE TABLET     prochlorperazine 5 MG tablet  Commonly known as: COMPAZINE  Take 1 tablet by mouth every 6 hours as needed (nausea)     vitamin D-3 125 MCG (5000 UT) Tabs  Commonly known as: cholecalciferol  Take 1 tablet by mouth daily               Where to Get Your Medications        These medications were sent to [facility] [id number] [facility], [address] [place] [address] [phone]  [address] [address] , [address]      Phone: [phone]   apixaban 2.5 MG Tabs tablet  fludrocortisone 0.1 MG tablet  levoFLOXacin 500 MG tablet  methotrexate 2.5 MG chemo tablet  midodrine 10 MG tablet  oxyCODONE-acetaminophen 5-325 MG per tablet  polyethylene glycol 17 g packet  prochlorperazine 5 MG tablet  sennosides-docusate sodium 8.6-50 MG tablet           I spent over 35 minutes on this discharge encounter between counseling, coordination of care, and medication reconciliation. To comply with [facility] macario [email or web address]:   Discharge order placed in advance to facilitate patients discharge needs.       Dom Giles, DO

## 2022-12-17 NOTE — PLAN OF CARE
Problem: Discharge Planning  Goal: Discharge to home or other facility with appropriate resources  Outcome: Progressing  Discharge to home or other facility with appropriate resources:   Identify barriers to discharge with patient and caregiver   Identify discharge learning needs (meds, wound care, etc)     Problem: Safety - Adult  Goal: Free from fall injury  Outcome: Progressing  Free From Fall Injury: Instruct family/caregiver on patient safety     Problem: Pain  Goal: Verbalizes/displays adequate comfort level or baseline comfort level  Outcome: Progressing  Verbalizes/displays adequate comfort level or baseline comfort level: Encourage patient to monitor pain and request assistance  Verbalizes/displays adequate comfort level or baseline comfort level:   Encourage patient to monitor pain and request assistance   Assess pain using appropriate pain scale   Administer analgesics based on type and severity of pain and evaluate response   Implement non-pharmacological measures as appropriate and evaluate response     Problem: Skin/Tissue Integrity  Goal: Absence of new skin breakdown  Description: 1. Monitor for areas of redness and/or skin breakdown  2. Assess vascular access sites hourly  3. Every 4-6 hours minimum:  Change oxygen saturation probe site  4. Every 4-6 hours:  If on nasal continuous positive airway pressure, respiratory therapy assess nares and determine need for appliance change or resting period.   Outcome: Progressing     Problem: ABCDS Injury Assessment  Goal: Absence of physical injury  Outcome: Progressing  Absence of Physical Injury: Implement safety measures based on patient assessment     Problem: Nutrition Deficit:  Goal: Optimize nutritional status  Outcome: Progressing  Nutrient intake appropriate for improving, restoring, or maintaining nutritional needs:   Assess nutritional status and recommend course of action   Monitor oral intake, labs, and treatment plans   Recommend appropriate diets, oral nutritional supplements, and vitamin/mineral supplements

## 2022-12-17 NOTE — PROGRESS NOTES
Shift assessment complete. Patient A/Ox4, VSS with soft BP, Left hip area pain managed with PRN Medications, Call light and over bed table within  reach, safety measures in place, hourly rounding and visual checks in place. Will continue to monitor.

## 2022-12-17 NOTE — PROGRESS NOTES
[facility] Discharge Assessment    Transportation  \"Has lack of transportation kept you from medical appointments, meetings, work, or from getting things needed for daily living? \"Check all that apply:  [] A.  Yes, it has kept me from medical appointments or from getting my medications  [] B.  Yes, it has kept me from non-medical meetings, appointments, work, or from getting things that I need  [x] C.  No  [] X. Patient unable to respond  [] Y. Patient declines to respond    Provision of Current Reconciled Medication List to Subsequent Provider at Discharge  [] No, current reconciled medication list not provided to the subsequent provider. [x] Yes, current reconciled medication list provided to the subsequent provider. (**Select route of transmission below**)   [] Via Electronic Health Record   [x] [employer]  [] Verbal (e.g. in person, telephone, video conferencing)  [] Paper-based (e.g. fax, copies, printouts)   [] Other Methods (e.g. texting, email, CDs)    Provision of Current Reconciled Medication List to Patient at Discharge  [] No, current reconciled medication list not provided to the patient, family and/or caregiver. [x] Yes, current reconciled medication list provided to the patient, family and/or caregiver.  (**Select route of transmission below**)   [] Via Electronic Health Record (e.g., electronic access to patient portal)   [] [employer]  [] Verbal (e.g. in person, telephone, video conferencing)  [x] Paper-based (e.g. fax, copies, printouts)   [] Other Methods (e.g. texting, email, CDs)    Health Literacy  \"How often do you need to have someone help you when you read instructions, pamphlets, or other written material from your doctor or pharmacy? \"  [x]  0. Never  []  1. Rarely  []  2. Sometimes  []  3. Often  []  4. Always  []  8.   Patient unable to respond    BIMS - **Must be completed in the flowsheet at discharge prior to proceeding with Delirium Assessment**  [x] BIMS completed in flowsheet at discharge    Signs and Symptoms of Delirium  A. Acute Onset Mental Status Change - Is there evidence of an acute change in mental status from the patient's baseline? [x] 0. No  [] 1. Yes    B. Inattention - Did the patient have difficulty focusing attention, for example being easily distractible or having difficulty keeping track of what was being said? [x]  0. Behavior not present  []  1. Behavior continuously present, does not fluctuate  []  2. Behavior present, fluctuates (comes and goes, changes in severity)    C. Disorganized thinking - Was the patient's thinking disorganized or incoherent (rambling or irrelevant conversation, unclear or illogical flow of ideas, or unpredictable switching from subject to subject)? [x]  0. Behavior not present  []  1. Behavior continuously present, does not fluctuate  []  2. Behavior present, fluctuates (comes and goes, changes in severity)    D. Altered level of consciousness - Did the patient have altered level of consciousness as indicated by any of the following criteria? Vigilant - startled easily to any sound or touch  Lethargic - repeatedly dozed off while being asked questions, but responded to voice or touch  Stuporous - very difficulty to arouse and keep aroused for the interview  Comatose - could not be aroused  [x]  0. Behavior not present  []  1. Behavior continuously present, does not fluctuate  []  2. Behavior present, fluctuates (comes and goes, changes in severity)    Mood    \"Over the last 2 weeks, have you been bothered by any of the following problems?\" 1. Symptom Presence    0 = No  1 = Yes  9 = No Response 2.  Symptom Frequency    0 = Never or 1 day  1 = 2-6 days (several days)  2 = 7-11 days (half or more of the days)  3 = 12-14 days (nearly every day)  **Leave blank if 'No Reponse'**      Enter scores in boxes    Column 1 Column 2   Little interest or pleasure in doing things   0 0   Feeling down, depressed, or hopeless   0 0   **If either A or B in column 2 is coded 2 or 3, CONTINUE asking the questions below. If not, END the interview. **     Trouble falling or staying asleep, or sleeping too much       Feeling tired or having little energy       Poor appetite or overeating       Feeling bad about yourself - or that you are a failure or have let yourself or your family down       Trouble concentrating on things, such as reading the newspaper or watching television       Moving or speaking so slowly that other people could have noticed. Or the opposite- being so fidgety or restless that you have been moving around a lot more than usual.       Thoughts that you would be better off dead, or of hurting yourself in some way. Total Severity: Add scores for all frequency responses in column 2 (possible score 0-27, or enter 99 if unable to complete (if symptom frequency (column 2) is blank for 3 or more items). Social Isolation  \"How often do you feel lonely or isolated from those around you? \"  [] 0. Never  [x] 1. Rarely  [] 2. Sometimes  [] 3. Often  [] 4. Always  [] 7. Patient declines to respond  [] 8. Patient unable to respond    Pain Effect on Sleep  \"Over the past 5 days, how much of the time has pain made it hard for you to sleep at night? \"  []  0. Does not apply - I have not had any pain or hurting in the past 5 days  [x]  1. Rarely or not at all  []  2. Occasionally  []  3. Frequently  []  4. Almost constantly  []  8. Unable to answer    **If the patient answers \"0. Does not apply\" to this question, skip the next two \"Pain Effect. Michelle Colon Michelle Colon \" questions**    Pain Interference with Therapy Activities  \"Over the past 5 days, how often have you limited your participation in rehabilitation therapy sessions due to pain? \"  []  0. Does not apply - I have not received rehabilitation therapy in the past 5 days  [x]  1. Rarely or not at all  []  2. Occasionally  []  3. Frequently  []  4. Almost constantly  []  8. Unable to answer    Pain Interference with Day-to-Day Activities: \"Over the past 5 days, how often have you limited your day-to-day activities (excluding rehabilitation therapy session)? \"  [x]  1. Rarely or not at all  []  2. Occasionally  []  3. Frequently  []  4. Almost constantly  []  8. Unable to answer    Nutritional Approaches  Last 7 Days - Check all of the following nutritional approaches that were received within the last 7 days:  []  A. Parenteral/IV feeding  []  B. Feeding tube (e.g., nasogastric or abdominal (PEG))  []  C. Mechanically altered diet - requires change in texture of food or liquids (e.g., pureed food, thickened liquids)  [x]  D. Therapeutic diet (e.g., low salt, diabetic, low cholesterol)  []  Z. None of the above    At time of Discharge - Check all of the following nutritional approaches that were being received at discharge:  []  A. Parenteral/IV feeding (including IV fluids if needed for hydration, but not as part of dialysis/chemo)  []  B. Feeding tube (e.g., nasogastric or abdominal (PEG))  []  C. Mechanically altered diet - requires change in texture of food or liquids (e.g., pureed food, thickened liquids)  [x]  D. Therapeutic diet (e.g., low salt, diabetic, low cholesterol  []  Z. None of the above    High Risk Drug Classes:  Use and Indication    Is taking: Check if the pt is taking any medications by pharmacological classification, not how it is used, in the following classes  Indication noted:  If column 1 is checked, check if there is an indication noted for all meds in the drug class Is taking  (check all that apply) Indication noted (check all that apply)   Antipsychotic [] []   Anticoagulant [x] [x]   Antibiotic [] []   Opioid [x] [x]   Antiplatelet [] []   Hypoglycemic (including insulin) [] []   None of the above []     Special Treatments, Procedures, and Programs    Check all of the following treatments, procedures, and programs that apply at discharge. At Discharge (check all that apply)   Cancer Treatments   A1. Chemotherapy []           A2. IV []           A3. Oral []           A10. Other []   B1. Radiation []   Respiratory Therapies   C1. Oxygen Therapy [x]           C2. Continuous (continuously for at least 14 hours per day) []           C3. Intermittent [x]           C4. High-concentration []   D1. Suctioning (Does not include oral suctioning) []           D2. Scheduled []           D3. As needed []   E1. Tracheostomy Care []   F1. Invasive Mechanical Ventilator (ventilator or respirator) []   G1. Non-invasive Mechanical Ventilator []           G2. BiPAP []           G3. CPAP []   Other   H1. IV Medications (Do not include sub Q pumps, flushes, Dextrose 50% or lactated ringers) []           H2. Vasoactive medications []           H3. Antibiotics []           H4. Anticoagulation []           H10. Other []   I1. Transfusions []   J1. Dialysis []           J2. Hemodialysis []           J3. Peritoneal dialysis []   O1. IV access (including a catheter in a vein) []           O2. Peripheral []           O3. Midline []           O4.  Central (PICC, tunneled, port) []      None of the above (select if no Cancer, Respiratory, or Other boxes are checked) []

## 2022-12-17 NOTE — DISCHARGE INSTR - COC
Continuity of Care Form    Patient Name: Padmini Doan   :  1940  MRN:  [id number]    Admit date:  2022  Discharge date:  22    Code Status Order: Full Code   Advance Directives:     Admitting Physician:  Shermon Dandy, DO  PCP: Madhuri Ceballos MD    Discharging Nurse: VEE [facility] Unit/Room#: 3921/3420-17  Discharging Unit Phone Number: [phone]    Emergency Contact:   Extended Emergency Contact Information  Primary Emergency Contact: Ramiro Heck [address] Phone: [phone]  Relation: Child  Secondary Emergency Contact: Gabriel Dickens  [place] Phone: [phone]  Relation: Child    Past Surgical History:  Past Surgical History:   Procedure Laterality Date    APPENDECTOMY      AXILLARY SURGERY Right 10/18/2022    RIGHT AXILLARY EXCISIONAL LYMPH NODE BIOPSY performed by Yloa Mukherjee MD at [address]    NASAL SINUS SURGERY      OTHER SURGICAL HISTORY      melanoma removal    SHOULDER SURGERY Right 2018    Rotator cuff    TONSILLECTOMY         Immunization History:   Immunization History   Administered Date(s) Administered    COVID-19, PFIZER PURPLE top, DILUTE for use, (age 15 y+), 30mcg/0.3mL 2021, 2021, 2021    Influenza 10/08/2012    Influenza Vaccine, unspecified formulation 10/01/2018    Influenza Virus Vaccine 10/08/2012, 2015, 10/30/2018    Influenza, FLUAD, (age 72 y+), Adjuvanted, 0.5mL 2020    Influenza, High Dose (Fluzone 65 yrs and older) 2015, 2019    Influenza, Triv, 3 Years and older, IM (Afluria (5 yrs and older) 03/15/2017    Pneumococcal Conjugate 13-valent ([id number]) 2018    Pneumococcal Conjugate Vaccine 2018    Pneumococcal Polysaccharide ([id number]) 2014, 03/15/2017, 2019    Tdap (Boostrix, Adacel) 2014       Active Problems:  Patient Active Problem List   Diagnosis Code    Lumbar radiculopathy M54.16    Other chronic sinusitis J32.8    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    History of pelvic fracture Z87.81       Isolation/Infection:   Isolation            No Isolation          Patient Infection Status       Infection Onset Added Last Indicated Last Indicated By Review Planned Expiration Resolved Resolved By    None active    Resolved    COVID-19 (Rule Out) 12/01/22 12/01/22 12/01/22 COVID-19, Rapid (Ordered)   12/01/22 Rule-Out Test Resulted    COVID-19 (Rule Out) 12/23/21 12/23/21 12/23/21 Respiratory Panel, Molecular, with COVID-19 (Restricted: peds pts or suitable admitted adults) (Ordered)   12/23/21 Rule-Out Test Resulted            Nurse Assessment:  Last Vital Signs: BP (!) 108/59   Pulse 71   Temp 98.1 °F (36.7 °C) (Oral)   Resp 18   Ht 5' 1\" (1.549 m)   Wt 97 lb 3.6 oz (44.1 kg)   LMP  (LMP Unknown)   SpO2 97%   BMI 18.37 kg/m²     Last documented pain score (0-10 scale): Pain Level: 8  Last Weight:   Wt Readings from Last 1 Encounters:   12/12/22 97 lb 3.6 oz (44.1 kg)     Mental Status:  oriented and alert    IV Access:  - None    Nursing Mobility/ADLs:  Walking   Assisted  Transfer  Assisted  Bathing  Assisted  Dressing  Assisted  Toileting Assisted  Feeding  Independent  Med Admin  Assisted  Med Delivery   whole    Wound Care Documentation and Therapy:  Incision 10/18/22 Axilla Proximal;Right (Active)   Number of days: 60        Elimination:  Continence: Bowel: Yes  Bladder: Yes  Urinary Catheter: None   Colostomy/Ileostomy/Ileal Conduit: No       Date of Last BM: 12/15/22    Intake/Output Summary (Last 24 hours) at 12/17/2022 0925  Last data filed at 12/17/2022 0603  Gross per 24 hour   Intake 1320 ml   Output 1350 ml   Net -30 ml     I/O last 3 completed shifts: In: 36 [P.O.:1320]  [facility] [Urine:1350]    Safety Concerns: At Risk for Falls    Impairments/Disabilities:      Vision    Nutrition Therapy:  Current Nutrition Therapy:   - Oral Diet:  General    Routes of Feeding: Oral  Liquids: Thin Liquids  Daily Fluid Restriction: no  Last Modified Barium Swallow with Video (Video Swallowing Test): not done    Treatments at the Time of Hospital Discharge:   Respiratory Treatments: none  Oxygen Therapy:  is not on home oxygen therapy.   Ventilator:    - No ventilator support    Rehab Therapies: Physical Therapy and Occupational Therapy  Weight Bearing Status/Restrictions: No weight bearing restrictions  Other Medical Equipment (for information only, NOT a DME order):  wheelchair and walker  Other Treatments: none    Patient's personal belongings (please select all that are sent with patient):  Glasses    RN SIGNATURE:  Electronically signed by Adi Ruano RN on 12/17/22 at 11:20 AM EST    CASE MANAGEMENT/SOCIAL WORK SECTION    Inpatient Status Date: ***    Readmission Risk Assessment Score:  Readmission Risk              Risk of Unplanned Readmission:  20           Discharging to Facility/ Agency   Name:   Address:  Phone:  Fax:    Dialysis Facility (if applicable)   Name:  Address:  Dialysis Schedule:  Phone:  Fax:    / signature: {Esignature:[id number]}    PHYSICIAN SECTION    Prognosis: Good    Condition at Discharge: Stable    Rehab Potential (if transferring to Rehab): Good    Recommended Labs or Other Treatments After Discharge: PT/OT    Physician Certification: I certify the above information and transfer of Brian Molina  is necessary for the continuing treatment of the diagnosis listed and that she requires Home Care for greater 30 days.      Update Admission H&P: No change in H&P    PHYSICIAN SIGNATURE:  Electronically signed by Dave Russo DO on 12/17/22 at 9:26 AM EST

## 2022-12-19 ENCOUNTER — CARE COORDINATION (OUTPATIENT)
Dept: CASE MANAGEMENT | Age: 82
End: 2022-12-19

## 2022-12-19 DIAGNOSIS — R91.1 SOLITARY PULMONARY NODULE ON LUNG CT: Primary | ICD-10-CM

## 2022-12-19 LAB — CULTURE, FUNGUS BLOOD: NORMAL

## 2022-12-19 PROCEDURE — 1111F DSCHRG MED/CURRENT MED MERGE: CPT | Performed by: INTERNAL MEDICINE

## 2022-12-19 NOTE — CARE COORDINATION
[facility] Care Transitions Initial Follow Up Call    Call within 2 business days of discharge: Yes    LPN Care Coordinator contacted the patient by telephone to perform post hospital discharge assessment. Verified name and  with patient as identifiers. Provided introduction to self, and explanation of the LPN Care Coordinator role. Patient: Hussain Manley Patient : 1940   MRN: [id number]  Reason for Admission: s/p fall, pelvic ring fx, hypotension  Discharge Date: 22 RARS: Readmission Risk Score: 15.7      Last Discharge  Street       Date Complaint Diagnosis Description Type Department Provider    22  Pelvic ring fracture with nonunion . .. Admission (Discharged) [address], DO            Was this an external facility discharge? No Discharge Facility: NA    Challenges to be reviewed by the provider   Additional needs identified to be addressed with provider: No  none               Method of communication with provider: none. LPN CC spoke with patient. States she is doing good. Has some pain to buttocks. Managing with ice & medication. Ambulating with walker to get up to commode. Uses WC throughout house. Stands with assist and gait belt to do some things like brushing teeth. Has good family support. HC active. BP improving per patient. Ranges >400 systolic & in the 07F diastolic. Appetite ok, is hydrating with water. Discussed strategies to improve hydration like using a straw. Has some lightheadedness and fatigue. States she has been resting. Denies problems with bowels or bladder. Full medication reconciliation and 1111f completed. Ortho to call back to complete scheduling for f/u. [facility] . Is not scheduled with PCP at this time. Denies needs. Fito Jackman  [facility]  Care Transitions  [phone]    LPN Care Coordinator reviewed discharge instructions, medical action plan, and red flags with patient who verbalized understanding.  The patient was given an opportunity to ask questions and does not have any further questions or concerns at this time. Were discharge instructions available to patient? Yes. Reviewed appropriate site of care based on symptoms and resources available to patient including: PCP  Specialist  Urgent care clinics  [facility]  When to call 911. The patient agrees to contact the PCP office for questions related to their healthcare. Advance Care Planning:   Does patient have an Advance Directive: reviewed and current. Medication reconciliation was performed with patient, who verbalizes understanding of administration of home medications. Medications reviewed, 1111F entered: yes    Was patient discharged with a pulse oximeter? no    Non-face-to-face services provided:  Obtained and reviewed discharge summary and/or continuity of care documents  Education of patient/family/caregiver/guardian to support self-management-fall precautions, HC, BP monitoring, f/u  Assessment and support for treatment adherence and medication management-full medication reconciliation completed    Offered patient enrollment in the Remote Patient Monitoring (RPM) program for in-home monitoring:  did not offer this encounter . Care Transitions 24 Hour Call    Do you have a copy of your discharge instructions?: Yes  Do you have all of your prescriptions and are they filled?: Yes  Have you been contacted by a [facility] [address]?: No  Have you scheduled your follow up appointment?: Yes  How are you going to get to your appointment?: Car - family or friend to transport  Do you feel like you have everything you need to keep you well at home?: Yes  Care Transitions Interventions         Follow Up  No future appointments. LPN Care Coordinator provided contact information. Plan for follow-up call in 5-7 days based on severity of symptoms and risk factors. Plan for next call: symptom management-pain, lightheadedness, falls  self management-BP, transfers  follow-up appointment-ortho?  [facility] 1/6  medication management-new or changed  [facility], [place]

## 2022-12-20 NOTE — PROGRESS NOTES
Discharge note completed per request of admission staff. Low microbial diet. Percocet for pain associated with pelvic fracture.

## 2022-12-21 ENCOUNTER — TELEPHONE (OUTPATIENT)
Dept: INTERNAL MEDICINE CLINIC | Age: 82
End: 2022-12-21

## 2022-12-27 ENCOUNTER — CARE COORDINATION (OUTPATIENT)
Dept: CASE MANAGEMENT | Age: 82
End: 2022-12-27

## 2022-12-27 NOTE — CARE COORDINATION
[facility] Transitions Follow Up Call    2022    Patient: Rogelio Lopez  Patient : 1940   MRN: [id number]  Reason for Admission: s/p fall, pelvic ring fx, hypotension  Discharge Date: 22 RARS: Readmission Risk Score: 15.7         Spoke with: Lila  Patient reports she is doing about the best to be expected. Eating and drinking well. No bladder or bowel issues. Patient has pain and rates it 6-7/10. Home care PT, OT and nurse continued. Uses a w/c for mobility. Denies sob, chills, fever or fatigue. She has a lightheadedness at times. B/P remains low, nurse is checking it. No questions or needs. Will continue to follow. Care Transitions Follow Up Call    Needs to be reviewed by the provider   Additional needs identified to be addressed with provider: No  none             Method of communication with provider : none      Care Transition Nurse (CTN) contacted the parent by telephone to follow up after admission on 2022. Verified name and  with patient as identifiers. Addressed changes since last contact: none  Discussed follow-up appointments. If no appointment was previously scheduled, appointment scheduling offered: na.   Is follow up appointment scheduled within 7 days of discharge? No.    Advance Care Planning:   Does patient have an Advance Directive:    . CTN reviewed discharge instructions, medical action plan and red flags with patient and discussed any barriers to care and/or understanding of plan of care after discharge. Discussed appropriate site of care based on symptoms and resources available to patient including: PCP  Specialist  Home health. The patient agrees to contact the PCP office for questions related to their healthcare.      Patients top risk factors for readmission: falls  ineffective coping  medical condition-pelvic fracture  Interventions to address risk factors: Education of patient/family/caregiver/guardian to support self-management-       Non-[facility] follow up appointment(s):     CTN provided contact information for future needs. Plan for follow-up call in 5-7 days based on severity of symptoms and risk factors. Plan for next call: symptom management-pain, lh  self management-bp          Care Transitions Subsequent and Final Call    Subsequent and Final Calls  Do you have any ongoing symptoms?: No  Have your medications changed?: No  Do you have any questions related to your medications?: No  Do you currently have any active services?: Yes  Are you currently active with any services?: Home Health  Do you have any needs or concerns that I can assist you with?: No  Identified Barriers: None  Care Transitions Interventions  Other Interventions: Follow Up  No future appointments.     Lisa Dillard LPN

## 2023-01-05 ENCOUNTER — CARE COORDINATION (OUTPATIENT)
Dept: CASE MANAGEMENT | Age: 83
End: 2023-01-05

## 2023-01-05 NOTE — CARE COORDINATION
[facility] Transitions Initial Follow Up Call    Patient:  Jud Olszewski  Patient :  1940  MRN:  [id number]   Reason for Admission:  pelvic fracture   Discharge Date:  22  RARS: 16      Transitions of Care Initial Call    Was this an external facility discharge? no    Discharge Facility: [address] [place] [address] to be reviewed by the provider   Additional needs identified to be addressed with provider:    no    AMB CC Provider Discharge Needs: none            CTC attempt to reach Pt regarding recent hospital discharge. CTC left voice recording with call back number requesting a call back. Follow up appointments:    No future appointments. MICHELLE Dudley, RN  Care Transition Coordinator  Contact Number:  [phone]

## 2023-01-12 ENCOUNTER — CARE COORDINATION (OUTPATIENT)
Dept: CASE MANAGEMENT | Age: 83
End: 2023-01-12

## 2023-01-12 NOTE — CARE COORDINATION
[facility] Transitions Initial Follow Up Call    Patient:  Megan Jc Patient :  1940  MRN:  [id number]  Reason for Admission:  pelvic fracture   Discharge Date:  22 RARS: 16      Transitions of Care Initial Call    Was this an external facility discharge? no    Discharge Facility: [address] [place] [address] to be reviewed by the provider   Additional needs identified to be addressed with provider:    dorys    AMB CC Provider Discharge Needs: none            Non-face-to-face services provided:  Communication with home health agencies or other community services the patient is currently using-  CTN spoke with [facility]  who confirms [facility] was  and pt is still active with them. CTC attempt to reach Pt regarding recent hospital discharge. CTC left voice recording with call back number requesting a call back. CT episode closed. Follow up appointments:    No future appointments. MICHELLE Garcia, RN  Care Transition Coordinator  Contact Number:  [phone]

## 2023-01-27 ENCOUNTER — TELEPHONE (OUTPATIENT)
Dept: INTERNAL MEDICINE CLINIC | Age: 83
End: 2023-01-27

## 2023-01-27 NOTE — TELEPHONE ENCOUNTER
----- Message from Lucille Silva sent at 1/26/2023  9:50 AM EST -----  Subject: Appointment Request    Reason for Call: Established Patient Appointment needed: Routine Existing   Condition Follow Up    QUESTIONS    Reason for appointment request? Available appointments did not meet   patient need     Additional Information for Provider? Patient would like a call to discuss   her blood thinner. She has been passed around with different docs and   would like to stay with Claudia Tuttle or Haily Ferrer. Nothing available.  Please   call asap to discuss/schedule.  ---------------------------------------------------------------------------  --------------  [address]  [phone]; OK to leave message on voicemail  ---------------------------------------------------------------------------  --------------  SCRIPT ANSWERS  COVID Screen: Alina Ovalles

## 2023-01-31 ENCOUNTER — OFFICE VISIT (OUTPATIENT)
Dept: FAMILY MEDICINE CLINIC | Age: 83
End: 2023-01-31
Payer: MEDICARE

## 2023-01-31 VITALS
BODY MASS INDEX: 18.81 KG/M2 | HEIGHT: 61 IN | SYSTOLIC BLOOD PRESSURE: 120 MMHG | TEMPERATURE: 98.4 F | OXYGEN SATURATION: 98 % | HEART RATE: 48 BPM | DIASTOLIC BLOOD PRESSURE: 68 MMHG | WEIGHT: 99.6 LBS

## 2023-01-31 DIAGNOSIS — M80.00XD AGE-RELATED OSTEOPOROSIS WITH CURRENT PATHOLOGICAL FRACTURE WITH ROUTINE HEALING: ICD-10-CM

## 2023-01-31 DIAGNOSIS — S32.810D PELVIC RING FRACTURE WITH ROUTINE HEALING: ICD-10-CM

## 2023-01-31 DIAGNOSIS — C91.10 CHRONIC LARGE GRANULAR LYMPHOCYTIC LEUKEMIA (HCC): Primary | ICD-10-CM

## 2023-01-31 PROCEDURE — 99203 OFFICE O/P NEW LOW 30 MIN: CPT | Performed by: FAMILY MEDICINE

## 2023-01-31 PROCEDURE — G8420 CALC BMI NORM PARAMETERS: HCPCS | Performed by: FAMILY MEDICINE

## 2023-01-31 PROCEDURE — G8427 DOCREV CUR MEDS BY ELIG CLIN: HCPCS | Performed by: FAMILY MEDICINE

## 2023-01-31 PROCEDURE — G8399 PT W/DXA RESULTS DOCUMENT: HCPCS | Performed by: FAMILY MEDICINE

## 2023-01-31 PROCEDURE — G8484 FLU IMMUNIZE NO ADMIN: HCPCS | Performed by: FAMILY MEDICINE

## 2023-01-31 PROCEDURE — 1090F PRES/ABSN URINE INCON ASSESS: CPT | Performed by: FAMILY MEDICINE

## 2023-01-31 PROCEDURE — 1123F ACP DISCUSS/DSCN MKR DOCD: CPT | Performed by: FAMILY MEDICINE

## 2023-01-31 PROCEDURE — 1036F TOBACCO NON-USER: CPT | Performed by: FAMILY MEDICINE

## 2023-01-31 ASSESSMENT — PATIENT HEALTH QUESTIONNAIRE - PHQ9
SUM OF ALL RESPONSES TO PHQ QUESTIONS 1-9: 0
1. LITTLE INTEREST OR PLEASURE IN DOING THINGS: 0
SUM OF ALL RESPONSES TO PHQ9 QUESTIONS 1 & 2: 0
2. FEELING DOWN, DEPRESSED OR HOPELESS: 0
SUM OF ALL RESPONSES TO PHQ QUESTIONS 1-9: 0

## 2023-01-31 NOTE — PROGRESS NOTES
Portions of this chart may have been created with voice recognition software. Occasional wrong-word or \"sound-alike\" substitutions may have occurred due to the inherent limitations of voice recognition software. Read the chart carefully and recognize, using context, where these substitutions have occurred      Chief Complaint     New Patient (Est. Discuss stop taking eliquis & ekg)       Katherin Nash is a 81 y.o. female here for establishing care with me as well as follow-up of chronic medical problems. Patient's main concern was that she was taking Eliquis after she had a pelvic ring fracture back in December. She contacted the orthopedic office to get directions regarding the use of Eliquis and was told to follow-up with cardiology. It was questioning whether she had any cardiac etiology for being on Eliquis. She states that she has not seen a cardiologist in the past.  Upon reviewing the notes that patient was started on Eliquis for DVT prophylaxis and I recommended by the oncologist to be discontinued after 1 month of starting on the Eliquis. Currently patient is ambulatory and doing very well and using walker for ambulation as well as at home uses cane and other support systems. She does not have any other indications for being on the Eliquis at this time. Recommended to discontinue Eliquis this time. Recommended also to inform hematologist oncologist that she is following up regularly. She has CLL and is regularly following up with hematologist for the same. She also has acquired hemolytic anemia and currently being followed up by them as well. Patient has osteoporosis and currently on Prolia biannual injections. It is being managed by her hematologist as well. No other significant questions or concerns other than these. No chest pain palpitation shortness of breath lightheaded dizziness any abdominal bladder symptoms at this time.       REVIEW OF SYSTEMS:  Pertinent positive and negative symptoms noted in HPI        Lab Results   Component Value Date    WBC 15.0 (H) 12/16/2022    HGB 9.0 (L) 12/16/2022    HCT 27.1 (L) 12/16/2022    .1 (H) 12/16/2022     12/16/2022      Lab Results   Component Value Date    LABA1C 5.3 02/25/2021     Lab Results   Component Value Date    .4 02/25/2021     Lab Results   Component Value Date    TSHFT4 3.23 06/24/2021    TSH 2.03 03/13/2014     Lab Results   Component Value Date    CHOL 209 (H) 10/15/2022     Lab Results   Component Value Date    TRIG 171 (H) 10/15/2022     Lab Results   Component Value Date    HDL 45 10/15/2022     Lab Results   Component Value Date    LDLCALC 130 (H) 10/15/2022     Lab Results   Component Value Date    LABVLDL 34 10/15/2022     No results found for: [facility]   Lab Results   Component Value Date     12/16/2022    K 4.1 12/16/2022     12/16/2022    CO2 27 12/16/2022    BUN 12 12/16/2022    CREATININE <0.5 (L) 12/16/2022    GLUCOSE 92 12/16/2022    CALCIUM 8.9 12/16/2022    PROT 5.8 (L) 10/15/2022    LABALBU 4.3 10/15/2022    BILITOT 0.5 10/15/2022    ALKPHOS 107 10/15/2022    AST 23 10/15/2022    ALT 15 10/15/2022    LABGLOM >60 12/16/2022    GFRAA >60 10/15/2022    AGRATIO 2.9 (H) 10/15/2022    GLOB 1.9 10/27/2020           Current Outpatient Medications   Medication Sig Dispense Refill    methotrexate (RHEUMATREX) 2.5 MG chemo tablet 06/29/2021 methotrexate 2.5 MG Oral Tablet      06/29/2021  active      sennosides-docusate sodium (SENOKOT-S) 8.6-50 MG tablet Take 2 tablets by mouth daily 60 tablet 0    midodrine (PROAMATINE) 10 MG tablet Take 1 tablet by mouth 3 times daily (with meals) 90 tablet 3    Acalabrutinib 100 MG CAPS Take 100 mg by mouth every 12 hours Indications: CLL Take 1 tablet (100 mg) every 12 hours      vitamin D-3 (CHOLECALCIFEROL) 125 MCG (5000 UT) TABS Take 1 tablet by mouth daily 90 tablet 1    B Complex Vitamins (B-COMPLEX/B-12 PO) Take 1 capsule by mouth daily Multiple Vitamin (MVI, CELEBRATE, CHEWABLE TABLET) Take 1 tablet by mouth      allopurinol (ZYLOPRIM) 300 MG tablet Take 300 mg by mouth daily       No current facility-administered medications for this visit. Facility-Administered Medications Ordered in Other Visits   Medication Dose Route Frequency Provider Last Rate Last Admin    0.9 % sodium chloride bolus  250 mL IntraVENous Once Gabe Ruffin MD           Allergies   Allergen Reactions    Penicillins Rash    Potassium     Potassium-Containing Compounds          Past Medical History:   Diagnosis Date    Allergic rhinitis due to pollen 01/20/2016    Arthritis, degenerative 05/03/2011    Cancer (HCC)     chronic lymphocytic leukemia    CLL (chronic lymphoblastic leukemia)     Closed compression fracture of thoracic vertebra ([facility] [address] 06/27/2016    Depression     History of blood transfusion     Hyperlipidemia     10/14/22 -  no medication currently    Melanoma of upper arm ([facility] [address]     2002    Mild reactive airways disease 01/20/2016    Sternum fx 08/2022    vaccuming         Past Surgical History:   Procedure Laterality Date    APPENDECTOMY      AXILLARY SURGERY Right 10/18/2022    RIGHT AXILLARY EXCISIONAL LYMPH NODE BIOPSY performed by Jose Braun MD at [address]    NASAL SINUS SURGERY      OTHER SURGICAL HISTORY      melanoma removal    SHOULDER SURGERY Right 2018    Rotator cuff    TONSILLECTOMY           Family History   Problem Relation Age of Onset    Cancer Mother         lymphoma    Heart Disease Father     Heart Disease Sister     Arthritis Sister         Social History     Socioeconomic History    Marital status:       Spouse name: None    Number of children: None    Years of education: None    Highest education level: None   Tobacco Use    Smoking status: Never    Smokeless tobacco: Never   Vaping Use    Vaping Use: Never used   Substance and Sexual Activity    Alcohol use: Yes     Alcohol/week: 0.0 standard drinks     Comment: occasionally     Drug use: Never     Social Determinants of Health     Financial Resource Strain: Low Risk     Difficulty of Paying Living Expenses: Not hard at all   Food Insecurity: No Food Insecurity    Worried About [facility] in the Last Year: Never true    [facility] in the Last Year: Never true          OBJECTIVE:      Vitals:    01/31/23 1101   BP: 120/68   Pulse: (!) 48   Temp: 98.4 °F (36.9 °C)   SpO2: 98%   Weight: 99 lb 9.6 oz (45.2 kg)   Height: 5' 1\" (1.549 m)     Constitutional: Patient appears well-nourished, not in any distress. HENT:  Head: Normocephalic and atraumatic. Eyes: Conjunctivae and EOM are normal  Right Ear: External ear normal.  Left Ear: External ear normal.   Nose: Nose normal.  Mouth/Throat: Oropharynx is clear and moist.   Neck: Normal range of motion. Neck supple. Lymphatic: No cervical lymphadenopathy  Cardiovascular: Normal rate, regular rhythm, normal heart sounds and intact distal pulses. Pulmonary/Chest: Effort normal and breath sounds normal, no wheezes or rhonchi. Neurological:Patient is alert, oriented to person, place, and time. No obvious focal neurological deficits  Skin: Skin is warm and moist.  Psychiatric:Patient has a normal mood and affect, behavior is normal. Judgment and thought content normal.              ASSESSMENT AND PLAN    Alexis Burns was seen today for new patient. 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 [address]    Age-related osteoporosis with current pathological fracture with routine healing    Pelvic ring fracture with routine healing           DISCHARGE MEDICATION LIST        Medication List            Accurate as of January 31, 2023  2:29 PM. If you have any questions, ask your nurse or doctor.                 CONTINUE taking these medications      Acalabrutinib 100 MG Caps     allopurinol 300 MG tablet  Commonly known as: ZYLOPRIM     B-COMPLEX/B-12 PO     methotrexate 2.5 MG chemo tablet  Commonly known as: RHEUMATREX     midodrine 10 MG tablet  Commonly known as: PROAMATINE  Take 1 tablet by mouth 3 times daily (with meals)     MVI (CELEBRATE) CHEWABLE TABLET     sennosides-docusate sodium 8.6-50 MG tablet  Commonly known as: SENOKOT-S  Take 2 tablets by mouth daily     vitamin D-3 125 MCG (5000 UT) Tabs  Commonly known as: cholecalciferol  Take 1 tablet by mouth daily               Return in about 6 months (around 7/31/2023), or if symptoms worsen or fail to improve, for medicare wellness. Please refer to diagnosis, orders and patient instructions for further recommendations given. Body mass index is 18.82 kg/m². . Goals of Healthy standard of living discussed. Emphasis given on appropriate diet and routine regular physical activity with cardio and strength training as much as tolerated advised. All patient's questions and concerns were addressed appropriately. All orders, handouts were reviewed in detail with the patient and patient voiced understanding verbally.

## 2023-06-02 ENCOUNTER — HOSPITAL ENCOUNTER (OUTPATIENT)
Dept: ULTRASOUND IMAGING | Age: 83
Discharge: HOME | End: 2023-06-02
Attending: INTERNAL MEDICINE
Payer: MEDICARE

## 2023-06-02 DIAGNOSIS — C91.Z2 OTHER LYMPHOID LEUKEMIA, IN RELAPSE (HCC): ICD-10-CM

## 2023-06-02 PROCEDURE — 38505 NEEDLE BIOPSY LYMPH NODES: CPT

## 2023-06-02 NOTE — H&P
Patient:  Yuri Webb   :   1940      Relevant patient history reviewed and discussed. The procedure including risks and benefits was discussed at length with the patient (or designated family member) and all questions were answered. Informed consent to proceed with the procedure was given. Condition : stable    [facility] nurses notes reviewed and agreed. Medications reviewed. Allergies:    Allergies   Allergen Reactions    Penicillins Rash    Potassium     Potassium-Containing Compounds

## 2023-06-02 NOTE — PROCEDURES
Interventional Radiology Post Procedure    Date: 6/2/2023    Physician: Denys Savage MD    Pre-op Diagnosis: axillary adenopathy    Post-op Diagnosis: same    Variation from Planned Procedure: None       Findings: successful core biopsy of a right axillary node    Patient condition: Stable    Estimated Blood Loss: 1 cc    Specimens:  18G core x4      Signed,  Anthony Kessler MD  11:31 AM  6/2/2023

## 2023-06-29 LAB
Lab: NORMAL
REPORT: NORMAL
THIS TEST SENT TO: NORMAL

## 2023-08-17 ENCOUNTER — OFFICE VISIT (OUTPATIENT)
Dept: FAMILY MEDICINE CLINIC | Age: 83
End: 2023-08-17

## 2023-08-17 ENCOUNTER — HOSPITAL ENCOUNTER (OUTPATIENT)
Dept: GENERAL RADIOLOGY | Age: 83
Discharge: HOME | End: 2023-08-17
Payer: MEDICARE

## 2023-08-17 VITALS
HEIGHT: 61 IN | OXYGEN SATURATION: 95 % | SYSTOLIC BLOOD PRESSURE: 112 MMHG | HEART RATE: 55 BPM | TEMPERATURE: 97.7 F | WEIGHT: 99.4 LBS | BODY MASS INDEX: 18.77 KG/M2 | DIASTOLIC BLOOD PRESSURE: 78 MMHG

## 2023-08-17 DIAGNOSIS — G89.29 CHRONIC RIGHT SHOULDER PAIN: Primary | ICD-10-CM

## 2023-08-17 DIAGNOSIS — G89.29 CHRONIC RIGHT SHOULDER PAIN: ICD-10-CM

## 2023-08-17 DIAGNOSIS — M15.9 GENERALIZED OSTEOARTHROSIS: ICD-10-CM

## 2023-08-17 DIAGNOSIS — C91.10 CHRONIC LARGE GRANULAR LYMPHOCYTIC LEUKEMIA (HCC): ICD-10-CM

## 2023-08-17 DIAGNOSIS — M25.511 CHRONIC RIGHT SHOULDER PAIN: Primary | ICD-10-CM

## 2023-08-17 DIAGNOSIS — M25.511 CHRONIC RIGHT SHOULDER PAIN: ICD-10-CM

## 2023-08-17 PROCEDURE — 73030 X-RAY EXAM OF SHOULDER: CPT

## 2023-08-17 RX ORDER — TIZANIDINE 4 MG/1
4 TABLET ORAL NIGHTLY PRN
Qty: 30 TABLET | Refills: 2 | Status: SHIPPED | OUTPATIENT
Start: 2023-08-17

## 2023-08-17 RX ORDER — VENETOCLAX 100 MG/1
TABLET, FILM COATED ORAL
COMMUNITY
Start: 2023-06-21

## 2023-08-17 RX ORDER — CELECOXIB 100 MG/1
100 CAPSULE ORAL DAILY
Qty: 60 CAPSULE | Refills: 3 | Status: SHIPPED | OUTPATIENT
Start: 2023-08-17

## 2023-08-17 SDOH — ECONOMIC STABILITY: FOOD INSECURITY: WITHIN THE PAST 12 MONTHS, THE FOOD YOU BOUGHT JUST DIDN'T LAST AND YOU DIDN'T HAVE MONEY TO GET MORE.: NEVER TRUE

## 2023-08-17 SDOH — ECONOMIC STABILITY: HOUSING INSECURITY
IN THE LAST 12 MONTHS, WAS THERE A TIME WHEN YOU DID NOT HAVE A STEADY PLACE TO SLEEP OR SLEPT IN A SHELTER (INCLUDING NOW)?: NO

## 2023-08-17 SDOH — ECONOMIC STABILITY: INCOME INSECURITY: HOW HARD IS IT FOR YOU TO PAY FOR THE VERY BASICS LIKE FOOD, HOUSING, MEDICAL CARE, AND HEATING?: NOT HARD AT ALL

## 2023-08-17 SDOH — ECONOMIC STABILITY: FOOD INSECURITY: WITHIN THE PAST 12 MONTHS, YOU WORRIED THAT YOUR FOOD WOULD RUN OUT BEFORE YOU GOT MONEY TO BUY MORE.: NEVER TRUE

## 2023-08-17 ASSESSMENT — PATIENT HEALTH QUESTIONNAIRE - PHQ9
SUM OF ALL RESPONSES TO PHQ QUESTIONS 1-9: 0
SUM OF ALL RESPONSES TO PHQ QUESTIONS 1-9: 0
2. FEELING DOWN, DEPRESSED OR HOPELESS: 0
SUM OF ALL RESPONSES TO PHQ QUESTIONS 1-9: 0
1. LITTLE INTEREST OR PLEASURE IN DOING THINGS: 0
SUM OF ALL RESPONSES TO PHQ9 QUESTIONS 1 & 2: 0
SUM OF ALL RESPONSES TO PHQ QUESTIONS 1-9: 0

## 2023-08-17 NOTE — PROGRESS NOTES
Portions of this chart may have been created with voice recognition software. Occasional wrong-word or \"sound-alike\" substitutions may have occurred due to the inherent limitations of voice recognition software. Read the chart carefully and recognize, using context, where these substitutions have occurred        Chief Complaint     Follow-up Chronic Condition (F/up with chronic arthritis.), Shoulder Pain (Rt shoulder.), and Orders (Discuss continuation of PT and order renewal)               SUBJECTIVE    Nas Childs is a 81 y.o. female here for Follow-up Chronic Condition (F/up with chronic arthritis.), Shoulder Pain (Rt shoulder.), and Orders (Discuss continuation of PT and order renewal)         Patient coming in for complaints of generalized osteoarthrosis pain in several joints with stiffness. No joint swelling tenderness or any numbness tingling weakness of extremities. She does have some generalized weakness she feels like physical therapy helped in the past however she will call back to get a referral for physical therapy whenever she is ready. She does some regular workout and homes and as much as possible tries to be very active. She also has chronic right shoulder pain secondary to rotator cuff disorder s/p repair and would like to get an x-ray to get further evaluated. She states that she did get cortisone shots in her right shoulder however did not help much. No other significant questions or concerns other than these. No chest pain palpitation shortness of breath lightheaded dizziness or any other significant symptoms recommended we will start on Celebrex for her symptoms as well as try muscle relaxants at bedtime to see if it will help her symptoms additionally. Risk versus benefits of medications discussed.   CLL currently stable, following up regularly with hematologist oncologist.              REVIEW OF SYSTEMS:  Pertinent positive and negative symptoms noted in HPI        Lab Results

## 2023-10-07 NOTE — PATIENT INSTRUCTIONS
Personalized Preventive Plan for Haily Mejia - 2/9/2021  Medicare offers a range of preventive health benefits. Some of the tests and screenings are paid in full while other may be subject to a deductible, co-insurance, and/or copay. Some of these benefits include a comprehensive review of your medical history including lifestyle, illnesses that may run in your family, and various assessments and screenings as appropriate. After reviewing your medical record and screening and assessments performed today your provider may have ordered immunizations, labs, imaging, and/or referrals for you. A list of these orders (if applicable) as well as your Preventive Care list are included within your After Visit Summary for your review. Other Preventive Recommendations:    · A preventive eye exam performed by an eye specialist is recommended every 1-2 years to screen for glaucoma; cataracts, macular degeneration, and other eye disorders. · A preventive dental visit is recommended every 6 months. · Try to get at least 150 minutes of exercise per week or 10,000 steps per day on a pedometer . · Order or download the FREE \"Exercise & Physical Activity: Your Everyday Guide\" from [employer]. Call 6-590.553.5135 or search [employer] online. · You need 3239-3437 mg of calcium and 9666-2069 IU of vitamin D per day. It is possible to meet your calcium requirement with diet alone, but a vitamin D supplement is usually necessary to meet this goal.  · When exposed to the sun, use a sunscreen that protects against both UVA and UVB radiation with an SPF of 30 or greater. Reapply every 2 to 3 hours or after sweating, drying off with a towel, or swimming. · Always wear a seat belt when traveling in a car. Always wear a helmet when riding a bicycle or motorcycle. Pt c/o generalized bruising, lack of appetite, headaches for a few weeks. 6 pound weight loss this month. Both grandparents hx of leukemia

## 2023-11-29 ENCOUNTER — OFFICE VISIT (OUTPATIENT)
Dept: FAMILY MEDICINE CLINIC | Age: 83
End: 2023-11-29
Payer: MEDICARE

## 2023-11-29 VITALS
SYSTOLIC BLOOD PRESSURE: 150 MMHG | BODY MASS INDEX: 18.96 KG/M2 | HEART RATE: 52 BPM | TEMPERATURE: 97.3 F | HEIGHT: 61 IN | WEIGHT: 100.4 LBS | OXYGEN SATURATION: 98 % | DIASTOLIC BLOOD PRESSURE: 88 MMHG

## 2023-11-29 DIAGNOSIS — R03.0 ELEVATED BLOOD PRESSURE READING: ICD-10-CM

## 2023-11-29 DIAGNOSIS — C91.10 CHRONIC LARGE GRANULAR LYMPHOCYTIC LEUKEMIA (HCC): ICD-10-CM

## 2023-11-29 DIAGNOSIS — E78.5 HYPERLIPIDEMIA, UNSPECIFIED HYPERLIPIDEMIA TYPE: ICD-10-CM

## 2023-11-29 DIAGNOSIS — R91.8 MULTIPLE PULMONARY NODULES DETERMINED BY COMPUTED TOMOGRAPHY OF LUNG: ICD-10-CM

## 2023-11-29 DIAGNOSIS — Z00.00 MEDICARE ANNUAL WELLNESS VISIT, SUBSEQUENT: Primary | ICD-10-CM

## 2023-11-29 PROCEDURE — G8484 FLU IMMUNIZE NO ADMIN: HCPCS | Performed by: FAMILY MEDICINE

## 2023-11-29 PROCEDURE — 1123F ACP DISCUSS/DSCN MKR DOCD: CPT | Performed by: FAMILY MEDICINE

## 2023-11-29 PROCEDURE — G0439 PPPS, SUBSEQ VISIT: HCPCS | Performed by: FAMILY MEDICINE

## 2023-11-29 ASSESSMENT — PATIENT HEALTH QUESTIONNAIRE - PHQ9
SUM OF ALL RESPONSES TO PHQ QUESTIONS 1-9: 0
SUM OF ALL RESPONSES TO PHQ QUESTIONS 1-9: 0
1. LITTLE INTEREST OR PLEASURE IN DOING THINGS: 0
SUM OF ALL RESPONSES TO PHQ QUESTIONS 1-9: 0
SUM OF ALL RESPONSES TO PHQ9 QUESTIONS 1 & 2: 0
SUM OF ALL RESPONSES TO PHQ QUESTIONS 1-9: 0
2. FEELING DOWN, DEPRESSED OR HOPELESS: 0
2. FEELING DOWN, DEPRESSED OR HOPELESS: 0
SUM OF ALL RESPONSES TO PHQ QUESTIONS 1-9: 0
1. LITTLE INTEREST OR PLEASURE IN DOING THINGS: 0
SUM OF ALL RESPONSES TO PHQ QUESTIONS 1-9: 0
SUM OF ALL RESPONSES TO PHQ9 QUESTIONS 1 & 2: 0

## 2023-11-29 ASSESSMENT — LIFESTYLE VARIABLES
HOW MANY STANDARD DRINKS CONTAINING ALCOHOL DO YOU HAVE ON A TYPICAL DAY: 1 OR 2
HOW OFTEN DO YOU HAVE A DRINK CONTAINING ALCOHOL: MONTHLY OR LESS

## 2023-11-29 NOTE — PROGRESS NOTES
Conjunctivae and EOM are normal  Right Ear: External ear normal.  Left Ear: External ear normal.   Nose: Nose normal.  Mouth/Throat: Oropharynx is clear and moist.   Neck: Normal range of motion. Neck supple. Lymphatic: No cervical lymphadenopathy  Cardiovascular: Normal rate, regular rhythm, normal heart sounds and intact distal pulses. Pulmonary/Chest: Effort normal and breath sounds normal, no wheezes or rhonchi. Neurological:Patient is alert, oriented to person, place, and time. No obvious focal neurological deficits  Skin: Skin is warm and moist.  Psychiatric:Patient has a normal mood and affect, behavior is normal. Judgment and thought content normal.           Allergies   Allergen Reactions    Penicillins Rash    Potassium     Potassium-Containing Compounds      Prior to Visit Medications    Medication Sig Taking?  Authorizing Provider   VENCLEXTA 100 MG TABS  Yes Forest Hercules MD   FLUVOXAMINE MALEATE PO Take by mouth Yes Forest Hercules MD   celecoxib (CELEBREX) 100 MG capsule Take 1 capsule by mouth daily Yes Alistair Ramirez MD   methotrexate (RHEUMATREX) 2.5 MG chemo tablet Take 4 tablets weekly Yes Forest Hercules MD   sennosides-docusate sodium (SENOKOT-S) 8.6-50 MG tablet Take 2 tablets by mouth daily Yes Dom Giles DO   allopurinol (ZYLOPRIM) 300 MG tablet Take 1 tablet by mouth daily Yes Forest Hercules MD   vitamin D-3 (CHOLECALCIFEROL) 125 MCG (5000 UT) TABS Take 1 tablet by mouth daily Yes Gretchen Cobb MD   B Complex Vitamins (B-COMPLEX/B-12 PO) Take 1 capsule by mouth daily Yes Forest Hercules MD   Multiple Vitamin (MVI, CELEBRATE, CHEWABLE TABLET) Take 1 tablet by mouth Yes Forest Hercules MD CareTeam (Including outside providers/suppliers regularly involved in providing care):   Patient Care Team:  Alistair Ramirez MD as PCP - General (Family Medicine)  Byron Dawkins MD as PCP - Hematology/Oncology (Hematology and Oncology)  Alistair Ramirez MD

## 2023-11-29 NOTE — PATIENT INSTRUCTIONS

## 2023-11-30 DIAGNOSIS — E78.5 HYPERLIPIDEMIA, UNSPECIFIED HYPERLIPIDEMIA TYPE: ICD-10-CM

## 2023-11-30 LAB
CHOLEST SERPL-MCNC: 296 MG/DL (ref 0–199)
HDLC SERPL-MCNC: 66 MG/DL (ref 40–60)
LDLC SERPL CALC-MCNC: 186 MG/DL
TRIGL SERPL-MCNC: 222 MG/DL (ref 0–150)
VLDLC SERPL CALC-MCNC: 44 MG/DL

## 2023-12-08 RX ORDER — ATORVASTATIN CALCIUM 20 MG/1
20 TABLET, FILM COATED ORAL NIGHTLY
Qty: 90 TABLET | Refills: 1 | Status: SHIPPED | OUTPATIENT
Start: 2023-12-08

## 2023-12-12 ENCOUNTER — HOSPITAL ENCOUNTER (OUTPATIENT)
Dept: CT IMAGING | Age: 83
Discharge: HOME | End: 2023-12-12
Payer: MEDICARE

## 2023-12-12 DIAGNOSIS — R91.8 MULTIPLE PULMONARY NODULES DETERMINED BY COMPUTED TOMOGRAPHY OF LUNG: ICD-10-CM

## 2023-12-12 PROCEDURE — 71250 CT THORAX DX C-: CPT

## 2023-12-13 ENCOUNTER — TELEPHONE (OUTPATIENT)
Dept: FAMILY MEDICINE CLINIC | Age: 83
End: 2023-12-13

## 2023-12-13 NOTE — TELEPHONE ENCOUNTER
Pt would like a call back to discuss her ct.  Said that Suzanna called her with medication questions as well, please follow up with pt

## 2024-04-03 PROBLEM — Z85.820 HISTORY OF MELANOMA: Status: RESOLVED | Noted: 2021-06-23 | Resolved: 2024-04-03

## 2024-04-03 PROBLEM — J30.1 NON-SEASONAL ALLERGIC RHINITIS DUE TO POLLEN: Status: RESOLVED | Noted: 2017-02-04 | Resolved: 2024-04-03

## 2024-04-03 PROBLEM — R91.8 MULTIPLE LUNG NODULES ON CT: Status: ACTIVE | Noted: 2024-04-03

## 2024-04-03 PROBLEM — R42 LIGHTHEADEDNESS: Status: RESOLVED | Noted: 2021-06-23 | Resolved: 2024-04-03

## 2024-04-03 PROBLEM — R19.5 HEME POSITIVE STOOL: Status: RESOLVED | Noted: 2021-06-23 | Resolved: 2024-04-03

## 2024-04-03 PROBLEM — Z87.81 HISTORY OF PELVIC FRACTURE: Status: RESOLVED | Noted: 2022-12-04 | Resolved: 2024-04-03

## 2024-04-03 PROBLEM — R91.1 SOLITARY PULMONARY NODULE ON LUNG CT: Status: RESOLVED | Noted: 2022-10-14 | Resolved: 2024-04-03

## 2024-04-03 RX ORDER — CELECOXIB 100 MG/1
100 CAPSULE ORAL DAILY
Qty: 60 CAPSULE | Refills: 3 | Status: SHIPPED | OUTPATIENT
Start: 2024-04-03

## 2024-05-16 ENCOUNTER — TELEPHONE (OUTPATIENT)
Dept: FAMILY MEDICINE CLINIC | Age: 84
End: 2024-05-16

## 2024-05-16 NOTE — TELEPHONE ENCOUNTER
Pt had hip surgery 5/7/2024. In the past few days she has developed a cough and low grade fever. Nurses that she sees for PT and OT say her lungs sound clear, but pt can't seem to get rid of her cough. Please advise, pt would be unable to come in person for a visit.

## 2024-05-16 NOTE — TELEPHONE ENCOUNTER
Spoke to patient regarding this. She has had a lingering cough since being discharged out of the hospital 5/7. She feels a lot of chest congestion and cannot cough up any mucous. Has been taking OTC [employer] expctorant/mucus relief for the past 3 days. No other URI symptoms or fever. Pt is open to any OTC recommendations to help break the chest congestion and help her cough the mucus up. I advised PCP may want to see her for VV since she is unable to come into office, but we would call her back.

## 2024-05-17 ENCOUNTER — TELEPHONE (OUTPATIENT)
Dept: FAMILY MEDICINE CLINIC | Age: 84
End: 2024-05-17

## 2024-05-17 ENCOUNTER — TELEMEDICINE (OUTPATIENT)
Dept: FAMILY MEDICINE CLINIC | Age: 84
End: 2024-05-17

## 2024-05-17 DIAGNOSIS — J20.9 ACUTE BRONCHITIS, UNSPECIFIED ORGANISM: Primary | ICD-10-CM

## 2024-05-17 RX ORDER — DEXTROMETHORPHAN HYDROBROMIDE AND PROMETHAZINE HYDROCHLORIDE 15; 6.25 MG/5ML; MG/5ML
5 SYRUP ORAL 4 TIMES DAILY PRN
Qty: 180 ML | Refills: 0 | Status: SHIPPED | OUTPATIENT
Start: 2024-05-17 | End: 2024-05-26

## 2024-05-17 RX ORDER — AZITHROMYCIN 250 MG/1
TABLET, FILM COATED ORAL
Qty: 6 TABLET | Refills: 0 | Status: SHIPPED | OUTPATIENT
Start: 2024-05-17 | End: 2024-05-27

## 2024-05-17 RX ORDER — BROMPHENIRAMINE MALEATE, PSEUDOEPHEDRINE HYDROCHLORIDE, AND DEXTROMETHORPHAN HYDROBROMIDE 2; 30; 10 MG/5ML; MG/5ML; MG/5ML
10 SYRUP ORAL 4 TIMES DAILY PRN
Qty: 118 ML | Refills: 2 | Status: SHIPPED | OUTPATIENT
Start: 2024-05-17 | End: 2024-05-27

## 2024-05-17 NOTE — TELEPHONE ENCOUNTER
Pharmacy said the cough syrup that was called in for pt today is on back order so they need something else called in instead

## 2024-05-17 NOTE — PROGRESS NOTES
2024    TELEHEALTH EVALUATION -- Audio/Visual (During COVID-19 public health emergency)    HPI:    Lila Dickens (:  1940) has requested an audio/video evaluation for the following concern(s):    Patient with a known history of CLL, on immunosuppressive medications, recently underwent hip surgery and is bedbound.  She presents with wet productive cough with mild low-grade fever, no wheezing or shortness of breath at this time.  Patient could not come into the office visit because of postsurgical status.  Recommended to use incentive spirometer at home as well as start on antibiotic at this time.  Recommended to use over-the-counter colitis and cough medications as recommended.  Recommended to call back if symptoms do not improve or worsen.  Red flag signs to return discussed.    Review of Systems  Pertinent positive and negative symptoms noted in HPI    Prior to Visit Medications    Medication Sig Taking? Authorizing Provider   brompheniramine-pseudoephedrine-DM 2-30-10 MG/5ML syrup Take 10 mLs by mouth 4 times daily as needed for Congestion or Cough Yes Joy Logan MD   azithromycin (ZITHROMAX) 250 MG tablet 500mg on day 1 followed by 250mg on days 2 - 5 Yes Joy Logan MD   promethazine-dextromethorphan (PROMETHAZINE-DM) 6.25-15 MG/5ML syrup Take 5 mLs by mouth 4 times daily as needed for Cough  Joy Logan MD   celecoxib (CELEBREX) 100 MG capsule TAKE 1 CAPSULE BY MOUTH DAILY  Joy Logan MD   atorvastatin (LIPITOR) 20 MG tablet Take 1 tablet by mouth at bedtime  Joy Logan MD   VENCLEXTA 100 MG TABS   ProviderForest MD   FLUVOXAMINE MALEATE PO Take by mouth  Forest Hercules MD   methotrexate (RHEUMATREX) 2.5 MG chemo tablet Take 4 tablets weekly  Forest Hercules MD   sennosides-docusate sodium (SENOKOT-S) 8.6-50 MG tablet Take 2 tablets by mouth daily  Dom Giles DO   allopurinol (ZYLOPRIM) 300 MG tablet Take 1 tablet by mouth daily  Forest Hercules MD

## 2024-05-20 ENCOUNTER — HOSPITAL ENCOUNTER (OUTPATIENT)
Dept: GENERAL RADIOLOGY | Age: 84
Discharge: HOME | End: 2024-05-20
Payer: MEDICARE

## 2024-05-20 DIAGNOSIS — R05.9 COUGH, UNSPECIFIED TYPE: ICD-10-CM

## 2024-05-20 PROCEDURE — 71046 X-RAY EXAM CHEST 2 VIEWS: CPT

## 2024-05-22 RX ORDER — TIZANIDINE 4 MG/1
TABLET ORAL
Qty: 30 TABLET | Refills: 2 | Status: SHIPPED | OUTPATIENT
Start: 2024-05-22

## 2024-06-04 RX ORDER — ATORVASTATIN CALCIUM 20 MG/1
20 TABLET, FILM COATED ORAL NIGHTLY
Qty: 90 TABLET | Refills: 1 | Status: SHIPPED | OUTPATIENT
Start: 2024-06-04

## 2024-06-12 ENCOUNTER — TELEPHONE (OUTPATIENT)
Dept: FAMILY MEDICINE CLINIC | Age: 84
End: 2024-06-12

## 2024-06-12 NOTE — TELEPHONE ENCOUNTER
Josefina from [employer] called stating  the pt just recently had right hip surgery and she is in a lot of pain. She takes her pain medication in the morning and by nighttime she is uncomfortable. She is currently taking tramadol. They will be calling the office of the Dr who did the procedure but they wanted to let her PCP also know.

## 2024-07-08 ENCOUNTER — TELEPHONE (OUTPATIENT)
Dept: FAMILY MEDICINE CLINIC | Age: 84
End: 2024-07-08

## 2024-07-08 NOTE — TELEPHONE ENCOUNTER
LMOM for [facility] informing her that pt's to be new PCP is willing to fill out orders. Left fax and phone number for [facility] on the voicemail.     Awaiting orders at this time.

## 2024-07-08 NOTE — TELEPHONE ENCOUNTER
[facility] faxed over home care orders to be signed on 6/25/2024, no orders found when checking media. Informed them that 's last day was 6/27/2024, and pt will be establishing with Odalis Guzmán 7/22/2024. Home care states they need another provider to sign off on these orders asap, unsure of how to proceed.

## 2024-07-22 ENCOUNTER — OFFICE VISIT (OUTPATIENT)
Dept: FAMILY MEDICINE CLINIC | Age: 84
End: 2024-07-22
Payer: MEDICARE

## 2024-07-22 VITALS
SYSTOLIC BLOOD PRESSURE: 108 MMHG | OXYGEN SATURATION: 97 % | HEIGHT: 60 IN | BODY MASS INDEX: 18.85 KG/M2 | HEART RATE: 63 BPM | DIASTOLIC BLOOD PRESSURE: 68 MMHG | TEMPERATURE: 98.2 F | WEIGHT: 96 LBS

## 2024-07-22 DIAGNOSIS — D80.1 HYPOGAMMAGLOBULINEMIA (HCC): ICD-10-CM

## 2024-07-22 DIAGNOSIS — E78.5 HYPERLIPIDEMIA, UNSPECIFIED HYPERLIPIDEMIA TYPE: Primary | ICD-10-CM

## 2024-07-22 DIAGNOSIS — C91.10 CHRONIC LARGE GRANULAR LYMPHOCYTIC LEUKEMIA (HCC): ICD-10-CM

## 2024-07-22 DIAGNOSIS — M25.551 CHRONIC PAIN OF RIGHT HIP: ICD-10-CM

## 2024-07-22 DIAGNOSIS — D61.01 PRCA (PURE RED CELL APLASIA) (HCC): ICD-10-CM

## 2024-07-22 DIAGNOSIS — G89.29 CHRONIC PAIN OF RIGHT HIP: ICD-10-CM

## 2024-07-22 PROBLEM — S32.810K: Status: RESOLVED | Noted: 2022-12-01 | Resolved: 2024-07-22

## 2024-07-22 PROCEDURE — G8399 PT W/DXA RESULTS DOCUMENT: HCPCS | Performed by: NURSE PRACTITIONER

## 2024-07-22 PROCEDURE — 1123F ACP DISCUSS/DSCN MKR DOCD: CPT | Performed by: NURSE PRACTITIONER

## 2024-07-22 PROCEDURE — G8427 DOCREV CUR MEDS BY ELIG CLIN: HCPCS | Performed by: NURSE PRACTITIONER

## 2024-07-22 PROCEDURE — G2211 COMPLEX E/M VISIT ADD ON: HCPCS | Performed by: NURSE PRACTITIONER

## 2024-07-22 PROCEDURE — 1090F PRES/ABSN URINE INCON ASSESS: CPT | Performed by: NURSE PRACTITIONER

## 2024-07-22 PROCEDURE — 99214 OFFICE O/P EST MOD 30 MIN: CPT | Performed by: NURSE PRACTITIONER

## 2024-07-22 PROCEDURE — 1036F TOBACCO NON-USER: CPT | Performed by: NURSE PRACTITIONER

## 2024-07-22 PROCEDURE — G8420 CALC BMI NORM PARAMETERS: HCPCS | Performed by: NURSE PRACTITIONER

## 2024-07-22 RX ORDER — TRAMADOL HYDROCHLORIDE 50 MG/1
1 TABLET ORAL
COMMUNITY
Start: 2023-06-13

## 2024-07-22 RX ORDER — FLUDROCORTISONE ACETATE 0.1 MG/1
0.1 TABLET ORAL DAILY
COMMUNITY

## 2024-07-22 ASSESSMENT — ENCOUNTER SYMPTOMS
BACK PAIN: 0
EYE ITCHING: 0
SINUS PAIN: 0
TROUBLE SWALLOWING: 0
RHINORRHEA: 0
SHORTNESS OF BREATH: 0
NAUSEA: 0
COUGH: 0
WHEEZING: 0
EYE PAIN: 0
EYE REDNESS: 0
DIARRHEA: 0
ABDOMINAL DISTENTION: 0
STRIDOR: 0
SINUS PRESSURE: 0
VOMITING: 0
EYE DISCHARGE: 0
ABDOMINAL PAIN: 0
CHEST TIGHTNESS: 0
CONSTIPATION: 0

## 2024-07-22 NOTE — PROGRESS NOTES
Lila Dickens (:  1940) is a 84 y.o. female,Established patient, here for evaluation of the following chief complaint(s):  Establish Care      ASSESSMENT/PLAN:  1. 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 Hypogammaglobulinemia (HCC)  Assessment & Plan:  Chronic- stable follows Essel at [facility]  4. Chronic large granular lymphocytic leukemia (HCC)  Assessment & Plan:  Chronic- sees essel with [facility]    No follow-ups on file.    SUBJECTIVE/OBJECTIVE:  Est care  Chronic rt hip pain following a fx and pinning- tramadol helps  Sees [facility] frequently    Current Outpatient Medications   Medication Sig Dispense Refill    traMADol (ULTRAM) 50 MG tablet Take 1 tablet by mouth.      fludrocortisone (FLORINEF) 0.1 MG tablet Take 1 tablet by mouth daily      atorvastatin (LIPITOR) 20 MG tablet TAKE 1 TABLET BY MOUTH AT BEDTIME 90 tablet 1    celecoxib (CELEBREX) 100 MG capsule TAKE 1 CAPSULE BY MOUTH DAILY 60 capsule 3    VENCLEXTA 100 MG TABS       methotrexate (RHEUMATREX) 2.5 MG chemo tablet Take 4 tablets weekly      sennosides-docusate sodium (SENOKOT-S) 8.6-50 MG tablet Take 2 tablets by mouth daily 60 tablet 0    B Complex Vitamins (B-COMPLEX/B-12 PO) Take 1 capsule by mouth daily      Multiple Vitamin (MVI, CELEBRATE, CHEWABLE TABLET) Take 1 tablet by mouth       No current facility-administered medications for this visit.     Facility-Administered Medications Ordered in Other Visits   Medication Dose Route Frequency Provider Last Rate Last Admin    0.9 % sodium chloride bolus  250 mL IntraVENous Once Ivan Flores MD         MEDICATION LIST REVIEWED AND UPDATED AT TIME OF VISIT    Review of Systems   Constitutional:  Negative for chills, fatigue and fever.   HENT:  Negative for congestion, ear pain, hearing loss, rhinorrhea, sinus pressure, sinus pain, tinnitus and trouble swallowing.

## 2024-08-21 ENCOUNTER — TELEPHONE (OUTPATIENT)
Dept: FAMILY MEDICINE CLINIC | Age: 84
End: 2024-08-21

## 2024-08-21 NOTE — TELEPHONE ENCOUNTER
Pt wants Odalis to know she got her lab results back and her fasting cholesterol is 223  She's taking atorvastatin 20MG  Wants Odalis's opinion

## 2024-08-21 NOTE — TELEPHONE ENCOUNTER
Patient called back saying disregard the previous message, she was looking at the wrong paperwork.

## 2025-01-07 ENCOUNTER — OFFICE VISIT (OUTPATIENT)
Dept: FAMILY MEDICINE CLINIC | Age: 85
End: 2025-01-07

## 2025-01-07 DIAGNOSIS — Z00.00 MEDICARE ANNUAL WELLNESS VISIT, SUBSEQUENT: Primary | ICD-10-CM

## 2025-01-07 DIAGNOSIS — J30.89 ALLERGIC RHINITIS DUE TO OTHER ALLERGIC TRIGGER, UNSPECIFIED SEASONALITY: ICD-10-CM

## 2025-01-07 DIAGNOSIS — M19.91 PRIMARY OSTEOARTHRITIS, UNSPECIFIED SITE: ICD-10-CM

## 2025-01-07 DIAGNOSIS — C91.10 CHRONIC LARGE GRANULAR LYMPHOCYTIC LEUKEMIA (HCC): ICD-10-CM

## 2025-01-07 DIAGNOSIS — E78.2 MIXED HYPERLIPIDEMIA: ICD-10-CM

## 2025-01-07 DIAGNOSIS — D61.01 PRCA (PURE RED CELL APLASIA) (HCC): ICD-10-CM

## 2025-01-07 RX ORDER — ATORVASTATIN CALCIUM 20 MG/1
20 TABLET, FILM COATED ORAL NIGHTLY
Qty: 90 TABLET | Refills: 1 | Status: SHIPPED | OUTPATIENT
Start: 2025-01-07

## 2025-01-07 RX ORDER — CELECOXIB 100 MG/1
100 CAPSULE ORAL DAILY
Qty: 60 CAPSULE | Refills: 3 | Status: SHIPPED | OUTPATIENT
Start: 2025-01-07

## 2025-01-07 RX ORDER — FLUTICASONE PROPIONATE 50 MCG
2 SPRAY, SUSPENSION (ML) NASAL DAILY
Qty: 48 G | Refills: 1 | Status: SHIPPED | OUTPATIENT
Start: 2025-01-07

## 2025-01-07 ASSESSMENT — ENCOUNTER SYMPTOMS
RHINORRHEA: 0
EYE DISCHARGE: 0
BACK PAIN: 0
SINUS PRESSURE: 0
ABDOMINAL DISTENTION: 0
DIARRHEA: 0
ABDOMINAL PAIN: 0
EYE REDNESS: 0
EYE PAIN: 0
COUGH: 0
VOMITING: 0
EYE ITCHING: 0
SINUS PAIN: 0
TROUBLE SWALLOWING: 0
CONSTIPATION: 0
NAUSEA: 0
CHEST TIGHTNESS: 0
STRIDOR: 0
WHEEZING: 0
SHORTNESS OF BREATH: 0

## 2025-01-07 ASSESSMENT — LIFESTYLE VARIABLES
HOW OFTEN DO YOU HAVE A DRINK CONTAINING ALCOHOL: NEVER
HOW MANY STANDARD DRINKS CONTAINING ALCOHOL DO YOU HAVE ON A TYPICAL DAY: PATIENT DOES NOT DRINK

## 2025-01-07 ASSESSMENT — PATIENT HEALTH QUESTIONNAIRE - PHQ9
2. FEELING DOWN, DEPRESSED OR HOPELESS: NOT AT ALL
SUM OF ALL RESPONSES TO PHQ QUESTIONS 1-9: 0
1. LITTLE INTEREST OR PLEASURE IN DOING THINGS: NOT AT ALL
SUM OF ALL RESPONSES TO PHQ QUESTIONS 1-9: 0
SUM OF ALL RESPONSES TO PHQ9 QUESTIONS 1 & 2: 0

## 2025-01-07 NOTE — ASSESSMENT & PLAN NOTE
Chronic, at goal (stable), continue current treatment plan    Orders:    atorvastatin (LIPITOR) 20 MG tablet; Take 1 tablet by mouth at bedtime

## 2025-01-07 NOTE — PATIENT INSTRUCTIONS
best things you can do for your health. And it's never too late to start. Being active--or getting active, if you aren't already--has definite benefits. It can:  Give you more energy,  Keep your mind sharp.  Improve balance to reduce your risk of falls.  Help you manage chronic illness with fewer medicines.  No matter how old you are, how fit you are, or what health problems you have, there is a form of activity that will work for you. And the more physical activity you can do, the better your overall health will be.  What kinds of activity can help you stay healthy?  Being more active will make your daily activities easier. Physical activity includes planned exercise and things you do in daily life. There are four types of activity:  Aerobic.  Doing aerobic activity makes your heart and lungs strong.  Includes walking, dancing, and gardening.  Aim for at least 2½ hours spread throughout the week.  It improves your energy and can help you sleep better.  Muscle-strengthening.  This type of activity can help maintain muscle and strengthen bones.  Includes climbing stairs, using resistance bands, and lifting or carrying heavy loads.  Aim for at least twice a week.  It can help protect the knees and other joints.  Stretching.  Stretching gives you better range of motion in joints and muscles.  Includes upper arm stretches, calf stretches, and gentle yoga.  Aim for at least twice a week, preferably after your muscles are warmed up from other activities.  It can help you function better in daily life.  Balancing.  This helps you stay coordinated and have good posture.  Includes heel-to-toe walking, prema chi, and certain types of yoga.  Aim for at least 3 days a week.  It can reduce your risk of falling.  Even if you have a hard time meeting the recommendations, it's better to be more active than less active. All activity done in each category counts toward your weekly total. You'd be surprised how daily things like

## 2025-01-07 NOTE — ASSESSMENT & PLAN NOTE
Chronic, at goal (stable), continue current treatment plan    Orders:    celecoxib (CELEBREX) 100 MG capsule; Take 1 capsule by mouth daily

## 2025-01-07 NOTE — PROGRESS NOTES
Lila Dickens, was evaluated through a synchronous (real-time) audio-video encounter. The patient (or guardian if applicable) is aware that this is a billable service, which includes applicable co-pays. This Virtual Visit was conducted with patient's (and/or legal guardian's) consent. Patient identification was verified, and a caregiver was present when appropriate.   The patient was located at Home: [address] [place], [place] [id number]  Provider was located at Facility (Appt Dept): [address] [place], [place] [id number]  Confirm you are appropriately licensed, registered, or certified to deliver care in the state where the patient is located as indicated above. If you are not or unsure, please re-schedule the visit: Yes, I confirm.     Lila Dickens (:  1940) is a Established patient, presenting virtually for evaluation of the following:      Below is the assessment and plan developed based on review of pertinent history, physical exam, labs, studies, and medications.     Assessment & Plan  PRCA (pure red cell aplasia) (HCC)   Monitored by specialist- no acute findings meriting change in the plan         Chronic large granular lymphocytic leukemia (HCC)   Monitored by specialist- no acute findings meriting change in the plan         Medicare annual wellness visit, subsequent    Completed. No additional needs at this time         Primary osteoarthritis, unspecified site   Chronic, at goal (stable), continue current treatment plan    Orders:    celecoxib (CELEBREX) 100 MG capsule; Take 1 capsule by mouth daily    Mixed hyperlipidemia   Chronic, at goal (stable), continue current treatment plan    Orders:    atorvastatin (LIPITOR) 20 MG tablet; Take 1 tablet by mouth at bedtime    Allergic rhinitis due to other allergic trigger, unspecified seasonality   Chronic, at goal (stable), continue current treatment plan    Orders:    fluticasone (FLONASE) 50 MCG/ACT nasal spray; 2 sprays by

## 2025-07-04 DIAGNOSIS — E78.2 MIXED HYPERLIPIDEMIA: ICD-10-CM

## 2025-07-07 RX ORDER — ATORVASTATIN CALCIUM 20 MG/1
20 TABLET, FILM COATED ORAL NIGHTLY
Qty: 90 TABLET | Refills: 1 | OUTPATIENT
Start: 2025-07-07

## 2025-07-17 ENCOUNTER — OFFICE VISIT (OUTPATIENT)
Dept: FAMILY MEDICINE CLINIC | Age: 85
End: 2025-07-17

## 2025-07-17 VITALS
OXYGEN SATURATION: 97 % | HEIGHT: 60 IN | TEMPERATURE: 96.8 F | DIASTOLIC BLOOD PRESSURE: 62 MMHG | SYSTOLIC BLOOD PRESSURE: 116 MMHG | BODY MASS INDEX: 19.83 KG/M2 | HEART RATE: 65 BPM | WEIGHT: 101 LBS

## 2025-07-17 DIAGNOSIS — D80.1 HYPOGAMMAGLOBULINEMIA: ICD-10-CM

## 2025-07-17 DIAGNOSIS — E78.5 HYPERLIPIDEMIA, UNSPECIFIED HYPERLIPIDEMIA TYPE: ICD-10-CM

## 2025-07-17 DIAGNOSIS — Z00.00 MEDICARE ANNUAL WELLNESS VISIT, SUBSEQUENT: Primary | ICD-10-CM

## 2025-07-17 DIAGNOSIS — C91.10 CHRONIC LARGE GRANULAR LYMPHOCYTIC LEUKEMIA (HCC): ICD-10-CM

## 2025-07-17 DIAGNOSIS — M19.09 PRIMARY OSTEOARTHRITIS OF OTHER SITE: ICD-10-CM

## 2025-07-17 DIAGNOSIS — R91.8 MULTIPLE LUNG NODULES ON CT: ICD-10-CM

## 2025-07-17 DIAGNOSIS — E78.2 MIXED HYPERLIPIDEMIA: ICD-10-CM

## 2025-07-17 DIAGNOSIS — D61.01 PRCA (PURE RED CELL APLASIA) (HCC): ICD-10-CM

## 2025-07-17 PROBLEM — R59.9 LYMPH NODE ENLARGEMENT: Status: RESOLVED | Noted: 2022-10-18 | Resolved: 2025-07-17

## 2025-07-17 PROBLEM — F51.01 PRIMARY INSOMNIA: Status: RESOLVED | Noted: 2019-05-14 | Resolved: 2025-07-17

## 2025-07-17 RX ORDER — ATORVASTATIN CALCIUM 20 MG/1
20 TABLET, FILM COATED ORAL NIGHTLY
Qty: 90 TABLET | Refills: 3 | Status: SHIPPED | OUTPATIENT
Start: 2025-07-17

## 2025-07-17 SDOH — ECONOMIC STABILITY: FOOD INSECURITY: WITHIN THE PAST 12 MONTHS, YOU WORRIED THAT YOUR FOOD WOULD RUN OUT BEFORE YOU GOT MONEY TO BUY MORE.: NEVER TRUE

## 2025-07-17 SDOH — ECONOMIC STABILITY: FOOD INSECURITY: WITHIN THE PAST 12 MONTHS, THE FOOD YOU BOUGHT JUST DIDN'T LAST AND YOU DIDN'T HAVE MONEY TO GET MORE.: NEVER TRUE

## 2025-07-17 ASSESSMENT — PATIENT HEALTH QUESTIONNAIRE - PHQ9
DEPRESSION UNABLE TO ASSESS: FUNCTIONAL CAPACITY MOTIVATION LIMITS ACCURACY
SUM OF ALL RESPONSES TO PHQ QUESTIONS 1-9: 0
2. FEELING DOWN, DEPRESSED OR HOPELESS: NOT AT ALL
1. LITTLE INTEREST OR PLEASURE IN DOING THINGS: NOT AT ALL
DEPRESSION UNABLE TO ASSESS: FUNCTIONAL CAPACITY MOTIVATION LIMITS ACCURACY
SUM OF ALL RESPONSES TO PHQ QUESTIONS 1-9: 0
SUM OF ALL RESPONSES TO PHQ QUESTIONS 1-9: 0
2. FEELING DOWN, DEPRESSED OR HOPELESS: NOT AT ALL
SUM OF ALL RESPONSES TO PHQ QUESTIONS 1-9: 0
1. LITTLE INTEREST OR PLEASURE IN DOING THINGS: NOT AT ALL
SUM OF ALL RESPONSES TO PHQ QUESTIONS 1-9: 0

## 2025-07-17 ASSESSMENT — LIFESTYLE VARIABLES
HOW OFTEN DO YOU HAVE A DRINK CONTAINING ALCOHOL: MONTHLY OR LESS
HOW MANY STANDARD DRINKS CONTAINING ALCOHOL DO YOU HAVE ON A TYPICAL DAY: 1 OR 2

## 2025-07-17 NOTE — PROGRESS NOTES
Medicare Annual Wellness Visit    Lila Dickens is here for Medication Refill    Assessment & Plan    Medicare annual wellness visit, subsequent  PRCA (pure red cell aplasia) (HCC)  Chronic large granular lymphocytic leukemia (HCC)  Multiple lung nodules on CT  Mixed hyperlipidemia  -     atorvastatin (LIPITOR) 20 MG tablet; Take 1 tablet by mouth at bedtime, Disp-90 tablet, R-3Normal  Hypogammaglobulinemia  Primary osteoarthritis of other site    Results  Imaging   - Low dose chest CT: Multiple scattered pulmonary nodules that appear benign.     Return in 6 months (on 1/17/2026).     Subjective     History of Present Illness  Patient presents today for annual this visit.  She has no complaints at this time.  She is still very active and independent at home.  She declines any additional needs at this time.  She is seen monthly by [facility] for her CLL.  She has multiple children who all know her end-of-life care wishes.  She is up-to-date with all of her health maintenance.  She knows that she can get her RSV vaccine and her Tdap at her local pharmacy.  She is monitored closely by [facility]    Patient's complete Health Risk Assessment and screening values have been reviewed and are found in Flowsheets. The following problems were reviewed today and where indicated follow up appointments were made and/or referrals ordered.    Positive Risk Factor Screenings with Interventions:      Depression:  Depression Unable to Assess: Functional capacity motivation limits accuracy  PHQ-2 Score: 0  PHQ-9 Total Score: 0          Controlled Medication Review:    Today's Pain Level: No data recorded   Opioid Risk: (Low risk score <55) Opioid risk score: 7    Patient is low risk for opioid use disorder or overdose.    Last PDMP [place] as Reviewed:  Review User Review Instant Review Result              Last Controlled Substance Monitoring Documentation      Flowsheet Row Office Visit from 3/5/2019 in [facility] [address]   Attestation The

## 2025-07-18 LAB
CHOLEST SERPL-MCNC: 182 MG/DL (ref 0–199)
HDLC SERPL-MCNC: 64 MG/DL (ref 40–60)
LDLC SERPL CALC-MCNC: 61 MG/DL
TRIGL SERPL-MCNC: 286 MG/DL (ref 0–150)
VLDLC SERPL CALC-MCNC: 57 MG/DL

## 2025-07-21 ENCOUNTER — RESULTS FOLLOW-UP (OUTPATIENT)
Dept: FAMILY MEDICINE CLINIC | Age: 85
End: 2025-07-21

## 2025-07-28 NOTE — PROGRESS NOTES
Vaccine Information Sheet, \"Influenza - Inactivated\"  given to Gwynne Cabot, or parent/legal guardian of  Gwynne Cabot and verbalized understanding. Patient responses:    Have you ever had a reaction to a flu vaccine? No  Do you have any current illness? No  Have you ever had Guillian [place] Syndrome? No  Do you have a serious allergy to any of the follow: Neomycin, Polymyxin, Thimerosal, eggs or egg products? No    Flu vaccine given per order. Please see immunization tab. Risks and benefits explained. Current VIS given.
Avoid tiring and awkward posture that may impair breathing.  Pace: Slow and steady pace, never rushing!  Pursed lip breathing.  Pursed Lip Breathing: \"Smell the roses, blow out the candles\".

## (undated) DEVICE — Device

## (undated) DEVICE — STANDARD HYPODERMIC NEEDLE,POLYPROPYLENE HUB: Brand: MONOJECT

## (undated) DEVICE — SUTURE VCRL + SZ 3-0 L27IN ABSRB UD L26MM SH 1/2 CIR VCP416H

## (undated) DEVICE — SPONGE,DISSECTOR,K,XRAY,9/16"X1/4",STRL: Brand: MEDLINE

## (undated) DEVICE — CLIP LIG M BLU TI HRT SHP WIRE HORZ 600 PER BX

## (undated) DEVICE — SUTURE MCRYL + SZ 4-0 L18IN ABSRB UD L19MM PS-2 3/8 CIR MCP496G

## (undated) DEVICE — GOWN SIRUS NONREIN LG W/TWL: Brand: MEDLINE INDUSTRIES, INC.

## (undated) DEVICE — BLADE ES ELASTOMERIC COAT INSUL DURABLE BEND UPTO 90DEG

## (undated) DEVICE — SYRINGE MED 10ML LUERLOCK TIP W/O SFTY DISP

## (undated) DEVICE — YANKAUER,OPEN TIP,W/O VENT,STERILE: Brand: MEDLINE INDUSTRIES, INC.

## (undated) DEVICE — GLOVE,SURG,SENSICARE SLT,LF,PF,6.5: Brand: MEDLINE

## (undated) DEVICE — APPLICATOR MEDICATED 26 CC SOLUTION HI LT ORNG CHLORAPREP

## (undated) DEVICE — PENCIL SMK EVAC MPLR ULT VAC E Z CLN TLS MEGADYNE

## (undated) DEVICE — ADHESIVE SKIN CLSR 0.7ML TOP DERMBND ADV